# Patient Record
Sex: MALE | URBAN - METROPOLITAN AREA
[De-identification: names, ages, dates, MRNs, and addresses within clinical notes are randomized per-mention and may not be internally consistent; named-entity substitution may affect disease eponyms.]

---

## 2018-06-05 ENCOUNTER — NEW REFERRAL (OUTPATIENT)
Dept: URBAN - METROPOLITAN AREA CLINIC 27 | Facility: CLINIC | Age: 73
End: 2018-06-05

## 2018-06-05 DIAGNOSIS — H25.9: ICD-10-CM

## 2018-06-05 DIAGNOSIS — H43.393: ICD-10-CM

## 2018-06-05 DIAGNOSIS — E11.3213: ICD-10-CM

## 2018-06-05 PROCEDURE — 92134 CPTRZ OPH DX IMG PST SGM RTA: CPT

## 2018-06-05 PROCEDURE — 92250 FUNDUS PHOTOGRAPHY W/I&R: CPT

## 2018-06-05 PROCEDURE — 92004 COMPRE OPH EXAM NEW PT 1/>: CPT

## 2018-06-05 PROCEDURE — 92225 OPHTHALMOSCOPY (INITIAL): CPT

## 2018-06-05 PROCEDURE — 92235 FLUORESCEIN ANGRPH MLTIFRAME: CPT

## 2018-06-05 ASSESSMENT — VISUAL ACUITY
OD_SC: CF 6FT
OD_PH: 20/200
OS_SC: CF 5FT

## 2018-06-05 ASSESSMENT — TONOMETRY
OD_IOP_MMHG: 18
OS_IOP_MMHG: 17

## 2018-06-12 ENCOUNTER — PROCEDURE ONLY (OUTPATIENT)
Dept: URBAN - METROPOLITAN AREA CLINIC 27 | Facility: CLINIC | Age: 73
End: 2018-06-12

## 2018-06-12 DIAGNOSIS — E11.3213: ICD-10-CM

## 2018-06-12 PROCEDURE — 67028 INJECTION EYE DRUG: CPT | Mod: GA,RT

## 2018-06-12 ASSESSMENT — TONOMETRY: OD_IOP_MMHG: 16

## 2018-06-12 ASSESSMENT — VISUAL ACUITY: OD_SC: 20/200+1

## 2018-06-19 ENCOUNTER — PROCEDURE ONLY (OUTPATIENT)
Dept: URBAN - METROPOLITAN AREA CLINIC 27 | Facility: CLINIC | Age: 73
End: 2018-06-19

## 2018-06-19 DIAGNOSIS — E11.3213: ICD-10-CM

## 2018-06-19 PROCEDURE — 67028 INJECTION EYE DRUG: CPT

## 2018-06-19 ASSESSMENT — VISUAL ACUITY
OS_SC: CF 5FT
OS_PH: 20/400

## 2018-06-19 ASSESSMENT — TONOMETRY: OS_IOP_MMHG: 20

## 2018-07-17 ENCOUNTER — FOLLOW UP (OUTPATIENT)
Dept: URBAN - METROPOLITAN AREA CLINIC 27 | Facility: CLINIC | Age: 73
End: 2018-07-17

## 2018-07-17 DIAGNOSIS — E11.3213: ICD-10-CM

## 2018-07-17 PROCEDURE — 92134 CPTRZ OPH DX IMG PST SGM RTA: CPT

## 2018-07-17 PROCEDURE — 92012 INTRM OPH EXAM EST PATIENT: CPT

## 2018-07-17 ASSESSMENT — VISUAL ACUITY
OD_SC: 20/400
OS_SC: CF 5FT
OD_PH: 20/100

## 2018-07-17 ASSESSMENT — TONOMETRY
OD_IOP_MMHG: 20
OS_IOP_MMHG: 18

## 2018-08-02 RX ORDER — GLIMEPIRIDE 4 MG/1
4 TABLET ORAL DAILY
COMMUNITY
End: 2021-07-03 | Stop reason: HOSPADM

## 2018-08-02 RX ORDER — PIOGLITAZONEHYDROCHLORIDE 30 MG/1
30 TABLET ORAL EVERY MORNING
COMMUNITY
End: 2021-07-03 | Stop reason: HOSPADM

## 2018-08-02 RX ORDER — FUROSEMIDE 20 MG/1
20 TABLET ORAL WEEKLY
COMMUNITY
End: 2021-07-03 | Stop reason: HOSPADM

## 2018-08-02 RX ORDER — ENALAPRIL MALEATE 10 MG/1
10 TABLET ORAL 2 TIMES DAILY
Status: ON HOLD | COMMUNITY
End: 2021-07-03 | Stop reason: SDUPTHER

## 2018-08-02 NOTE — PRE-PROCEDURE INSTRUCTIONS
Pre-Surgery Instructions:   Medication Instructions    enalapril (VASOTEC) 10 mg tablet Instructed patient per Anesthesia Guidelines   furosemide (LASIX) 20 mg tablet Instructed patient per Anesthesia Guidelines   glimepiride (AMARYL) 4 mg tablet Instructed patient per Anesthesia Guidelines   MAGNESIUM PO Instructed patient per Anesthesia Guidelines   metFORMIN (GLUCOPHAGE) 850 mg tablet Instructed patient per Anesthesia Guidelines   metFORMIN (GLUCOPHAGE) 850 mg tablet Instructed patient per Anesthesia Guidelines   NON FORMULARY Instructed patient per Anesthesia Guidelines   pioglitazone (ACTOS) 30 mg tablet Instructed patient per Anesthesia Guidelines   PRAVASTATIN SODIUM PO Instructed patient per Anesthesia Guidelines  Pre op instructions given  Pt to take enalapril and blood sugar the am of surgery   [facility] Surgical Experience    The following information was developed to assist you to prepare for your operation  What do I need to do before coming to the hospital?   Arrange for a responsible person to drive you to and from the hospital    Arrange care for your children at home  Children are not allowed in the recovery areas of the hospital   Plan to wear clothing that is easy to put on and take off  If you are having shoulder surgery, wear a shirt that buttons or zippers in the front  Bathing  o Shower the evening before and the morning of your surgery with an antibacterial soap  Please refer to the Pre Op Showering Instructions for Surgery Patients Sheet   o Remove nail polish and all body piercing jewelry  o Do not shave any body part for at least 24 hours before surgery-this includes face, arms, legs and upper body  Food  o Nothing to eat or drink after midnight the night before your surgery   This includes candy and chewing gum  o Exception: If your surgery is after 12:00pm (noon), you may have clear liquids such as 7-Up®, ginger ale, apple or cranberry juice, Grzegorz Loron, water, or clear broth until 8:00 am  o Do not drink milk or juice with pulp on the morning before surgery  o Do not drink alcohol 24 hours before surgery  Medicine  o Follow instructions you received from your surgeon about which medicines you may take on the day of surgery  o If instructed to take medicine on the morning of surgery, take pills with just a small sip of water  Call your prescribing doctor for specific infroamtion on what to do if you take insulin    What should I bring to the hospital?    Bring:  Raisa Mcelroy or a walker, if you have them, for foot or knee surgery   A list of the daily medicines, vitamins, minerals, herbals and nutritional supplements you take  Include the dosages of medicines and the time you take them each day   Glasses, dentures or hearing aids   Minimal clothing; you will be wearing hospital sleepwear   Photo ID; required to verify your identity   If you have a Living Will or Power of , bring a copy of the documents   If you have an ostomy, bring an extra pouch and any supplies you use    Do not bring   Medicines or inhalers   Money, valuables or jewelry    What other information should I know about the day of surgery?  Notify your surgeons if you develop a cold, sore throat, cough, fever, rash or any other illness   Report to the Ambulatory Surgical/Same Day Surgery Unit   You will be instructed to stop at Registration only if you have not been pre-registered   Inform your  fi they do not stay that they will be asked by the staff to leave a phone number where they can be reached   Be available to be reached before surgery  In the event the operating room schedule changes, you may be asked to come in earlier or later than expected    *It is important to tell your doctor and others involved in your health care if you are taking or have been taking any non-prescription drugs, vitamins, minerals, herbals or other nutritional supplements   Any of these may interact with some food or medicines and cause a reaction

## 2018-08-05 ENCOUNTER — ANESTHESIA EVENT (OUTPATIENT)
Dept: PERIOP | Facility: AMBULARY SURGERY CENTER | Age: 73
End: 2018-08-05
Payer: MEDICARE

## 2018-08-05 RX ORDER — ONDANSETRON 2 MG/ML
4 INJECTION INTRAMUSCULAR; INTRAVENOUS ONCE AS NEEDED
Status: CANCELLED | OUTPATIENT
Start: 2018-08-05

## 2018-08-05 NOTE — ANESTHESIA PREPROCEDURE EVALUATION
Review of Systems/Medical History  Patient summary reviewed  Chart reviewed      Cardiovascular  EKG reviewed, Hyperlipidemia, Hypertension ,   Comment: EKG: SR IRBBB    Cariology visit, murmur present, no further work up indicated  Watching murmur  ,  Pulmonary       GI/Hepatic            Endo/Other  Diabetes well controlled type 2 ,   Obesity  morbid obesity   GYN       Hematology   Musculoskeletal    Arthritis     Neurology   Psychology           Physical Exam    Airway    Mallampati score: II  TM Distance: >3 FB  Neck ROM: full     Dental       Cardiovascular  Rhythm: regular, Rate: normal, Murmur,     Pulmonary  Breath sounds clear to auscultation,     Other Findings  Some missing teeth no loose teeth      Anesthesia Plan  ASA Score- 3     Anesthesia Type- IV sedation with anesthesia with ASA Monitors  Additional Monitors:   Airway Plan:         Plan Factors-    Induction- intravenous  Postoperative Plan-     Informed Consent- Anesthetic plan and risks discussed with patient

## 2018-08-06 ENCOUNTER — HOSPITAL ENCOUNTER (OUTPATIENT)
Facility: AMBULARY SURGERY CENTER | Age: 73
Setting detail: OUTPATIENT SURGERY
Discharge: HOME | End: 2018-08-06
Attending: OPHTHALMOLOGY | Admitting: OPHTHALMOLOGY
Payer: MEDICARE

## 2018-08-06 ENCOUNTER — ANESTHESIA (OUTPATIENT)
Dept: PERIOP | Facility: AMBULARY SURGERY CENTER | Age: 73
End: 2018-08-06
Payer: MEDICARE

## 2018-08-06 VITALS
HEIGHT: 68 IN | WEIGHT: 240 LBS | SYSTOLIC BLOOD PRESSURE: 146 MMHG | HEART RATE: 88 BPM | DIASTOLIC BLOOD PRESSURE: 63 MMHG | OXYGEN SATURATION: 95 % | RESPIRATION RATE: 18 BRPM | TEMPERATURE: 97.8 F | BODY MASS INDEX: 36.37 KG/M2

## 2018-08-06 DIAGNOSIS — H25.041 POSTERIOR SUBCAPSULAR POLAR AGE-RELATED CATARACT OF RIGHT EYE: Primary | ICD-10-CM

## 2018-08-06 LAB — GLUCOSE SERPL-MCNC: 120 MG/DL (ref 65–140)

## 2018-08-06 PROCEDURE — 82948 REAGENT STRIP/BLOOD GLUCOSE: CPT

## 2018-08-06 PROCEDURE — 93005 ELECTROCARDIOGRAM TRACING: CPT

## 2018-08-06 PROCEDURE — V2632 POST CHMBR INTRAOCULAR LENS: HCPCS | Performed by: OPHTHALMOLOGY

## 2018-08-06 DEVICE — IOL SN60WF 20.0: Type: IMPLANTABLE DEVICE | Site: EYE | Status: FUNCTIONAL

## 2018-08-06 RX ORDER — LIDOCAINE HYDROCHLORIDE 20 MG/ML
1 JELLY TOPICAL
Status: ACTIVE | OUTPATIENT
Start: 2018-08-06 | End: 2018-08-06

## 2018-08-06 RX ORDER — GATIFLOXACIN 5 MG/ML
1 SOLUTION/ DROPS OPHTHALMIC 2 TIMES DAILY
Qty: 3 ML | Refills: 0
Start: 2018-08-06 | End: 2018-09-27

## 2018-08-06 RX ORDER — GATIFLOXACIN 5 MG/ML
SOLUTION/ DROPS OPHTHALMIC AS NEEDED
Status: DISCONTINUED | OUTPATIENT
Start: 2018-08-06 | End: 2018-08-06 | Stop reason: HOSPADM

## 2018-08-06 RX ORDER — LIDOCAINE HYDROCHLORIDE 10 MG/ML
INJECTION, SOLUTION EPIDURAL; INFILTRATION; INTRACAUDAL; PERINEURAL AS NEEDED
Status: DISCONTINUED | OUTPATIENT
Start: 2018-08-06 | End: 2018-08-06 | Stop reason: HOSPADM

## 2018-08-06 RX ORDER — TETRACAINE HYDROCHLORIDE 5 MG/ML
1 SOLUTION OPHTHALMIC ONCE
Status: COMPLETED | OUTPATIENT
Start: 2018-08-06 | End: 2018-08-06

## 2018-08-06 RX ORDER — MIDAZOLAM HYDROCHLORIDE 1 MG/ML
INJECTION INTRAMUSCULAR; INTRAVENOUS AS NEEDED
Status: DISCONTINUED | OUTPATIENT
Start: 2018-08-06 | End: 2018-08-06 | Stop reason: SURG

## 2018-08-06 RX ORDER — PHENYLEPHRINE HCL 2.5 %
1 DROPS OPHTHALMIC (EYE)
Status: ACTIVE | OUTPATIENT
Start: 2018-08-06 | End: 2018-08-06

## 2018-08-06 RX ORDER — TETRACAINE HYDROCHLORIDE 5 MG/ML
SOLUTION OPHTHALMIC AS NEEDED
Status: DISCONTINUED | OUTPATIENT
Start: 2018-08-06 | End: 2018-08-06 | Stop reason: HOSPADM

## 2018-08-06 RX ORDER — KETOROLAC TROMETHAMINE 5 MG/ML
1 SOLUTION OPHTHALMIC
Status: ACTIVE | OUTPATIENT
Start: 2018-08-06 | End: 2018-08-06

## 2018-08-06 RX ORDER — CYCLOPENTOLATE HYDROCHLORIDE 10 MG/ML
1 SOLUTION/ DROPS OPHTHALMIC
Status: ACTIVE | OUTPATIENT
Start: 2018-08-06 | End: 2018-08-06

## 2018-08-06 RX ORDER — BALANCED SALT SOLUTION 6.4; .75; .48; .3; 3.9; 1.7 MG/ML; MG/ML; MG/ML; MG/ML; MG/ML; MG/ML
SOLUTION OPHTHALMIC AS NEEDED
Status: DISCONTINUED | OUTPATIENT
Start: 2018-08-06 | End: 2018-08-06 | Stop reason: HOSPADM

## 2018-08-06 RX ORDER — LIDOCAINE HYDROCHLORIDE 10 MG/ML
0.5 INJECTION, SOLUTION EPIDURAL; INFILTRATION; INTRACAUDAL; PERINEURAL ONCE AS NEEDED
Status: DISCONTINUED | OUTPATIENT
Start: 2018-08-06 | End: 2018-08-06 | Stop reason: HOSPADM

## 2018-08-06 RX ADMIN — LIDOCAINE HYDROCHLORIDE 1 APPLICATION: 20 JELLY TOPICAL at 06:52

## 2018-08-06 RX ADMIN — LIDOCAINE HYDROCHLORIDE 1 APPLICATION: 20 JELLY TOPICAL at 07:07

## 2018-08-06 RX ADMIN — LIDOCAINE HYDROCHLORIDE 1 APPLICATION: 20 JELLY TOPICAL at 07:41

## 2018-08-06 RX ADMIN — KETOROLAC TROMETHAMINE 1 DROP: 5 SOLUTION OPHTHALMIC at 07:41

## 2018-08-06 RX ADMIN — PHENYLEPHRINE HYDROCHLORIDE 1 DROP: 25 SOLUTION/ DROPS OPHTHALMIC at 06:53

## 2018-08-06 RX ADMIN — CYCLOPENTOLATE HYDROCHLORIDE 1 DROP: 10 SOLUTION/ DROPS OPHTHALMIC at 07:07

## 2018-08-06 RX ADMIN — KETOROLAC TROMETHAMINE 1 DROP: 5 SOLUTION OPHTHALMIC at 06:52

## 2018-08-06 RX ADMIN — KETOROLAC TROMETHAMINE 1 DROP: 5 SOLUTION OPHTHALMIC at 07:07

## 2018-08-06 RX ADMIN — PHENYLEPHRINE HYDROCHLORIDE 1 DROP: 25 SOLUTION/ DROPS OPHTHALMIC at 07:41

## 2018-08-06 RX ADMIN — CYCLOPENTOLATE HYDROCHLORIDE 1 DROP: 10 SOLUTION/ DROPS OPHTHALMIC at 07:41

## 2018-08-06 RX ADMIN — TETRACAINE HYDROCHLORIDE 1 DROP: 5 SOLUTION OPHTHALMIC at 06:53

## 2018-08-06 RX ADMIN — PHENYLEPHRINE HYDROCHLORIDE 1 DROP: 25 SOLUTION/ DROPS OPHTHALMIC at 07:07

## 2018-08-06 RX ADMIN — MIDAZOLAM HYDROCHLORIDE 2 MG: 1 INJECTION, SOLUTION INTRAMUSCULAR; INTRAVENOUS at 07:45

## 2018-08-06 RX ADMIN — CYCLOPENTOLATE HYDROCHLORIDE 1 DROP: 10 SOLUTION/ DROPS OPHTHALMIC at 06:52

## 2018-08-06 NOTE — ANESTHESIA POSTPROCEDURE EVALUATION
Post-Op Assessment Note      CV Status:  Stable    Mental Status:  Alert and awake    Hydration Status:  Euvolemic    PONV Controlled:  Controlled    Airway Patency:  Patent    Post Op Vitals Reviewed: Yes          Staff: Anesthesiologist           /63 (08/06/18 0804)    Temp      Pulse 88 (08/06/18 0804)   Resp      SpO2 95 % (08/06/18 0804)

## 2018-08-06 NOTE — DISCHARGE INSTRUCTIONS
Dr Jaquelin Medeiros Cataract Instructions    Activity:     1  No Driving until instructed   2  Keep shield on until seen tomorrow except when administering drops   3  No heavy lifting   4  No water in eye     Diet:     1  Resume normal diet    Normal Symptoms:     1  Mild Headache   2  Scratchy or picky feeling around eye    Call the office if:     1  You have any questions or concerns   2  If eye pain is not relieved by extra strength tylenol    Office phone number:  [phone]      Next appointment:     1  See Dr Jaquelin Medeiros at his office tomorrow as scheduled   _________1030 am _________________________________________________   2  Bring blue eye kit with you and eyedrops to the office    A new set of comprehensive instructions will be given and reviewed with you during your office visit tomorrow

## 2018-08-06 NOTE — OP NOTE
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 [H25 041]  Decreased vision to 20/100  With problems driving  Pt requested cataract sx the right eye    Procedure and Technique:    Procedure Details     The patient was brought in the OR in stable condition and placed on the operative table  The right eye was prepped and draped in the usual sterile manner  Attention was directed to the right eye where a lid speculum was placed  A 2 4 mm clear corneal incision was made temperally  1/2 cc of 1% MPF Lidocaine was irrigated into the anterior chamber followed by viscoat  The side port incision was placed superiorly  The capsularrhexis was made and the nucleus was hydrodissected with BSS  The nucleus was then removed with the phaco handpiece followed by removal of the cortical material with the I/A handpiece  The capsular bag was then filled with Provisc  The IOL was folded and placed in to the capsular bag and centered well  The remaining Provisc was removed from the eye with the I/A  The wounds were hydrated with BSS and found to be water tight  The lid speculum was removed and 2 drops of Gatifloxicin were placed over the cornea  A protective eye shield was taped over the eye and the patient went to PACU in stable condition  I will see the patient in the office tomorrow and the expected post op period is a few weeks         Complications: None        Disposition: PACU Condition: Stable    SIGNATURE: Marilee Nolan MD  DATE: August 6, 2018  TIME: 8:04 AM

## 2018-08-07 LAB
ATRIAL RATE: 85 BPM
P AXIS: -16 DEGREES
PR INTERVAL: 142 MS
QRS AXIS: 70 DEGREES
QRSD INTERVAL: 110 MS
QT INTERVAL: 392 MS
QTC INTERVAL: 466 MS
T WAVE AXIS: 30 DEGREES
VENTRICULAR RATE: 85 BPM

## 2018-08-07 PROCEDURE — 93010 ELECTROCARDIOGRAM REPORT: CPT | Performed by: INTERNAL MEDICINE

## 2018-09-25 ENCOUNTER — FOLLOW UP (OUTPATIENT)
Dept: URBAN - METROPOLITAN AREA CLINIC 27 | Facility: CLINIC | Age: 73
End: 2018-09-25

## 2018-09-25 DIAGNOSIS — E11.3213: ICD-10-CM

## 2018-09-25 PROCEDURE — 92134 CPTRZ OPH DX IMG PST SGM RTA: CPT

## 2018-09-25 PROCEDURE — 92012 INTRM OPH EXAM EST PATIENT: CPT

## 2018-09-25 ASSESSMENT — VISUAL ACUITY
OD_SC: 20/40+3
OS_SC: CF 4FT

## 2018-09-25 ASSESSMENT — TONOMETRY
OS_IOP_MMHG: 19
OD_IOP_MMHG: 18

## 2018-09-27 NOTE — PRE-PROCEDURE INSTRUCTIONS
Pre-Surgery Instructions:   Medication Instructions    enalapril (VASOTEC) 10 mg tablet Instructed patient per Anesthesia Guidelines   furosemide (LASIX) 20 mg tablet Instructed patient per Anesthesia Guidelines   glimepiride (AMARYL) 4 mg tablet Instructed patient per Anesthesia Guidelines   MAGNESIUM PO Instructed patient per Anesthesia Guidelines   metFORMIN (GLUCOPHAGE) 850 mg tablet Instructed patient per Anesthesia Guidelines   metFORMIN (GLUCOPHAGE) 850 mg tablet Instructed patient per Anesthesia Guidelines   NON FORMULARY Instructed patient per Anesthesia Guidelines   pioglitazone (ACTOS) 30 mg tablet Instructed patient per Anesthesia Guidelines   PRAVASTATIN SODIUM PO Instructed patient per Anesthesia Guidelines  Pre op instructions given  Pt to take blood sugar, enalapril the am of surgery   sister Lurdes Mendez [phone] Surgical Experience    The following information was developed to assist you to prepare for your operation  What do I need to do before coming to the hospital?   Arrange for a responsible person to drive you to and from the hospital    Arrange care for your children at home  Children are not allowed in the recovery areas of the hospital   Plan to wear clothing that is easy to put on and take off  If you are having shoulder surgery, wear a shirt that buttons or zippers in the front  Bathing  o Shower the evening before and the morning of your surgery with an antibacterial soap  Please refer to the Pre Op Showering Instructions for Surgery Patients Sheet   o Remove nail polish and all body piercing jewelry  o Do not shave any body part for at least 24 hours before surgery-this includes face, arms, legs and upper body  Food  o Nothing to eat or drink after midnight the night before your surgery   This includes candy and chewing gum  o Exception: If your surgery is after 12:00pm (noon), you may have clear liquids such as 7-Up®, ginger ale, apple or cranberry juice, Jell-O®, water, or clear broth until 8:00 am  o Do not drink milk or juice with pulp on the morning before surgery  o Do not drink alcohol 24 hours before surgery  Medicine  o Follow instructions you received from your surgeon about which medicines you may take on the day of surgery  o If instructed to take medicine on the morning of surgery, take pills with just a small sip of water  Call your prescribing doctor for specific infroamtion on what to do if you take insulin    What should I bring to the hospital?    Bring:  Antionette Duster or a walker, if you have them, for foot or knee surgery   A list of the daily medicines, vitamins, minerals, herbals and nutritional supplements you take  Include the dosages of medicines and the time you take them each day   Glasses, dentures or hearing aids   Minimal clothing; you will be wearing hospital sleepwear   Photo ID; required to verify your identity   If you have a Living Will or Power of , bring a copy of the documents   If you have an ostomy, bring an extra pouch and any supplies you use    Do not bring   Medicines or inhalers   Money, valuables or jewelry    What other information should I know about the day of surgery?  Notify your surgeons if you develop a cold, sore throat, cough, fever, rash or any other illness   Report to the Ambulatory Surgical/Same Day Surgery Unit   You will be instructed to stop at Registration only if you have not been pre-registered   Inform your  fi they do not stay that they will be asked by the staff to leave a phone number where they can be reached   Be available to be reached before surgery  In the event the operating room schedule changes, you may be asked to come in earlier or later than expected    *It is important to tell your doctor and others involved in your health care if you are taking or have been taking any non-prescription drugs, vitamins, minerals, herbals or other nutritional supplements  Any of these may interact with some food or medicines and cause a reaction

## 2018-09-30 ENCOUNTER — ANESTHESIA EVENT (OUTPATIENT)
Dept: PERIOP | Facility: AMBULARY SURGERY CENTER | Age: 73
End: 2018-09-30
Payer: MEDICARE

## 2018-09-30 NOTE — ANESTHESIA PREPROCEDURE EVALUATION
Edema lower legs   Diabetes mellitus (HCC)    Hearing aid worn bilateral   Arthritis    Hyperlipidemia    Hypertension controlled   BPH associated with nocturia            Review of Systems/Medical History  Patient summary reviewed  Chart reviewed      Cardiovascular  Hyperlipidemia, Hypertension ,    Pulmonary       GI/Hepatic       Prostatic disorder, benign prostatic hyperplasia       Endo/Other  Diabetes ,   Obesity  super morbid obesity   GYN       Hematology   Musculoskeletal    Comment: Tremor of both hands Arthritis     Neurology   Psychology           Physical Exam    Airway    Mallampati score: II  TM Distance: >3 FB  Neck ROM: full     Dental       Cardiovascular  Rhythm: regular, Rate: normal,     Pulmonary  Breath sounds clear to auscultation,     Other Findings        Anesthesia Plan  ASA Score- 2     Anesthesia Type- IV sedation with anesthesia with ASA Monitors  Additional Monitors:   Airway Plan:         Plan Factors-    Induction- intravenous  Postoperative Plan-     Informed Consent- Anesthetic plan and risks discussed with patient

## 2018-10-01 ENCOUNTER — ANESTHESIA (OUTPATIENT)
Dept: PERIOP | Facility: AMBULARY SURGERY CENTER | Age: 73
End: 2018-10-01
Payer: MEDICARE

## 2018-10-01 ENCOUNTER — HOSPITAL ENCOUNTER (OUTPATIENT)
Facility: AMBULARY SURGERY CENTER | Age: 73
Setting detail: OUTPATIENT SURGERY
Discharge: HOME | End: 2018-10-01
Attending: OPHTHALMOLOGY | Admitting: OPHTHALMOLOGY
Payer: MEDICARE

## 2018-10-01 VITALS
HEART RATE: 88 BPM | HEIGHT: 68 IN | OXYGEN SATURATION: 94 % | RESPIRATION RATE: 16 BRPM | TEMPERATURE: 98.3 F | SYSTOLIC BLOOD PRESSURE: 143 MMHG | DIASTOLIC BLOOD PRESSURE: 67 MMHG | BODY MASS INDEX: 36.37 KG/M2 | WEIGHT: 240 LBS

## 2018-10-01 DIAGNOSIS — H25.042 POSTERIOR SUBCAPSULAR POLAR AGE-RELATED CATARACT OF LEFT EYE: Primary | ICD-10-CM

## 2018-10-01 LAB — GLUCOSE SERPL-MCNC: 117 MG/DL (ref 65–140)

## 2018-10-01 PROCEDURE — V2632 POST CHMBR INTRAOCULAR LENS: HCPCS | Performed by: OPHTHALMOLOGY

## 2018-10-01 PROCEDURE — 82948 REAGENT STRIP/BLOOD GLUCOSE: CPT

## 2018-10-01 DEVICE — IOL SN60WF 20.0: Type: IMPLANTABLE DEVICE | Site: EYE | Status: FUNCTIONAL

## 2018-10-01 RX ORDER — GATIFLOXACIN 5 MG/ML
1 SOLUTION/ DROPS OPHTHALMIC 2 TIMES DAILY
Qty: 3 ML | Refills: 0
Start: 2018-10-01 | End: 2021-07-03 | Stop reason: HOSPADM

## 2018-10-01 RX ORDER — KETOROLAC TROMETHAMINE 5 MG/ML
1 SOLUTION OPHTHALMIC
Status: COMPLETED | OUTPATIENT
Start: 2018-10-01 | End: 2018-10-01

## 2018-10-01 RX ORDER — FENTANYL CITRATE/PF 50 MCG/ML
25 SYRINGE (ML) INJECTION
Status: DISCONTINUED | OUTPATIENT
Start: 2018-10-01 | End: 2018-10-01 | Stop reason: HOSPADM

## 2018-10-01 RX ORDER — PHENYLEPHRINE HCL 2.5 %
1 DROPS OPHTHALMIC (EYE)
Status: COMPLETED | OUTPATIENT
Start: 2018-10-01 | End: 2018-10-01

## 2018-10-01 RX ORDER — CYCLOPENTOLATE HYDROCHLORIDE 10 MG/ML
1 SOLUTION/ DROPS OPHTHALMIC
Status: COMPLETED | OUTPATIENT
Start: 2018-10-01 | End: 2018-10-01

## 2018-10-01 RX ORDER — MIDAZOLAM HYDROCHLORIDE 1 MG/ML
INJECTION INTRAMUSCULAR; INTRAVENOUS AS NEEDED
Status: DISCONTINUED | OUTPATIENT
Start: 2018-10-01 | End: 2018-10-01 | Stop reason: SURG

## 2018-10-01 RX ORDER — TETRACAINE HYDROCHLORIDE 5 MG/ML
1 SOLUTION OPHTHALMIC ONCE
Status: COMPLETED | OUTPATIENT
Start: 2018-10-01 | End: 2018-10-01

## 2018-10-01 RX ORDER — LIDOCAINE HYDROCHLORIDE 10 MG/ML
INJECTION, SOLUTION EPIDURAL; INFILTRATION; INTRACAUDAL; PERINEURAL AS NEEDED
Status: DISCONTINUED | OUTPATIENT
Start: 2018-10-01 | End: 2018-10-01 | Stop reason: HOSPADM

## 2018-10-01 RX ORDER — GATIFLOXACIN 5 MG/ML
SOLUTION/ DROPS OPHTHALMIC AS NEEDED
Status: DISCONTINUED | OUTPATIENT
Start: 2018-10-01 | End: 2018-10-01 | Stop reason: HOSPADM

## 2018-10-01 RX ORDER — LIDOCAINE HYDROCHLORIDE 20 MG/ML
1 JELLY TOPICAL
Status: COMPLETED | OUTPATIENT
Start: 2018-10-01 | End: 2018-10-01

## 2018-10-01 RX ORDER — BALANCED SALT SOLUTION 6.4; .75; .48; .3; 3.9; 1.7 MG/ML; MG/ML; MG/ML; MG/ML; MG/ML; MG/ML
SOLUTION OPHTHALMIC AS NEEDED
Status: DISCONTINUED | OUTPATIENT
Start: 2018-10-01 | End: 2018-10-01 | Stop reason: HOSPADM

## 2018-10-01 RX ORDER — ONDANSETRON 2 MG/ML
4 INJECTION INTRAMUSCULAR; INTRAVENOUS ONCE AS NEEDED
Status: DISCONTINUED | OUTPATIENT
Start: 2018-10-01 | End: 2018-10-01 | Stop reason: HOSPADM

## 2018-10-01 RX ORDER — TETRACAINE HYDROCHLORIDE 5 MG/ML
SOLUTION OPHTHALMIC AS NEEDED
Status: DISCONTINUED | OUTPATIENT
Start: 2018-10-01 | End: 2018-10-01 | Stop reason: HOSPADM

## 2018-10-01 RX ADMIN — KETOROLAC TROMETHAMINE 1 DROP: 5 SOLUTION OPHTHALMIC at 10:13

## 2018-10-01 RX ADMIN — PHENYLEPHRINE HYDROCHLORIDE 1 DROP: 25 SOLUTION/ DROPS OPHTHALMIC at 10:13

## 2018-10-01 RX ADMIN — TETRACAINE HYDROCHLORIDE 1 DROP: 5 SOLUTION OPHTHALMIC at 08:24

## 2018-10-01 RX ADMIN — LIDOCAINE HYDROCHLORIDE 1 APPLICATION: 20 JELLY TOPICAL at 08:39

## 2018-10-01 RX ADMIN — MIDAZOLAM HYDROCHLORIDE 0.5 MG: 1 INJECTION, SOLUTION INTRAMUSCULAR; INTRAVENOUS at 11:09

## 2018-10-01 RX ADMIN — LIDOCAINE HYDROCHLORIDE 1 APPLICATION: 20 JELLY TOPICAL at 08:24

## 2018-10-01 RX ADMIN — LIDOCAINE HYDROCHLORIDE 1 APPLICATION: 20 JELLY TOPICAL at 10:13

## 2018-10-01 RX ADMIN — LIDOCAINE HYDROCHLORIDE 1 APPLICATION: 20 JELLY TOPICAL at 10:29

## 2018-10-01 RX ADMIN — CYCLOPENTOLATE HYDROCHLORIDE 1 DROP: 10 SOLUTION/ DROPS OPHTHALMIC at 10:12

## 2018-10-01 RX ADMIN — PHENYLEPHRINE HYDROCHLORIDE 1 DROP: 25 SOLUTION/ DROPS OPHTHALMIC at 08:24

## 2018-10-01 RX ADMIN — KETOROLAC TROMETHAMINE 1 DROP: 5 SOLUTION OPHTHALMIC at 08:39

## 2018-10-01 RX ADMIN — PHENYLEPHRINE HYDROCHLORIDE 1 DROP: 25 SOLUTION/ DROPS OPHTHALMIC at 08:39

## 2018-10-01 RX ADMIN — PHENYLEPHRINE HYDROCHLORIDE 1 DROP: 25 SOLUTION/ DROPS OPHTHALMIC at 10:29

## 2018-10-01 RX ADMIN — CYCLOPENTOLATE HYDROCHLORIDE 1 DROP: 10 SOLUTION/ DROPS OPHTHALMIC at 08:39

## 2018-10-01 RX ADMIN — KETOROLAC TROMETHAMINE 1 DROP: 5 SOLUTION OPHTHALMIC at 10:29

## 2018-10-01 RX ADMIN — KETOROLAC TROMETHAMINE 1 DROP: 5 SOLUTION OPHTHALMIC at 08:24

## 2018-10-01 RX ADMIN — MIDAZOLAM HYDROCHLORIDE 0.5 MG: 1 INJECTION, SOLUTION INTRAMUSCULAR; INTRAVENOUS at 11:06

## 2018-10-01 RX ADMIN — CYCLOPENTOLATE HYDROCHLORIDE 1 DROP: 10 SOLUTION/ DROPS OPHTHALMIC at 10:29

## 2018-10-01 RX ADMIN — CYCLOPENTOLATE HYDROCHLORIDE 1 DROP: 10 SOLUTION/ DROPS OPHTHALMIC at 08:24

## 2018-10-01 NOTE — DISCHARGE INSTRUCTIONS
Dr Jaison Rodriguez Cataract Instructions    Activity:     1  No Driving until instructed   2  Keep shield on until seen tomorrow except when administering drops   3  No heavy lifting   4  No water in eye     Diet:     1  Resume normal diet    Normal Symptoms:     1  Mild Headache   2  Scratchy or picky feeling around eye    Call the office if:     1  You have any questions or concerns   2  If eye pain is not relieved by extra strength tylenol    Office phone number:  [phone]      Next appointment:     1  See Dr Jaison Rodriguez at his office tomorrow as scheduled         11:15 AM   2  Bring blue eye kit with you and eyedrops to the office    A new set of comprehensive instructions will be given and reviewed with you during your office visit tomorrow

## 2018-10-01 NOTE — OP NOTE
OPERATIVE REPORT    PATIENT NAME: Brianne Monteiro    :  1945  MRN: [id number]  Pt Location: [place] [address] OR ROOM 01    Surgery Date: 10/1/2018    Surgeon(s) and Role:     * Jhony Mann MD - Primary    Posterior subcapsular polar age-related cataract of left eye [H25 042]    Post-Op Diagnosis Codes:     * Posterior subcapsular polar age-related cataract of left eye [H25 042]    Procedure(s):  EXTRACTION EXTRACAPSULAR CATARACT PHACO INTRAOCULAR LENS (IOL)    Anesthesia Type:   IV Sedation with Anesthesia    Operative Indications:  Posterior subcapsular polar age-related cataract of left eye [H25 042]  Decreased vision to 20/200  With problems driving  Pt requested cataract sx the left eye    Procedure and Technique:    Procedure Details     The patient was brought in the OR in stable condition and placed on the operative table  The left eye was prepped and draped in the usual sterile manner  Attention was directed to the left eye where a lid speculum was placed  A 2 4 mm clear corneal incision was made temperally  1/2 cc of 1% MPF Lidocaine was irrigated into the anterior chamber followed by viscoat  The side port incision was placed superiorly  The capsularrhexis was made and the nucleus was hydrodissected with BSS  The nucleus was then removed with the phaco handpiece followed by removal of the cortical material with the I/A handpiece  The capsular bag was then filled with Provisc  The IOL was folded and placed in to the capsular bag and centered well  The remaining Provisc was removed from the eye with the I/A  The wounds were hydrated with BSS and found to be water tight  The lid speculum was removed and 2 drops of Gatifloxicin were placed over the cornea  A protective eye shield was taped over the eye and the patient went to PACU in stable condition  I will see the patient in the office tomorrow and the expected post op period is a few weeks         Complications: None        Disposition: PACU Condition: Stable    SIGNATURE: Diana Stoner MD  DATE: October 1, 2018  TIME: 11:28 AM

## 2018-10-01 NOTE — PERIOPERATIVE NURSING NOTE
Surgery delayed until 1100 due to having toast at 0700  Drops stopped after 2nd dose and resumed at 1013

## 2018-11-20 ENCOUNTER — FOLLOW UP (OUTPATIENT)
Dept: URBAN - METROPOLITAN AREA CLINIC 27 | Facility: CLINIC | Age: 73
End: 2018-11-20

## 2018-11-20 DIAGNOSIS — Z96.1: ICD-10-CM

## 2018-11-20 DIAGNOSIS — H43.393: ICD-10-CM

## 2018-11-20 DIAGNOSIS — E11.3213: ICD-10-CM

## 2018-11-20 PROCEDURE — 92250 FUNDUS PHOTOGRAPHY W/I&R: CPT

## 2018-11-20 PROCEDURE — 92014 COMPRE OPH EXAM EST PT 1/>: CPT

## 2018-11-20 PROCEDURE — 92134 CPTRZ OPH DX IMG PST SGM RTA: CPT

## 2018-11-20 PROCEDURE — 92235 FLUORESCEIN ANGRPH MLTIFRAME: CPT

## 2018-11-20 ASSESSMENT — TONOMETRY
OD_IOP_MMHG: 16
OS_IOP_MMHG: 17

## 2018-11-20 ASSESSMENT — VISUAL ACUITY
OS_SC: 20/200
OD_SC: 20/30-2
OS_PH: 20/80-1

## 2018-12-04 ENCOUNTER — PROCEDURE ONLY (OUTPATIENT)
Dept: URBAN - METROPOLITAN AREA CLINIC 27 | Facility: CLINIC | Age: 73
End: 2018-12-04

## 2018-12-04 DIAGNOSIS — E11.3213: ICD-10-CM

## 2018-12-04 PROCEDURE — 67028 INJECTION EYE DRUG: CPT

## 2018-12-04 ASSESSMENT — VISUAL ACUITY: OD_SC: 20/30-2

## 2018-12-04 ASSESSMENT — TONOMETRY: OD_IOP_MMHG: 15

## 2018-12-18 ENCOUNTER — PROCEDURE ONLY (OUTPATIENT)
Dept: URBAN - METROPOLITAN AREA CLINIC 27 | Facility: CLINIC | Age: 73
End: 2018-12-18

## 2018-12-18 DIAGNOSIS — E11.3213: ICD-10-CM

## 2018-12-18 PROCEDURE — 67028 INJECTION EYE DRUG: CPT

## 2018-12-18 ASSESSMENT — TONOMETRY: OS_IOP_MMHG: 17

## 2018-12-18 ASSESSMENT — VISUAL ACUITY: OS_SC: 20/200+1

## 2019-01-22 ENCOUNTER — FOLLOW UP (OUTPATIENT)
Dept: URBAN - METROPOLITAN AREA CLINIC 27 | Facility: CLINIC | Age: 74
End: 2019-01-22

## 2019-01-22 DIAGNOSIS — E11.3213: ICD-10-CM

## 2019-01-22 PROCEDURE — 92134 CPTRZ OPH DX IMG PST SGM RTA: CPT

## 2019-01-22 PROCEDURE — 92012 INTRM OPH EXAM EST PATIENT: CPT

## 2019-01-22 ASSESSMENT — TONOMETRY
OD_IOP_MMHG: 14
OS_IOP_MMHG: 13

## 2019-01-22 ASSESSMENT — VISUAL ACUITY
OD_SC: 20/40+2
OS_SC: 20/125-2

## 2019-03-05 ENCOUNTER — FOLLOW UP (OUTPATIENT)
Dept: URBAN - METROPOLITAN AREA CLINIC 27 | Facility: CLINIC | Age: 74
End: 2019-03-05

## 2019-03-05 DIAGNOSIS — E11.3213: ICD-10-CM

## 2019-03-05 PROCEDURE — 92134 CPTRZ OPH DX IMG PST SGM RTA: CPT

## 2019-03-05 PROCEDURE — 92012 INTRM OPH EXAM EST PATIENT: CPT

## 2019-03-05 ASSESSMENT — VISUAL ACUITY
OD_SC: 20/20-1
OS_PH: 20/80
OS_SC: 20/125

## 2019-03-05 ASSESSMENT — TONOMETRY
OS_IOP_MMHG: 20
OD_IOP_MMHG: 22

## 2019-05-07 ENCOUNTER — FOLLOW UP (OUTPATIENT)
Dept: URBAN - METROPOLITAN AREA CLINIC 27 | Facility: CLINIC | Age: 74
End: 2019-05-07

## 2019-05-07 DIAGNOSIS — Z96.1: ICD-10-CM

## 2019-05-07 DIAGNOSIS — H43.393: ICD-10-CM

## 2019-05-07 DIAGNOSIS — E11.3213: ICD-10-CM

## 2019-05-07 PROCEDURE — 92134 CPTRZ OPH DX IMG PST SGM RTA: CPT

## 2019-05-07 PROCEDURE — 92014 COMPRE OPH EXAM EST PT 1/>: CPT

## 2019-05-07 ASSESSMENT — TONOMETRY
OD_IOP_MMHG: 18
OS_IOP_MMHG: 16

## 2019-05-07 ASSESSMENT — VISUAL ACUITY
OS_PH: 20/80+
OD_PH: 20/25-
OS_SC: 20/100-2
OD_SC: 20/30+2

## 2019-05-21 ENCOUNTER — PROCEDURE ONLY (OUTPATIENT)
Dept: URBAN - METROPOLITAN AREA CLINIC 27 | Facility: CLINIC | Age: 74
End: 2019-05-21

## 2019-05-21 DIAGNOSIS — E11.3213: ICD-10-CM

## 2019-05-21 PROCEDURE — 67028 INJECTION EYE DRUG: CPT | Mod: GA,RT

## 2019-05-21 ASSESSMENT — VISUAL ACUITY: OD_SC: 20/25

## 2019-05-21 ASSESSMENT — TONOMETRY: OD_IOP_MMHG: 19

## 2019-06-04 ENCOUNTER — PROCEDURE ONLY (OUTPATIENT)
Dept: URBAN - METROPOLITAN AREA CLINIC 27 | Facility: CLINIC | Age: 74
End: 2019-06-04

## 2019-06-04 DIAGNOSIS — E11.3213: ICD-10-CM

## 2019-06-04 PROCEDURE — 67028 INJECTION EYE DRUG: CPT

## 2019-06-04 ASSESSMENT — VISUAL ACUITY: OS_SC: 20/125

## 2019-06-04 ASSESSMENT — TONOMETRY: OS_IOP_MMHG: 17

## 2019-07-02 ENCOUNTER — FOLLOW UP (OUTPATIENT)
Dept: URBAN - METROPOLITAN AREA CLINIC 27 | Facility: CLINIC | Age: 74
End: 2019-07-02

## 2019-07-02 DIAGNOSIS — E11.3213: ICD-10-CM

## 2019-07-02 PROCEDURE — 92012 INTRM OPH EXAM EST PATIENT: CPT

## 2019-07-02 PROCEDURE — 92134 CPTRZ OPH DX IMG PST SGM RTA: CPT

## 2019-07-02 ASSESSMENT — VISUAL ACUITY
OS_SC: 20/125
OD_SC: 20/25+2

## 2019-07-02 ASSESSMENT — TONOMETRY
OD_IOP_MMHG: 21
OS_IOP_MMHG: 21

## 2019-09-03 ENCOUNTER — FOLLOW UP (OUTPATIENT)
Dept: URBAN - METROPOLITAN AREA CLINIC 27 | Facility: CLINIC | Age: 74
End: 2019-09-03

## 2019-09-03 DIAGNOSIS — H43.393: ICD-10-CM

## 2019-09-03 DIAGNOSIS — E11.3213: ICD-10-CM

## 2019-09-03 DIAGNOSIS — Z96.1: ICD-10-CM

## 2019-09-03 PROCEDURE — 92134 CPTRZ OPH DX IMG PST SGM RTA: CPT

## 2019-09-03 PROCEDURE — 92012 INTRM OPH EXAM EST PATIENT: CPT

## 2019-09-03 ASSESSMENT — VISUAL ACUITY
OD_SC: 20/30
OS_SC: 20/100

## 2019-09-03 ASSESSMENT — TONOMETRY
OS_IOP_MMHG: 18
OD_IOP_MMHG: 16

## 2019-10-08 ENCOUNTER — FOLLOW UP (OUTPATIENT)
Dept: URBAN - METROPOLITAN AREA CLINIC 27 | Facility: CLINIC | Age: 74
End: 2019-10-08

## 2019-10-08 DIAGNOSIS — E11.3213: ICD-10-CM

## 2019-10-08 DIAGNOSIS — H43.393: ICD-10-CM

## 2019-10-08 DIAGNOSIS — Z96.1: ICD-10-CM

## 2019-10-08 PROCEDURE — 92134 CPTRZ OPH DX IMG PST SGM RTA: CPT

## 2019-10-08 PROCEDURE — 92014 COMPRE OPH EXAM EST PT 1/>: CPT

## 2019-10-08 PROCEDURE — 92250 FUNDUS PHOTOGRAPHY W/I&R: CPT

## 2019-10-08 PROCEDURE — 92235 FLUORESCEIN ANGRPH MLTIFRAME: CPT

## 2019-10-08 ASSESSMENT — TONOMETRY
OS_IOP_MMHG: 17
OD_IOP_MMHG: 17

## 2019-10-08 ASSESSMENT — VISUAL ACUITY
OD_SC: 20/30-2
OS_SC: 20/100+1

## 2019-11-05 ENCOUNTER — PROCEDURE ONLY (OUTPATIENT)
Dept: URBAN - METROPOLITAN AREA CLINIC 27 | Facility: CLINIC | Age: 74
End: 2019-11-05

## 2019-11-05 DIAGNOSIS — E11.3213: ICD-10-CM

## 2019-11-05 PROCEDURE — 67028 INJECTION EYE DRUG: CPT

## 2019-11-05 ASSESSMENT — VISUAL ACUITY: OD_CC: 20/40

## 2019-11-05 ASSESSMENT — TONOMETRY: OD_IOP_MMHG: 18

## 2019-11-19 ENCOUNTER — PROCEDURE ONLY (OUTPATIENT)
Dept: URBAN - METROPOLITAN AREA CLINIC 27 | Facility: CLINIC | Age: 74
End: 2019-11-19

## 2019-11-19 DIAGNOSIS — E11.3213: ICD-10-CM

## 2019-11-19 PROCEDURE — 67028 INJECTION EYE DRUG: CPT

## 2019-11-19 ASSESSMENT — TONOMETRY: OS_IOP_MMHG: 18

## 2019-11-19 ASSESSMENT — VISUAL ACUITY: OS_SC: 20/100+2

## 2019-12-31 ENCOUNTER — FOLLOW UP (OUTPATIENT)
Dept: URBAN - METROPOLITAN AREA CLINIC 27 | Facility: CLINIC | Age: 74
End: 2019-12-31

## 2019-12-31 DIAGNOSIS — E11.3213: ICD-10-CM

## 2019-12-31 PROCEDURE — 92134 CPTRZ OPH DX IMG PST SGM RTA: CPT

## 2019-12-31 PROCEDURE — 92012 INTRM OPH EXAM EST PATIENT: CPT

## 2019-12-31 ASSESSMENT — VISUAL ACUITY
OS_SC: 20/80-2
OD_SC: 20/25-2

## 2019-12-31 ASSESSMENT — TONOMETRY
OS_IOP_MMHG: 19
OD_IOP_MMHG: 19

## 2020-02-25 ENCOUNTER — FOLLOW UP (OUTPATIENT)
Dept: URBAN - METROPOLITAN AREA CLINIC 27 | Facility: CLINIC | Age: 75
End: 2020-02-25

## 2020-02-25 DIAGNOSIS — E11.3213: ICD-10-CM

## 2020-02-25 DIAGNOSIS — Z96.1: ICD-10-CM

## 2020-02-25 DIAGNOSIS — H43.393: ICD-10-CM

## 2020-02-25 PROCEDURE — 92012 INTRM OPH EXAM EST PATIENT: CPT

## 2020-02-25 PROCEDURE — 92134 CPTRZ OPH DX IMG PST SGM RTA: CPT

## 2020-02-25 ASSESSMENT — TONOMETRY
OS_IOP_MMHG: 21
OD_IOP_MMHG: 18

## 2020-02-25 ASSESSMENT — VISUAL ACUITY
OD_CC: 20/30+2
OD_PH: 20/25-2
OS_PH: 20/60-1
OS_CC: 20/80-2

## 2021-04-10 ENCOUNTER — IMMUNIZATIONS (OUTPATIENT)
Dept: FAMILY MEDICINE CLINIC | Facility: HOSPITAL | Age: 76
End: 2021-04-10

## 2021-04-10 DIAGNOSIS — Z23 ENCOUNTER FOR IMMUNIZATION: Primary | ICD-10-CM

## 2021-04-10 PROCEDURE — 0001A SARS-COV-2 / COVID-19 MRNA VACCINE (PFIZER-BIONTECH) 30 MCG: CPT

## 2021-04-10 PROCEDURE — 91300 SARS-COV-2 / COVID-19 MRNA VACCINE (PFIZER-BIONTECH) 30 MCG: CPT

## 2021-04-13 ENCOUNTER — FOLLOW UP (OUTPATIENT)
Dept: URBAN - METROPOLITAN AREA CLINIC 27 | Facility: CLINIC | Age: 76
End: 2021-04-13

## 2021-04-13 DIAGNOSIS — E11.3213: ICD-10-CM

## 2021-04-13 DIAGNOSIS — H43.393: ICD-10-CM

## 2021-04-13 DIAGNOSIS — Z96.1: ICD-10-CM

## 2021-04-13 PROCEDURE — 92235 FLUORESCEIN ANGRPH MLTIFRAME: CPT

## 2021-04-13 PROCEDURE — 92134 CPTRZ OPH DX IMG PST SGM RTA: CPT

## 2021-04-13 PROCEDURE — 92250 FUNDUS PHOTOGRAPHY W/I&R: CPT

## 2021-04-13 PROCEDURE — 92014 COMPRE OPH EXAM EST PT 1/>: CPT

## 2021-04-13 ASSESSMENT — TONOMETRY
OD_IOP_MMHG: 18
OS_IOP_MMHG: 17

## 2021-04-13 ASSESSMENT — VISUAL ACUITY
OS_SC: 20/100
OD_SC: 20/30+2

## 2021-04-27 ENCOUNTER — PROCEDURE ONLY (OUTPATIENT)
Dept: URBAN - METROPOLITAN AREA CLINIC 27 | Facility: CLINIC | Age: 76
End: 2021-04-27

## 2021-04-27 DIAGNOSIS — E11.3213: ICD-10-CM

## 2021-04-27 PROCEDURE — 67028 INJECTION EYE DRUG: CPT

## 2021-04-27 ASSESSMENT — VISUAL ACUITY: OD_CC: 20/25

## 2021-04-27 ASSESSMENT — TONOMETRY: OD_IOP_MMHG: 20

## 2021-05-01 ENCOUNTER — IMMUNIZATIONS (OUTPATIENT)
Dept: FAMILY MEDICINE CLINIC | Facility: HOSPITAL | Age: 76
End: 2021-05-01

## 2021-05-01 DIAGNOSIS — Z23 ENCOUNTER FOR IMMUNIZATION: Primary | ICD-10-CM

## 2021-05-01 PROCEDURE — 0002A SARS-COV-2 / COVID-19 MRNA VACCINE (PFIZER-BIONTECH) 30 MCG: CPT

## 2021-05-01 PROCEDURE — 91300 SARS-COV-2 / COVID-19 MRNA VACCINE (PFIZER-BIONTECH) 30 MCG: CPT

## 2021-05-04 ENCOUNTER — PROCEDURE ONLY (OUTPATIENT)
Dept: URBAN - METROPOLITAN AREA CLINIC 27 | Facility: CLINIC | Age: 76
End: 2021-05-04

## 2021-05-04 DIAGNOSIS — E11.3213: ICD-10-CM

## 2021-05-04 PROCEDURE — 67028 INJECTION EYE DRUG: CPT

## 2021-05-04 ASSESSMENT — VISUAL ACUITY: OS_CC: 20/200

## 2021-05-04 ASSESSMENT — TONOMETRY: OS_IOP_MMHG: 17

## 2021-06-01 ENCOUNTER — FOLLOW UP (OUTPATIENT)
Dept: URBAN - METROPOLITAN AREA CLINIC 27 | Facility: CLINIC | Age: 76
End: 2021-06-01

## 2021-06-01 DIAGNOSIS — E11.3213: ICD-10-CM

## 2021-06-01 PROCEDURE — 92134 CPTRZ OPH DX IMG PST SGM RTA: CPT

## 2021-06-01 PROCEDURE — 92012 INTRM OPH EXAM EST PATIENT: CPT

## 2021-06-01 ASSESSMENT — VISUAL ACUITY
OD_SC: 20/25+2
OS_SC: 20/200+1

## 2021-06-01 ASSESSMENT — TONOMETRY
OS_IOP_MMHG: 21
OD_IOP_MMHG: 15

## 2021-06-29 ENCOUNTER — APPOINTMENT (EMERGENCY)
Dept: RADIOLOGY | Facility: HOSPITAL | Age: 76
DRG: 637 | End: 2021-06-29
Payer: MEDICARE

## 2021-06-29 ENCOUNTER — HOSPITAL ENCOUNTER (INPATIENT)
Facility: HOSPITAL | Age: 76
LOS: 3 days | Discharge: HOME HEALTH | DRG: 637 | End: 2021-07-03
Attending: EMERGENCY MEDICINE | Admitting: INTERNAL MEDICINE
Payer: MEDICARE

## 2021-06-29 DIAGNOSIS — E11.649 TYPE 2 DIABETES MELLITUS WITH HYPOGLYCEMIA WITHOUT COMA, WITHOUT LONG-TERM CURRENT USE OF INSULIN (HCC): ICD-10-CM

## 2021-06-29 DIAGNOSIS — N30.00 ACUTE CYSTITIS WITHOUT HEMATURIA: ICD-10-CM

## 2021-06-29 DIAGNOSIS — I10 ESSENTIAL HYPERTENSION: ICD-10-CM

## 2021-06-29 DIAGNOSIS — R97.20 ELEVATED PSA: ICD-10-CM

## 2021-06-29 DIAGNOSIS — R33.9 URINARY RETENTION: ICD-10-CM

## 2021-06-29 DIAGNOSIS — E16.2 HYPOGLYCEMIA: Primary | ICD-10-CM

## 2021-06-29 LAB
BASE EX.OXY STD BLDV CALC-SCNC: 53.6 % (ref 60–80)
BASE EXCESS BLDV CALC-SCNC: 5.9 MMOL/L
BASOPHILS # BLD AUTO: 0.01 THOUSANDS/ΜL (ref 0–0.1)
BASOPHILS NFR BLD AUTO: 0 % (ref 0–1)
EOSINOPHIL # BLD AUTO: 0.01 THOUSAND/ΜL (ref 0–0.61)
EOSINOPHIL NFR BLD AUTO: 0 % (ref 0–6)
ERYTHROCYTE [DISTWIDTH] IN BLOOD BY AUTOMATED COUNT: 14.5 % (ref 11.6–15.1)
GLUCOSE SERPL-MCNC: 46 MG/DL (ref 65–140)
GLUCOSE SERPL-MCNC: 76 MG/DL (ref 65–140)
HCO3 BLDV-SCNC: 32.1 MMOL/L (ref 24–30)
HCT VFR BLD AUTO: 31.3 % (ref 36.5–49.3)
HGB BLD-MCNC: 9.6 G/DL (ref 12–17)
IMM GRANULOCYTES # BLD AUTO: 0.02 THOUSAND/UL (ref 0–0.2)
IMM GRANULOCYTES NFR BLD AUTO: 0 % (ref 0–2)
LYMPHOCYTES # BLD AUTO: 1.14 THOUSANDS/ΜL (ref 0.6–4.47)
LYMPHOCYTES NFR BLD AUTO: 15 % (ref 14–44)
MCH RBC QN AUTO: 27.4 PG (ref 26.8–34.3)
MCHC RBC AUTO-ENTMCNC: 30.7 G/DL (ref 31.4–37.4)
MCV RBC AUTO: 89 FL (ref 82–98)
MONOCYTES # BLD AUTO: 0.59 THOUSAND/ΜL (ref 0.17–1.22)
MONOCYTES NFR BLD AUTO: 8 % (ref 4–12)
NEUTROPHILS # BLD AUTO: 5.75 THOUSANDS/ΜL (ref 1.85–7.62)
NEUTS SEG NFR BLD AUTO: 77 % (ref 43–75)
NRBC BLD AUTO-RTO: 0 /100 WBCS
O2 CT BLDV-SCNC: 8.1 ML/DL
PCO2 BLDV: 55.1 MM HG (ref 42–50)
PH BLDV: 7.38 [PH] (ref 7.3–7.4)
PLATELET # BLD AUTO: 308 THOUSANDS/UL (ref 149–390)
PMV BLD AUTO: 9.8 FL (ref 8.9–12.7)
PO2 BLDV: 30.9 MM HG (ref 35–45)
RBC # BLD AUTO: 3.51 MILLION/UL (ref 3.88–5.62)
WBC # BLD AUTO: 7.52 THOUSAND/UL (ref 4.31–10.16)

## 2021-06-29 PROCEDURE — 82948 REAGENT STRIP/BLOOD GLUCOSE: CPT

## 2021-06-29 PROCEDURE — 96374 THER/PROPH/DIAG INJ IV PUSH: CPT

## 2021-06-29 PROCEDURE — 99285 EMERGENCY DEPT VISIT HI MDM: CPT

## 2021-06-29 PROCEDURE — 85025 COMPLETE CBC W/AUTO DIFF WBC: CPT | Performed by: EMERGENCY MEDICINE

## 2021-06-29 PROCEDURE — 82805 BLOOD GASES W/O2 SATURATION: CPT | Performed by: EMERGENCY MEDICINE

## 2021-06-29 PROCEDURE — 80053 COMPREHEN METABOLIC PANEL: CPT | Performed by: EMERGENCY MEDICINE

## 2021-06-29 PROCEDURE — 84443 ASSAY THYROID STIM HORMONE: CPT | Performed by: EMERGENCY MEDICINE

## 2021-06-29 PROCEDURE — 36415 COLL VENOUS BLD VENIPUNCTURE: CPT | Performed by: EMERGENCY MEDICINE

## 2021-06-29 PROCEDURE — 83690 ASSAY OF LIPASE: CPT | Performed by: EMERGENCY MEDICINE

## 2021-06-29 PROCEDURE — 99291 CRITICAL CARE FIRST HOUR: CPT | Performed by: EMERGENCY MEDICINE

## 2021-06-29 PROCEDURE — 84484 ASSAY OF TROPONIN QUANT: CPT | Performed by: EMERGENCY MEDICINE

## 2021-06-29 PROCEDURE — 71045 X-RAY EXAM CHEST 1 VIEW: CPT

## 2021-06-29 RX ORDER — DEXTROSE MONOHYDRATE 25 G/50ML
50 INJECTION, SOLUTION INTRAVENOUS ONCE
Status: COMPLETED | OUTPATIENT
Start: 2021-06-29 | End: 2021-06-29

## 2021-06-29 RX ORDER — DEXTROSE MONOHYDRATE 25 G/50ML
INJECTION, SOLUTION INTRAVENOUS
Status: COMPLETED
Start: 2021-06-29 | End: 2021-06-29

## 2021-06-29 RX ADMIN — DEXTROSE MONOHYDRATE 50 ML: 25 INJECTION, SOLUTION INTRAVENOUS at 22:36

## 2021-06-29 RX ADMIN — DEXTROSE MONOHYDRATE 50 ML: 500 INJECTION PARENTERAL at 22:36

## 2021-06-30 PROBLEM — I10 ESSENTIAL HYPERTENSION: Status: ACTIVE | Noted: 2021-06-30

## 2021-06-30 PROBLEM — D64.9 ANEMIA: Status: ACTIVE | Noted: 2021-06-30

## 2021-06-30 PROBLEM — N17.9 AKI (ACUTE KIDNEY INJURY) (HCC): Status: ACTIVE | Noted: 2021-06-30

## 2021-06-30 PROBLEM — E11.649 TYPE 2 DIABETES MELLITUS WITH HYPOGLYCEMIA, WITHOUT LONG-TERM CURRENT USE OF INSULIN (HCC): Status: ACTIVE | Noted: 2021-06-30

## 2021-06-30 PROBLEM — E78.5 DYSLIPIDEMIA: Status: ACTIVE | Noted: 2021-06-30

## 2021-06-30 LAB
ALBUMIN SERPL BCP-MCNC: 2.4 G/DL (ref 3.5–5)
ALP SERPL-CCNC: 76 U/L (ref 46–116)
ALT SERPL W P-5'-P-CCNC: 19 U/L (ref 12–78)
ANION GAP SERPL CALCULATED.3IONS-SCNC: 6 MMOL/L (ref 4–13)
ANION GAP SERPL CALCULATED.3IONS-SCNC: 8 MMOL/L (ref 4–13)
AST SERPL W P-5'-P-CCNC: 20 U/L (ref 5–45)
ATRIAL RATE: 85 BPM
BACTERIA UR QL AUTO: ABNORMAL /HPF
BASOPHILS # BLD AUTO: 0.01 THOUSANDS/ΜL (ref 0–0.1)
BASOPHILS NFR BLD AUTO: 0 % (ref 0–1)
BILIRUB SERPL-MCNC: 0.19 MG/DL (ref 0.2–1)
BILIRUB UR QL STRIP: NEGATIVE
BUN SERPL-MCNC: 14 MG/DL (ref 5–25)
BUN SERPL-MCNC: 15 MG/DL (ref 5–25)
CALCIUM ALBUM COR SERPL-MCNC: 9.4 MG/DL (ref 8.3–10.1)
CALCIUM SERPL-MCNC: 8.1 MG/DL (ref 8.3–10.1)
CALCIUM SERPL-MCNC: 8.4 MG/DL (ref 8.3–10.1)
CHLORIDE SERPL-SCNC: 100 MMOL/L (ref 100–108)
CHLORIDE SERPL-SCNC: 101 MMOL/L (ref 100–108)
CLARITY UR: ABNORMAL
CO2 SERPL-SCNC: 31 MMOL/L (ref 21–32)
CO2 SERPL-SCNC: 31 MMOL/L (ref 21–32)
COLOR UR: YELLOW
CORTIS SERPL-MCNC: 13.7 UG/DL
CREAT SERPL-MCNC: 1.45 MG/DL (ref 0.6–1.3)
CREAT SERPL-MCNC: 1.46 MG/DL (ref 0.6–1.3)
EOSINOPHIL # BLD AUTO: 0.01 THOUSAND/ΜL (ref 0–0.61)
EOSINOPHIL NFR BLD AUTO: 0 % (ref 0–6)
ERYTHROCYTE [DISTWIDTH] IN BLOOD BY AUTOMATED COUNT: 14.5 % (ref 11.6–15.1)
EST. AVERAGE GLUCOSE BLD GHB EST-MCNC: 126 MG/DL
FERRITIN SERPL-MCNC: 88 NG/ML (ref 8–388)
GFR SERPL CREATININE-BSD FRML MDRD: 46 ML/MIN/1.73SQ M
GFR SERPL CREATININE-BSD FRML MDRD: 46 ML/MIN/1.73SQ M
GLUCOSE P FAST SERPL-MCNC: 159 MG/DL (ref 65–99)
GLUCOSE SERPL-MCNC: 129 MG/DL (ref 65–140)
GLUCOSE SERPL-MCNC: 130 MG/DL (ref 65–140)
GLUCOSE SERPL-MCNC: 139 MG/DL (ref 65–140)
GLUCOSE SERPL-MCNC: 159 MG/DL (ref 65–140)
GLUCOSE SERPL-MCNC: 165 MG/DL (ref 65–140)
GLUCOSE SERPL-MCNC: 36 MG/DL (ref 65–140)
GLUCOSE SERPL-MCNC: 52 MG/DL (ref 65–140)
GLUCOSE SERPL-MCNC: 54 MG/DL (ref 65–140)
GLUCOSE SERPL-MCNC: 56 MG/DL (ref 65–140)
GLUCOSE SERPL-MCNC: 65 MG/DL (ref 65–140)
GLUCOSE SERPL-MCNC: 99 MG/DL (ref 65–140)
GLUCOSE UR STRIP-MCNC: NEGATIVE MG/DL
HBA1C MFR BLD: 6 %
HCT VFR BLD AUTO: 30 % (ref 36.5–49.3)
HGB BLD-MCNC: 9.1 G/DL (ref 12–17)
HGB UR QL STRIP.AUTO: ABNORMAL
IMM GRANULOCYTES # BLD AUTO: 0.03 THOUSAND/UL (ref 0–0.2)
IMM GRANULOCYTES NFR BLD AUTO: 0 % (ref 0–2)
IRON SATN MFR SERPL: 18 %
IRON SERPL-MCNC: 40 UG/DL (ref 65–175)
KETONES UR STRIP-MCNC: NEGATIVE MG/DL
LEUKOCYTE ESTERASE UR QL STRIP: ABNORMAL
LIPASE SERPL-CCNC: 143 U/L (ref 73–393)
LYMPHOCYTES # BLD AUTO: 1.63 THOUSANDS/ΜL (ref 0.6–4.47)
LYMPHOCYTES NFR BLD AUTO: 22 % (ref 14–44)
MCH RBC QN AUTO: 27.3 PG (ref 26.8–34.3)
MCHC RBC AUTO-ENTMCNC: 30.3 G/DL (ref 31.4–37.4)
MCV RBC AUTO: 90 FL (ref 82–98)
MONOCYTES # BLD AUTO: 0.49 THOUSAND/ΜL (ref 0.17–1.22)
MONOCYTES NFR BLD AUTO: 7 % (ref 4–12)
NEUTROPHILS # BLD AUTO: 5.1 THOUSANDS/ΜL (ref 1.85–7.62)
NEUTS SEG NFR BLD AUTO: 71 % (ref 43–75)
NITRITE UR QL STRIP: NEGATIVE
NON-SQ EPI CELLS URNS QL MICRO: ABNORMAL /HPF
NRBC BLD AUTO-RTO: 0 /100 WBCS
PH UR STRIP.AUTO: 6 [PH]
PLATELET # BLD AUTO: 306 THOUSANDS/UL (ref 149–390)
PMV BLD AUTO: 10 FL (ref 8.9–12.7)
POTASSIUM SERPL-SCNC: 4.1 MMOL/L (ref 3.5–5.3)
POTASSIUM SERPL-SCNC: 4.1 MMOL/L (ref 3.5–5.3)
PR INTERVAL: 144 MS
PROT SERPL-MCNC: 6.6 G/DL (ref 6.4–8.2)
PROT UR STRIP-MCNC: ABNORMAL MG/DL
QRS AXIS: 69 DEGREES
QRSD INTERVAL: 114 MS
QT INTERVAL: 414 MS
QTC INTERVAL: 492 MS
RBC # BLD AUTO: 3.33 MILLION/UL (ref 3.88–5.62)
RBC #/AREA URNS AUTO: ABNORMAL /HPF
SODIUM SERPL-SCNC: 137 MMOL/L (ref 136–145)
SODIUM SERPL-SCNC: 140 MMOL/L (ref 136–145)
SP GR UR STRIP.AUTO: 1.01 (ref 1–1.03)
T WAVE AXIS: 38 DEGREES
TIBC SERPL-MCNC: 228 UG/DL (ref 250–450)
TROPONIN I SERPL-MCNC: <0.02 NG/ML
TSH SERPL DL<=0.05 MIU/L-ACNC: 1.76 UIU/ML (ref 0.36–3.74)
UROBILINOGEN UR QL STRIP.AUTO: 0.2 E.U./DL
VENTRICULAR RATE: 85 BPM
WBC # BLD AUTO: 7.27 THOUSAND/UL (ref 4.31–10.16)
WBC #/AREA URNS AUTO: ABNORMAL /HPF

## 2021-06-30 PROCEDURE — 85025 COMPLETE CBC W/AUTO DIFF WBC: CPT | Performed by: PHYSICIAN ASSISTANT

## 2021-06-30 PROCEDURE — 82948 REAGENT STRIP/BLOOD GLUCOSE: CPT

## 2021-06-30 PROCEDURE — 80377 DRUG/SUBSTANCE NOS 7/MORE: CPT | Performed by: INTERNAL MEDICINE

## 2021-06-30 PROCEDURE — 99223 1ST HOSP IP/OBS HIGH 75: CPT | Performed by: INTERNAL MEDICINE

## 2021-06-30 PROCEDURE — 82533 TOTAL CORTISOL: CPT | Performed by: EMERGENCY MEDICINE

## 2021-06-30 PROCEDURE — 97163 PT EVAL HIGH COMPLEX 45 MIN: CPT

## 2021-06-30 PROCEDURE — 82728 ASSAY OF FERRITIN: CPT | Performed by: PHYSICIAN ASSISTANT

## 2021-06-30 PROCEDURE — 36415 COLL VENOUS BLD VENIPUNCTURE: CPT | Performed by: EMERGENCY MEDICINE

## 2021-06-30 PROCEDURE — 83550 IRON BINDING TEST: CPT | Performed by: PHYSICIAN ASSISTANT

## 2021-06-30 PROCEDURE — 81001 URINALYSIS AUTO W/SCOPE: CPT | Performed by: PHYSICIAN ASSISTANT

## 2021-06-30 PROCEDURE — 97110 THERAPEUTIC EXERCISES: CPT

## 2021-06-30 PROCEDURE — 80048 BASIC METABOLIC PNL TOTAL CA: CPT | Performed by: PHYSICIAN ASSISTANT

## 2021-06-30 PROCEDURE — 99222 1ST HOSP IP/OBS MODERATE 55: CPT | Performed by: INTERNAL MEDICINE

## 2021-06-30 PROCEDURE — 83036 HEMOGLOBIN GLYCOSYLATED A1C: CPT | Performed by: PHYSICIAN ASSISTANT

## 2021-06-30 PROCEDURE — 93005 ELECTROCARDIOGRAM TRACING: CPT

## 2021-06-30 PROCEDURE — 83540 ASSAY OF IRON: CPT | Performed by: PHYSICIAN ASSISTANT

## 2021-06-30 PROCEDURE — 93010 ELECTROCARDIOGRAM REPORT: CPT | Performed by: INTERNAL MEDICINE

## 2021-06-30 RX ORDER — LISINOPRIL 10 MG/1
10 TABLET ORAL DAILY
Status: DISCONTINUED | OUTPATIENT
Start: 2021-06-30 | End: 2021-07-01

## 2021-06-30 RX ORDER — ONDANSETRON 2 MG/ML
4 INJECTION INTRAMUSCULAR; INTRAVENOUS EVERY 6 HOURS PRN
Status: DISCONTINUED | OUTPATIENT
Start: 2021-06-30 | End: 2021-07-03 | Stop reason: HOSPADM

## 2021-06-30 RX ORDER — POLYETHYLENE GLYCOL 3350 17 G/17G
17 POWDER, FOR SOLUTION ORAL DAILY PRN
Status: DISCONTINUED | OUTPATIENT
Start: 2021-06-30 | End: 2021-07-03 | Stop reason: HOSPADM

## 2021-06-30 RX ORDER — DEXTROSE MONOHYDRATE 25 G/50ML
25 INJECTION, SOLUTION INTRAVENOUS ONCE
Status: COMPLETED | OUTPATIENT
Start: 2021-06-30 | End: 2021-06-30

## 2021-06-30 RX ORDER — CALCIUM CARBONATE 200(500)MG
1000 TABLET,CHEWABLE ORAL DAILY PRN
Status: DISCONTINUED | OUTPATIENT
Start: 2021-06-30 | End: 2021-07-03 | Stop reason: HOSPADM

## 2021-06-30 RX ORDER — DEXTROSE MONOHYDRATE 100 MG/ML
50 INJECTION, SOLUTION INTRAVENOUS CONTINUOUS
Status: DISCONTINUED | OUTPATIENT
Start: 2021-06-30 | End: 2021-07-01

## 2021-06-30 RX ORDER — DEXTROSE MONOHYDRATE 25 G/50ML
25 INJECTION, SOLUTION INTRAVENOUS EVERY 4 HOURS PRN
Status: DISCONTINUED | OUTPATIENT
Start: 2021-06-30 | End: 2021-07-03 | Stop reason: HOSPADM

## 2021-06-30 RX ORDER — ACETAMINOPHEN 325 MG/1
650 TABLET ORAL EVERY 6 HOURS PRN
Status: DISCONTINUED | OUTPATIENT
Start: 2021-06-30 | End: 2021-07-03 | Stop reason: HOSPADM

## 2021-06-30 RX ORDER — HEPARIN SODIUM 5000 [USP'U]/ML
5000 INJECTION, SOLUTION INTRAVENOUS; SUBCUTANEOUS EVERY 8 HOURS SCHEDULED
Status: DISCONTINUED | OUTPATIENT
Start: 2021-06-30 | End: 2021-07-03 | Stop reason: HOSPADM

## 2021-06-30 RX ORDER — PRAVASTATIN SODIUM 40 MG
40 TABLET ORAL EVERY MORNING
Status: DISCONTINUED | OUTPATIENT
Start: 2021-06-30 | End: 2021-07-03 | Stop reason: HOSPADM

## 2021-06-30 RX ORDER — DEXTROSE MONOHYDRATE 25 G/50ML
50 INJECTION, SOLUTION INTRAVENOUS ONCE
Status: COMPLETED | OUTPATIENT
Start: 2021-06-30 | End: 2021-06-30

## 2021-06-30 RX ORDER — DEXTROSE AND SODIUM CHLORIDE 5; .45 G/100ML; G/100ML
75 INJECTION, SOLUTION INTRAVENOUS CONTINUOUS
Status: DISCONTINUED | OUTPATIENT
Start: 2021-06-30 | End: 2021-06-30

## 2021-06-30 RX ADMIN — LISINOPRIL 10 MG: 10 TABLET ORAL at 08:46

## 2021-06-30 RX ADMIN — HEPARIN SODIUM 5000 UNITS: 5000 INJECTION INTRAVENOUS; SUBCUTANEOUS at 22:28

## 2021-06-30 RX ADMIN — DEXTROSE MONOHYDRATE 25 ML: 500 INJECTION PARENTERAL at 00:52

## 2021-06-30 RX ADMIN — DEXTROSE MONOHYDRATE 25 ML: 500 INJECTION PARENTERAL at 10:13

## 2021-06-30 RX ADMIN — HEPARIN SODIUM 5000 UNITS: 5000 INJECTION INTRAVENOUS; SUBCUTANEOUS at 14:30

## 2021-06-30 RX ADMIN — DEXTROSE 50 ML/HR: 10 SOLUTION INTRAVENOUS at 10:18

## 2021-06-30 RX ADMIN — PRAVASTATIN SODIUM 40 MG: 40 TABLET ORAL at 08:46

## 2021-06-30 RX ADMIN — DEXTROSE AND SODIUM CHLORIDE 75 ML/HR: 5; .45 INJECTION, SOLUTION INTRAVENOUS at 01:17

## 2021-06-30 RX ADMIN — DEXTROSE MONOHYDRATE 50 ML: 500 INJECTION PARENTERAL at 04:33

## 2021-06-30 RX ADMIN — DEXTROSE MONOHYDRATE 25 ML: 500 INJECTION PARENTERAL at 12:36

## 2021-06-30 RX ADMIN — HEPARIN SODIUM 5000 UNITS: 5000 INJECTION INTRAVENOUS; SUBCUTANEOUS at 06:43

## 2021-06-30 NOTE — PROGRESS NOTES
Patient at [address] [place] [address] had a finger stick of 36  Patient alert and verbally responsive, no c/o nausea or vomiting, no diaphoresis, no tremor, no visual disturbance  Orange juice 4 0z given with carmencita ramirez  Hospitalist Tennille Rodas made aware with new order to give D50 IV solution 50ml  Blood work done  Recheck blood sugar was 99  Patient remained asymptomatic  Will continue to monitor

## 2021-06-30 NOTE — ED NOTES
PATIENT BECAME MORE ALERT AND ORIENTED AFTER ADMINISTRATION OF DEXTROSE  PATIENT STATES "I'VE BEEN EATING BUT I THINK MY METFORMIN IS TOO MUCH SINCE I'VE LOST 20LBS"  SISTER AT BEDSIDE STATES "HE IS MORE ALERT THAN EARLIER"        Myra Cramer, RN  06/29/21 2022

## 2021-06-30 NOTE — ASSESSMENT & PLAN NOTE
No results found for: HGBA1C    Recent Labs     06/29/21  2219 06/29/21  2336 06/30/21  0037   POCGLU 46* 76 56*       Blood Sugar Average: Last 72 hrs:  (P) 09 [id number]     Patient unsure of last HgA1C  Currently on metformin 1700mg QAM, pioglitazone 30 mg QAM, and glimepiride 4 mg daily   States he had multiple episodes of hypoglycemia - he passed out at home today and states he had a similar episode a few weeks ago  Seen by his PCP a few weeks ago and states they were due to discuss discontinuing/cutting down some of his medications  HgA1C pending  Glucose 46 at home this afternoon after he became unresponsive sitting at the kitchen table, EMS gave D50 and patient declined transport at the time - he ate a full meal and had ice cream and his family convinced him to come to the ED for evaluation, glucose was 46 on arrival  Received D50 in the ED and was given a boxed lunch and it came up to 76, but again dropped to 56  Patient started on D5 continuous fluids   Continue with frequent glucose checks, holding home PO meds  Add SSI coverage as needed   Endocrinology consult appreciated

## 2021-06-30 NOTE — ASSESSMENT & PLAN NOTE
No prior labs available for comparison, Hg 9 6  No S/sx bleeding  Likely chronic in nature given DM2 and possible CKD  Repeat CBC in AM

## 2021-06-30 NOTE — PLAN OF CARE
Problem: Potential for Falls  Goal: Patient will remain free of falls  Description: INTERVENTIONS:  - Educate patient/family on patient safety including physical limitations  - Instruct patient to call for assistance with activity   - Consult OT/PT to assist with strengthening/mobility   - Keep Call bell within reach  - Keep bed low and locked with side rails adjusted as appropriate  - Keep care items and personal belongings within reach  - Initiate and maintain comfort rounds  - Make Fall Risk Sign visible to staff  - Offer Toileting every 2  Hours, in advance of need  - Initiate/Maintain  bed alarm  - Obtain necessary fall risk management equipment:  - Apply yellow socks and bracelet for high fall risk patients  - Consider moving patient to room near nurses station  Outcome: Progressing

## 2021-06-30 NOTE — ASSESSMENT & PLAN NOTE
Patient reported dizziness after starting Flomax which improved after discontinuing lisinopril  Holding antihypertensive at present   Continue Flomax   Follow-up with PCP as as outpatient for monitoring for blood pressure

## 2021-06-30 NOTE — ASSESSMENT & PLAN NOTE
No prior labs available for comparison, Hg 9 6  No S/sx bleeding, iron sat 18%, check B12, folate  Likely chronic in nature given DM2 and possible CKD  Remains stable  Monitor

## 2021-06-30 NOTE — ASSESSMENT & PLAN NOTE
Cr 1 46  Patient denies any Hx of CKD, no prior labs available for comparison  Previously on Lasix for LE edema but has not been taking this for the past year  Repeat BMP in AM  Does have LE edema, patient states this is about baseline for him  Currently on IVF, monitor response and consider restarting Lasix if Cr trends up or he shows signs of worsening fluid overload

## 2021-06-30 NOTE — CONSULTS
Consultation - Eliu Elliott 66 y o  male MRN: [id number]    Unit/Bed#: [address] [place] [address] Encounter: [id number]      Assessment/Plan     1  Type 2 diabetes mellitus not on long-term insulin therapy  2  Recurrent Hypoglycemia  3  Altered mental status-resolved  4  CKD/acute kidney injury  Tightly controlled glycemic management based on A1c of 6% for age  [de-identified] recent blood sugars within normal limits  -continue dextrose containing IV fluids for now, once blood sugars start trending up more, would recommend decreasing and eventually discontinuing  -sulfonylurea screen ordered  - continue sliding scale insulin, will change to algorithm 1    Will continue to follow, if blood sugars trend up without dextrose containing IV fluids, will start basal bolus insulin depending on needs  - based on requirements, will decide on medications on discharge  vWould avoid sulfonylureas is elderly patient    CC: Diabetes Consult    History of Present Illness     HPI: Eliu Elliott is a 66y o  year old male with type 2 diabetes,  Hypertension, hyperlipidemia presented with hypoglycemia altered mental status  Diagnosed with diabetes mellitus approximately 40 years ago    He is on oral agents at home  Medication list includes glimepiride 4 mg orally daily , pioglitazone 30 mg, metformin 1000 mg orally daily  Patient does not remember names of medications other than the metformin she says he was taking only once a day  Per pharmacy refill list-glimepiride was last filled in January 2021, 3 month supply    Unclear if patient was still taking this  Denies possibility of over medication with any of his prescriptions  Has a starr Nance, reports that his blood sugars were usually on the low side, mostly in the 90s, very occasionally >200mg/dl     Unintentional weight loss of 20 lb, however this may be explained by his low appetite  on recall for example ate only 10 grapes, few strawberries for lunch  Denies history of CKD, CAD, CVA    Patient was given D50, then started on a D10 drip  Recent blood sugars 139, 130 mg/dL    At this time, states that he feels well, mental status is improved  Has no complaints    All other systems were reviewed and are negative  Inpatient consult to Endocrinology  Consult performed by: Radha Workman MD  Consult ordered by: Stoney Cancino PA-C          Review of Systems    Historical Information   Past Medical History:   Diagnosis Date    Arthritis     BPH associated with nocturia     Diabetes mellitus ([address] )     Edema     lower legs    Hearing aid worn     bilateral    Hyperlipidemia     Hypertension     controlled    Tremor of both hands      Past Surgical History:   Procedure Laterality Date    CATARACT EXTRACTION      DC XCAPSL CTRC RMVL INSJ IO LENS PROSTH W/O ECP Right 8/6/2018    Procedure: EXTRACTION EXTRACAPSULAR CATARACT PHACO INTRAOCULAR LENS (IOL); Surgeon: Shayy Conti MD;  Location: [facility] MAIN OR;  Service: Ophthalmology    DC XCAPSL CTRC RMVL INSJ IO LENS PROSTH W/O ECP Left 10/1/2018    Procedure: EXTRACTION EXTRACAPSULAR CATARACT PHACO INTRAOCULAR LENS (IOL);   Surgeon: Shayy Conti MD;  Location: [facility] MAIN OR;  Service: Ophthalmology     Social History   Social History     Substance and Sexual Activity   Alcohol Use No     Social History     Substance and Sexual Activity   Drug Use No     Social History     Tobacco Use   Smoking Status Never Smoker   Smokeless Tobacco Never Used     Family History:   Family History   Problem Relation Age of Onset    Cancer Mother     Cancer Father         lung-smoker    Early death Brother     Cancer Brother         "bone cancer"       Meds/Allergies   Current Facility-Administered Medications   Medication Dose Route Frequency Provider Last Rate Last Admin    acetaminophen (TYLENOL) tablet 650 mg  650 mg Oral Q6H PRN Stoney Cancino PA-C        calcium carbonate (TUMS) chewable tablet 1,000 mg  1,000 mg Oral Daily PRN Stoney Cancino DENISHA        dextrose 50 % IV solution 25 mL  25 mL Intravenous Q4H PRN Norberto Fitzgerald MD   25 mL at 06/30/21 1236    dextrose infusion 10 %  50 mL/hr Intravenous Continuous Norberto Fitzgerald MD 50 mL/hr at 06/30/21 1018 50 mL/hr at 06/30/21 1018    heparin (porcine) subcutaneous injection 5,000 Units  5,000 Units Subcutaneous [facility] Connie Rodriguez PA-C   5,000 Units at 06/30/21 1430    insulin lispro (HumaLOG) 100 units/mL subcutaneous injection 1-5 Units  1-5 Units Subcutaneous TID AC Connie Rodriguez PA-C        insulin lispro (HumaLOG) 100 units/mL subcutaneous injection 1-5 Units  1-5 Units Subcutaneous HS Connie Rodriguez PA-C        lisinopril (ZESTRIL) tablet 10 mg  10 mg Oral Daily Connie Rodriguez PA-C   10 mg at 06/30/21 0846    ondansetron (ZOFRAN) injection 4 mg  4 mg Intravenous Q6H PRN Connie Rodriguez PA-C        polyethylene glycol (MIRALAX) packet 17 g  17 g Oral Daily PRN Connie Rodriguez PA-C        pravastatin (PRAVACHOL) tablet 40 mg  40 mg Oral QAM Connie Rodriguez PA-C   40 mg at 06/30/21 0846     No Known Allergies    Objective   Vitals: Blood pressure 134/71, pulse 82, temperature 98 7 °F (37 1 °C), resp  rate 20, height 5' 8" (1 727 m), weight 109 kg (240 lb 11 9 oz), SpO2 95 %  Intake/Output Summary (Last 24 hours) at 6/30/2021 1517  Last data filed at 6/30/2021 1401  Gross per 24 hour   Intake 330 ml   Output 1425 ml   Net -1095 ml     Invasive Devices     Peripheral Intravenous Line            Peripheral IV 06/29/21 Right;Ventral (anterior); Dorsal (posterior) Forearm <1 day                Physical Exam    The history was obtained from the review of the chart, patient and family      Lab Results:   Results from last 7 days   Lab Units 06/30/21  0442   HEMOGLOBIN A1C % 6 0*     Lab Results   Component Value Date    WBC 7 27 06/30/2021    HGB 9 1 (L) 06/30/2021    HCT 30 0 (L) 06/30/2021    MCV 90 06/30/2021     06/30/2021     Lab Results   Component Value Date/Time    BUN 14 06/30/2021 04:42 AM    K 4 1 06/30/2021 04:42 AM     06/30/2021 04:42 AM    CO2 31 06/30/2021 04:42 AM    CREATININE 1 45 (H) 06/30/2021 04:42 AM    AST 20 06/29/2021 11:49 PM    ALT 19 06/29/2021 11:49 PM    ALB 2 4 (L) 06/29/2021 11:49 PM     No results for input(s): CHOL, HDL, LDL, TRIG, VLDL in the last 72 hours  No results found for: See Doherty  POC Glucose (mg/dl)   Date Value   06/30/2021 54 (L)   06/30/2021 56 (L)   06/30/2021 52 (L)   06/30/2021 65   06/30/2021 99   06/30/2021 36 (LL)   06/30/2021 56 (L)   06/29/2021 76   06/29/2021 46 (L)   10/01/2018 117       Imaging Studies: I have personally reviewed pertinent reports  Portions of the record may have been created with voice recognition software  Occasional wrong word or "sound a like" substitutions may have occurred due to the inherent limitations of voice recognition software  Read the chart carefully and recognize, using context, where substitutions have occurred

## 2021-06-30 NOTE — ASSESSMENT & PLAN NOTE
Cr 1 46  Patient denies any Hx of CKD  Prior creatinine from PCP 1 2, likely urinary retention contributing  Previously on Lasix for LE edema but has not been taking this for the past year  Remains stable, likely around baseline  Monitor

## 2021-06-30 NOTE — PROGRESS NOTES
[facility] Progress Note  Patient: Elsa Flynn 66 y o  male   MRN: [id number]  PCP: No primary care provider on file    Unit/Bed#: [address] [place] [address] Encounter: [id number]  Date Of Visit: 06/30/21    Problem List:    Principal Problem:    Type 2 diabetes mellitus with hypoglycemia, without long-term current use of insulin ([facility])  Active Problems:    MARGIE (acute kidney injury) ([facility] )    Anemia    Essential hypertension    Dyslipidemia      Assessment & Plan:    * Type 2 diabetes mellitus with hypoglycemia, without long-term current use of insulin [facility])  Assessment & Plan  Lab Results   Component Value Date    HGBA1C 6 0 (H) 06/30/2021       Recent Labs     06/30/21  0501 06/30/21  0713 06/30/21  0914 06/30/21  0939   POCGLU 99 65 52* 56*       Blood Sugar Average: Last 72 hrs:  (P) 60 75     Patient unsure of last HgA1C  Currently on metformin 1700mg QAM, pioglitazone 30 mg QAM, and glimepiride 4 mg daily   States he had multiple episodes of hypoglycemia - he passed out at home today and states he had a similar episode a few weeks ago  Seen by his PCP a few weeks ago and states they were due to discuss discontinuing/cutting down some of his medications  Glucose 46 at home after he became unresponsive sitting at the kitchen table, EMS gave D50 and patient declined transport at the time - he ate a full meal and had ice cream and his family convinced him to come to the ED for evaluation, glucose was 46 on arrival  Received D50 in the ED and was given a boxed lunch and it came up to 76, but again dropped to 64  Patient is currently asymptomatic blood remains with hypoglycemia despite dextrose 5%  Tolerating diet  · Will change to D10  · Continue with frequent glucose checks, holding home PO meds  · Add SSI coverage as needed   · Will verify home meds  Endocrinology consult appreciated     Essential hypertension  Assessment & Plan  Home dose of enalapril substituted with lisinopril 10 mg    Anemia  Assessment & Plan  No prior labs available for comparison, Hg 9 6  No S/sx bleeding  Likely chronic in nature given DM2 and possible CKD  Repeat CBC in AM  Will attempt to obtain prior labs  Anemia workup    MARGIE (acute kidney injury) ([facility] [address] )  Assessment & Plan  Cr 1 46  Patient denies any Hx of CKD, no prior labs available for comparison  Previously on Lasix for LE edema but has not been taking this for the past year  Remains stable  Monitor  Will attempt to obtain prior labs    Dyslipidemia  Assessment & Plan  Continue pravastatin        VTE Pharmacologic Prophylaxis:   Pharmacologic: Heparin  Mechanical VTE Prophylaxis in Place: Yes    Patient Centered Rounds: I have performed bedside rounds with nursing staff today  Discussions with Specialists or Other Care Team Provider:  Yes    Education and Discussions with Family / Patient:  Discussed with patient's sister over the phone    Time Spent for Care: 45 minutes  More than 50% of total time spent on counseling and coordination of care as described above  Current Length of Stay: 0 day(s)    Current Patient Status: Inpatient   Certification Statement: The patient, admitted on an observation basis, will now require > 2 midnight hospital stay due to Persistent hypoglycemia requiring IV dextrose    Discharge Plan:  Home when hypoglycemia improved    Code Status: Level 1 - Full Code      Subjective:   Reports that he feels well  Reported ongoing generalize fatigue recently  Denies fever, chills, chest pain, nausea vomiting or abdominal pain  Reports loose bowel movement at times related to p o  Intake    Patient also reported that he lost weight over last few weeks which was unintentional but his appetite has been fair    Lives alone, remembers taking metformin not sure about other diabetic medications including glimepiride and pioglitazone        Objective:     Vitals:   Temp (24hrs), Av 3 °F (36 8 °C), Min:98 °F (36 7 °C), Max:98 7 °F (37 1 °C)    Temp:  [98 °F (36 7 °C)-98 7 °F (37 1 °C)] 98 7 °F (37 1 °C)  HR:  [75-98] 82  Resp:  [18-22] 20  BP: (131-145)/(63-75) 134/71  SpO2:  [93 %-98 %] 95 %  Body mass index is 36 6 kg/m²  Input and Output Summary (last 24 hours): Intake/Output Summary (Last 24 hours) at 6/30/2021 1047  Last data filed at 6/30/2021 1018  Gross per 24 hour   Intake 330 ml   Output 725 ml   Net -395 ml       Physical Exam:     Physical Exam  Constitutional:       General: He is not in acute distress  Appearance: He is obese  HENT:      Head: Normocephalic and atraumatic  Cardiovascular:      Rate and Rhythm: Normal rate  Heart sounds: Murmur heard  Pulmonary:      Effort: Pulmonary effort is normal  No respiratory distress  Breath sounds: No wheezing, rhonchi or rales  Chest:      Chest wall: No tenderness  Abdominal:      General: Bowel sounds are normal  There is no distension  Palpations: Abdomen is soft  Tenderness: There is no abdominal tenderness  There is no guarding or rebound  Comments: Protuberant   Musculoskeletal:      Cervical back: Neck supple  Comments: Trace lower extremity edema   Skin:     General: Skin is warm and dry  Findings: No rash  Neurological:      General: No focal deficit present  Mental Status: He is alert  Mental status is at baseline  Cranial Nerves: No cranial nerve deficit           Additional Data:     Labs:    Results from last 7 days   Lab Units 06/30/21  0442   WBC Thousand/uL 7 27   HEMOGLOBIN g/dL 9 1*   HEMATOCRIT % 30 0*   PLATELETS Thousands/uL 306   NEUTROS PCT % 71   LYMPHS PCT % 22   MONOS PCT % 7   EOS PCT % 0     Results from last 7 days   Lab Units 06/30/21  0442 06/29/21  2349   POTASSIUM mmol/L 4 1 4 1   CHLORIDE mmol/L 100 101   CO2 mmol/L 31 31   BUN mg/dL 14 15   CREATININE mg/dL 1 45* 1 46*   CALCIUM mg/dL 8 4 8 1*   ALK PHOS U/L  --  76   ALT U/L  --  19   AST U/L  --  20           * I Have Reviewed All Lab Data Listed Above  * Additional Pertinent Lab Tests Reviewed: All Labs Within Last 24 Hours Reviewed      Imaging:  Imaging Reports Reviewed Today Include:  X-ray  Recent Cultures (last 7 days):           Last 24 Hours Medication List:   Current Facility-Administered Medications   Medication Dose Route Frequency Provider Last Rate    acetaminophen  650 mg Oral Q6H PRN Donna Memphis, PA-C      calcium carbonate  1,000 mg Oral Daily PRN Donna Memphis, PA-C      dextrose  25 mL Intravenous Q4H PRN Britt Avelar MD      dextrose  50 mL/hr Intravenous Continuous Britt Avelar MD 50 mL/hr (06/30/21 1018)    heparin (porcine)  5,000 Units Subcutaneous [facility] Donna Kesha, PA-C      insulin lispro  1-5 Units Subcutaneous TID AC Donna Kesha, PA-C      insulin lispro  1-5 Units Subcutaneous HS Donna Memphis, PA-C      lisinopril  10 mg Oral Daily Donna Memphis, PA-C      ondansetron  4 mg Intravenous Q6H PRN Donna Memphis, PA-C      polyethylene glycol  17 g Oral Daily PRN Donna Kesha, PA-C      pravastatin  40 mg Oral QAM Donnamiko Rebolledo, PA-C            Today, Patient Was Seen By: Britt Avelar MD    ** Please Note: "This note has been constructed using a voice recognition system  Therefore there may be syntax, spelling, and/or grammatical errors   Please call if you have any questions  "**

## 2021-06-30 NOTE — ASSESSMENT & PLAN NOTE
Lab Results   Component Value Date    HGBA1C 6 0 (H) 06/30/2021       Recent Labs     07/02/21 2001 07/03/21  0720 07/03/21  1100 07/03/21  1639   POCGLU 260* 182* 163* 188*       Blood Sugar Average: Last 72 hrs:  (P) 60 75     Patient unsure of last HgA1C  on metformin 1700mg QAM, pioglitazone 30 mg QAM, and glimepiride 4 mg daily as per home med list   He does have freestyle [place] sensor  States he had multiple episodes of hypoglycemia - he passed out at home today and states he had a similar episode a few weeks ago  Seen by his PCP a few weeks ago and stated they were due to discuss discontinuing/cutting down some of his medications  Glucose 46 at home after he became unresponsive sitting at the kitchen table, EMS gave D50 and patient declined transport at the time - he ate a full meal and had ice cream and his family convinced him to come to the ED for evaluation, glucose was 46 on arrival  Remained persistently hypo glycemic despite p o  Intake and D50 in eventually needed to be on continuous dextrose drip with subsequent improvement  Blood sugar trend noted, no further hypoglycemia off dextrose  [place] diet  Seen by endocrinology, input appreciated  Patient with very tightly control hemoglobin A1c given his age comorbidities and living situation  · Started on Januvia 100 mg daily as per endocrinology recommendation  · Follow-up sulfonylurea screen  · Resume metformin 500 mg b i d  after discharge   · Advised to monitor blood sugar at home  · Follow-up with endocrinology and PCP as outpatient

## 2021-06-30 NOTE — H&P
Paul 43  H&P- Mona Daigle 1945, 66 y o  male MRN: [id number]  Unit/Bed#: ED 10 Encounter: [id number]  Primary Care Provider: No primary care provider on file     Date and time admitted to hospital: 6/29/2021 10:20 PM    * Type 2 diabetes mellitus with hypoglycemia, without long-term current use of insulin [facility])  Assessment & Plan  No results found for: HGBA1C    Recent Labs     06/29/21  2219 06/29/21  2336 06/30/21  0037   POCGLU 46* 76 56*       Blood Sugar Average: Last 72 hrs:  (P) 34 [id number]     Patient unsure of last HgA1C  Currently on metformin 1700mg QAM, pioglitazone 30 mg QAM, and glimepiride 4 mg daily   States he had multiple episodes of hypoglycemia - he passed out at home today and states he had a similar episode a few weeks ago  Seen by his PCP a few weeks ago and states they were due to discuss discontinuing/cutting down some of his medications  HgA1C pending  Glucose 46 at home this afternoon after he became unresponsive sitting at the kitchen table, EMS gave D50 and patient declined transport at the time - he ate a full meal and had ice cream and his family convinced him to come to the ED for evaluation, glucose was 46 on arrival  Received D50 in the ED and was given a boxed lunch and it came up to 76, but again dropped to 64  Patient started on D5 continuous fluids   Continue with frequent glucose checks, holding home PO meds  Add SSI coverage as needed   Endocrinology consult appreciated     MARGIE (acute kidney injury) ([facility] [address] )  Assessment & Plan  Cr 1 46  Patient denies any Hx of CKD, no prior labs available for comparison  Previously on Lasix for LE edema but has not been taking this for the past year  Repeat BMP in AM  Does have LE edema, patient states this is about baseline for him  Currently on IVF, monitor response and consider restarting Lasix if Cr trends up or he shows signs of worsening fluid overload    Anemia  Assessment & Plan  No prior labs available for comparison, Hg 9 6  No S/sx bleeding  Likely chronic in nature given DM2 and possible CKD  Repeat CBC in AM      Dyslipidemia  Assessment & Plan  Continue pravastatin    Essential hypertension  Assessment & Plan  Home dose of enalapril substituted with lisinopril 10 mg    VTE Prophylaxis: Heparin  / sequential compression device   Code Status: level 1  POLST: POLST is not applicable to this patient  Discussion with family: No    Anticipated Length of Stay:  Patient will be admitted on an Observation basis with an anticipated length of stay of  < 2 midnights  Justification for Hospital Stay: hypoglycemia in DM2    Total Time for Visit, including Counseling / Coordination of Care: 30 minutes  Greater than 50% of this total time spent on direct patient counseling and coordination of care  Chief Complaint:   hypoglycemia    History of Present Illness:    Rudi Sorto is a 66 y o  male who presents with PMH of HTN, HLD, DM2, LE edema, and BPH  He presented to the ED today for hypoglycemia  This afternoon he was sitting at the kitchen table and slumped over in his chair and was unresponsive  He did not fall to the ground or hit his head on anything  Family report that medics checked a glucose and it was 46 at the time  He received an amp of D50 and afterwards declined transport to the hospital  He states he had a full meal a few hours later and this evening at around 7pm he had a bowl of ice cream  His family encouraged him to come to the ER for further evaluation and when he arrived his glucose was 46  He was again given an amp of D50 and reportedly was somewhat groggy but perked up after this  He was given a turkey sandwich and repeat glucose was 76 and back down to 56 about an hour later  He states that he has had multiple hypoglycemic episodes in the past few weeks and was unresponsive one other time  He states he was seen by his PCP a few weeks ago decreased some of his medications  Previously he was taking metformin 1700mg QAM and 850 mg QPM, glimepiride 4 mg BID and Actos 30 mg QAM but cut down to metformin 1700mg once daily, glimepiride 4mg QD, and is still taking Actos 30 mg QAM  He states his appetite has been the same and he has been eating about the same over the past few weeks, he denies any decrease in oral intake  He follows with multiple specialists, though patient is a relatively poor historian and is not sure which specialists he sees  He states someone put him on Lasix in the past but he hasn't been taking this in the past year or so because he wasn't following up with that doctor since the beginning of the pandemic  He is on Lasix for LE edmea  He denies any Hx of CKD  Review of Systems:    Review of Systems   Constitutional: Negative for chills, fatigue and fever  HENT: Negative for congestion, rhinorrhea and sore throat  Eyes: Negative for visual disturbance  Respiratory: Negative for cough, shortness of breath and wheezing  Cardiovascular: Negative for chest pain, palpitations and leg swelling  Gastrointestinal: Negative for abdominal distention, abdominal pain, diarrhea, nausea and vomiting  Genitourinary: Negative for dysuria, frequency, hematuria and urgency  Musculoskeletal: Negative for gait problem and myalgias  Skin: Negative for rash and wound  Neurological: Positive for syncope  Negative for dizziness, weakness, light-headedness, numbness and headaches          Past Medical and Surgical History:     Past Medical History:   Diagnosis Date    Arthritis     BPH associated with nocturia     Diabetes mellitus ([address] )     Edema     lower legs    Hearing aid worn     bilateral    Hyperlipidemia     Hypertension     controlled    Tremor of both hands        Past Surgical History:   Procedure Laterality Date    CATARACT EXTRACTION      CO XCAPSL CTRC RMVL INSJ IO LENS PROSTH W/O ECP Right 8/6/2018    Procedure: EXTRACTION EXTRACAPSULAR CATARACT PHACO INTRAOCULAR LENS (IOL); Surgeon: Mani Luong MD;  Location: [facility] MAIN OR;  Service: Ophthalmology    TN XCAPSL CTRC RMVL INSJ IO LENS PROSTH W/O ECP Left 10/1/2018    Procedure: EXTRACTION EXTRACAPSULAR CATARACT PHACO INTRAOCULAR LENS (IOL); Surgeon: Mani Luong MD;  Location: [facility] MAIN OR;  Service: Ophthalmology       Meds/Allergies:    Prior to Admission medications    Medication Sig Start Date End Date Taking?  Authorizing Provider   enalapril (VASOTEC) 10 mg tablet Take 10 mg by mouth 2 (two) times a day   Yes Historical Provider, MD   glimepiride (AMARYL) 4 mg tablet Take 4 mg by mouth daily    Yes Historical Provider, MD   metFORMIN (GLUCOPHAGE) 850 mg tablet Take 1,700 mg by mouth daily with breakfast   Yes Historical Provider, MD   pioglitazone (ACTOS) 30 mg tablet Take 30 mg by mouth every morning   Yes Historical Provider, MD   PRAVASTATIN SODIUM PO Take 40 mg by mouth every morning   Yes Historical Provider, MD   Bromfenac Sodium (BROMSITE) 0 075 % SOLN Administer 1 drop into the left eye 2 (two) times a day  Patient not taking: Reported on 6/30/2021 10/1/18   Mani Luong MD   furosemide (LASIX) 20 mg tablet Take 20 mg by mouth once a week On Monday  Patient not taking: Reported on 6/30/2021    Historical Provider, MD   gatifloxacin (ZYMAXID) 0 5 % Administer 1 drop into the left eye 2 (two) times a day  Patient not taking: Reported on 6/30/2021 10/1/18   Mani Luong MD   MAGNESIUM PO Take by mouth every morning  Patient not taking: Reported on 6/30/2021    Historical Provider, MD   metFORMIN (GLUCOPHAGE) 850 mg tablet Take 850 mg by mouth every evening  Patient not taking: Reported on 6/30/2021    Historical Provider, MD   NON FORMULARY every morning [place]  Patient not taking: Reported on 6/30/2021    Historical Provider, MD       Allergies: No Known Allergies    Social History:    Social History     Substance and Sexual Activity   Alcohol Use No     Social History     Tobacco Use   Smoking Status Never Smoker   Smokeless Tobacco Never Used     Social History     Substance and Sexual Activity   Drug Use No       Family History:    Family History   Problem Relation Age of Onset    Cancer Mother     Cancer Father         lung-smoker    Early death Brother     Cancer Brother         "bone cancer"       Physical Exam:     Vitals:   Blood Pressure: 136/65 (06/30/21 0116)  Pulse: 84 (06/30/21 0116)  Temperature: 98 1 °F (36 7 °C) (06/29/21 2221)  Temp Source: Oral (06/29/21 2221)  Respirations: 20 (06/30/21 0116)  Weight - Scale: 109 kg (240 lb 11 9 oz) (06/29/21 2221)  SpO2: 94 % (06/30/21 0116)    Physical Exam  Vitals reviewed  Constitutional:       General: He is not in acute distress  Appearance: He is well-developed  He is not ill-appearing  HENT:      Head: Normocephalic and atraumatic  Cardiovascular:      Rate and Rhythm: Normal rate and regular rhythm  Heart sounds: Murmur heard  No gallop  Pulmonary:      Effort: Pulmonary effort is normal  No respiratory distress  Breath sounds: Normal breath sounds  No wheezing, rhonchi or rales  Abdominal:      General: Bowel sounds are normal  There is no distension  Palpations: Abdomen is soft  Tenderness: There is no abdominal tenderness  There is no guarding  Musculoskeletal:         General: No tenderness  Skin:     General: Skin is warm and dry  Findings: No erythema or rash  Neurological:      Mental Status: He is alert and oriented to person, place, and time  Psychiatric:         Mood and Affect: Mood normal          Behavior: Behavior normal          Additional Data:     Lab Results: I have personally reviewed pertinent reports        Results from last 7 days   Lab Units 06/29/21  2349   WBC Thousand/uL 7 52   HEMOGLOBIN g/dL 9 6*   HEMATOCRIT % 31 3*   PLATELETS Thousands/uL 308   NEUTROS PCT % 77*   LYMPHS PCT % 15   MONOS PCT % 8   EOS PCT % 0     Results from last 7 days   Lab Units 06/29/21  2349   SODIUM mmol/L 140   POTASSIUM mmol/L 4 1   CHLORIDE mmol/L 101   CO2 mmol/L 31   BUN mg/dL 15   CREATININE mg/dL 1 46*   ANION GAP mmol/L 8   CALCIUM mg/dL 8 1*   ALBUMIN g/dL 2 4*   TOTAL BILIRUBIN mg/dL 0 19*   ALK PHOS U/L 76   ALT U/L 19   AST U/L 20   GLUCOSE RANDOM mg/dL 56*         Results from last 7 days   Lab Units 06/30/21  0037 06/29/21  2336 06/29/21  2219   POC GLUCOSE mg/dl 56* 76 46*               Imaging: I have personally reviewed pertinent reports  XR chest 1 view portable    (Results Pending)       EKG, Pathology, and Other Studies Reviewed on Admission:   · EKG: none on admission    Allscripts / Epic Records Reviewed: Yes     ** Please Note: This note has been constructed using a voice recognition system   **

## 2021-06-30 NOTE — ED PROVIDER NOTES
History  Chief Complaint   Patient presents with    Hypoglycemia - Symptomatic     Pt has a bloodsugar of 55 at ED  Seen today by Medics at home for the same  Refused trasport to ED at that time  HPI     65 yo M presents hx of DM on metformin, htn hld, to ed for eval of symptomatic hypoglycemia  Seen today by EMS earlier for symptomatic hypoglycemia, in 46s, refused transport to ed  Family forced him to come after his glucose monitor noted that he was continuously low  He has no complaints, but on arrival was somnolent  Glucose 46  Patient got amp of d50 and is awake and alert, has no complaints  Prior to Admission Medications   Prescriptions Last Dose Informant Patient Reported? Taking?    Bromfenac Sodium (BROMSITE) 0 075 % SOLN Not Taking at Unknown time  No No   Sig: Administer 1 drop into the left eye 2 (two) times a day   Patient not taking: Reported on 6/30/2021   MAGNESIUM PO Not Taking at Unknown time  Yes No   Sig: Take by mouth every morning   Patient not taking: Reported on 6/30/2021   NON FORMULARY Not Taking at Unknown time  Yes No   Sig: every morning [place]   Patient not taking: Reported on 6/30/2021   PRAVASTATIN SODIUM PO 6/30/2021 at Unknown time  Yes Yes   Sig: Take 40 mg by mouth every morning   enalapril (VASOTEC) 10 mg tablet 6/30/2021 at Unknown time  Yes Yes   Sig: Take 10 mg by mouth 2 (two) times a day   furosemide (LASIX) 20 mg tablet Not Taking at Unknown time  Yes No   Sig: Take 20 mg by mouth once a week On Monday   Patient not taking: Reported on 6/30/2021   gatifloxacin (ZYMAXID) 0 5 % Not Taking at Unknown time  No No   Sig: Administer 1 drop into the left eye 2 (two) times a day   Patient not taking: Reported on 6/30/2021   glimepiride (AMARYL) 4 mg tablet 6/30/2021 at Unknown time  Yes Yes   Sig: Take 4 mg by mouth daily    metFORMIN (GLUCOPHAGE) 850 mg tablet 6/30/2021 at Unknown time  Yes Yes   Sig: Take 1,700 mg by mouth daily with breakfast   metFORMIN (GLUCOPHAGE) 850 mg tablet Not Taking at Unknown time  Yes No   Sig: Take 850 mg by mouth every evening   Patient not taking: Reported on 6/30/2021   pioglitazone (ACTOS) 30 mg tablet 6/30/2021 at Unknown time  Yes Yes   Sig: Take 30 mg by mouth every morning      Facility-Administered Medications: None       Past Medical History:   Diagnosis Date    Arthritis     BPH associated with nocturia     Diabetes mellitus ([address] )     Edema     lower legs    Hearing aid worn     bilateral    Hyperlipidemia     Hypertension     controlled    Tremor of both hands        Past Surgical History:   Procedure Laterality Date    CATARACT EXTRACTION      OR XCAPSL CTRC RMVL INSJ IO LENS PROSTH W/O ECP Right 8/6/2018    Procedure: EXTRACTION EXTRACAPSULAR CATARACT PHACO INTRAOCULAR LENS (IOL); Surgeon: Billy Zuleta MD;  Location: [facility] MAIN OR;  Service: Ophthalmology    OR XCAPSL CTRC RMVL INSJ IO LENS PROSTH W/O ECP Left 10/1/2018    Procedure: EXTRACTION EXTRACAPSULAR CATARACT PHACO INTRAOCULAR LENS (IOL); Surgeon: Billy Zuleta MD;  Location: [facility] MAIN OR;  Service: Ophthalmology       Family History   Problem Relation Age of Onset    Cancer Mother     Cancer Father         lung-smoker    Early death Brother     Cancer Brother         "bone cancer"     I have reviewed and agree with the history as documented  E-Cigarette/Vaping    E-Cigarette Use Never User      E-Cigarette/Vaping Substances    Nicotine No     THC No     CBD No     Flavoring No     Other No     Unknown No      Social History     Tobacco Use    Smoking status: Never Smoker    Smokeless tobacco: Never Used   Vaping Use    Vaping Use: Never used   Substance Use Topics    Alcohol use: No    Drug use: No       Review of Systems   Constitutional: Negative for chills, fatigue and fever  HENT: Negative for nosebleeds and sore throat  Eyes: Negative for redness and visual disturbance     Respiratory: Negative for shortness of breath and wheezing  Cardiovascular: Negative for chest pain and palpitations  Gastrointestinal: Negative for abdominal pain and diarrhea  Endocrine: Negative for polyuria  Genitourinary: Negative for difficulty urinating and testicular pain  Musculoskeletal: Negative for back pain and neck stiffness  Skin: Negative for rash and wound  Neurological: Negative for seizures, speech difficulty and headaches  Psychiatric/Behavioral: Negative for dysphoric mood and hallucinations  All other systems reviewed and are negative  Physical Exam  Physical Exam  Vitals and nursing note reviewed  Constitutional:       Appearance: He is well-developed  He is obese  HENT:      Head: Normocephalic and atraumatic  Right Ear: External ear normal       Left Ear: External ear normal    Eyes:      Conjunctiva/sclera: Conjunctivae normal    Cardiovascular:      Rate and Rhythm: Normal rate and regular rhythm  Heart sounds: Normal heart sounds  Pulmonary:      Effort: Pulmonary effort is normal       Breath sounds: Normal breath sounds  No wheezing  Chest:      Chest wall: No tenderness  Abdominal:      General: Bowel sounds are normal       Palpations: Abdomen is soft  Tenderness: There is no abdominal tenderness  There is no guarding  Musculoskeletal:         General: Normal range of motion  Cervical back: Normal range of motion  Skin:     General: Skin is warm and dry  Findings: No rash  Neurological:      Mental Status: He is alert and oriented to person, place, and time  Cranial Nerves: No cranial nerve deficit  Sensory: No sensory deficit  Motor: No abnormal muscle tone        Coordination: Coordination normal          Vital Signs  ED Triage Vitals   Temperature Pulse Respirations Blood Pressure SpO2   06/29/21 2221 06/29/21 2221 06/29/21 2221 06/29/21 2221 06/29/21 2221   98 1 °F (36 7 °C) 98 22 145/63 93 %      Temp Source Heart Rate Source Patient Position - Orthostatic VS BP Location FiO2 (%)   06/29/21 2221 06/29/21 2221 06/29/21 2221 06/29/21 2221 --   Oral Monitor Lying Right arm       Pain Score       06/30/21 0308       No Pain           Vitals:    06/30/21 2232 07/01/21 0709 07/01/21 1532 07/01/21 1945   BP: 131/69 129/69 130/71 134/58   Pulse:  83  74   Patient Position - Orthostatic VS:             Visual Acuity  Visual Acuity      Most Recent Value   L Pupil Size (mm)  3   R Pupil Size (mm)  3   L Pupil Shape  Round   R Pupil Shape  Round          ED Medications  Medications   pravastatin (PRAVACHOL) tablet 40 mg (40 mg Oral Given 7/1/21 0809)   acetaminophen (TYLENOL) tablet 650 mg (650 mg Oral Given 7/1/21 1604)   calcium carbonate (TUMS) chewable tablet 1,000 mg (has no administration in time range)   polyethylene glycol (MIRALAX) packet 17 g (has no administration in time range)   ondansetron (ZOFRAN) injection 4 mg (has no administration in time range)   heparin (porcine) subcutaneous injection 5,000 Units (5,000 Units Subcutaneous Given 7/1/21 1306)   dextrose 50 % IV solution 25 mL (25 mL Intravenous Given 6/30/21 1236)   insulin lispro (HumaLOG) 100 units/mL subcutaneous injection 1-5 Units (2 Units Subcutaneous Given 7/1/21 1618)   insulin lispro (HumaLOG) 100 units/mL subcutaneous injection 1-5 Units (1 Units Subcutaneous Not Given 6/30/21 2225)   tamsulosin (FLOMAX) capsule 0 4 mg (0 4 mg Oral Given 7/1/21 1937)   lisinopril (ZESTRIL) tablet 10 mg (has no administration in time range)   dextrose 50 % IV solution 50 mL (50 mL Intravenous Given 6/29/21 2236)   dextrose 50 % IV solution 25 mL (25 mL Intravenous Given 6/30/21 0052)   dextrose 50 % IV solution 50 mL (50 mL Intravenous Given 6/30/21 0433)       Diagnostic Studies  Results Reviewed     Procedure Component Value Units Date/Time    Cortisol [[id number]]  (Normal) Collected: 06/30/21 0039    Lab Status: Final result Specimen: Blood from Arm, Right Updated: 06/30/21 0933     Cortisol, Random 13 7 ug/dL     UA (URINE) with reflex to Scope [[id number]]  (Abnormal) Collected: 06/30/21 0637    Lab Status: Final result Specimen: Urine, Clean Catch Updated: 06/30/21 0726     Color, UA Yellow     Clarity, UA Cloudy     Specific Gravity, UA 1 015     pH, UA 6 0     Leukocytes, UA Large     Nitrite, UA Negative     Protein, UA 30 (1+) mg/dl      Glucose, UA Negative mg/dl      Ketones, UA Negative mg/dl      Urobilinogen, UA 0 2 E U /dl      Bilirubin, UA Negative     Blood, UA Small    TSH, 3rd generation with Free T4 reflex [[id number]]  (Normal) Collected: 06/29/21 2349    Lab Status: Final result Specimen: Blood from Arm, Right Updated: 06/30/21 0047     TSH 3RD GENERATON 1 757 uIU/mL     Narrative:      Patients undergoing fluorescein dye angiography may retain small amounts of fluorescein in the body for 48-72 hours post procedure  Samples containing fluorescein can produce falsely depressed TSH values  If the patient had this procedure,a specimen should be resubmitted post fluorescein clearance        Fingerstick Glucose (POCT) [[id number]]  (Abnormal) Collected: 06/30/21 0037    Lab Status: Final result Updated: 06/30/21 0041     POC Glucose 56 mg/dl     Troponin I [[id number]]  (Normal) Collected: 06/29/21 2349    Lab Status: Final result Specimen: Blood from Arm, Right Updated: 06/30/21 0017     Troponin I <0 02 ng/mL     Comprehensive metabolic panel [[id number]]  (Abnormal) Collected: 06/29/21 2349    Lab Status: Final result Specimen: Blood from Arm, Right Updated: 06/30/21 0016     Sodium 140 mmol/L      Potassium 4 1 mmol/L      Chloride 101 mmol/L      CO2 31 mmol/L      ANION GAP 8 mmol/L      BUN 15 mg/dL      Creatinine 1 46 mg/dL      Glucose 56 mg/dL      Calcium 8 1 mg/dL      Corrected Calcium 9 4 mg/dL      AST 20 U/L      ALT 19 U/L      Alkaline Phosphatase 76 U/L      Total Protein 6 6 g/dL      Albumin 2 4 g/dL      Total Bilirubin 0 19 mg/dL      eGFR 46 ml/min/1 73sq m     Narrative: National Kidney Disease Foundation guidelines for Chronic Kidney Disease (CKD):     Stage 1 with normal or high GFR (GFR > 90 mL/min/1 73 square meters)    Stage 2 Mild CKD (GFR = 60-89 mL/min/1 73 square meters)    Stage 3A Moderate CKD (GFR = 45-59 mL/min/1 73 square meters)    Stage 3B Moderate CKD (GFR = 30-44 mL/min/1 73 square meters)    Stage 4 Severe CKD (GFR = 15-29 mL/min/1 73 square meters)    Stage 5 End Stage CKD (GFR <15 mL/min/1 73 square meters)  Note: GFR calculation is accurate only with a steady state creatinine    Lipase [[id number]]  (Normal) Collected: 06/29/21 2349    Lab Status: Final result Specimen: Blood from Arm, Right Updated: 06/30/21 0016     Lipase 143 u/L     CBC and differential [[id number]]  (Abnormal) Collected: 06/29/21 2349    Lab Status: Final result Specimen: Blood from Arm, Right Updated: 06/29/21 2359     WBC 7 52 Thousand/uL      RBC 3 51 Million/uL      Hemoglobin 9 6 g/dL      Hematocrit 31 3 %      MCV 89 fL      MCH 27 4 pg      MCHC 30 7 g/dL      RDW 14 5 %      MPV 9 8 fL      Platelets 527 Thousands/uL      nRBC 0 /100 WBCs      Neutrophils Relative 77 %      Immat GRANS % 0 %      Lymphocytes Relative 15 %      Monocytes Relative 8 %      Eosinophils Relative 0 %      Basophils Relative 0 %      Neutrophils Absolute 5 75 Thousands/µL      Immature Grans Absolute 0 02 Thousand/uL      Lymphocytes Absolute 1 14 Thousands/µL      Monocytes Absolute 0 59 Thousand/µL      Eosinophils Absolute 0 01 Thousand/µL      Basophils Absolute 0 01 Thousands/µL     Blood gas, venous [[id number]]  (Abnormal) Collected: 06/29/21 2349    Lab Status: Final result Specimen: Blood from Arm, Right Updated: 06/29/21 2359     pH, Dangelo 7 383     pCO2, Dangelo 55 1 mm Hg      pO2, Dangelo 30 9 mm Hg      HCO3, Dangelo 32 1 mmol/L      Base Excess, Dangelo 5 9 mmol/L      O2 Content, Dangelo 8 1 ml/dL      O2 HGB, VENOUS 53 6 %     Fingerstick Glucose (POCT) [[id number]]  (Normal) Collected: 06/29/21 2256 Lab Status: Final result Updated: 06/29/21 2337     POC Glucose 76 mg/dl     Fingerstick Glucose (POCT) [[id number]]  (Abnormal) Collected: 06/29/21 2219    Lab Status: Final result Updated: 06/29/21 2224     POC Glucose 46 mg/dl                  XR chest 1 view portable   Final Result by Lisa Mcmullen MD (06/30 5498)      Mildly elevated right hemidiaphragm with adjacent right basilar scarring or subsegmental atelectasis  Otherwise no acute pulmonary disease                  Workstation performed: [id number]                    Procedures  CriticalCare Time  Performed by: Azalea Perez MD  Authorized by: Azalea Perez MD     Critical care provider statement:     Critical care time (minutes):  34    Critical care start time:  6/29/2021 10:40 PM    Critical care end time:  6/29/2021 11:14 PM    Critical care time was exclusive of:  Separately billable procedures and treating other patients and teaching time    Critical care was necessary to treat or prevent imminent or life-threatening deterioration of the following conditions:  Metabolic crisis    Critical care was time spent personally by me on the following activities:  Obtaining history from patient or surrogate, ordering and performing treatments and interventions, development of treatment plan with patient or surrogate, ordering and review of laboratory studies, ordering and review of radiographic studies, re-evaluation of patient's condition, evaluation of patient's response to treatment, review of old charts and examination of patient  Comments:      Multiple episodes of hypoglycemia requiring frequent re evaluations and d50, as well as glucose drip             ED Course  ED Course as of Jul 01 2011 Wed Jun 30, 2021   0021 Poct 76      0049 Started on D10 gtt  Med list from home shows patient also on glimepiride  MDM    Hypoglycemia, symptomatic, patient had d50 provided x2  Started on glucose drip   Suspect from anti hyperglycemics, on glimepiride as well  Needs obs for hypoglycemia monitoring and med adjustment  Disposition  Final diagnoses:   Hypoglycemia     Time reflects when diagnosis was documented in both MDM as applicable and the Disposition within this note     Time User Action Codes Description Comment    6/30/2021 12:29 AM Kaden Ochoa Add [E16 2] Hypoglycemia     6/30/2021  6:51 AM Jaimie Núñez Add [E11 649] Type 2 diabetes mellitus with hypoglycemia without coma, without long-term current use of insulin [facility])       ED Disposition     ED Disposition Condition Date/Time Comment    Admit Stable Wed Jun 30, 2021 12:29 AM Case was discussed with CHRISTO and the patient's admission status was agreed to be Admission Status: observation status to the service of Dr Tracey Rojo          Follow-up Information    None         Current Discharge Medication List      CONTINUE these medications which have NOT CHANGED    Details   enalapril (VASOTEC) 10 mg tablet Take 10 mg by mouth 2 (two) times a day      glimepiride (AMARYL) 4 mg tablet Take 4 mg by mouth daily       !! 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 metFORMIN (GLUCOPHAGE) 850 mg tablet Take 850 mg by mouth every evening      NON FORMULARY every morning [place]uma       !! - Potential duplicate medications found  Please discuss with provider  No discharge procedures on file      PDMP Review     None          ED Provider  Electronically Signed by           Gwen Oh MD  07/01/21 2013

## 2021-06-30 NOTE — PLAN OF CARE
Problem: Potential for Falls  Goal: Patient will remain free of falls  Description: INTERVENTIONS:  - Educate patient/family on patient safety including physical limitations  - Instruct patient to call for assistance with activity   - Consult OT/PT to assist with strengthening/mobility   - Keep Call bell within reach  - Keep bed low and locked with side rails adjusted as appropriate  - Keep care items and personal belongings within reach  - Initiate and maintain comfort rounds  - Make Fall Risk Sign visible to staff  - Offer Toileting every 2 Hours, in advance of need  - Initiate/Maintain bed alarm  - Obtain necessary fall risk management equipment:   - Apply yellow socks and bracelet for high fall risk patients  - Consider moving patient to room near nurses station  Outcome: Progressing     Problem: NEUROSENSORY - ADULT  Goal: Achieves stable or improved neurological status  Description: INTERVENTIONS  - Monitor and report changes in neurological status  - Monitor vital signs such as temperature, blood pressure, glucose, and any other labs ordered   - Initiate measures to prevent increased intracranial pressure  - Monitor for seizure activity and implement precautions if appropriate      Outcome: Progressing  Goal: Remains free of injury related to seizures activity  Description: INTERVENTIONS  - Maintain airway, patient safety  and administer oxygen as ordered  - Monitor patient for seizure activity, document and report duration and description of seizure to physician/advanced practitioner  - If seizure occurs,  ensure patient safety during seizure  - Reorient patient post seizure  - Seizure pads on all 4 side rails  - Instruct patient/family to notify RN of any seizure activity including if an aura is experienced  - Instruct patient/family to call for assistance with activity based on nursing assessment  - Administer anti-seizure medications if ordered    Outcome: Progressing  Goal: Achieves maximal functionality and self care  Description: INTERVENTIONS  - Monitor swallowing and airway patency with patient fatigue and changes in neurological status  - Encourage and assist patient to increase activity and self care     - Encourage visually impaired, hearing impaired and aphasic patients to use assistive/communication devices  Outcome: Progressing     Problem: CARDIOVASCULAR - ADULT  Goal: Absence of cardiac dysrhythmias or at baseline rhythm  Description: INTERVENTIONS:  - Continuous cardiac monitoring, vital signs, obtain 12 lead EKG if ordered  - Administer antiarrhythmic and heart rate control medications as ordered  - Monitor electrolytes and administer replacement therapy as ordered  Outcome: Progressing     Problem: RESPIRATORY - ADULT  Goal: Achieves optimal ventilation and oxygenation  Description: INTERVENTIONS:  - Assess for changes in respiratory status  - Assess for changes in mentation and behavior  - Position to facilitate oxygenation and minimize respiratory effort  - Oxygen administered by appropriate delivery if ordered  - Initiate smoking cessation education as indicated  - Encourage broncho-pulmonary hygiene including cough, deep breathe, Incentive Spirometry  - Assess the need for suctioning and aspirate as needed  - Assess and instruct to report SOB or any respiratory difficulty  - Respiratory Therapy support as indicated  Outcome: Progressing     Problem: GASTROINTESTINAL - ADULT  Goal: Minimal or absence of nausea and/or vomiting  Description: INTERVENTIONS:  - Administer IV fluids if ordered to ensure adequate hydration  - Maintain NPO status until nausea and vomiting are resolved  - Nasogastric tube if ordered  - Administer ordered antiemetic medications as needed  - Provide nonpharmacologic comfort measures as appropriate  - Advance diet as tolerated, if ordered  - Consider nutrition services referral to assist patient with adequate nutrition and appropriate food choices  Outcome: Progressing  Goal: Maintains or returns to baseline bowel function  Description: INTERVENTIONS:  - Assess bowel function  - Encourage oral fluids to ensure adequate hydration  - Administer IV fluids if ordered to ensure adequate hydration  - Administer ordered medications as needed  - Encourage mobilization and activity  - Consider nutritional services referral to assist patient with adequate nutrition and appropriate food choices  Outcome: Progressing  Goal: Maintains adequate nutritional intake  Description: INTERVENTIONS:  - Monitor percentage of each meal consumed  - Identify factors contributing to decreased intake, treat as appropriate  - Assist with meals as needed  - Monitor I&O, weight, and lab values if indicated  - Obtain nutrition services referral as needed  Outcome: Progressing  Goal: Oral mucous membranes remain intact  Description: INTERVENTIONS  - Assess oral mucosa and hygiene practices  - Implement preventative oral hygiene regimen  - Implement oral medicated treatments as ordered  - Initiate Nutrition services referral as needed  Outcome: Progressing     Problem: GENITOURINARY - ADULT  Goal: Maintains or returns to baseline urinary function  Description: INTERVENTIONS:  - Assess urinary function  - Encourage oral fluids to ensure adequate hydration if ordered  - Administer IV fluids as ordered to ensure adequate hydration  - Administer ordered medications as needed  - Offer frequent toileting  - Follow urinary retention protocol if ordered  Outcome: Progressing  Goal: Absence of urinary retention  Description: INTERVENTIONS:  - Assess patients ability to void and empty bladder  - Monitor I/O  - Bladder scan as needed  - Discuss with physician/AP medications to alleviate retention as needed  - Discuss catheterization for long term situations as appropriate  Outcome: Progressing     Problem: METABOLIC, FLUID AND ELECTROLYTES - ADULT  Goal: Electrolytes maintained within normal limits  Description: INTERVENTIONS:  - Monitor labs and assess patient for signs and symptoms of electrolyte imbalances  - Administer electrolyte replacement as ordered  - Monitor response to electrolyte replacements, including repeat lab results as appropriate  - Instruct patient on fluid and nutrition as appropriate  Outcome: Progressing  Goal: Fluid balance maintained  Description: INTERVENTIONS:  - Monitor labs   - Monitor I/O and WT  - Instruct patient on fluid and nutrition as appropriate  - Assess for signs & symptoms of volume excess or deficit  Outcome: Progressing  Goal: Glucose maintained within target range  Description: INTERVENTIONS:  - Monitor Blood Glucose as ordered  - Assess for signs and symptoms of hyperglycemia and hypoglycemia  - Administer ordered medications to maintain glucose within target range  - Assess nutritional intake and initiate nutrition service referral as needed  Outcome: Progressing     Problem: SKIN/TISSUE INTEGRITY - ADULT  Goal: Skin Integrity remains intact(Skin Breakdown Prevention)  Description: Assess:  -Perform Izaiah assessment every shift  -Clean and moisturize skin every shift  -Inspect skin when repositioning, toileting, and assisting with ADLS  -Assess under medical devices such as oxygen tubing every shift  -Assess extremities for adequate circulation and sensation     Bed Management:  -Have minimal linens on bed & keep smooth, unwrinkled  -Change linens as needed when moist or perspiring  -Avoid sitting or lying in one position for more than 2 hours while in bed  -Keep HOB at 30 degrees     Toileting:  -Offer bedside commode  -Assess for incontinence every shift  -Use incontinent care products after each incontinent episode such as foam cleanser    Activity:  -Mobilize patient 3 times a day  -Encourage activity and walks on unit  -Encourage or provide ROM exercises   -Turn and reposition patient every 2 Hours  -Use appropriate equipment to lift or move patient in bed  -Instruct/ Assist with weight shifting every 2 hours when out of bed in chair  -Consider limitation of chair time 4 hour intervals    Skin Care:  -Avoid use of baby powder, tape, friction and shearing, hot water or constrictive clothing  -Relieve pressure over bony prominences using cushion  -Do not massage red bony areas    Next Steps:  -Teach patient strategies to minimize risks such as turning and repositioning     Outcome: Progressing     Problem: HEMATOLOGIC - ADULT  Goal: Maintains hematologic stability  Description: INTERVENTIONS  - Assess for signs and symptoms of bleeding or hemorrhage  - Monitor labs  - Administer supportive blood products/factors as ordered and appropriate  Outcome: Progressing     Problem: MUSCULOSKELETAL - ADULT  Goal: Maintain or return mobility to safest level of function  Description: INTERVENTIONS:  - Assess patient's ability to carry out ADLs; assess patient's baseline for ADL function and identify physical deficits which impact ability to perform ADLs (bathing, care of mouth/teeth, toileting, grooming, dressing, etc )  - Assess/evaluate cause of self-care deficits   - Assess range of motion  - Assess patient's mobility  - Assess patient's need for assistive devices and provide as appropriate  - Encourage maximum independence but intervene and supervise when necessary  - Involve family in performance of ADLs  - Assess for home care needs following discharge   - Consider OT consult to assist with ADL evaluation and planning for discharge  - Provide patient education as appropriate  Outcome: Progressing  Goal: Maintain proper alignment of affected body part  Description: INTERVENTIONS:  - Support, maintain and protect limb and body alignment  - Provide patient/ family with appropriate education  Outcome: Progressing

## 2021-06-30 NOTE — PHYSICAL THERAPY NOTE
PT EVALUATION     06/30/21 1540   Note Type   Note type Evaluation   Pain Assessment   Pain Assessment Tool Pain Assessment not indicated - pt denies pain   Home Living   Type of [address] One level   Home Equipment Walker;Cane   Additional Comments Patient states using a cane for longer distances at times but otherwise holding on to furniture as per patient   Prior Function   Level of [place] Independent with ADLs and functional mobility   Lives With Alone   Receives Help From Family  (Sisters food shopping during [place] pandemic)   ADL Assistance Independent   IADLs Independent   Comments Patient driving prior to admission although deconditioned being in the home the past year with pandemic   Restrictions/Precautions   Other Precautions Fall Risk   General   Additional Pertinent History Chart reviewed, patient admitted with 2 diabetes mellitus with hypoglycemia  Symptoms now resolving patient presents as generally deconditioned unsteady gait and tolerated use of roller walker   Family/Caregiver Present Yes  (Sister present for partial treatment)   Cognition   Overall Cognitive Status WFL   Arousal/Participation Cooperative   Following Commands Follows multistep commands with increased time or repetition   RLE Assessment   RLE Assessment   (ROM WFL, strength 3+/5)   LLE Assessment   LLE Assessment   (ROM WFL, strength 3+/5)   Coordination   Movements are Fluid and Coordinated 0   Transfers   Sit to Stand 5  Supervision   Stand to Sit 5  Supervision   Ambulation/Elevation   Gait Assistance 4  Minimal assist   Additional items Assist x 1;Verbal cues; Tactile cues   Assistive Device   (None)   Distance 10 ft with change in direction unsteady gait at times narrow base of support   Balance   Static Sitting Fair +   Dynamic Sitting Fair   Static Standing Fair   Dynamic Standing Fair -   Ambulatory Fair -   Activity Tolerance   Activity Tolerance Patient limited by fatigue   Nurse Made Aware yes Assessment   Prognosis Good   Problem List Decreased strength;Decreased range of motion;Decreased endurance; Impaired balance;Decreased mobility; Decreased coordination   Assessment Patient seen for Physical Therapy evaluation  Patient admitted with Type 2 diabetes mellitus with hypoglycemia, without long-term current use of insulin ([facility] [address] )  Comorbidities affecting patient's physical performance include:HTN, HLD, DM2, LE edema, and BPH  Personal factors affecting patient at time of initial evaluation include: ambulating with assistive device, inability to ambulate household distances, inability to navigate community distances, inability to navigate level surfaces without external assistance, inability to perform dynamic tasks in community, limited home support, positive fall history, inability to perform physical activity, inability to perform ADLS and inability to perform IADLS   Prior to admission, patient was independent with functional mobility without assistive device, independent with ADLS, requiring assist for IADLS, ambulating household distance and home with family assist   Please find objective findings from Physical Therapy assessment regarding body systems outlined above with impairments and limitations including weakness, decreased ROM, impaired balance, decreased endurance, impaired coordination, gait deviations, decreased activity tolerance, decreased functional mobility tolerance and fall risk  The Barthel Index was used as a functional outcome tool presenting with a score of 50 today indicating marked limitations of functional mobility and ADLS  Patient's clinical presentation is currently unstable/unpredictable as seen in patient's presentation of vital sign response, changing level of pain, increased fall risk, new onset of impairment of functional mobility, decreased endurance and new onset of weakness   Pt would benefit from continued Physical Therapy treatment to address deficits as defined above and maximize level of functional mobility  As demonstrated by objective findings, the assigned level of complexity for this evaluation is high  The patient's AM-PAC Basic Mobility Inpatient Short Form Raw Score is 19, Standardized Score is 42 48  A standardized score less than 42 9 suggests the patient may benefit from discharge to post-acute rehabilitation services although patient has family support and demonstrated improved gait with walker and will be able to return home with home PT services  Please also refer to the recommendation of the Physical Therapist for safe discharge planning  Goals   Patient Goals Get stronger and go home   STG Expiration Date 07/07/21   Short Term Goal #1 Transfers and gait with roller walker independently   Short Term Goal #2 Gait endurance to functional household distances, strength bilateral lower extremities 3+ /4-all to meet patient goal of improved strength and returning home   LTG Expiration Date 07/14/21   Long Term Goal #1 Transfers and gait without assistive device independently   Long Term Goal #2 Gait endurance to functional community distances   Plan   Treatment/Interventions ADL retraining;Functional transfer training;LE strengthening/ROM; Elevations; Therapeutic exercise; Endurance training;Patient/family training;Equipment eval/education; Bed mobility;Gait training; Compensatory technique education   PT Frequency 5x/wk   Recommendation   PT Discharge Recommendation Home with home health rehabilitation   Additional Comments Patient has roller walker at home for use as needed   AM-PAC Basic Mobility Inpatient   Turning in Bed Without Bedrails 4   Lying on Back to Sitting on Edge of Flat Bed 3   Moving Bed to Chair 3   Standing Up From Chair 3   Walk in Room 3   Climb 3-5 Stairs 3   Basic Mobility Inpatient Raw Score 19   Basic Mobility Standardized Score 42 48   Barthel Index   Feeding 10   Bathing 0   Grooming Score 0   Dressing Score 5   Bladder Score 5   Bowels Score 10   Toilet Use Score 5   Transfers (Bed/Chair) Score 10   Mobility (Level Surface) Score 0   Stairs Score 5   Barthel Index Score 48   Licensure   [place] License Number  Felixsoren Chakraborty [id number]     PT TREATMENT  Time In:1525  Time Out: 1540  Total Time: 15min      S:  Patient states wanting to return home  O:  -gait training with roller with supervision, education completed in safety awareness and benefits of use of walker once return home verses holding onto furniture as patient  -standing balance exercise completed with sidestepping and backward walking  A:  Patient will benefit from continued physical therapy with progression as tolerated as well as increasing functional mobility with nursing staff and return home with home PT  P:  [address], PT

## 2021-07-01 PROBLEM — R97.20 ELEVATED PSA: Status: ACTIVE | Noted: 2021-07-01

## 2021-07-01 LAB
ANION GAP SERPL CALCULATED.3IONS-SCNC: 9 MMOL/L (ref 4–13)
BUN SERPL-MCNC: 13 MG/DL (ref 5–25)
CALCIUM SERPL-MCNC: 8.7 MG/DL (ref 8.3–10.1)
CHLORIDE SERPL-SCNC: 101 MMOL/L (ref 100–108)
CO2 SERPL-SCNC: 29 MMOL/L (ref 21–32)
CREAT SERPL-MCNC: 1.43 MG/DL (ref 0.6–1.3)
ERYTHROCYTE [DISTWIDTH] IN BLOOD BY AUTOMATED COUNT: 14.5 % (ref 11.6–15.1)
GFR SERPL CREATININE-BSD FRML MDRD: 47 ML/MIN/1.73SQ M
GLUCOSE SERPL-MCNC: 148 MG/DL (ref 65–140)
GLUCOSE SERPL-MCNC: 150 MG/DL (ref 65–140)
GLUCOSE SERPL-MCNC: 152 MG/DL (ref 65–140)
GLUCOSE SERPL-MCNC: 152 MG/DL (ref 65–140)
GLUCOSE SERPL-MCNC: 177 MG/DL (ref 65–140)
GLUCOSE SERPL-MCNC: 250 MG/DL (ref 65–140)
GLUCOSE SERPL-MCNC: 273 MG/DL (ref 65–140)
GLUCOSE SERPL-MCNC: 273 MG/DL (ref 65–140)
GLUCOSE SERPL-MCNC: 274 MG/DL (ref 65–140)
GLUCOSE SERPL-MCNC: 292 MG/DL (ref 65–140)
GLUCOSE SERPL-MCNC: 320 MG/DL (ref 65–140)
HCT VFR BLD AUTO: 31.3 % (ref 36.5–49.3)
HGB BLD-MCNC: 9.6 G/DL (ref 12–17)
MCH RBC QN AUTO: 27.4 PG (ref 26.8–34.3)
MCHC RBC AUTO-ENTMCNC: 30.7 G/DL (ref 31.4–37.4)
MCV RBC AUTO: 89 FL (ref 82–98)
PLATELET # BLD AUTO: 312 THOUSANDS/UL (ref 149–390)
PMV BLD AUTO: 9.7 FL (ref 8.9–12.7)
POTASSIUM SERPL-SCNC: 4.7 MMOL/L (ref 3.5–5.3)
RBC # BLD AUTO: 3.5 MILLION/UL (ref 3.88–5.62)
SODIUM SERPL-SCNC: 139 MMOL/L (ref 136–145)
WBC # BLD AUTO: 7.45 THOUSAND/UL (ref 4.31–10.16)

## 2021-07-01 PROCEDURE — 87086 URINE CULTURE/COLONY COUNT: CPT | Performed by: INTERNAL MEDICINE

## 2021-07-01 PROCEDURE — 82948 REAGENT STRIP/BLOOD GLUCOSE: CPT

## 2021-07-01 PROCEDURE — 87077 CULTURE AEROBIC IDENTIFY: CPT | Performed by: INTERNAL MEDICINE

## 2021-07-01 PROCEDURE — 80048 BASIC METABOLIC PNL TOTAL CA: CPT | Performed by: INTERNAL MEDICINE

## 2021-07-01 PROCEDURE — 99232 SBSQ HOSP IP/OBS MODERATE 35: CPT | Performed by: INTERNAL MEDICINE

## 2021-07-01 PROCEDURE — 85027 COMPLETE CBC AUTOMATED: CPT | Performed by: INTERNAL MEDICINE

## 2021-07-01 PROCEDURE — 87186 SC STD MICRODIL/AGAR DIL: CPT | Performed by: INTERNAL MEDICINE

## 2021-07-01 RX ORDER — LISINOPRIL 10 MG/1
10 TABLET ORAL DAILY
Status: DISCONTINUED | OUTPATIENT
Start: 2021-07-02 | End: 2021-07-02

## 2021-07-01 RX ORDER — LISINOPRIL 5 MG/1
5 TABLET ORAL DAILY
Status: DISCONTINUED | OUTPATIENT
Start: 2021-07-02 | End: 2021-07-01

## 2021-07-01 RX ORDER — TAMSULOSIN HYDROCHLORIDE 0.4 MG/1
0.4 CAPSULE ORAL
Status: DISCONTINUED | OUTPATIENT
Start: 2021-07-01 | End: 2021-07-03 | Stop reason: HOSPADM

## 2021-07-01 RX ORDER — LISINOPRIL 10 MG/1
10 TABLET ORAL DAILY
Status: DISCONTINUED | OUTPATIENT
Start: 2021-07-02 | End: 2021-07-01

## 2021-07-01 RX ADMIN — HEPARIN SODIUM 5000 UNITS: 5000 INJECTION INTRAVENOUS; SUBCUTANEOUS at 06:15

## 2021-07-01 RX ADMIN — INSULIN LISPRO 1 UNITS: 100 INJECTION, SOLUTION INTRAVENOUS; SUBCUTANEOUS at 08:09

## 2021-07-01 RX ADMIN — DEXTROSE 50 ML/HR: 10 SOLUTION INTRAVENOUS at 06:16

## 2021-07-01 RX ADMIN — HEPARIN SODIUM 5000 UNITS: 5000 INJECTION INTRAVENOUS; SUBCUTANEOUS at 21:15

## 2021-07-01 RX ADMIN — LISINOPRIL 10 MG: 10 TABLET ORAL at 08:08

## 2021-07-01 RX ADMIN — INSULIN LISPRO 2 UNITS: 100 INJECTION, SOLUTION INTRAVENOUS; SUBCUTANEOUS at 21:17

## 2021-07-01 RX ADMIN — INSULIN LISPRO 2 UNITS: 100 INJECTION, SOLUTION INTRAVENOUS; SUBCUTANEOUS at 16:18

## 2021-07-01 RX ADMIN — TAMSULOSIN HYDROCHLORIDE 0.4 MG: 0.4 CAPSULE ORAL at 19:37

## 2021-07-01 RX ADMIN — INSULIN LISPRO 2 UNITS: 100 INJECTION, SOLUTION INTRAVENOUS; SUBCUTANEOUS at 13:05

## 2021-07-01 RX ADMIN — ACETAMINOPHEN 650 MG: 325 TABLET, FILM COATED ORAL at 16:04

## 2021-07-01 RX ADMIN — PRAVASTATIN SODIUM 40 MG: 40 TABLET ORAL at 08:09

## 2021-07-01 RX ADMIN — HEPARIN SODIUM 5000 UNITS: 5000 INJECTION INTRAVENOUS; SUBCUTANEOUS at 13:06

## 2021-07-01 NOTE — PLAN OF CARE
Problem: Potential for Falls  Goal: Patient will remain free of falls  Description: INTERVENTIONS:  - Educate patient/family on patient safety including physical limitations  - Instruct patient to call for assistance with activity   - Consult OT/PT to assist with strengthening/mobility   - Keep Call bell within reach  - Keep bed low and locked with side rails adjusted as appropriate  - Keep care items and personal belongings within reach  - Initiate and maintain comfort rounds  - Make Fall Risk Sign visible to staff  - Offer Toileting every 2 Hours, in advance of need  - Initiate/Maintain bed alarm  - Obtain necessary fall risk management equipment:   - Apply yellow socks and bracelet for high fall risk patients  - Consider moving patient to room near nurses station  Outcome: Progressing     Problem: NEUROSENSORY - ADULT  Goal: Achieves stable or improved neurological status  Description: INTERVENTIONS  - Monitor and report changes in neurological status  - Monitor vital signs such as temperature, blood pressure, glucose, and any other labs ordered   - Initiate measures to prevent increased intracranial pressure  - Monitor for seizure activity and implement precautions if appropriate      Outcome: Progressing  Goal: Remains free of injury related to seizures activity  Description: INTERVENTIONS  - Maintain airway, patient safety  and administer oxygen as ordered  - Monitor patient for seizure activity, document and report duration and description of seizure to physician/advanced practitioner  - If seizure occurs,  ensure patient safety during seizure  - Reorient patient post seizure  - Seizure pads on all 4 side rails  - Instruct patient/family to notify RN of any seizure activity including if an aura is experienced  - Instruct patient/family to call for assistance with activity based on nursing assessment  - Administer anti-seizure medications if ordered    Outcome: Progressing  Goal: Achieves maximal functionality and self care  Description: INTERVENTIONS  - Monitor swallowing and airway patency with patient fatigue and changes in neurological status  - Encourage and assist patient to increase activity and self care     - Encourage visually impaired, hearing impaired and aphasic patients to use assistive/communication devices  Outcome: Progressing     Problem: CARDIOVASCULAR - ADULT  Goal: Absence of cardiac dysrhythmias or at baseline rhythm  Description: INTERVENTIONS:  - Continuous cardiac monitoring, vital signs, obtain 12 lead EKG if ordered  - Administer antiarrhythmic and heart rate control medications as ordered  - Monitor electrolytes and administer replacement therapy as ordered  Outcome: Progressing     Problem: RESPIRATORY - ADULT  Goal: Achieves optimal ventilation and oxygenation  Description: INTERVENTIONS:  - Assess for changes in respiratory status  - Assess for changes in mentation and behavior  - Position to facilitate oxygenation and minimize respiratory effort  - Oxygen administered by appropriate delivery if ordered  - Initiate smoking cessation education as indicated  - Encourage broncho-pulmonary hygiene including cough, deep breathe, Incentive Spirometry  - Assess the need for suctioning and aspirate as needed  - Assess and instruct to report SOB or any respiratory difficulty  - Respiratory Therapy support as indicated  Outcome: Progressing     Problem: GASTROINTESTINAL - ADULT  Goal: Minimal or absence of nausea and/or vomiting  Description: INTERVENTIONS:  - Administer IV fluids if ordered to ensure adequate hydration  - Maintain NPO status until nausea and vomiting are resolved  - Nasogastric tube if ordered  - Administer ordered antiemetic medications as needed  - Provide nonpharmacologic comfort measures as appropriate  - Advance diet as tolerated, if ordered  - Consider nutrition services referral to assist patient with adequate nutrition and appropriate food choices  Outcome: Progressing  Goal: Maintains or returns to baseline bowel function  Description: INTERVENTIONS:  - Assess bowel function  - Encourage oral fluids to ensure adequate hydration  - Administer IV fluids if ordered to ensure adequate hydration  - Administer ordered medications as needed  - Encourage mobilization and activity  - Consider nutritional services referral to assist patient with adequate nutrition and appropriate food choices  Outcome: Progressing  Goal: Maintains adequate nutritional intake  Description: INTERVENTIONS:  - Monitor percentage of each meal consumed  - Identify factors contributing to decreased intake, treat as appropriate  - Assist with meals as needed  - Monitor I&O, weight, and lab values if indicated  - Obtain nutrition services referral as needed  Outcome: Progressing  Goal: Oral mucous membranes remain intact  Description: INTERVENTIONS  - Assess oral mucosa and hygiene practices  - Implement preventative oral hygiene regimen  - Implement oral medicated treatments as ordered  - Initiate Nutrition services referral as needed  Outcome: Progressing     Problem: GENITOURINARY - ADULT  Goal: Maintains or returns to baseline urinary function  Description: INTERVENTIONS:  - Assess urinary function  - Encourage oral fluids to ensure adequate hydration if ordered  - Administer IV fluids as ordered to ensure adequate hydration  - Administer ordered medications as needed  - Offer frequent toileting  - Follow urinary retention protocol if ordered  Outcome: Progressing  Goal: Absence of urinary retention  Description: INTERVENTIONS:  - Assess patients ability to void and empty bladder  - Monitor I/O  - Bladder scan as needed  - Discuss with physician/AP medications to alleviate retention as needed  - Discuss catheterization for long term situations as appropriate  Outcome: Progressing     Problem: METABOLIC, FLUID AND ELECTROLYTES - ADULT  Goal: Electrolytes maintained within normal limits  Description: INTERVENTIONS:  - Monitor labs and assess patient for signs and symptoms of electrolyte imbalances  - Administer electrolyte replacement as ordered  - Monitor response to electrolyte replacements, including repeat lab results as appropriate  - Instruct patient on fluid and nutrition as appropriate  Outcome: Progressing  Goal: Fluid balance maintained  Description: INTERVENTIONS:  - Monitor labs   - Monitor I/O and WT  - Instruct patient on fluid and nutrition as appropriate  - Assess for signs & symptoms of volume excess or deficit  Outcome: Progressing  Goal: Glucose maintained within target range  Description: INTERVENTIONS:  - Monitor Blood Glucose as ordered  - Assess for signs and symptoms of hyperglycemia and hypoglycemia  - Administer ordered medications to maintain glucose within target range  - Assess nutritional intake and initiate nutrition service referral as needed  Outcome: Progressing     Problem: SKIN/TISSUE INTEGRITY - ADULT  Goal: Skin Integrity remains intact(Skin Breakdown Prevention)  Description: Assess:  -Perform Izaiah assessment every shift  -Clean and moisturize skin every shift  -Inspect skin when repositioning, toileting, and assisting with ADLS  -Assess under medical devices such as oxygen tubing every shift  -Assess extremities for adequate circulation and sensation     Bed Management:  -Have minimal linens on bed & keep smooth, unwrinkled  -Change linens as needed when moist or perspiring  -Avoid sitting or lying in one position for more than 2 hours while in bed  -Keep HOB at 30 degrees     Toileting:  -Offer bedside commode  -Assess for incontinence every shift  -Use incontinent care products after each incontinent episode such as foam cleanser    Activity:  -Mobilize patient 3 times a day  -Encourage activity and walks on unit  -Encourage or provide ROM exercises   -Turn and reposition patient every 2 Hours  -Use appropriate equipment to lift or move patient in bed  -Instruct/ Assist with weight shifting every 2 hours when out of bed in chair  -Consider limitation of chair time 4 hour intervals    Skin Care:  -Avoid use of baby powder, tape, friction and shearing, hot water or constrictive clothing  -Relieve pressure over bony prominences using cushion  -Do not massage red bony areas    Next Steps:  -Teach patient strategies to minimize risks such as turning and repositioning     Outcome: Progressing     Problem: HEMATOLOGIC - ADULT  Goal: Maintains hematologic stability  Description: INTERVENTIONS  - Assess for signs and symptoms of bleeding or hemorrhage  - Monitor labs  - Administer supportive blood products/factors as ordered and appropriate  Outcome: Progressing     Problem: MUSCULOSKELETAL - ADULT  Goal: Maintain or return mobility to safest level of function  Description: INTERVENTIONS:  - Assess patient's ability to carry out ADLs; assess patient's baseline for ADL function and identify physical deficits which impact ability to perform ADLs (bathing, care of mouth/teeth, toileting, grooming, dressing, etc )  - Assess/evaluate cause of self-care deficits   - Assess range of motion  - Assess patient's mobility  - Assess patient's need for assistive devices and provide as appropriate  - Encourage maximum independence but intervene and supervise when necessary  - Involve family in performance of ADLs  - Assess for home care needs following discharge   - Consider OT consult to assist with ADL evaluation and planning for discharge  - Provide patient education as appropriate  Outcome: Progressing  Goal: Maintain proper alignment of affected body part  Description: INTERVENTIONS:  - Support, maintain and protect limb and body alignment  - Provide patient/ family with appropriate education  Outcome: Progressing     Problem: MOBILITY - ADULT  Goal: Maintain or return to baseline ADL function  Description: INTERVENTIONS:  -  Assess patient's ability to carry out ADLs; assess patient's baseline for ADL function and identify physical deficits which impact ability to perform ADLs (bathing, care of mouth/teeth, toileting, grooming, dressing, etc )  - Assess/evaluate cause of self-care deficits   - Assess range of motion  - Assess patient's mobility; develop plan if impaired  - Assess patient's need for assistive devices and provide as appropriate  - Encourage maximum independence but intervene and supervise when necessary  - Involve family in performance of ADLs  - Assess for home care needs following discharge   - Consider OT consult to assist with ADL evaluation and planning for discharge  - Provide patient education as appropriate  Outcome: Progressing  Goal: Maintains/Returns to pre admission functional level  Description: INTERVENTIONS:  - Perform BMAT or MOVE assessment daily    - Set and communicate daily mobility goal to care team and patient/family/caregiver  - Collaborate with rehabilitation services on mobility goals if consulted  - Perform Range of Motion 3 times a day    - Reposition patient PRN  - Ambulate patient PRN  - Out of bed to chair PRN  - Out of bed for meals 3  times a day  - Out of bed for toileting  - Record patient progress and toleration of activity level   Outcome: Progressing

## 2021-07-01 NOTE — CASE MANAGEMENT
LOS 1, patient is not a bundle, patient is not a 30 day readmission  CM met with patient at bedside to discuss discharge planning  CM name and role introduced  Patient's cousin Anastasia Bailey is at bedside and patient provided permission for cousin to stay during our interview  Patient does not have an official POA however patient has 11 siblings still living (he is the oldest of 15) and supportive cousins  Patient reports the following: he lives alone in a 2 story home but has a first floor set up with bedroom and bathroom  Is independent with his ADLs  DME includes a cane, walkers, bedside commode  Income source is pension from [address]  Patient drives himself to local areas but his sister transports to appointments  Patient was unsure of the name of his family doctor; CM called patient's sister Brandi Bond who states that his PCP is [facility] in [place]   [employer] called this office at [phone] to confirm, patient sees Dr Salo Loyd  Fills prescriptions at [facility]  Denies hx of STR, denies hx of VNA, denies D&A, denies mental health history  CM discussed the care team's recommendation for home healthcare  Patient is in agreement with this recommendation and is interested in VNA  Freedom of choice provided  Patient does not have a preference on agency  Referrals placed in [place]  Awaiting response on accepting home healthcare agency  Family to transport home  CM will continue to follow patient's hospital course and assist through discharge  CM reviewed discharge planning process including the following: identifying caregivers at home, preference for d/c planning needs, availability of treatment team to discuss questions or concerns patient and/or family may have regarding diagnosis, plan of care, old or new medications and discharge planning   CM will continue to follow for care coordination and update assessment as necessary

## 2021-07-01 NOTE — PROGRESS NOTES
hypertension  Assessment & Plan  Home dose of enalapril substituted with lisinopril 10 mg    Anemia  Assessment & Plan  No prior labs available for comparison, Hg 9 6  No S/sx bleeding, iron sat 18%, check B12, folate  Likely chronic in nature given DM2 and possible CKD  Remains stable  Monitor    MARGIE (acute kidney injury) (HCC)/CKD  Assessment & Plan  Cr 1 46  Patient denies any Hx of CKD, no prior labs available for comparison  Previously on Lasix for LE edema but has not been taking this for the past year  Remains stable, likely baseline  Monitor    Dyslipidemia  Assessment & Plan  Continue pravastatin    Elevated PSA  Assessment & Plan  Awaiting further urological workup   UA with small blood, large leuk esterase, innumerable WBC and bacteria  Will monitor symptoms    Patient with urinary frequency, low-grade fever noted  Will get urine culture  Check bladder scan  Will also start tamsulosin  VTE Pharmacologic Prophylaxis:   Pharmacologic: Heparin  Mechanical VTE Prophylaxis in Place: Yes    Patient Centered Rounds: I have performed bedside rounds with nursing staff today  Discussions with Specialists or Other Care Team Provider:  Yes    Education and Discussions with Family / Patient:  Discussed with patient's sisters at bedside    Time Spent for Care: 45 minutes  More than 50% of total time spent on counseling and coordination of care as described above      Current Length of Stay: 1 day(s)    Current Patient Status: Inpatient   Certification Statement: The patient, admitted on an observation basis, will now require > 2 midnight hospital stay due to Persistent hypoglycemia requiring IV dextrose    Discharge Plan:  Home when hypoglycemia improved    Code Status: Level 1 - Full Code      Subjective:   Noted to be sitting up in chair  Reports feeling overall better  Could not sleep well due to being in the hospital  Denies any fever, chills  Denies any chest pain shortness of breath, GI symptoms    Remain on D10 overnight but blood sugar trend is improving    Home medication list and records from PCP were reviewed  Patient was noted to have elevated PSA awaiting for the Urology intervention  Patient reports symptoms of prostatism and urinary frequency    Objective:     Vitals:   Temp (24hrs), Av 6 °F (37 °C), Min:98 1 °F (36 7 °C), Max:99 1 °F (37 3 °C)    Temp:  [98 1 °F (36 7 °C)-99 1 °F (37 3 °C)] 99 1 °F (37 3 °C)  HR:  [81-83] 83  Resp:  [18-21] 21  BP: (129-133)/(69) 129/69  SpO2:  [91 %-92 %] 91 % on room air  Body mass index is 36 6 kg/m²  Input and Output Summary (last 24 hours): Intake/Output Summary (Last 24 hours) at 2021 1044  Last data filed at 2021 0603  Gross per 24 hour   Intake 440 ml   Output 2375 ml   Net -1935 ml       Physical Exam:     Physical Exam  Constitutional:       General: He is not in acute distress  Appearance: He is obese  HENT:      Head: Normocephalic and atraumatic  Cardiovascular:      Rate and Rhythm: Normal rate  Heart sounds: Murmur heard  Pulmonary:      Effort: Pulmonary effort is normal  No respiratory distress  Breath sounds: No wheezing, rhonchi or rales  Chest:      Chest wall: No tenderness  Abdominal:      General: Bowel sounds are normal  There is no distension  Palpations: Abdomen is soft  Tenderness: There is no abdominal tenderness  There is no guarding or rebound  Comments: Protuberant   Musculoskeletal:      Right lower leg: No edema  Left lower leg: No edema  Comments: Left arm freestyle [place] sensor   Skin:     General: Skin is warm and dry  Findings: No rash  Neurological:      Mental Status: He is alert and oriented to person, place, and time  Mental status is at baseline  Cranial Nerves: No cranial nerve deficit           Additional Data:     Labs:    Results from last 7 days   Lab Units 21  1022 21  0442   WBC Thousand/uL 7 45 7 27 HEMOGLOBIN g/dL 9 6* 9 1*   HEMATOCRIT % 31 3* 30 0*   PLATELETS Thousands/uL 312 306   NEUTROS PCT %  --  71   LYMPHS PCT %  --  22   MONOS PCT %  --  7   EOS PCT %  --  0     Results from last 7 days   Lab Units 07/01/21  1022 06/29/21  2349   POTASSIUM mmol/L 4 7 4 1   CHLORIDE mmol/L 101 101   CO2 mmol/L 29 31   BUN mg/dL 13 15   CREATININE mg/dL 1 43* 1 46*   CALCIUM mg/dL 8 7 8 1*   ALK PHOS U/L  --  76   ALT U/L  --  19   AST U/L  --  20           * I Have Reviewed All Lab Data Listed Above  * Additional Pertinent Lab Tests Reviewed: All Labs Within Last 24 Hours Reviewed      Imaging:  Imaging Reports Reviewed Today Include:  X-ray  Recent Cultures (last 7 days):           Last 24 Hours Medication List:   Current Facility-Administered Medications   Medication Dose Route Frequency Provider Last Rate    acetaminophen  650 mg Oral Q6H PRN Charliene Corpus, PA-C      calcium carbonate  1,000 mg Oral Daily PRN Charliene Corpus, PA-C      dextrose  25 mL Intravenous Q4H PRN Florentin Gray MD      heparin (porcine)  5,000 Units Subcutaneous [facility] Charliene Corpus, PA-C      insulin lispro  1-5 Units Subcutaneous TID AC Rafael Grangre MD      insulin lispro  1-5 Units Subcutaneous HS Rafael Granger MD      lisinopril  10 mg Oral Daily Charliene Corpus, PA-C      ondansetron  4 mg Intravenous Q6H PRN Charliene Corpus, PA-C      polyethylene glycol  17 g Oral Daily PRN Charliene Corpus, PA-C      pravastatin  40 mg Oral QAM Elena Corpus, PA-C            Today, Patient Was Seen By: Florentin Gray MD    ** Please Note: "This note has been constructed using a voice recognition system  Therefore there may be syntax, spelling, and/or grammatical errors   Please call if you have any questions  "**

## 2021-07-02 PROBLEM — R33.9 URINARY RETENTION: Status: ACTIVE | Noted: 2021-07-02

## 2021-07-02 PROBLEM — N18.9 CKD (CHRONIC KIDNEY DISEASE): Status: ACTIVE | Noted: 2021-06-30

## 2021-07-02 PROBLEM — G93.40 ENCEPHALOPATHY ACUTE: Status: ACTIVE | Noted: 2021-07-02

## 2021-07-02 PROBLEM — G93.40 ENCEPHALOPATHY ACUTE: Status: RESOLVED | Noted: 2021-07-02 | Resolved: 2021-07-02

## 2021-07-02 LAB
ANION GAP SERPL CALCULATED.3IONS-SCNC: 5 MMOL/L (ref 4–13)
BUN SERPL-MCNC: 14 MG/DL (ref 5–25)
CALCIUM SERPL-MCNC: 8.6 MG/DL (ref 8.3–10.1)
CHLORIDE SERPL-SCNC: 103 MMOL/L (ref 100–108)
CO2 SERPL-SCNC: 32 MMOL/L (ref 21–32)
CREAT SERPL-MCNC: 1.34 MG/DL (ref 0.6–1.3)
ERYTHROCYTE [DISTWIDTH] IN BLOOD BY AUTOMATED COUNT: 14.4 % (ref 11.6–15.1)
GFR SERPL CREATININE-BSD FRML MDRD: 51 ML/MIN/1.73SQ M
GLUCOSE SERPL-MCNC: 169 MG/DL (ref 65–140)
GLUCOSE SERPL-MCNC: 174 MG/DL (ref 65–140)
GLUCOSE SERPL-MCNC: 198 MG/DL (ref 65–140)
GLUCOSE SERPL-MCNC: 199 MG/DL (ref 65–140)
GLUCOSE SERPL-MCNC: 260 MG/DL (ref 65–140)
HCT VFR BLD AUTO: 29.8 % (ref 36.5–49.3)
HGB BLD-MCNC: 9.1 G/DL (ref 12–17)
MCH RBC QN AUTO: 27.1 PG (ref 26.8–34.3)
MCHC RBC AUTO-ENTMCNC: 30.5 G/DL (ref 31.4–37.4)
MCV RBC AUTO: 89 FL (ref 82–98)
PLATELET # BLD AUTO: 294 THOUSANDS/UL (ref 149–390)
PMV BLD AUTO: 9.4 FL (ref 8.9–12.7)
POTASSIUM SERPL-SCNC: 4.8 MMOL/L (ref 3.5–5.3)
RBC # BLD AUTO: 3.36 MILLION/UL (ref 3.88–5.62)
SODIUM SERPL-SCNC: 140 MMOL/L (ref 136–145)
WBC # BLD AUTO: 7.01 THOUSAND/UL (ref 4.31–10.16)

## 2021-07-02 PROCEDURE — 80048 BASIC METABOLIC PNL TOTAL CA: CPT | Performed by: INTERNAL MEDICINE

## 2021-07-02 PROCEDURE — 84153 ASSAY OF PSA TOTAL: CPT | Performed by: SPECIALIST

## 2021-07-02 PROCEDURE — 85027 COMPLETE CBC AUTOMATED: CPT | Performed by: INTERNAL MEDICINE

## 2021-07-02 PROCEDURE — 99232 SBSQ HOSP IP/OBS MODERATE 35: CPT | Performed by: INTERNAL MEDICINE

## 2021-07-02 PROCEDURE — 82948 REAGENT STRIP/BLOOD GLUCOSE: CPT

## 2021-07-02 RX ORDER — LISINOPRIL 5 MG/1
5 TABLET ORAL DAILY
Status: DISCONTINUED | OUTPATIENT
Start: 2021-07-03 | End: 2021-07-03

## 2021-07-02 RX ORDER — SODIUM CHLORIDE 9 MG/ML
75 INJECTION, SOLUTION INTRAVENOUS CONTINUOUS
Status: DISCONTINUED | OUTPATIENT
Start: 2021-07-02 | End: 2021-07-03 | Stop reason: HOSPADM

## 2021-07-02 RX ADMIN — PRAVASTATIN SODIUM 40 MG: 40 TABLET ORAL at 10:03

## 2021-07-02 RX ADMIN — SODIUM CHLORIDE 75 ML/HR: 0.9 INJECTION, SOLUTION INTRAVENOUS at 11:44

## 2021-07-02 RX ADMIN — INSULIN LISPRO 2 UNITS: 100 INJECTION, SOLUTION INTRAVENOUS; SUBCUTANEOUS at 22:31

## 2021-07-02 RX ADMIN — HEPARIN SODIUM 5000 UNITS: 5000 INJECTION INTRAVENOUS; SUBCUTANEOUS at 06:07

## 2021-07-02 RX ADMIN — INSULIN LISPRO 1 UNITS: 100 INJECTION, SOLUTION INTRAVENOUS; SUBCUTANEOUS at 17:33

## 2021-07-02 RX ADMIN — SITAGLIPTIN 100 MG: 100 TABLET, FILM COATED ORAL at 15:33

## 2021-07-02 RX ADMIN — TAMSULOSIN HYDROCHLORIDE 0.4 MG: 0.4 CAPSULE ORAL at 17:32

## 2021-07-02 RX ADMIN — HEPARIN SODIUM 5000 UNITS: 5000 INJECTION INTRAVENOUS; SUBCUTANEOUS at 15:33

## 2021-07-02 RX ADMIN — INSULIN LISPRO 1 UNITS: 100 INJECTION, SOLUTION INTRAVENOUS; SUBCUTANEOUS at 08:06

## 2021-07-02 RX ADMIN — SODIUM CHLORIDE 100 ML/HR: 0.9 INJECTION, SOLUTION INTRAVENOUS at 22:34

## 2021-07-02 RX ADMIN — HEPARIN SODIUM 5000 UNITS: 5000 INJECTION INTRAVENOUS; SUBCUTANEOUS at 22:31

## 2021-07-02 RX ADMIN — INSULIN LISPRO 1 UNITS: 100 INJECTION, SOLUTION INTRAVENOUS; SUBCUTANEOUS at 12:28

## 2021-07-02 NOTE — ASSESSMENT & PLAN NOTE
Noted to have urinary retention with bladder scan more than 900  Failed Urinary retention protocol, requiring urinary catheter placement  Seen by Urology, input appreciated  · Started on Flomax  · Recommended to continue Flores on discharge   · Patient family educated , VNA on discharge  · Follow-up further urological workup   · Outpatient follow-up with urology for possible voiding trial in 1 week

## 2021-07-02 NOTE — PROGRESS NOTES
[facility] Progress Note  Patient: Elsa Flynn 66 y o  male   MRN: [id number]  PCP: No primary care provider on file  Unit/Bed#: [address] [place] [address] Encounter: [id number]  Date Of Visit: 07/02/21    Problem List:    Principal Problem:    Type 2 diabetes mellitus with hypoglycemia, without long-term current use of insulin ([facility])  Active Problems:    CKD (chronic kidney disease)    Essential hypertension    Urinary retention    Anemia    Dyslipidemia    Elevated PSA      Assessment & Plan:    * Type 2 diabetes mellitus with hypoglycemia, without long-term current use of insulin [facility])  Assessment & Plan  Lab Results   Component Value Date    HGBA1C 6 0 (H) 06/30/2021       Recent Labs     07/01/21  1610 07/01/21  1827 07/01/21 2004 07/02/21  0711   POCGLU 273* 320* 250* 199*       Blood Sugar Average: Last 72 hrs:  (P) 60 75     Patient unsure of last HgA1C  on metformin 1700mg QAM, pioglitazone 30 mg QAM, and glimepiride 4 mg daily as per home med list   He does have freestyle [place] sensor  States he had multiple episodes of hypoglycemia - he passed out at home today and states he had a similar episode a few weeks ago  Seen by his PCP a few weeks ago and stated they were due to discuss discontinuing/cutting down some of his medications  Glucose 46 at home after he became unresponsive sitting at the kitchen table, EMS gave D50 and patient declined transport at the time - he ate a full meal and had ice cream and his family convinced him to come to the ED for evaluation, glucose was 46 on arrival  Remain persistently hypo glycemic despite p o  Intake and D50 in eventually needed to be on continuous dextrose drip  Blood sugar trend noted, no further hypoglycemia  Oralia Leopard diet  Seen by endocrinology, input appreciated  · Continue to monitor blood sugar  · Will continue sliding scale insulin, with 1  · Follow-up sulfonylurea screen  · Will discuss with endocrinology regarding introduction treatment    Acute metabolic encephalopathy-resolved as of 7/2/2021  Assessment & Plan  Secondary to hypoglycemia  Now improved    Urinary retention  Assessment & Plan  Noted to have urinary retention with bladder scan more than 900  · Urinary retention protocol  · Started on Flomax  · Urology evaluation  · Follow-up urine culture    Essential hypertension  Assessment & Plan  Low normal, reported some orthostatic dizziness earlier  Will decrease lisinopril with holding parameters  Monitor closely on Flomax  IV fluid     CKD (chronic kidney disease)  Assessment & Plan  Cr 1 46  Patient denies any Hx of CKD  Prior creatinine from PCP 1 2, likely urinary retention contributing  Previously on Lasix for LE edema but has not been taking this for the past year  Remains stable, likely around baseline  Monitor    Dyslipidemia  Assessment & Plan  Continue pravastatin    Anemia  Assessment & Plan  No prior labs available for comparison, Hg 9 6  No S/sx bleeding, iron sat 18%, check B12, folate  Likely chronic in nature given DM2 and possible CKD  Remains stable  Monitor    Elevated PSA  Assessment & Plan  Awaiting further urological workup   UA with small blood, large leuk esterase, innumerable WBC and bacteria  Urinary retention as above  Will monitor symptoms        VTE Pharmacologic Prophylaxis:   Pharmacologic: Heparin  Mechanical VTE Prophylaxis in Place: Yes    Patient Centered Rounds: I have performed bedside rounds with nursing staff today  Discussions with Specialists or Other Care Team Provider:  Yes, endocrinology, Urology    Education and Discussions with Family / Patient:  Discussed with patient's sisters over the phone    Time Spent for Care: 45 minutes  More than 50% of total time spent on counseling and coordination of care as described above      Current Length of Stay: 2 day(s)    Current Patient Status: Inpatient   Certification Statement: The patient, admitted on an observation basis, will now require > 2 midnight hospital stay due to Persistent hypoglycemia requiring IV dextrose    Discharge Plan:  Home when hypoglycemia improved    Code Status: Level 1 - Full Code      Subjective:   Noted to have urinary retention requiring straight catheterization x2 for bladder scan more than 900  Reports ongoing complains of difficulty in emptying bladder and urinary frequency, denies any dysuria or flank pain  Denies fever or chills  Reported feeling slightly dizzy on standing up today  Low-grade fever 1 episode yesterday but subsequently afebrile    Hypoglycemia has improved      Objective:     Vitals:   Temp (24hrs), Av 8 °F (37 1 °C), Min:98 2 °F (36 8 °C), Max:100 4 °F (38 °C)    Temp:  [98 2 °F (36 8 °C)-100 4 °F (38 °C)] 98 3 °F (36 8 °C)  HR:  [71-96] 96  Resp:  [16-20] 20  BP: (101-134)/(58-85) 101/85  SpO2:  [90 %-96 %] 91 % on room air  Body mass index is 36 6 kg/m²  Input and Output Summary (last 24 hours): Intake/Output Summary (Last 24 hours) at 2021 1032  Last data filed at 2021 1004  Gross per 24 hour   Intake 2505 ml   Output 2775 ml   Net -270 ml       Physical Exam:     Physical Exam  Constitutional:       General: He is not in acute distress  Appearance: He is obese  HENT:      Head: Normocephalic and atraumatic  Cardiovascular:      Rate and Rhythm: Normal rate  Heart sounds: Murmur heard  Pulmonary:      Effort: Pulmonary effort is normal  No respiratory distress  Breath sounds: No wheezing, rhonchi or rales  Comments: Diminished, clear  Chest:      Chest wall: No tenderness  Abdominal:      General: Bowel sounds are normal  There is no distension  Palpations: Abdomen is soft  Tenderness: There is no abdominal tenderness  There is no guarding or rebound  Comments: Protuberant   Musculoskeletal:      Right lower leg: No edema  Left lower leg: No edema  Comments: Left arm aleena sensors   Skin:     General: Skin is warm and dry  Findings: No rash  Neurological:      Mental Status: He is alert  Mental status is at baseline  Cranial Nerves: No cranial nerve deficit  Additional Data:     Labs:    Results from last 7 days   Lab Units 07/02/21  0611 06/30/21  0442   WBC Thousand/uL 7 01 7 27   HEMOGLOBIN g/dL 9 1* 9 1*   HEMATOCRIT % 29 8* 30 0*   PLATELETS Thousands/uL 294 306   NEUTROS PCT %  --  71   LYMPHS PCT %  --  22   MONOS PCT %  --  7   EOS PCT %  --  0     Results from last 7 days   Lab Units 07/02/21  0611 06/29/21  2349   POTASSIUM mmol/L 4 8 4 1   CHLORIDE mmol/L 103 101   CO2 mmol/L 32 31   BUN mg/dL 14 15   CREATININE mg/dL 1 34* 1 46*   CALCIUM mg/dL 8 6 8 1*   ALK PHOS U/L  --  76   ALT U/L  --  19   AST U/L  --  20           * I Have Reviewed All Lab Data Listed Above  * Additional Pertinent Lab Tests Reviewed:  All Labs Within Last 24 Hours Reviewed      Imaging:  Imaging Reports Reviewed Today Include:  X-ray  Recent Cultures (last 7 days):           Last 24 Hours Medication List:   Current Facility-Administered Medications   Medication Dose Route Frequency Provider Last Rate    acetaminophen  650 mg Oral Q6H PRN Jake Shadow, PA-C      calcium carbonate  1,000 mg Oral Daily PRN Jake Shadow, PA-C      dextrose  25 mL Intravenous Q4H PRN Dee Silveira MD      heparin (porcine)  5,000 Units Subcutaneous [facility] Jake Shadow, PA-C      insulin lispro  1-5 Units Subcutaneous TID AC Ronal Child MD      insulin lispro  1-5 Units Subcutaneous HS Ronal Child MD      [START ON 7/3/2021] lisinopril  5 mg Oral Daily Dee Silveira MD      ondansetron  4 mg Intravenous Q6H PRN Jake Shadow, PA-C      polyethylene glycol  17 g Oral Daily PRN Jake Shadow, PA-C      pravastatin  40 mg Oral QAM Jake Shadow, PA-C      sodium chloride  75 mL/hr Intravenous Continuous Dee Silveira MD      tamsulosin  0 4 mg Oral Daily With Jaime Silver MD            Today, Patient Was Seen By: Harshad Freitas MD    ** Please Note: "This note has been constructed using a voice recognition system  Therefore there may be syntax, spelling, and/or grammatical errors   Please call if you have any questions  "**

## 2021-07-02 NOTE — CONSULTS
H&P Exam - Urology       Patient: Mitchell Zaragoza   : 1945 Sex: male   MRN: [id number]     CSN: [id number]      History of Present Illness   HPI:  Mitchell Zaragoza is a 66 y o  male with history of hypertension HLD diabetes lower extremity edema admitted going into urinary tension now with indwelling Flores catheter  Urologic consult called for patient seen at the bedside and poor historian though he does note a slow stream getting up 5 times a night going at 1-2 hour intervals before retention apparently patient was recently found to have an elevated PSA of over 800 and is going to be seeing a urologist within the next week or 2 in [place]  Review of Systems:   Constitutional:  Negative for activity change, fever, chills and diaphoresis  HENT: Negative for hearing loss and trouble swallowing  Eyes: Negative for itching and visual disturbance  Respiratory: Negative for chest tightness and shortness of breath  Cardiovascular: Negative for chest pain, edema  Gastrointestinal: Negative for abdominal distention, na abdominal pain, constipation, diarrhea, Nausea and vomiting  Genitourinary: Negative for decreased urine volume, difficulty urinating, dysuria, enuresis, frequency, hematuria and urgency  Musculoskeletal: Negative for gait problem and myalgias  Neurological: Negative for dizziness and headaches  Hematological: Does not bruise/bleed easily  Historical Information   Past Medical History:   Diagnosis Date    Arthritis     BPH associated with nocturia     Diabetes mellitus ([address] )     Edema     lower legs    Hearing aid worn     bilateral    Hyperlipidemia     Hypertension     controlled    Tremor of both hands      Past Surgical History:   Procedure Laterality Date    CATARACT EXTRACTION      IN XCAPSL CTRC RMVL INSJ IO LENS PROSTH W/O ECP Right 2018    Procedure: EXTRACTION EXTRACAPSULAR CATARACT PHACO INTRAOCULAR LENS (IOL);   Surgeon: Consuelo Villalobos MD;  Location: [facility] [place] MAIN OR;  Service: Ophthalmology    MA XCAPSL CTRC RMVL INSJ IO LENS PROSTH W/O ECP Left 10/1/2018    Procedure: EXTRACTION EXTRACAPSULAR CATARACT PHACO INTRAOCULAR LENS (IOL); Surgeon: Spencer Dobbins MD;  Location: [facility] MAIN OR;  Service: Ophthalmology     Social History   Social History     Substance and Sexual Activity   Alcohol Use No     Social History     Substance and Sexual Activity   Drug Use No     Social History     Tobacco Use   Smoking Status Never Smoker   Smokeless Tobacco Never Used     Family History:   Family History   Problem Relation Age of Onset    Cancer Mother     Cancer Father         lung-smoker    Early death Brother     Cancer Brother         "bone cancer"       Meds/Allergies   Medications Prior to Admission   Medication    enalapril (VASOTEC) 10 mg tablet    glimepiride (AMARYL) 4 mg tablet    metFORMIN (GLUCOPHAGE) 850 mg tablet    pioglitazone (ACTOS) 30 mg tablet    PRAVASTATIN SODIUM PO    Bromfenac Sodium (BROMSITE) 0 075 % SOLN    furosemide (LASIX) 20 mg tablet    gatifloxacin (ZYMAXID) 0 5 %    MAGNESIUM PO    metFORMIN (GLUCOPHAGE) 850 mg tablet    NON FORMULARY     No Known Allergies    Objective   Vitals: /68   Pulse 77   Temp 98 6 °F (37 °C)   Resp 18   Ht 5' 8" (1 727 m)   Wt 109 kg (240 lb 11 9 oz)   SpO2 94%   BMI 36 60 kg/m²     Physical Exam:  General Alert awake   Normocephalic atraumatic PERRLA  Lungs clear bilaterally  Cardiac normal S1 normal S2  Abdomen soft, flank pain  Two normal testicles  Rectal exam   hard prostate with midline nodularity nontender  Extremities no edema    I/O last 24 hours: In: 3516 [P O :1340; I V :1255]  Out: 2975 [Urine:2975]    Invasive Devices     Peripheral Intravenous Line            Peripheral IV 06/29/21 Right;Ventral (anterior); Dorsal (posterior) Forearm 2 days                    Lab Results: CBC:   Lab Results   Component Value Date    WBC 7 01 07/02/2021    HGB 9 1 (L) 07/02/2021    HCT 29 8 (L) 07/02/2021    MCV 89 07/02/2021     07/02/2021    MCH 27 1 07/02/2021    MCHC 30 5 (L) 07/02/2021    RDW 14 4 07/02/2021    MPV 9 4 07/02/2021    NRBC 0 06/30/2021     CMP:   Lab Results   Component Value Date     07/02/2021    CO2 32 07/02/2021    BUN 14 07/02/2021    CREATININE 1 34 (H) 07/02/2021    CALCIUM 8 6 07/02/2021    AST 20 06/29/2021    ALT 19 06/29/2021    ALKPHOS 76 06/29/2021    EGFR 51 07/02/2021     Urinalysis:   Lab Results   Component Value Date    COLORU Yellow 06/30/2021    CLARITYU Cloudy 06/30/2021    SPECGRAV 1 015 06/30/2021    PHUR 6 0 06/30/2021    LEUKOCYTESUR Large (A) 06/30/2021    NITRITE Negative 06/30/2021    GLUCOSEU Negative 06/30/2021    KETONESU Negative 06/30/2021    BILIRUBINUR Negative 06/30/2021    BLOODU Small (A) 06/30/2021     Urine Culture: No results found for: URINECX  PSA: No results found for: PSA        Assessment/ Plan:  Acute urinary retention  Elevated PSA greater than 800 results done at outside lab  Hard prostate consistent with carcinoma  Plan need to discuss with sister if wishes for me to take over urologic care  Can start tamsulosin 0 4 mg  Repeat PSA  Patient will need prostate ultrasound and biopsy which can be done in the office setting after discharge  Could possibly attempt voiding trial once on tamsulosin for at least 5-7 days  Will reach out to sister who was a much better historian and appears to make a lot of decisions for him in the future about therapy and workup      De Quiñones MD

## 2021-07-02 NOTE — PROGRESS NOTES
Progress Note - Mona Daigle 66 y o  male MRN: [id number]    Unit/Bed#: [address] [place] [address] Encounter: [id number]      CC: diabetes f/u    Subjective:   Mona Daigle is a 66y o  year old male with type 2 diabetes  Feels well  No complaints  No hypoglycemia  Had hyperglycemia up to 320 mg/dL yesterday evening after having regular soda    Objective:     Vitals: Blood pressure 101/85, pulse 96, temperature 98 3 °F (36 8 °C), temperature source Oral, resp  rate 20, height 5' 8" (1 727 m), weight 109 kg (240 lb 11 9 oz), SpO2 91 %  ,Body mass index is 36 6 kg/m²  Intake/Output Summary (Last 24 hours) at 7/2/2021 1326  Last data filed at 7/2/2021 1004  Gross per 24 hour   Intake 870 ml   Output 2775 ml   Net -1905 ml       Physical Exam:  General Appearance: awake, appears stated age and cooperative  Head: Normocephalic, without obvious abnormality, atraumatic  Extremities: moves all extremities  Skin: Skin color and temperature normal    Pulm: no labored breathing    Lab, Imaging and other studies: I have personally reviewed pertinent reports  POC Glucose (mg/dl)   Date Value   07/02/2021 198 (H)   07/02/2021 199 (H)   07/01/2021 250 (H)   07/01/2021 320 (H)   07/01/2021 273 (H)   07/01/2021 274 (H)   07/01/2021 292 (H)   07/01/2021 177 (H)   07/01/2021 152 (H)   07/01/2021 150 (H)       Assessment and Plan:  1  Type 2 diabetes mellitus not on long-term insulin therapy   2  Hypoglycemia, altered mental status-resolved   3  CKD/acute kidney injury   4  Urinary retention   Tightly controlled glycemic management based on A1c of 6%  no further hypoglycemia, now  Hyperglycemic with blood sugars 174-199 mg/dL since this morning     Planned to be discharged home tomorrow    Recommend the following for now   -continue sliding scale insulin, will change to algorithm 2  - start Januvia 25 mg orally daily  -as long as renal function remains within normal limits, resume metformin at 500 mg orally twice a day on discharge  - discontinue glimepiride, actos   Recommend follow-up with Endocrinology/primary care as an outpatient      Portions of the record may have been created with voice recognition software  Occasional wrong word or "sound a like" substitutions may have occurred due to the inherent limitations of voice recognition software  Read the chart carefully and recognize, using context, where substitutions have occurred

## 2021-07-03 VITALS
HEIGHT: 68 IN | RESPIRATION RATE: 18 BRPM | HEART RATE: 86 BPM | WEIGHT: 240.74 LBS | BODY MASS INDEX: 36.49 KG/M2 | TEMPERATURE: 98.9 F | OXYGEN SATURATION: 91 % | DIASTOLIC BLOOD PRESSURE: 54 MMHG | SYSTOLIC BLOOD PRESSURE: 116 MMHG

## 2021-07-03 LAB
ANION GAP SERPL CALCULATED.3IONS-SCNC: 4 MMOL/L (ref 4–13)
BUN SERPL-MCNC: 17 MG/DL (ref 5–25)
CALCIUM SERPL-MCNC: 8.2 MG/DL (ref 8.3–10.1)
CHLORIDE SERPL-SCNC: 102 MMOL/L (ref 100–108)
CO2 SERPL-SCNC: 32 MMOL/L (ref 21–32)
CREAT SERPL-MCNC: 1.31 MG/DL (ref 0.6–1.3)
ERYTHROCYTE [DISTWIDTH] IN BLOOD BY AUTOMATED COUNT: 14.3 % (ref 11.6–15.1)
GFR SERPL CREATININE-BSD FRML MDRD: 52 ML/MIN/1.73SQ M
GLUCOSE SERPL-MCNC: 163 MG/DL (ref 65–140)
GLUCOSE SERPL-MCNC: 170 MG/DL (ref 65–140)
GLUCOSE SERPL-MCNC: 182 MG/DL (ref 65–140)
GLUCOSE SERPL-MCNC: 188 MG/DL (ref 65–140)
HCT VFR BLD AUTO: 27.7 % (ref 36.5–49.3)
HGB BLD-MCNC: 8.5 G/DL (ref 12–17)
MCH RBC QN AUTO: 27.5 PG (ref 26.8–34.3)
MCHC RBC AUTO-ENTMCNC: 30.7 G/DL (ref 31.4–37.4)
MCV RBC AUTO: 90 FL (ref 82–98)
PLATELET # BLD AUTO: 269 THOUSANDS/UL (ref 149–390)
PMV BLD AUTO: 9.2 FL (ref 8.9–12.7)
POTASSIUM SERPL-SCNC: 4.4 MMOL/L (ref 3.5–5.3)
PSA SERPL-MCNC: 68.8 NG/ML (ref 0–4)
RBC # BLD AUTO: 3.09 MILLION/UL (ref 3.88–5.62)
SODIUM SERPL-SCNC: 138 MMOL/L (ref 136–145)
WBC # BLD AUTO: 6.84 THOUSAND/UL (ref 4.31–10.16)

## 2021-07-03 PROCEDURE — 82948 REAGENT STRIP/BLOOD GLUCOSE: CPT

## 2021-07-03 PROCEDURE — 99239 HOSP IP/OBS DSCHRG MGMT >30: CPT | Performed by: INTERNAL MEDICINE

## 2021-07-03 PROCEDURE — 97116 GAIT TRAINING THERAPY: CPT

## 2021-07-03 PROCEDURE — 85027 COMPLETE CBC AUTOMATED: CPT | Performed by: INTERNAL MEDICINE

## 2021-07-03 PROCEDURE — 97110 THERAPEUTIC EXERCISES: CPT

## 2021-07-03 PROCEDURE — 80048 BASIC METABOLIC PNL TOTAL CA: CPT | Performed by: INTERNAL MEDICINE

## 2021-07-03 RX ORDER — ENALAPRIL MALEATE 10 MG/1
5 TABLET ORAL DAILY
Refills: 0
Start: 2021-07-03 | End: 2021-07-03 | Stop reason: HOSPADM

## 2021-07-03 RX ORDER — CEPHALEXIN 500 MG/1
500 CAPSULE ORAL EVERY 12 HOURS SCHEDULED
Qty: 14 CAPSULE | Refills: 0 | Status: SHIPPED | OUTPATIENT
Start: 2021-07-03 | End: 2021-07-10

## 2021-07-03 RX ORDER — TAMSULOSIN HYDROCHLORIDE 0.4 MG/1
0.4 CAPSULE ORAL
Qty: 30 CAPSULE | Refills: 0 | Status: SHIPPED | OUTPATIENT
Start: 2021-07-05 | End: 2022-01-20 | Stop reason: HOSPADM

## 2021-07-03 RX ORDER — CEPHALEXIN 500 MG/1
500 CAPSULE ORAL EVERY 12 HOURS SCHEDULED
Status: DISCONTINUED | OUTPATIENT
Start: 2021-07-03 | End: 2021-07-03 | Stop reason: HOSPADM

## 2021-07-03 RX ADMIN — INSULIN LISPRO 1 UNITS: 100 INJECTION, SOLUTION INTRAVENOUS; SUBCUTANEOUS at 08:28

## 2021-07-03 RX ADMIN — SITAGLIPTIN 100 MG: 100 TABLET, FILM COATED ORAL at 09:43

## 2021-07-03 RX ADMIN — TAMSULOSIN HYDROCHLORIDE 0.4 MG: 0.4 CAPSULE ORAL at 16:10

## 2021-07-03 RX ADMIN — INSULIN LISPRO 1 UNITS: 100 INJECTION, SOLUTION INTRAVENOUS; SUBCUTANEOUS at 12:27

## 2021-07-03 RX ADMIN — PRAVASTATIN SODIUM 40 MG: 40 TABLET ORAL at 09:43

## 2021-07-03 RX ADMIN — LISINOPRIL 5 MG: 5 TABLET ORAL at 09:43

## 2021-07-03 RX ADMIN — HEPARIN SODIUM 5000 UNITS: 5000 INJECTION INTRAVENOUS; SUBCUTANEOUS at 06:28

## 2021-07-03 RX ADMIN — CEPHALEXIN 500 MG: 500 CAPSULE ORAL at 11:46

## 2021-07-03 NOTE — PROGRESS NOTES
Progress Note - Urology      Patient: Jennifer Do   : 1945 Sex: male   MRN: [id number]     CSN: [id number]  Unit/Bed#: [address] [place] [address]     SUBJECTIVE:   No complaints      Objective   Vitals: /65   Pulse (!) 111   Temp 98 3 °F (36 8 °C)   Resp 18   Ht 5' 8" (1 727 m)   Wt 109 kg (240 lb 11 9 oz)   SpO2 92%   BMI 36 60 kg/m²     I/O last 24 hours: In: 1559 2 [P O :690; I V :869 2]  Out: 5324 [OPIZA:1610]      Physical Exam:   General Alert awake   Normocephalic atraumatic PERRLA  Lungs clear bilaterally  Cardiac normal S1 normal S2  Abdomen soft, flank pain  Extremities no edema      Lab Results: CBC:   Lab Results   Component Value Date    WBC 6 84 2021    HGB 8 5 (L) 2021    HCT 27 7 (L) 2021    MCV 90 2021     2021    MCH 27 5 2021    MCHC 30 7 (L) 2021    RDW 14 3 2021    MPV 9 2 2021    NRBC 0 2021     CMP:   Lab Results   Component Value Date     2021    CO2 32 2021    BUN 17 2021    CREATININE 1 31 (H) 2021    CALCIUM 8 2 (L) 2021    AST 20 2021    ALT 19 2021    ALKPHOS 76 2021    EGFR 52 2021     Urinalysis:   Lab Results   Component Value Date    COLORU Yellow 2021    CLARITYU Cloudy 2021    SPECGRAV 1 015 2021    PHUR 6 0 2021    LEUKOCYTESUR Large (A) 2021    NITRITE Negative 2021    GLUCOSEU Negative 2021    KETONESU Negative 2021    BILIRUBINUR Negative 2021    BLOODU Small (A) 2021     Urine Culture:   Lab Results   Component Value Date    URINECX >100,000 cfu/ml Gram Negative Peyman (A) 2021     PSA: No results found for: PSA      Assessment/ Plan:  History of elevated PSA  Urologic workup pending?   Need to speak to sister  Awaiting PSA ordered yesterday  Will either follow-up with urologist in [place] or present to my office for workup possible ultrasound biopsy  Continue tamsulosin  Voiding trial in a few days          Rosina Marsh MD

## 2021-07-03 NOTE — PHYSICAL THERAPY NOTE
PT TREATMENT        07/03/21 1350   PT Last Visit   PT Visit Date 07/03/21   Note Type   Note Type Treatment   Pain Assessment   Pain Assessment Tool Pain Assessment not indicated - pt denies pain   Restrictions/Precautions   Other Precautions Fall Risk   General   Chart Reviewed Yes   Subjective   Subjective "going home today"   Transfers   Sit to Stand 5  Supervision   Additional items Verbal cues   Stand to Sit 5  Supervision   Additional items Verbal cues   Ambulation/Elevation   Gait pattern Foward flexed; Short stride   Gait Assistance   (S to occasional min assist)   Additional items Assist x 1;Verbal cues; Tactile cues   Assistive Device Rolling walker   Distance 50 feet x 2 with change in direction with extended rest break inbetween walks due to fatigue  Balance   Static Sitting Fair +   Static Standing Fair   Dynamic Standing   (F-/F)   Ambulatory   (F-/F)   Activity Tolerance   Activity Tolerance Patient limited by fatigue   Exercises   Hip Flexion 10 reps;Bilateral;Sitting   Knee AROM Long Arc Quad 10 reps;Bilateral;Sitting   Ankle Pumps 10 reps;Bilateral;Sitting   Assessment   Assessment Pt with good tolerance to BLE exercises, transfers and ambulation with a RW  Pt does need verbal/tactile cuing during gait for posture, RW control and direction  Pt fatigues easily with limited distance ambulation  Pt is for tentative discharge this p m  and will benefit from continued transfers and gait training with a RW, balance, endurance and functional mobility from home PT  The patient's AM-PAC Basic Mobility Inpatient Short Form Raw Score is 19, Standardized Score is 42 48  A standardized score less than 42 9 suggests the patient may benefit from discharge to post-acute rehabilitation services  Please also refer to the recommendation of the Physical Therapist for safe discharge planning    Despite ampac score, pt is safe to be discharged home with family assist, home PT and continued ambulation with a RW    Plan   Treatment/Interventions ADL retraining;Functional transfer training;LE strengthening/ROM; Therapeutic exercise; Endurance training;Patient/family training;Equipment eval/education; Bed mobility;Gait training; Compensatory technique education   PT Frequency 5x/wk   Recommendation   PT Discharge Recommendation Home with home health rehabilitation   Equipment Recommended   (pt has a RW at home)   Suma 8 in Bed Without Bedrails 4   Lying on Back to Sitting on Edge of Flat Bed 3   Moving Bed to Chair 3   Standing Up From Chair 3   Walk in Room 3   Climb 3-5 Stairs 3   Basic Mobility Inpatient Raw Score 19   Basic Mobility Standardized Score 79 14   Licensure   [place] License Number  [facility] [id number]       Portions of the documentation may have been created using voice recognition software  Occasional wrong word or sound alike substitutions may have occurred due to the inherent limitation of the voice recognition software  Read the chart carefully and recognize, using context, where substitutions have occurred

## 2021-07-03 NOTE — DISCHARGE SUMMARY
Discharge Summary - [facility]    Patient Information: Felipe Mercer 66 y o  male MRN: [id number]  Unit/Bed#: [address] [place] [address] Encounter: [id number]    Discharging Physician / Practitioner: Elaine Hernandez MD  PCP: No primary care provider on file  Admission Date: 6/29/2021  Discharge Date: 07/03/21    Reason for Admission: Hypoglycemia - Symptomatic (Pt has a bloodsugar of 46 at ED  Seen today by Medics at home for the same  Refused trasport to ED at that time )      Discharge Diagnoses:     Principal Problem:    Type 2 diabetes mellitus with hypoglycemia, without long-term current use of insulin (HCC)  Active Problems:    CKD (chronic kidney disease)    Essential hypertension    Urinary retention    Acute cystitis with hematuria    Anemia    Dyslipidemia    Elevated PSA  Resolved Problems:    Acute metabolic encephalopathy        * Type 2 diabetes mellitus with hypoglycemia, without long-term current use of insulin [facility])  Assessment & Plan  Lab Results   Component Value Date    HGBA1C 6 0 (H) 06/30/2021       Recent Labs     07/02/21 2001 07/03/21  0720 07/03/21  1100 07/03/21  1639   POCGLU 260* 182* 163* 188*       Blood Sugar Average: Last 72 hrs:  (P) 60 75     Patient unsure of last HgA1C  on metformin 1700mg QAM, pioglitazone 30 mg QAM, and glimepiride 4 mg daily as per home med list   He does have freestyle [place] sensor  States he had multiple episodes of hypoglycemia - he passed out at home today and states he had a similar episode a few weeks ago  Seen by his PCP a few weeks ago and stated they were due to discuss discontinuing/cutting down some of his medications  Glucose 46 at home after he became unresponsive sitting at the kitchen table, EMS gave D50 and patient declined transport at the time - he ate a full meal and had ice cream and his family convinced him to come to the ED for evaluation, glucose was 46 on arrival  Remained persistently hypo glycemic despite p o   Intake and D50 in eventually needed to be on continuous dextrose drip with subsequent improvement  Blood sugar trend noted, no further hypoglycemia off dextrose  Shobha Rasher diet  Seen by endocrinology, input appreciated  Patient with very tightly control hemoglobin A1c given his age comorbidities and living situation  · Started on Januvia 100 mg daily as per endocrinology recommendation  · Follow-up sulfonylurea screen  · Resume metformin 500 mg b i d  after discharge   · Advised to monitor blood sugar at home  · Follow-up with endocrinology and PCP as outpatient    Acute metabolic encephalopathy-resolved as of 7/2/2021  Assessment & Plan  Secondary to hypoglycemia  Now improved    Acute cystitis with hematuria  Assessment & Plan  Urine culture with growth of E coli prior to catheter placement   Will treat with cephalexin   Follow-up culture sensitivity      Urinary retention  Assessment & Plan  Noted to have urinary retention with bladder scan more than 900  Failed Urinary retention protocol, requiring urinary catheter placement  Seen by Urology, input appreciated  · Started on Flomax  · Recommended to continue Flores on discharge   · Patient family educated , VNA on discharge  · Follow-up further urological workup   · Outpatient follow-up with urology for possible voiding trial in 1 week    Essential hypertension  Assessment & Plan  Patient reported dizziness after starting Flomax which improved after discontinuing lisinopril  Holding antihypertensive at present   Continue Flomax   Follow-up with PCP as as outpatient for monitoring for blood pressure    CKD (chronic kidney disease)  Assessment & Plan  Cr 1 46  Patient denies any Hx of CKD  Prior creatinine from PCP 1 2, likely urinary retention contributing  Previously on Lasix for LE edema but has not been taking this for the past year  Remains stable, likely around baseline  Monitor    Dyslipidemia  Assessment & Plan  Continue pravastatin    Anemia  Assessment & Plan  No prior labs available for comparison, Hg 9 6  No S/sx bleeding, iron sat 18%, check B12, folate  Likely chronic in nature given DM2 and possible CKD  Remains stable  Monitor    Elevated PSA  Assessment & Plan  Awaiting further urological workup   Follow-up with urology after discharge      Consultations During Hospital Stay:  [address] TO CASE MANAGEMENT  IP CONSULT TO ENDOCRINOLOGY  IP CONSULT TO UROLOGY    Procedures Performed:     · Flores catheter placement    Significant Findings:     · Refer to hospital course and above listed diagnosis related plan for details    Imaging while in hospital:    XR chest 1 view portable    Result Date: 6/30/2021  Narrative: CHEST INDICATION:   hypoglycemia  COMPARISON:  None EXAM PERFORMED/VIEWS:  XR CHEST PORTABLE FINDINGS: Cardiomediastinal silhouette appears unremarkable  Right hemidiaphragm mildly elevated  There is adjacent right basilar scarring or subsegmental atelectasis  No pulmonary edema, pneumothorax or significant effusion  Osseous structures appear within normal limits for patient age  Impression: Mildly elevated right hemidiaphragm with adjacent right basilar scarring or subsegmental atelectasis  Otherwise no acute pulmonary disease Workstation performed: [id number]       Incidental Findings:   · None    Test Results Pending at Discharge (will require follow up):   · As per After Visit Summary     Outpatient Tests Requested:  · CBC, BMP    Complications:  Refer to hospital course and above listed diagnosis related plan, if any    Hospital Course: As per HPI  Ingrid Gallegos is a 66 y o  male patient with past medical history of hypertension, dyslipidemia, diabetes mellitus type 2, BPH, elevated PSA level who originally presented to the hospital on 6/29/2021 due to hypoglycemia  On the day of admission in afternoon, patient was sitting at the kitchen table and slumped over in his chair and was unresponsive   He did not fall to the ground or hit his head on anything  Family report that medics checked a glucose and it was 46 at the time  He received an amp of D50 and afterwards declined transport to the hospital  He stated he had a full meal a few hours later and in evening at around 7pm he had a bowl of ice cream  His family encouraged him to come to the ER for further evaluation and when he arrived his glucose was 46  He was again given an amp of D50 and reportedly was somewhat groggy but perked up after this  He was given a turkey sandwich and repeat glucose was 76 and back down to 56 about an hour later  He stated that he has had multiple hypoglycemic episodes in the past few weeks and was unresponsive one other time  He stated he was seen by his PCP a few weeks ago decreased some of his medications  Previously he was taking metformin 1700mg QAM and 850 mg QPM, glimepiride 4 mg BID and Actos 30 mg QAM but cut down to metformin 1700mg once daily, glimepiride 4mg QD, and is still taking Actos 30 mg QAM  He stated his appetite has been the same and he has been eating about the same over the past few weeks, he denied any decrease in oral intake  He follows with multiple specialists, though patient is a relatively poor historian and is not sure which specialists he sees  He stated someone put him on Lasix in the past but he hasn't been taking this in the past year or so because he wasn't following up with that doctor since the beginning of the pandemic  He is on Lasix for LE edmea  He denies any Hx of CKD  Please see above list of diagnoses and related plan for additional information         Condition at Discharge: stable     Discharge Day Visit / Exam:     Subjective:    Comfortably sitting in chair   Denies any chest pain, shortness of breath, dizziness   Ambulated with physical therapy without any dizziness   Denies any discomfort with urinary catheter, fever chills or flank pain   Looking forward to go home    Reports good p o  intake    Vitals: Blood Pressure: 116/54 (07/03/21 1636)  Pulse: 86 (07/03/21 1636)  Temperature: 98 9 °F (37 2 °C) (07/03/21 1636)  Temp Source: Oral (07/02/21 0912)  Respirations: 18 (07/03/21 1636)  Height: 5' 8" (172 7 cm) (06/30/21 0219)  Weight - Scale: 109 kg (240 lb 11 9 oz) (06/30/21 0219)  SpO2: 91 % (07/03/21 1636)  Exam:   Physical Exam  Constitutional:       General: He is not in acute distress  Appearance: He is obese  HENT:      Head: Normocephalic and atraumatic  Cardiovascular:      Rate and Rhythm: Normal rate  Heart sounds: Murmur heard  Pulmonary:      Effort: Pulmonary effort is normal  No respiratory distress  Breath sounds: No wheezing, rhonchi or rales  Comments: Diminished, clear  Chest:      Chest wall: No tenderness  Abdominal:      General: Bowel sounds are normal  There is no distension  Palpations: Abdomen is soft  Tenderness: There is no abdominal tenderness  There is no guarding or rebound  Genitourinary:     Comments: Flores catheter with clear yellow urine with sediments  Musculoskeletal:      Right lower leg: No edema  Left lower leg: No edema  Skin:     General: Skin is warm and dry  Findings: No rash  Neurological:      General: No focal deficit present  Mental Status: He is alert  Mental status is at baseline  Cranial Nerves: No cranial nerve deficit  Discharge instructions/Information to patient and family:(Discharge Medications and Follow up):   See after visit summary for information provided to patient and family  Provisions for Follow-Up Care:  See after visit summary for information related to follow-up care and any pertinent home health orders  Disposition: Home with VNA    Planned Readmission:  No     Discharge Statement:  I spent 40 minutes discharging the patient  This time was spent on the day of discharge   I had direct contact with the patient on the day of discharge  Greater than 50% of the total time was spent examining patient, answering all patient questions, arranging and discussing plan of care with patient as well as directly providing post-discharge instructions  Additional time then spent on discharge activities  Coordinated with endocrinology yesterday regarding discharge and follow-up plan  Coordinated with Dr Melody Garcia at the time of discharge   Discussed with patient's sister at bedside  Discharge Medications:  See after visit summary for reconciled discharge medications provided to patient and family  ** Please Note: "This note has been constructed using a voice recognition system  Therefore there may be syntax, spelling, and/or grammatical errors   Please call if you have any questions  "**

## 2021-07-03 NOTE — DISCHARGE INSTRUCTIONS
Monitor blood glucose level twice a day         How to Change a Catheter Drainage Bag   WHAT YOU NEED TO KNOW:   The catheter is attached to a drainage bag that holds the urine  A large drainage bag is usually used during the night  A leg bag can be worn under your clothes during the day  The leg bag is worn around your calf or thigh  It allows you to be in public without anyone knowing you have a catheter  A leg bag will have to be emptied frequently because it is not as big as the large bag  DISCHARGE INSTRUCTIONS:   Call your doctor if:   · Your catheter comes out  · You have a fever  · You have material that looks like sand in the tubing or drainage bag  · You see blood in the tubing or drainage bag  · You see a layer of crystals inside the tubing  · You have questions or concerns about your condition or care  Attach or remove a drainage bag: You use the same steps to attach or remove a large drainage bag and a leg bag  · Gather supplies:      ? Large drainage bag    ? Clean leg bag    ? A clean towel    ? Alcohol pads    ·          · Wash your hands with soap and water  for at least 20 seconds  · Empty the drainage bag  Do not touch the tip against the container or the toilet as you empty the urine  · Place the clean towel under the connection between the catheter tube and the drainage bag tubing  The towel will catch urine that may leak out of the tubing  · Pinch and hold the catheter tubing  Disconnect the tubing from the bag by using a twisting motion  Be careful not to pull on the catheter  Place the disconnected bag on the towel  · Clean the tip of the tubing on the bag to be connected  Use an alcohol pad  Start at the tip and clean towards the bag  You may have to hold the bag tubing in the same hand as the pinched catheter tubing while you are cleaning the tip  Do not  allow the tip to touch the catheter tubing      · Connect the bag tubing to the catheter tubing  Place the tip into the catheter tubing with a twisting motion until it is securely connected  · Use straps to fasten the bag to your calf  if you are attaching a leg bag  You may need to tape the catheter tubing to your thigh  Be careful not to pull the catheter tubing tight  Damage can be done to your urethra and bladder  · Wash your hands with soap and water  for at least 20 seconds  What you need to know about drainage bags:   · Wash your hands with soap and water before and after emptying the drainage bag  · Empty your drainage bag when it is half full throughout the day  Also empty the large drainage bag when you wake in the morning or from a nap  · Make sure to clamp or twist the drainage bag closed after emptying  · Put a cap on the end of the connection tubing to prevent germs in the tubing  Prevent problems with your catheter and drainage bag:   · Drink liquids as directed  Ask your healthcare provider how much liquid to drink each day and which liquids are best for you  Liquids will help flush your kidneys and bladder to help prevent infection  · Check for kinks in the tubing and straighten them out  · Check the tape or strap used to secure the catheter tube to your skin  Make sure it is not blocking the tube  · Make sure you are not sitting or lying on the tubing  · Make sure the urine bag is hanging below the level of your waist     Follow up with your doctor as directed:  Write down your questions so you remember to ask them during your visits  © Copyright [address] Information is for End User's use only and may not be sold, redistributed or otherwise used for commercial purposes  All illustrations and images included in CareNotes® are the copyrighted property of [employer]  or [facility] Brenda Dorsey   The above information is an  only  It is not intended as medical advice for individual conditions or treatments   Talk to your doctor, nurse or pharmacist before following any medical regimen to see if it is safe and effective for you  Flores Catheter Placement and Care   WHAT YOU NEED TO KNOW:   A Flores catheter is a sterile tube that is inserted into your bladder to drain urine  It is also called an indwelling urinary catheter  DISCHARGE INSTRUCTIONS:   Seek care immediately if:   · Your catheter comes out  · You suddenly have material that looks like sand in the tubing or drainage bag  · No urine is draining into the bag and you have checked the system  · You have pain in your hip, back, pelvis, or lower abdomen  · You are confused or cannot think clearly  Call your doctor or urologist if:   · You have a fever  · You have bladder spasms for more than 1 day after the catheter is placed  · You see blood in the tubing or drainage bag  · You have a rash or itching where the catheter tube is secured to your skin  · Urine leaks from or around the catheter, tubing, or drainage bag  · The closed drainage system has accidently come open or apart  · You see a layer of crystals inside the tubing  · You have questions or concerns about your condition or care  Care for your catheter and drainage bag: You can reduce your risk for infection and injury by caring for your catheter and drainage bag properly  · Wash your hands often  Wash before and after you touch your catheter, tubing, or drainage bag  Use soap and water  Wear clean disposable gloves when you care for your catheter or disconnect the drainage bag  Wash your hands before you prepare or eat food  · Clean your genital area 2 times every day  Clean your catheter area and anal opening after every bowel movement  ? For men:  Use a soapy cloth to clean the tip of your penis  Start where the catheter enters  Wipe backward making sure to pull back the foreskin  Then use a cloth with clear water in the same direction to clean away the soap  ? For women:  Use a soapy cloth to clean the area that the catheter enters your body  Make sure to separate your labia and wipe toward the anus  Then use a cloth with clear water and wipe in the same direction  · Secure the catheter tube  so you do not pull or move the catheter  This helps prevent pain and bladder spasms  Healthcare providers will show you how to use medical tape or a strap to secure the catheter tube to your body  · Keep a closed drainage system  Your catheter should always be attached to the drainage bag to form a closed system  Do not disconnect any part of the closed system unless you need to change the bag  · Keep the drainage bag below the level of your waist   This helps stop urine from moving back up the tubing and into your bladder  Do not loop or kink the tubing  This can cause urine to back up and collect in your bladder  Do not let the drainage bag touch or lie on the floor  · Empty the drainage bag when needed  The weight of a full drainage bag can be painful  Empty the drainage bag every 3 to 6 hours or when it is ? full  · Clean and change the drainage bag as directed  Ask your healthcare provider how often you should change the drainage bag and what cleaning solution to use  Wear disposable gloves when you change the bag  Do not allow the end of the catheter or tubing to touch anything  Clean the ends with an alcohol pad before you reconnect them  What to do if problems develop:   · No urine is draining into the bag:      ? Check for kinks in the tubing and straighten them out  ? Check the tape or strap used to secure the catheter tube to your skin  Make sure it is not blocking the tube  ? Make sure you are not sitting or lying on the tubing      ? Make sure the urine bag is hanging below the level of your waist     · Urine leaks from or around the catheter, tubing, or drainage bag:  Check if the closed drainage system has accidently come open or apart  Clean the catheter and tubing ends with a new alcohol pad and reconnect them  Follow up with your doctor or urologist as directed:  Write down your questions so you remember to ask them during your visits  © Copyright [address] Information is for End User's use only and may not be sold, redistributed or otherwise used for commercial purposes  All illustrations and images included in CareNotes® are the copyrighted property of [employer] M [employer] Inc  or [facility] Brenda Dorsey   The above information is an  only  It is not intended as medical advice for individual conditions or treatments  Talk to your doctor, nurse or pharmacist before following any medical regimen to see if it is safe and effective for you

## 2021-07-04 PROBLEM — N30.01 ACUTE CYSTITIS WITH HEMATURIA: Status: ACTIVE | Noted: 2021-07-04

## 2021-07-04 LAB — BACTERIA UR CULT: ABNORMAL

## 2021-07-04 NOTE — ASSESSMENT & PLAN NOTE
Urine culture with growth of E coli prior to catheter placement   Will treat with cephalexin   Follow-up culture sensitivity

## 2021-07-12 LAB
ACETOHEXAMIDE SERPL-MCNC: NEGATIVE UG/ML (ref 20–60)
CHLORPROPAMIDE SERPL-MCNC: NEGATIVE UG/ML (ref 75–250)
GLIMEPIRIDE SERPL-MCNC: NEGATIVE NG/ML (ref 80–250)
GLIPIZIDE SERPL-MCNC: NEGATIVE NG/ML (ref 200–1000)
GLYBURIDE SERPL-MCNC: NEGATIVE NG/ML
NATEGLINIDE SERPL-MCNC: NEGATIVE NG/ML
REPAGLINIDE SERPL-MCNC: NEGATIVE NG/ML
TOLAZAMIDE SERPL-MCNC: NEGATIVE UG/ML
TOLBUTAMIDE SERPL-MCNC: NEGATIVE UG/ML (ref 40–100)

## 2021-07-13 ENCOUNTER — OFFICE VISIT (OUTPATIENT)
Dept: ENDOCRINOLOGY | Facility: CLINIC | Age: 76
End: 2021-07-13
Payer: MEDICARE

## 2021-07-13 VITALS
WEIGHT: 223.3 LBS | TEMPERATURE: 98.2 F | HEIGHT: 68 IN | DIASTOLIC BLOOD PRESSURE: 70 MMHG | BODY MASS INDEX: 33.84 KG/M2 | SYSTOLIC BLOOD PRESSURE: 128 MMHG | HEART RATE: 80 BPM

## 2021-07-13 DIAGNOSIS — E78.5 DYSLIPIDEMIA: ICD-10-CM

## 2021-07-13 DIAGNOSIS — R33.9 URINARY RETENTION: ICD-10-CM

## 2021-07-13 DIAGNOSIS — R97.20 ELEVATED PSA: ICD-10-CM

## 2021-07-13 DIAGNOSIS — N18.31 STAGE 3A CHRONIC KIDNEY DISEASE (HCC): ICD-10-CM

## 2021-07-13 DIAGNOSIS — E11.65 TYPE 2 DIABETES MELLITUS WITH HYPERGLYCEMIA, WITHOUT LONG-TERM CURRENT USE OF INSULIN (HCC): Primary | ICD-10-CM

## 2021-07-13 DIAGNOSIS — E66.9 CLASS 1 OBESITY WITH BODY MASS INDEX (BMI) OF 33.0 TO 33.9 IN ADULT, UNSPECIFIED OBESITY TYPE, UNSPECIFIED WHETHER SERIOUS COMORBIDITY PRESENT: ICD-10-CM

## 2021-07-13 PROBLEM — E66.811 CLASS 1 OBESITY WITH BODY MASS INDEX (BMI) OF 33.0 TO 33.9 IN ADULT: Status: ACTIVE | Noted: 2021-07-13

## 2021-07-13 PROCEDURE — 1124F ACP DISCUSS-NO DSCNMKR DOCD: CPT | Performed by: INTERNAL MEDICINE

## 2021-07-13 PROCEDURE — 99214 OFFICE O/P EST MOD 30 MIN: CPT | Performed by: INTERNAL MEDICINE

## 2021-07-13 NOTE — PATIENT INSTRUCTIONS
Continue current dose of metformin and Januvia  Please check blood sugars before your meals and at bedtime    If you are checking off after meals please ensure that it is a minimum of 2 hours later  It is okay to check blood sugars 1 to 2 times a day   Please send log for review in 2-3 weeks     Repeat BMP (Lab work) in 1 month    Follow up in 3 months with repeat labs

## 2021-07-13 NOTE — PROGRESS NOTES
Michelle Hunt 66 y o  male MRN: [id number]    Encounter: [id number]      Assessment/Plan     1  Type 2 diabetes mellitus not on insulin therapy with hyperglycemia   recent hospital admission for hypoglycemia   Blood sugar log brought to the clinic may not be reflective of patient's true glycemic control based on history of average sugars 202 mg/dL that was noted on patient's Glucometer by the family as well as on history blood sugars are checked soon after eating  Recommend the following at this time  - Continue current regimen  -advised to check blood sugars before meals and at bedtime  If checking after meals, advised to check a minimum of 2 hours after   - Patient to send blood sugar log for review in 2-3 weeks   -consistent carb diet, regular exercise recommended  - repeat A1c, BMP, fasting lipid panel in 3 months    2  Hyperlipidemia  - continue statin therapy  -  Repeat fasting lipid panel in 3 months     3  CKD- keep well hydrated  Check BMP in 1 month     4  Urinary retention, elevated PSA -following up with urology     5  Obesity    CC: Diabetes    History of Present Illness     HPI:  Michelle Hunt is a 66 y o  male presents for a Hospital follow-up visit regarding diabetes management  Also has  History of hypertension, hyperlipidemia, CKD  Accompanied by sister     patient was initially seen at the hospital on 06/30/2021 and he had presented with hypoglycemia, altered mental status      Diagnosed with diabetes mellitus over 40 years ago  Prior to the hospital stay, was on glimepiride, pioglitazone, metformin  No known history of CAD, CVA     Current regimen:   Metformin 500 mg orally twice a day  Januvia 100 mg orally daily     Appetite is ok, eating 3 meals a day   Weight stable   Urinary retention - following up with urology; will be having prostate biopsy     Would like refills of medications and also freestyle aleena   Has a lot of shaking and so unable to check  Blood sugars on his own  Sister in law has been helping but does not live with him  And so often blood sugars are done after meals    Currently checking fingerstick BG twice a day   Has been checking BG after meals - within 1 hr    323, 304, 237, 243, 232, 309, 239, 188, 204   Per history Avg on meter 202 mg/dl - did not get    Has cut out sodas but is drinking juice   Protein drinks   some walking with cane at home  Denies chest pain /shortness of breath  No changes in vision       Statin:  Pravastatin   ACE-I/ARB:  --  Symptoms of hypoglycemia :  Not had any lows or symptoms     All other systems were reviewed and are negative  Review of Systems      Historical Information   Past Medical History:   Diagnosis Date    Arthritis     BPH associated with nocturia     Diabetes mellitus ([address] )     Edema     lower legs    Hearing aid worn     bilateral    Hyperlipidemia     Hypertension     controlled    Tremor of both hands      Past Surgical History:   Procedure Laterality Date    CATARACT EXTRACTION      UT XCAPSL CTRC RMVL INSJ IO LENS PROSTH W/O ECP Right 8/6/2018    Procedure: EXTRACTION EXTRACAPSULAR CATARACT PHACO INTRAOCULAR LENS (IOL); Surgeon: Maria A Richards MD;  Location: [facility] MAIN OR;  Service: Ophthalmology    UT XCAPSL CTRC RMVL INSJ IO LENS PROSTH W/O ECP Left 10/1/2018    Procedure: EXTRACTION EXTRACAPSULAR CATARACT PHACO INTRAOCULAR LENS (IOL);   Surgeon: Maria A Richards MD;  Location: [facility] MAIN OR;  Service: Ophthalmology     Social History   Social History     Substance and Sexual Activity   Alcohol Use No     Social History     Substance and Sexual Activity   Drug Use No     Social History     Tobacco Use   Smoking Status Never Smoker   Smokeless Tobacco Never Used     Family History:   Family History   Problem Relation Age of Onset    Cancer Mother     Cancer Father         lung-smoker    Early death Brother     Cancer Brother         "bone cancer"       Meds/Allergies   Current Outpatient Medications   Medication Sig Dispense Refill    metFORMIN (GLUCOPHAGE) 500 mg tablet Take 1 tablet (500 mg total) by mouth 2 (two) times a day with meals 60 tablet 0    PRAVASTATIN SODIUM PO Take 40 mg by mouth every morning      sitaGLIPtin (JANUVIA) 100 mg tablet Take 1 tablet (100 mg total) by mouth daily 30 tablet 0    tamsulosin (FLOMAX) 0 4 mg Take 1 capsule (0 4 mg total) by mouth daily with dinner 30 capsule 0     No current facility-administered medications for this visit  No Known Allergies    Objective   Vitals: Blood pressure 128/70, pulse 80, temperature 98 2 °F (36 8 °C), height 5' 8" (1 727 m), weight 101 kg (223 lb 4 8 oz)  Physical Exam  Constitutional:       General: He is not in acute distress  Appearance: He is well-developed  He is not diaphoretic  HENT:      Head: Normocephalic and atraumatic  Eyes:      Conjunctiva/sclera: Conjunctivae normal       Pupils: Pupils are equal, round, and reactive to light  Cardiovascular:      Rate and Rhythm: Normal rate and regular rhythm  Heart sounds: Normal heart sounds  No murmur heard  Pulmonary:      Effort: Pulmonary effort is normal  No respiratory distress  Breath sounds: Normal breath sounds  No wheezing  Abdominal:      General: There is no distension  Palpations: Abdomen is soft  Tenderness: There is no abdominal tenderness  There is no guarding  Musculoskeletal:      Cervical back: Normal range of motion and neck supple  Right lower leg: Edema present  Left lower leg: Edema present  Skin:     General: Skin is warm and dry  Findings: No erythema or rash  Neurological:      Mental Status: He is alert and oriented to person, place, and time  Psychiatric:         Behavior: Behavior normal          Thought Content: Thought content normal          The history was obtained from the review of the chart, patient and family      Lab Results:   Lab Results   Component Value Date/Time Hemoglobin A1C 6 0 (H) 06/30/2021 04:42 AM    WBC 6 84 07/03/2021 08:06 AM    WBC 7 01 07/02/2021 06:11 AM    WBC 7 45 07/01/2021 10:22 AM    Hemoglobin 8 5 (L) 07/03/2021 08:06 AM    Hemoglobin 9 1 (L) 07/02/2021 06:11 AM    Hemoglobin 9 6 (L) 07/01/2021 10:22 AM    Hematocrit 27 7 (L) 07/03/2021 08:06 AM    Hematocrit 29 8 (L) 07/02/2021 06:11 AM    Hematocrit 31 3 (L) 07/01/2021 10:22 AM    MCV 90 07/03/2021 08:06 AM    MCV 89 07/02/2021 06:11 AM    MCV 89 07/01/2021 10:22 AM    Platelets 913 72/32/2536 08:06 AM    Platelets 413 99/10/2835 06:11 AM    Platelets 538 60/79/6367 10:22 AM    BUN 17 07/03/2021 08:06 AM    BUN 14 07/02/2021 06:11 AM    BUN 13 07/01/2021 10:22 AM    Potassium 4 4 07/03/2021 08:06 AM    Potassium 4 8 07/02/2021 06:11 AM    Potassium 4 7 07/01/2021 10:22 AM    Chloride 102 07/03/2021 08:06 AM    Chloride 103 07/02/2021 06:11 AM    Chloride 101 07/01/2021 10:22 AM    CO2 32 07/03/2021 08:06 AM    CO2 32 07/02/2021 06:11 AM    CO2 29 07/01/2021 10:22 AM    Creatinine 1 31 (H) 07/03/2021 08:06 AM    Creatinine 1 34 (H) 07/02/2021 06:11 AM    Creatinine 1 43 (H) 07/01/2021 10:22 AM    AST 20 06/29/2021 11:49 PM    ALT 19 06/29/2021 11:49 PM    Albumin 2 4 (L) 06/29/2021 11:49 PM         Imaging Studies: I have personally reviewed pertinent reports  Portions of the record may have been created with voice recognition software  Occasional wrong word or "sound a like" substitutions may have occurred due to the inherent limitations of voice recognition software  Read the chart carefully and recognize, using context, where substitutions have occurred

## 2021-07-15 DIAGNOSIS — E11.65 TYPE 2 DIABETES MELLITUS WITH HYPERGLYCEMIA, WITHOUT LONG-TERM CURRENT USE OF INSULIN (HCC): Primary | ICD-10-CM

## 2021-07-15 RX ORDER — FLASH GLUCOSE SENSOR
KIT MISCELLANEOUS
Qty: 2 EACH | Refills: 12 | Status: SHIPPED | OUTPATIENT
Start: 2021-07-15 | End: 2022-07-11

## 2021-07-16 ENCOUNTER — OFFICE VISIT (OUTPATIENT)
Dept: FAMILY MEDICINE CLINIC | Facility: CLINIC | Age: 76
End: 2021-07-16
Payer: MEDICARE

## 2021-07-16 VITALS
RESPIRATION RATE: 20 BRPM | TEMPERATURE: 96.4 F | WEIGHT: 223 LBS | HEART RATE: 72 BPM | DIASTOLIC BLOOD PRESSURE: 62 MMHG | BODY MASS INDEX: 33.03 KG/M2 | SYSTOLIC BLOOD PRESSURE: 124 MMHG | HEIGHT: 69 IN

## 2021-07-16 DIAGNOSIS — E78.5 DYSLIPIDEMIA: ICD-10-CM

## 2021-07-16 DIAGNOSIS — R97.20 ELEVATED PSA: ICD-10-CM

## 2021-07-16 DIAGNOSIS — E66.9 OBESITY (BMI 30-39.9): ICD-10-CM

## 2021-07-16 DIAGNOSIS — E11.649 TYPE 2 DIABETES MELLITUS WITH HYPOGLYCEMIA WITHOUT COMA, WITHOUT LONG-TERM CURRENT USE OF INSULIN (HCC): ICD-10-CM

## 2021-07-16 DIAGNOSIS — E46 PROTEIN-CALORIE MALNUTRITION, UNSPECIFIED SEVERITY (HCC): ICD-10-CM

## 2021-07-16 DIAGNOSIS — R09.02 HYPOXIA: ICD-10-CM

## 2021-07-16 DIAGNOSIS — R53.1 GENERALIZED WEAKNESS: ICD-10-CM

## 2021-07-16 DIAGNOSIS — E11.22 CKD STAGE 3 DUE TO TYPE 2 DIABETES MELLITUS (HCC): ICD-10-CM

## 2021-07-16 DIAGNOSIS — N18.30 CKD STAGE 3 DUE TO TYPE 2 DIABETES MELLITUS (HCC): ICD-10-CM

## 2021-07-16 DIAGNOSIS — R25.1 TREMOR: ICD-10-CM

## 2021-07-16 DIAGNOSIS — D64.9 ANEMIA, UNSPECIFIED TYPE: Primary | ICD-10-CM

## 2021-07-16 PROBLEM — I10 ESSENTIAL HYPERTENSION: Status: RESOLVED | Noted: 2021-06-30 | Resolved: 2021-07-16

## 2021-07-16 PROCEDURE — 99204 OFFICE O/P NEW MOD 45 MIN: CPT | Performed by: FAMILY MEDICINE

## 2021-07-16 RX ORDER — PRAVASTATIN SODIUM 40 MG
40 TABLET ORAL EVERY MORNING
Qty: 90 TABLET | Refills: 1 | Status: SHIPPED | OUTPATIENT
Start: 2021-07-16 | End: 2022-01-31

## 2021-07-16 NOTE — PROGRESS NOTES
Assessment/Plan:    No problem-specific Assessment & Plan notes found for this encounter  DM2, hx of hypogylcemia  Now off sulfonylurea, risks advised to pt  Off of TZD also  Creatinine improved so on metformin  Seeing endocrinology, smbg being monitored    HLD on pravastatin  Continue use for risk reduction  [id number]    Elevated psa to 68 8  Urologist is suspecting prostate CA and doing testing for mets  Await result  Continue medications  Anemia may be related  Try to get old records from [address] for comparison    Anemia  Check labs  Consider hematology referral  Iron was ok in hospital    Uti, finish abx    Tremors and gait issues, they are requesting neurology eval, referral printed    Pulse on in low 90s with normal cxr, pulmonary eval suggested    VNA pending       Diagnoses and all orders for this visit:    Anemia, unspecified type  -     Vitamin B12; Future  -     Folate; Future  -     C-reactive protein; Future  -     Sedimentation rate, automated; Future  -     Uric acid; Future  -     Lactate dehydrogenase, isoenzymes; Future    Type 2 diabetes mellitus with hypoglycemia without coma, without long-term current use of insulin (HCC)    Dyslipidemia  -     pravastatin (PRAVACHOL) 40 mg tablet; Take 1 tablet (40 mg total) by mouth every morning    Tremor  -     Ambulatory referral to Neurology; Future    Hypoxia  -     Ambulatory referral to Pulmonology; Future    Generalized weakness  -     Ambulatory Referral to Home Health; Future    Protein-calorie malnutrition, unspecified severity ([facility] [address] )   -     Vitamin B12; Future  -     Folate; Future    Elevated PSA    Obesity (BMI 30-39  9)    CKD stage 3 due to type 2 diabetes mellitus ([address] )              Return in about 3 months (around 10/16/2021) for Recheck  Subjective:      Patient ID: Jaylen Pittman is a 66 y o  male  Chief Complaint   Patient presents with    Follow-up     hospital follow up for hypoglycemia   ac/lpn       HPI  Brother of Sapphire Fred  He lives along  [place] this am  Trouble walking since  Was in [address] [place] [address] to see endocrinologist  Just metformin and januvia    On pravachol   Sometimes low fat diet    On flomax  Since hospitalization  Seeing Dr Dontae Moralez  bx next week of prostate  Catheter since hospital    No hx of htn    Lasix in past  Hx of edema  Not taking  Pulse ox in 88-90 today    Anemia  Duration? No bleeding noted    psa 68        The following portions of the patient's history were reviewed and updated as appropriate: allergies, current medications, past family history, past medical history, past social history, past surgical history and problem list     Review of Systems   Constitutional: Negative for fever  HENT: Positive for hearing loss  Negative for trouble swallowing  Respiratory: Positive for shortness of breath  Cardiovascular: Positive for leg swelling  Negative for chest pain  Gastrointestinal: Negative for abdominal distention, abdominal pain, anal bleeding and blood in stool  Genitourinary: Positive for difficulty urinating  Musculoskeletal: Positive for gait problem  Skin: Negative for color change  Neurological: Positive for tremors and weakness  Current Outpatient Medications   Medication Sig Dispense Refill    Continuous Blood Gluc Sensor ([employer] [place] 14 Day Sensor) MISC Change sensor every 14 days 2 each 12    metFORMIN (GLUCOPHAGE) 500 mg tablet Take 1 tablet (500 mg total) by mouth 2 (two) times a day with meals 180 tablet 3    sitaGLIPtin (JANUVIA) 100 mg tablet Take 1 tablet (100 mg total) by mouth daily 90 tablet 3    tamsulosin (FLOMAX) 0 4 mg Take 1 capsule (0 4 mg total) by mouth daily with dinner 30 capsule 0    pravastatin (PRAVACHOL) 40 mg tablet Take 1 tablet (40 mg total) by mouth every morning 90 tablet 1     No current facility-administered medications for this visit         Objective:    /62   Pulse 72   Temp (!) 96 4 °F (35 8 °C)   Resp 20   Ht 5' 9" (1 753 m)   Wt 101 kg (223 lb)   BMI 32 93 kg/m²        Physical Exam  Vitals and nursing note reviewed  Constitutional:       General: He is not in acute distress  Appearance: He is well-developed  He is obese  He is not ill-appearing  HENT:      Head: Normocephalic  Right Ear: Tympanic membrane and ear canal normal       Left Ear: Tympanic membrane and ear canal normal    Eyes:      General: No scleral icterus  Conjunctiva/sclera: Conjunctivae normal    Cardiovascular:      Rate and Rhythm: Normal rate and regular rhythm  Heart sounds: No murmur heard  Pulmonary:      Effort: Pulmonary effort is normal  No respiratory distress  Breath sounds: No wheezing  Abdominal:      General: There is no distension  Palpations: Abdomen is soft  Tenderness: There is no abdominal tenderness  Musculoskeletal:         General: No deformity  Cervical back: Neck supple  Right lower leg: No edema  Left lower leg: Edema present  Skin:     General: Skin is warm and dry  Coloration: Skin is not jaundiced or pale  Neurological:      Mental Status: He is alert  Motor: Weakness present  Gait: Gait abnormal    Psychiatric:         Mood and Affect: Mood normal          Behavior: Behavior normal          Thought Content: Thought content normal          BMI Counseling: Body mass index is 32 93 kg/m²  The BMI is above normal  Nutrition recommendations include decreasing portion sizes and moderation in carbohydrate intake  Exercise recommendations include exercising 3-5 times per week  No pharmacotherapy was ordered                Perry Slater DO

## 2021-07-17 ENCOUNTER — HOSPITAL ENCOUNTER (INPATIENT)
Facility: HOSPITAL | Age: 76
LOS: 3 days | Discharge: HOME HEALTH | DRG: 698 | End: 2021-07-21
Attending: EMERGENCY MEDICINE | Admitting: FAMILY MEDICINE
Payer: MEDICARE

## 2021-07-17 DIAGNOSIS — R06.02 SOB (SHORTNESS OF BREATH): Primary | ICD-10-CM

## 2021-07-17 DIAGNOSIS — N39.0 URINARY TRACT INFECTION WITHOUT HEMATURIA, SITE UNSPECIFIED: ICD-10-CM

## 2021-07-17 DIAGNOSIS — R97.20 ELEVATED PSA: ICD-10-CM

## 2021-07-17 DIAGNOSIS — A41.9 SEPSIS (HCC): ICD-10-CM

## 2021-07-17 DIAGNOSIS — N39.0 UTI (URINARY TRACT INFECTION): ICD-10-CM

## 2021-07-17 DIAGNOSIS — D64.9 ANEMIA, UNSPECIFIED TYPE: ICD-10-CM

## 2021-07-17 DIAGNOSIS — K59.00 CONSTIPATION: ICD-10-CM

## 2021-07-17 DIAGNOSIS — I26.99 BILATERAL PULMONARY EMBOLISM (HCC): ICD-10-CM

## 2021-07-17 DIAGNOSIS — R09.02 HYPOXIA: ICD-10-CM

## 2021-07-17 DIAGNOSIS — K21.9 GERD (GASTROESOPHAGEAL REFLUX DISEASE): ICD-10-CM

## 2021-07-17 DIAGNOSIS — R77.8 ELEVATED TROPONIN: ICD-10-CM

## 2021-07-17 PROCEDURE — 99285 EMERGENCY DEPT VISIT HI MDM: CPT

## 2021-07-17 NOTE — LETTER
To [address],    Patient Tania Marcelino is being dc'd today 7/21/21  Attached is his AVS/SOC  Thanks!   Braden Form

## 2021-07-18 ENCOUNTER — APPOINTMENT (EMERGENCY)
Dept: RADIOLOGY | Facility: HOSPITAL | Age: 76
DRG: 698 | End: 2021-07-18
Attending: EMERGENCY MEDICINE
Payer: MEDICARE

## 2021-07-18 ENCOUNTER — APPOINTMENT (EMERGENCY)
Dept: RADIOLOGY | Facility: HOSPITAL | Age: 76
DRG: 698 | End: 2021-07-18
Payer: MEDICARE

## 2021-07-18 ENCOUNTER — APPOINTMENT (INPATIENT)
Dept: RADIOLOGY | Facility: HOSPITAL | Age: 76
DRG: 698 | End: 2021-07-18
Payer: MEDICARE

## 2021-07-18 PROBLEM — R79.89 ELEVATED TROPONIN: Status: ACTIVE | Noted: 2021-07-18

## 2021-07-18 PROBLEM — N39.0 URINARY TRACT INFECTION WITHOUT HEMATURIA: Status: ACTIVE | Noted: 2021-07-18

## 2021-07-18 PROBLEM — J96.91 RESPIRATORY FAILURE WITH HYPOXIA (HCC): Status: ACTIVE | Noted: 2021-07-18

## 2021-07-18 PROBLEM — R55 SYNCOPAL EPISODES: Status: ACTIVE | Noted: 2021-07-18

## 2021-07-18 PROBLEM — I26.99 BILATERAL PULMONARY EMBOLISM (HCC): Status: ACTIVE | Noted: 2021-07-18

## 2021-07-18 PROBLEM — R77.8 ELEVATED TROPONIN: Status: ACTIVE | Noted: 2021-07-18

## 2021-07-18 PROBLEM — E11.9 TYPE 2 DIABETES MELLITUS, WITHOUT LONG-TERM CURRENT USE OF INSULIN (HCC): Status: ACTIVE | Noted: 2021-06-30

## 2021-07-18 PROBLEM — A41.9 SEPSIS (HCC): Status: ACTIVE | Noted: 2021-07-18

## 2021-07-18 LAB
ALBUMIN SERPL BCP-MCNC: 2.8 G/DL (ref 3.5–5)
ALP SERPL-CCNC: 75 U/L (ref 46–116)
ALT SERPL W P-5'-P-CCNC: 28 U/L (ref 12–78)
ANION GAP SERPL CALCULATED.3IONS-SCNC: 11 MMOL/L (ref 4–13)
ANION GAP SERPL CALCULATED.3IONS-SCNC: 8 MMOL/L (ref 4–13)
APTT PPP: 115 SECONDS (ref 23–37)
APTT PPP: 184 SECONDS (ref 23–37)
APTT PPP: 26 SECONDS (ref 23–37)
AST SERPL W P-5'-P-CCNC: 21 U/L (ref 5–45)
BACTERIA UR QL AUTO: ABNORMAL /HPF
BASE EXCESS BLDA CALC-SCNC: 3.5 MMOL/L
BASOPHILS # BLD AUTO: 0.02 THOUSANDS/ΜL (ref 0–0.1)
BASOPHILS # BLD AUTO: 0.02 THOUSANDS/ΜL (ref 0–0.1)
BASOPHILS NFR BLD AUTO: 0 % (ref 0–1)
BASOPHILS NFR BLD AUTO: 0 % (ref 0–1)
BILIRUB SERPL-MCNC: 0.25 MG/DL (ref 0.2–1)
BILIRUB UR QL STRIP: NEGATIVE
BODY TEMPERATURE: 98.2 DEGREES FEHRENHEIT
BUN SERPL-MCNC: 21 MG/DL (ref 5–25)
BUN SERPL-MCNC: 24 MG/DL (ref 5–25)
CALCIUM ALBUM COR SERPL-MCNC: 9.2 MG/DL (ref 8.3–10.1)
CALCIUM SERPL-MCNC: 8.2 MG/DL (ref 8.3–10.1)
CALCIUM SERPL-MCNC: 8.2 MG/DL (ref 8.3–10.1)
CHLORIDE SERPL-SCNC: 100 MMOL/L (ref 100–108)
CHLORIDE SERPL-SCNC: 98 MMOL/L (ref 100–108)
CLARITY UR: CLEAR
CO2 SERPL-SCNC: 29 MMOL/L (ref 21–32)
CO2 SERPL-SCNC: 29 MMOL/L (ref 21–32)
COLOR UR: YELLOW
CREAT SERPL-MCNC: 1.16 MG/DL (ref 0.6–1.3)
CREAT SERPL-MCNC: 1.35 MG/DL (ref 0.6–1.3)
EOSINOPHIL # BLD AUTO: 0.11 THOUSAND/ΜL (ref 0–0.61)
EOSINOPHIL # BLD AUTO: 0.15 THOUSAND/ΜL (ref 0–0.61)
EOSINOPHIL NFR BLD AUTO: 1 % (ref 0–6)
EOSINOPHIL NFR BLD AUTO: 2 % (ref 0–6)
ERYTHROCYTE [DISTWIDTH] IN BLOOD BY AUTOMATED COUNT: 15.3 % (ref 11.6–15.1)
ERYTHROCYTE [DISTWIDTH] IN BLOOD BY AUTOMATED COUNT: 15.5 % (ref 11.6–15.1)
ERYTHROCYTE [DISTWIDTH] IN BLOOD BY AUTOMATED COUNT: 15.5 % (ref 11.6–15.1)
GFR SERPL CREATININE-BSD FRML MDRD: 50 ML/MIN/1.73SQ M
GFR SERPL CREATININE-BSD FRML MDRD: 60 ML/MIN/1.73SQ M
GLUCOSE SERPL-MCNC: 161 MG/DL (ref 65–140)
GLUCOSE SERPL-MCNC: 171 MG/DL (ref 65–140)
GLUCOSE SERPL-MCNC: 187 MG/DL (ref 65–140)
GLUCOSE SERPL-MCNC: 217 MG/DL (ref 65–140)
GLUCOSE SERPL-MCNC: 224 MG/DL (ref 65–140)
GLUCOSE SERPL-MCNC: 279 MG/DL (ref 65–140)
GLUCOSE UR STRIP-MCNC: ABNORMAL MG/DL
HCO3 BLDA-SCNC: 28.3 MMOL/L (ref 22–28)
HCT VFR BLD AUTO: 28.8 % (ref 36.5–49.3)
HCT VFR BLD AUTO: 31.1 % (ref 36.5–49.3)
HCT VFR BLD AUTO: 31.7 % (ref 36.5–49.3)
HGB BLD-MCNC: 8.8 G/DL (ref 12–17)
HGB BLD-MCNC: 9.3 G/DL (ref 12–17)
HGB BLD-MCNC: 9.8 G/DL (ref 12–17)
HGB UR QL STRIP.AUTO: ABNORMAL
IMM GRANULOCYTES # BLD AUTO: 0.03 THOUSAND/UL (ref 0–0.2)
IMM GRANULOCYTES # BLD AUTO: 0.04 THOUSAND/UL (ref 0–0.2)
IMM GRANULOCYTES NFR BLD AUTO: 0 % (ref 0–2)
IMM GRANULOCYTES NFR BLD AUTO: 1 % (ref 0–2)
INR PPP: 1.07 (ref 0.84–1.19)
KETONES UR STRIP-MCNC: NEGATIVE MG/DL
LACTATE SERPL-SCNC: 1.9 MMOL/L (ref 0.5–2)
LACTATE SERPL-SCNC: 2.1 MMOL/L (ref 0.5–2)
LEUKOCYTE ESTERASE UR QL STRIP: ABNORMAL
LYMPHOCYTES # BLD AUTO: 1.96 THOUSANDS/ΜL (ref 0.6–4.47)
LYMPHOCYTES # BLD AUTO: 2.07 THOUSANDS/ΜL (ref 0.6–4.47)
LYMPHOCYTES NFR BLD AUTO: 22 % (ref 14–44)
LYMPHOCYTES NFR BLD AUTO: 27 % (ref 14–44)
MCH RBC QN AUTO: 27.1 PG (ref 26.8–34.3)
MCH RBC QN AUTO: 27.2 PG (ref 26.8–34.3)
MCH RBC QN AUTO: 27.3 PG (ref 26.8–34.3)
MCHC RBC AUTO-ENTMCNC: 29.9 G/DL (ref 31.4–37.4)
MCHC RBC AUTO-ENTMCNC: 30.6 G/DL (ref 31.4–37.4)
MCHC RBC AUTO-ENTMCNC: 30.9 G/DL (ref 31.4–37.4)
MCV RBC AUTO: 88 FL (ref 82–98)
MCV RBC AUTO: 89 FL (ref 82–98)
MCV RBC AUTO: 91 FL (ref 82–98)
MONOCYTES # BLD AUTO: 0.57 THOUSAND/ΜL (ref 0.17–1.22)
MONOCYTES # BLD AUTO: 0.63 THOUSAND/ΜL (ref 0.17–1.22)
MONOCYTES NFR BLD AUTO: 7 % (ref 4–12)
MONOCYTES NFR BLD AUTO: 8 % (ref 4–12)
NASAL CANNULA: 3
NEUTROPHILS # BLD AUTO: 4.75 THOUSANDS/ΜL (ref 1.85–7.62)
NEUTROPHILS # BLD AUTO: 6.15 THOUSANDS/ΜL (ref 1.85–7.62)
NEUTS SEG NFR BLD AUTO: 62 % (ref 43–75)
NEUTS SEG NFR BLD AUTO: 70 % (ref 43–75)
NITRITE UR QL STRIP: POSITIVE
NON-SQ EPI CELLS URNS QL MICRO: ABNORMAL /HPF
NRBC BLD AUTO-RTO: 0 /100 WBCS
NRBC BLD AUTO-RTO: 0 /100 WBCS
NT-PROBNP SERPL-MCNC: 2051 PG/ML
O2 CT BLDA-SCNC: 13.6 ML/DL (ref 16–23)
OXYHGB MFR BLDA: 90.1 % (ref 94–97)
PCO2 BLDA: 44 MM HG (ref 36–44)
PCO2 TEMP ADJ BLDA: 43.6 MM HG (ref 36–44)
PH BLD: 7.43 [PH] (ref 7.35–7.45)
PH BLDA: 7.43 [PH] (ref 7.35–7.45)
PH UR STRIP.AUTO: 5.5 [PH]
PLATELET # BLD AUTO: 208 THOUSANDS/UL (ref 149–390)
PLATELET # BLD AUTO: 209 THOUSANDS/UL (ref 149–390)
PLATELET # BLD AUTO: 241 THOUSANDS/UL (ref 149–390)
PMV BLD AUTO: 10.5 FL (ref 8.9–12.7)
PMV BLD AUTO: 10.6 FL (ref 8.9–12.7)
PMV BLD AUTO: 10.7 FL (ref 8.9–12.7)
PO2 BLD: 60.8 MM HG (ref 75–129)
PO2 BLDA: 61.6 MM HG (ref 75–129)
POTASSIUM SERPL-SCNC: 4.5 MMOL/L (ref 3.5–5.3)
POTASSIUM SERPL-SCNC: 4.5 MMOL/L (ref 3.5–5.3)
PROCALCITONIN SERPL-MCNC: <0.05 NG/ML
PROT SERPL-MCNC: 7.1 G/DL (ref 6.4–8.2)
PROT UR STRIP-MCNC: ABNORMAL MG/DL
PROTHROMBIN TIME: 13.8 SECONDS (ref 11.6–14.5)
RBC # BLD AUTO: 3.24 MILLION/UL (ref 3.88–5.62)
RBC # BLD AUTO: 3.43 MILLION/UL (ref 3.88–5.62)
RBC # BLD AUTO: 3.59 MILLION/UL (ref 3.88–5.62)
RBC #/AREA URNS AUTO: ABNORMAL /HPF
SARS-COV-2 RNA RESP QL NAA+PROBE: NEGATIVE
SODIUM SERPL-SCNC: 137 MMOL/L (ref 136–145)
SODIUM SERPL-SCNC: 138 MMOL/L (ref 136–145)
SP GR UR STRIP.AUTO: 1.02 (ref 1–1.03)
SPECIMEN SOURCE: ABNORMAL
TROPONIN I SERPL-MCNC: 1.06 NG/ML
TROPONIN I SERPL-MCNC: 1.07 NG/ML
TROPONIN I SERPL-MCNC: 1.12 NG/ML
TROPONIN I SERPL-MCNC: 1.12 NG/ML
TROPONIN I SERPL-MCNC: 1.13 NG/ML
TSH SERPL DL<=0.05 MIU/L-ACNC: 2.94 UIU/ML (ref 0.36–3.74)
UROBILINOGEN UR QL STRIP.AUTO: 0.2 E.U./DL
WBC # BLD AUTO: 6.92 THOUSAND/UL (ref 4.31–10.16)
WBC # BLD AUTO: 7.6 THOUSAND/UL (ref 4.31–10.16)
WBC # BLD AUTO: 8.9 THOUSAND/UL (ref 4.31–10.16)
WBC #/AREA URNS AUTO: ABNORMAL /HPF

## 2021-07-18 PROCEDURE — 83605 ASSAY OF LACTIC ACID: CPT | Performed by: EMERGENCY MEDICINE

## 2021-07-18 PROCEDURE — 84484 ASSAY OF TROPONIN QUANT: CPT | Performed by: INTERNAL MEDICINE

## 2021-07-18 PROCEDURE — 82805 BLOOD GASES W/O2 SATURATION: CPT | Performed by: EMERGENCY MEDICINE

## 2021-07-18 PROCEDURE — 85730 THROMBOPLASTIN TIME PARTIAL: CPT | Performed by: INTERNAL MEDICINE

## 2021-07-18 PROCEDURE — 82948 REAGENT STRIP/BLOOD GLUCOSE: CPT

## 2021-07-18 PROCEDURE — 71045 X-RAY EXAM CHEST 1 VIEW: CPT

## 2021-07-18 PROCEDURE — 99291 CRITICAL CARE FIRST HOUR: CPT | Performed by: EMERGENCY MEDICINE

## 2021-07-18 PROCEDURE — 36600 WITHDRAWAL OF ARTERIAL BLOOD: CPT

## 2021-07-18 PROCEDURE — U0003 INFECTIOUS AGENT DETECTION BY NUCLEIC ACID (DNA OR RNA); SEVERE ACUTE RESPIRATORY SYNDROME CORONAVIRUS 2 (SARS-COV-2) (CORONAVIRUS DISEASE [COVID-19]), AMPLIFIED PROBE TECHNIQUE, MAKING USE OF HIGH THROUGHPUT TECHNOLOGIES AS DESCRIBED BY CMS-2020-01-R: HCPCS | Performed by: EMERGENCY MEDICINE

## 2021-07-18 PROCEDURE — 85025 COMPLETE CBC W/AUTO DIFF WBC: CPT | Performed by: PHYSICIAN ASSISTANT

## 2021-07-18 PROCEDURE — 80048 BASIC METABOLIC PNL TOTAL CA: CPT | Performed by: PHYSICIAN ASSISTANT

## 2021-07-18 PROCEDURE — 84443 ASSAY THYROID STIM HORMONE: CPT | Performed by: EMERGENCY MEDICINE

## 2021-07-18 PROCEDURE — U0005 INFEC AGEN DETEC AMPLI PROBE: HCPCS | Performed by: EMERGENCY MEDICINE

## 2021-07-18 PROCEDURE — 36415 COLL VENOUS BLD VENIPUNCTURE: CPT | Performed by: EMERGENCY MEDICINE

## 2021-07-18 PROCEDURE — 71275 CT ANGIOGRAPHY CHEST: CPT

## 2021-07-18 PROCEDURE — 99222 1ST HOSP IP/OBS MODERATE 55: CPT | Performed by: INTERNAL MEDICINE

## 2021-07-18 PROCEDURE — 84484 ASSAY OF TROPONIN QUANT: CPT

## 2021-07-18 PROCEDURE — 87040 BLOOD CULTURE FOR BACTERIA: CPT | Performed by: EMERGENCY MEDICINE

## 2021-07-18 PROCEDURE — 85027 COMPLETE CBC AUTOMATED: CPT | Performed by: PHYSICIAN ASSISTANT

## 2021-07-18 PROCEDURE — 84484 ASSAY OF TROPONIN QUANT: CPT | Performed by: EMERGENCY MEDICINE

## 2021-07-18 PROCEDURE — 84484 ASSAY OF TROPONIN QUANT: CPT | Performed by: PHYSICIAN ASSISTANT

## 2021-07-18 PROCEDURE — 85730 THROMBOPLASTIN TIME PARTIAL: CPT | Performed by: PHYSICIAN ASSISTANT

## 2021-07-18 PROCEDURE — 99223 1ST HOSP IP/OBS HIGH 75: CPT | Performed by: INTERNAL MEDICINE

## 2021-07-18 PROCEDURE — 83880 ASSAY OF NATRIURETIC PEPTIDE: CPT | Performed by: EMERGENCY MEDICINE

## 2021-07-18 PROCEDURE — 84145 PROCALCITONIN (PCT): CPT | Performed by: PHYSICIAN ASSISTANT

## 2021-07-18 PROCEDURE — 85610 PROTHROMBIN TIME: CPT | Performed by: PHYSICIAN ASSISTANT

## 2021-07-18 PROCEDURE — 80053 COMPREHEN METABOLIC PANEL: CPT | Performed by: EMERGENCY MEDICINE

## 2021-07-18 PROCEDURE — 96365 THER/PROPH/DIAG IV INF INIT: CPT

## 2021-07-18 PROCEDURE — 99292 CRITICAL CARE ADDL 30 MIN: CPT | Performed by: EMERGENCY MEDICINE

## 2021-07-18 PROCEDURE — 93970 EXTREMITY STUDY: CPT

## 2021-07-18 PROCEDURE — 85025 COMPLETE CBC W/AUTO DIFF WBC: CPT | Performed by: EMERGENCY MEDICINE

## 2021-07-18 PROCEDURE — 81001 URINALYSIS AUTO W/SCOPE: CPT | Performed by: EMERGENCY MEDICINE

## 2021-07-18 PROCEDURE — 93005 ELECTROCARDIOGRAM TRACING: CPT

## 2021-07-18 PROCEDURE — G1004 CDSM NDSC: HCPCS

## 2021-07-18 RX ORDER — CALCIUM CARBONATE 200(500)MG
1000 TABLET,CHEWABLE ORAL DAILY PRN
Status: DISCONTINUED | OUTPATIENT
Start: 2021-07-18 | End: 2021-07-21 | Stop reason: HOSPADM

## 2021-07-18 RX ORDER — CEFTRIAXONE 1 G/50ML
1000 INJECTION, SOLUTION INTRAVENOUS ONCE
Status: COMPLETED | OUTPATIENT
Start: 2021-07-18 | End: 2021-07-18

## 2021-07-18 RX ORDER — ASPIRIN 81 MG/1
324 TABLET, CHEWABLE ORAL ONCE
Status: COMPLETED | OUTPATIENT
Start: 2021-07-18 | End: 2021-07-18

## 2021-07-18 RX ORDER — HEPARIN SODIUM 1000 [USP'U]/ML
8400 INJECTION, SOLUTION INTRAVENOUS; SUBCUTANEOUS ONCE
Status: COMPLETED | OUTPATIENT
Start: 2021-07-18 | End: 2021-07-18

## 2021-07-18 RX ORDER — POLYETHYLENE GLYCOL 3350 17 G/17G
17 POWDER, FOR SOLUTION ORAL DAILY PRN
Status: DISCONTINUED | OUTPATIENT
Start: 2021-07-18 | End: 2021-07-21 | Stop reason: HOSPADM

## 2021-07-18 RX ORDER — TAMSULOSIN HYDROCHLORIDE 0.4 MG/1
0.4 CAPSULE ORAL
Status: DISCONTINUED | OUTPATIENT
Start: 2021-07-18 | End: 2021-07-21 | Stop reason: HOSPADM

## 2021-07-18 RX ORDER — PRAVASTATIN SODIUM 40 MG
40 TABLET ORAL EVERY MORNING
Status: DISCONTINUED | OUTPATIENT
Start: 2021-07-18 | End: 2021-07-18

## 2021-07-18 RX ORDER — CEFTRIAXONE 1 G/50ML
1000 INJECTION, SOLUTION INTRAVENOUS EVERY 24 HOURS
Status: DISCONTINUED | OUTPATIENT
Start: 2021-07-19 | End: 2021-07-21 | Stop reason: HOSPADM

## 2021-07-18 RX ORDER — HEPARIN SODIUM 1000 [USP'U]/ML
4200 INJECTION, SOLUTION INTRAVENOUS; SUBCUTANEOUS
Status: DISCONTINUED | OUTPATIENT
Start: 2021-07-18 | End: 2021-07-20

## 2021-07-18 RX ORDER — ACETAMINOPHEN 325 MG/1
650 TABLET ORAL EVERY 6 HOURS PRN
Status: DISCONTINUED | OUTPATIENT
Start: 2021-07-18 | End: 2021-07-21 | Stop reason: HOSPADM

## 2021-07-18 RX ORDER — HEPARIN SODIUM 1000 [USP'U]/ML
8400 INJECTION, SOLUTION INTRAVENOUS; SUBCUTANEOUS
Status: DISCONTINUED | OUTPATIENT
Start: 2021-07-18 | End: 2021-07-20

## 2021-07-18 RX ORDER — PRAVASTATIN SODIUM 40 MG
40 TABLET ORAL
Status: DISCONTINUED | OUTPATIENT
Start: 2021-07-18 | End: 2021-07-21 | Stop reason: HOSPADM

## 2021-07-18 RX ORDER — HEPARIN SODIUM 10000 [USP'U]/100ML
3-30 INJECTION, SOLUTION INTRAVENOUS
Status: DISCONTINUED | OUTPATIENT
Start: 2021-07-18 | End: 2021-07-20

## 2021-07-18 RX ORDER — ONDANSETRON 2 MG/ML
4 INJECTION INTRAMUSCULAR; INTRAVENOUS EVERY 6 HOURS PRN
Status: DISCONTINUED | OUTPATIENT
Start: 2021-07-18 | End: 2021-07-21 | Stop reason: HOSPADM

## 2021-07-18 RX ADMIN — PRAVASTATIN SODIUM 40 MG: 40 TABLET ORAL at 21:07

## 2021-07-18 RX ADMIN — INSULIN LISPRO 4 UNITS: 100 INJECTION, SOLUTION INTRAVENOUS; SUBCUTANEOUS at 12:38

## 2021-07-18 RX ADMIN — HEPARIN SODIUM 12.8 UNITS/KG/HR: 10000 INJECTION, SOLUTION INTRAVENOUS at 23:55

## 2021-07-18 RX ADMIN — ASPIRIN 81 MG CHEWABLE TABLET 324 MG: 81 TABLET CHEWABLE at 01:24

## 2021-07-18 RX ADMIN — INSULIN LISPRO 1 UNITS: 100 INJECTION, SOLUTION INTRAVENOUS; SUBCUTANEOUS at 09:18

## 2021-07-18 RX ADMIN — HEPARIN SODIUM 8400 UNITS: 1000 INJECTION INTRAVENOUS; SUBCUTANEOUS at 05:44

## 2021-07-18 RX ADMIN — TAMSULOSIN HYDROCHLORIDE 0.4 MG: 0.4 CAPSULE ORAL at 17:17

## 2021-07-18 RX ADMIN — HEPARIN SODIUM 18 UNITS/KG/HR: 10000 INJECTION, SOLUTION INTRAVENOUS at 05:47

## 2021-07-18 RX ADMIN — SODIUM CHLORIDE 500 ML: 0.9 INJECTION, SOLUTION INTRAVENOUS at 04:08

## 2021-07-18 RX ADMIN — CEFTRIAXONE 1000 MG: 1 INJECTION, SOLUTION INTRAVENOUS at 04:08

## 2021-07-18 RX ADMIN — INSULIN LISPRO 1 UNITS: 100 INJECTION, SOLUTION INTRAVENOUS; SUBCUTANEOUS at 21:07

## 2021-07-18 RX ADMIN — IOHEXOL 85 ML: 350 INJECTION, SOLUTION INTRAVENOUS at 02:48

## 2021-07-18 RX ADMIN — INSULIN LISPRO 1 UNITS: 100 INJECTION, SOLUTION INTRAVENOUS; SUBCUTANEOUS at 17:17

## 2021-07-18 NOTE — CONSULTS
Consultation - Cardiology   [facility]     Ingrid Gallegos 66 y o  male MRN: [id number]  : 1945  Unit/Bed#: [address] [place] [address] Encounter: [id number]      Assessment & Plan     1  Syncope  Patient is having recurrent episodes of syncopal episode  He describes the mostly exertional   Most likely etiology appears to be secondary to patient's hypoglycemia or possibility of pulmonary hypertension  Monitor overnight shows no evidence of any significant tachy-linda arrhythmia will check echo Doppler and will need ischemia workup  Will continue monitor    2  Bilateral pulmonary embolism  Patient is diagnosed to have bilateral pulmonary embolism with RV strain  Hemodynamically stable at this time continue heparin drip  Continue oxygen therapy  Continue monitoring  3  Acute DVT  Already started on IV heparin drip    4  Elevated troponin  Most likely secondary to non MI related secondary to RV strain  But does need ischemia workup as he has multiple risk factor    5  Respiratory failure with hypoxia  Secondary to above patient already on oxygen therapy as saturation has improved    6  Diabetes mellitus  Was recently admitted with hypoglycemia  Blood sugars are now better monitor blood sugar  Management as per medical team    7  Elevated PSA with possible prostate cancer  Patient was recently diagnosed to have elevated PSA workup is in progress    8  Recent admission for hypoglycemia and syncopal episode  Old chart reviewed    9  Dyslipidemia  Continue statin    10  Urinary tract infection with indwelling Flores catheter  Patient is on antibiotics    Plan  Echo Doppler  Agree with IV heparin  Continue monitoring  Follow-up electrolytes  Follow-up hemoglobin  Follow-up blood sugar   At some point patient will need ischemia workup    Discussed with medical team  Thank you for your consultation      Physician Requesting Consult: Anmol Luna MD    Reason for Consult / Principal Problem:  Elevated troponin, shortness of breath, syncope    Inpatient consult to Cardiology  Consult performed by: Iliana Vincent MD  Consult ordered by: Narayan Guaman PA-C          HPI: Yasmany Henderson is a 66y o  year old male who presents with shortness of breath and recurrent syncope  Patient who has medical history significant for hypertension, hyperlipidemia, CKD stage 3, type 2 diabetes mellitus, possible prostate cancer at PSA is elevated, was recently admitted with hypoglycemia and syncopal episode with urinary retention and UTI and had a Flores catheter placed  He was feeling dizzy and lightheadedness with Flomax and his lisinopril was held  He has not seen his medical MD for more than a year and he was told that he has a cardiac murmur  As per him as he went home he was having shortness of breath and he would pass out when he walks  He could not breathe and he is oxygen saturation was low  He was tachypneic and he had a syncopal episode where he start having pain in his knees and chest wall area hence he sent to the emergency room where CTA shows that he has a bilateral size segmental and subsegmental PE and later on found to have DVT on the right lower extremity as well as evidence of RV strain on CTA  His troponin was slightly elevated EKG shows right bundle branch block with heart rate around 90 beats per minute but hemodynamically he is stable  He is feeling better since he is on oxygen and IV heparin  He denies any previous history of cardiac issues  He said he had a cardiac murmur and workup was negative  He never  he never smoked  His sister help take care of him  Denies any previous history of MI  But he does have episodes of syncope and he is diabetic for many years  Review of Systems   Constitutional: Positive for activity change and fatigue  Negative for chills, diaphoresis, fever and unexpected weight change  HENT: Negative for congestion  Eyes: Negative for discharge and redness  Respiratory: Positive for shortness of breath  Negative for cough, chest tightness and wheezing  Cardiovascular: Negative  Negative for chest pain, palpitations and leg swelling  Gastrointestinal: Negative for abdominal pain, diarrhea and nausea  Endocrine: Negative  Genitourinary: Positive for difficulty urinating and urgency  Negative for decreased urine volume  Musculoskeletal: Positive for arthralgias and gait problem  Negative for back pain  Skin: Negative for rash and wound  Allergic/Immunologic: Negative  Neurological: Positive for syncope  Negative for dizziness, seizures, weakness, light-headedness and headaches  Hematological: Negative  Psychiatric/Behavioral: Negative for agitation and confusion  The patient is nervous/anxious  Historical Information   Past Medical History:   Diagnosis Date    Arthritis     BPH associated with nocturia     Diabetes mellitus ([address] )     Edema     lower legs    Hearing aid worn     bilateral    Hyperlipidemia     Hypertension     controlled    Tremor of both hands      Past Surgical History:   Procedure Laterality Date    CATARACT EXTRACTION      MI XCAPSL CTRC RMVL INSJ IO LENS PROSTH W/O ECP Right 8/6/2018    Procedure: EXTRACTION EXTRACAPSULAR CATARACT PHACO INTRAOCULAR LENS (IOL); Surgeon: Maria A Richards MD;  Location: [facility] MAIN OR;  Service: Ophthalmology    MI XCAPSL CTRC RMVL INSJ IO LENS PROSTH W/O ECP Left 10/1/2018    Procedure: EXTRACTION EXTRACAPSULAR CATARACT PHACO INTRAOCULAR LENS (IOL);   Surgeon: Maria A Richards MD;  Location: [facility] OR;  Service: Ophthalmology     Social History     Substance and Sexual Activity   Alcohol Use No     Social History     Substance and Sexual Activity   Drug Use No     Social History     Tobacco Use   Smoking Status Never Smoker   Smokeless Tobacco Never Used     Family History:   Family History   Problem Relation Age of Onset    Cancer Mother     Cancer Father         lung-smoker    Early death Brother     Cancer Brother         "bone cancer"       Meds/Allergies    PTA meds:    Medications Prior to Admission   Medication    Continuous Blood Gluc Sensor (FreeStyle [place] 14 Day Sensor) MISC    metFORMIN (GLUCOPHAGE) 500 mg tablet    pravastatin (PRAVACHOL) 40 mg tablet    sitaGLIPtin (JANUVIA) 100 mg tablet    tamsulosin (FLOMAX) 0 4 mg      No Known Allergies    Current Facility-Administered Medications:     acetaminophen (TYLENOL) tablet 650 mg, 650 mg, Oral, Q6H PRN, ASHLEE Menendez-C    calcium carbonate (TUMS) chewable tablet 1,000 mg, 1,000 mg, Oral, Daily PRN, ASHLEE Menendez-C    [START ON 7/19/2021] cefTRIAXone (ROCEPHIN) IVPB (premix in dextrose) 1,000 mg 50 mL, 1,000 mg, Intravenous, Q24H, ASHLEE Menendez-C    heparin (porcine) 25,000 units in 0 45% NaCl 250 mL infusion (premix), 3-30 Units/kg/hr (Order-Specific), Intravenous, Titrated, ASHLEE Menendez-C, Held at 07/18/21 1145    heparin (porcine) injection 4,200 Units, 4,200 Units, Intravenous, Q1H PRN, Andrew Scotty PA-C    heparin (porcine) injection 8,400 Units, 8,400 Units, Intravenous, Q1H PRN, Andrew Scotty PA-C    insulin lispro (HumaLOG) 100 units/mL subcutaneous injection 1-6 Units, 1-6 Units, Subcutaneous, TID AC, 4 Units at 07/18/21 1238 **AND** Fingerstick Glucose (POCT), , , TID AC, Andrewtien Fajardo PA-C    insulin lispro (HumaLOG) 100 units/mL subcutaneous injection 1-6 Units, 1-6 Units, Subcutaneous, HS, Andrew Scotty PA-C    ondansetron [facility]) injection 4 mg, 4 mg, Intravenous, Q6H PRN, Andrew Scotty PA-C    polyethylene glycol (MIRALAX) packet 17 g, 17 g, Oral, Daily PRN, Andrew Scotty PA-C    pravastatin (PRAVACHOL) tablet 40 mg, 40 mg, Oral, HS, Josie Revankar, DO    tamsulosin (FLOMAX) capsule 0 4 mg, 0 4 mg, Oral, Daily With Geofm Puls Josue Duque PA-C    VTE Pharmacologic Prophylaxis:   Heparin drip    Objective:   Vitals: Blood pressure 132/71, pulse 87, temperature 98 2 °F (36 8 °C), temperature source Oral, resp  rate 22, weight 103 kg (227 lb 14 4 oz), SpO2 90 %  Body mass index is 33 65 kg/m²  Wt Readings from Last 3 Encounters:   07/18/21 103 kg (227 lb 14 4 oz)   07/16/21 101 kg (223 lb)   07/13/21 101 kg (223 lb 4 8 oz)     BP Readings from Last 3 Encounters:   07/18/21 132/71   07/16/21 124/62   07/13/21 128/70     Orthostatic Blood Pressures      Most Recent Value   Blood Pressure  132/71 filed at 07/18/2021 1225   Patient Position - Orthostatic VS  Lying filed at 07/18/2021 7907          Intake/Output Summary (Last 24 hours) at 7/18/2021 1249  Last data filed at 7/18/2021 0526  Gross per 24 hour   Intake 550 ml   Output --   Net 550 ml       Invasive Devices     Peripheral Intravenous Line            Peripheral IV 07/18/21 Left Antecubital <1 day    Peripheral IV 07/18/21 Right Antecubital <1 day          Drain            Urethral Catheter Latex 16 Fr  15 days                  Physical Exam:   Physical Exam    Neurologic:  Alert & oriented x 3, no new focal deficits, Not in any acute distress,  Constitutional:  Well developed, well nourished, non-toxic appearance   Eyes:  Pupil equal and reacting to light, conjunctiva normal   HENT:  Atraumatic, oropharynx moist, Neck- normal range of motion, no tenderness, supple   Respiratory:  Bilateral air entry, mostly clear to auscultation  Cardiovascular: S1-S2 regular with a 2/6 ejection systolic murmur and S4 is present  GI:  Soft, nondistended, normal bowel sounds, nontender, no hepatosplenomegaly appreciated  Musculoskeletal:  No edema, no tenderness, no deformities     Skin:  Well hydrated, no rash   Lymphatic:  No lymphadenopathy noted   Extremities:  Mild edema and distal pulses are present    Labs:   Troponins:   Results from last 7 days   Lab Units 07/18/21  1102 07/18/21  0750 07/18/21  0306   TROPONIN I ng/mL 1 13* 1 12* 1 07*       CBC with diff:   Results from last 7 days   Lab Units 07/18/21  0750 07/18/21  0531 07/18/21  0014   WBC Thousand/uL 7 60 6 92 8 90   HEMOGLOBIN g/dL 9 3* 8 8* 9 8*   HEMATOCRIT % 31 1* 28 8* 31 7*   MCV fL 91 89 88   PLATELETS Thousands/uL 209 208 241   MCH pg 27 1 27 2 27 3   MCHC g/dL 29 9* 30 6* 30 9*   RDW % 15 5* 15 5* 15 3*   MPV fL 10 7 10 6 10 5   NRBC AUTO /100 WBCs 0  --  0       CMP:   Results from last 7 days   Lab Units 07/18/21  0750 07/18/21  0014   SODIUM mmol/L 137 138   POTASSIUM mmol/L 4 5 4 5   CHLORIDE mmol/L 100 98*   CO2 mmol/L 29 29   ANION GAP mmol/L 8 11   BUN mg/dL 21 24   CREATININE mg/dL 1 16 1 35*   CALCIUM mg/dL 8 2* 8 2*   AST U/L  --  21   ALT U/L  --  28   ALK PHOS U/L  --  75   TOTAL PROTEIN g/dL  --  7 1   ALBUMIN g/dL  --  2 8*   TOTAL BILIRUBIN mg/dL  --  0 25   EGFR ml/min/1 73sq m 60 50   GLUCOSE RANDOM mg/dL 171* 217*     Coags:   Results from last 7 days   Lab Units 07/18/21  1115 07/18/21  0531   PTT seconds 184* 26   INR   --  1 07     TSH:    Results from last 7 days   Lab Units 07/18/21  0014   TSH 3RD GENERATON uIU/mL 2 943     NT-proBNP:   Recent Labs     07/18/21  0014   NTBNP 2,051*        Imaging & Testing   Cardiac testing:   Echo Doppler will be requested      Imaging: I have personally reviewed pertinent reports  XR chest 1 view portable    Result Date: 6/30/2021  Narrative: CHEST INDICATION:   hypoglycemia  COMPARISON:  None EXAM PERFORMED/VIEWS:  XR CHEST PORTABLE FINDINGS: Cardiomediastinal silhouette appears unremarkable  Right hemidiaphragm mildly elevated  There is adjacent right basilar scarring or subsegmental atelectasis  No pulmonary edema, pneumothorax or significant effusion  Osseous structures appear within normal limits for patient age  Impression: Mildly elevated right hemidiaphragm with adjacent right basilar scarring or subsegmental atelectasis    Otherwise no acute pulmonary disease Workstation performed: [id number]     CTA ED chest PE Study    Result Date: 7/18/2021  Narrative: CTA - CHEST WITH IV CONTRAST - PULMONARY ANGIOGRAM INDICATION:   PE suspected, high pretest prob sob  COMPARISON: Chest x-ray dated the same  TECHNIQUE: CTA examination of the chest was performed using angiographic technique according to a protocol specifically tailored to evaluate for pulmonary embolism  Axial, sagittal, and coronal 2D reformatted images were created from the source data and  submitted for interpretation  In addition, coronal 3D MIP postprocessing was performed on the acquisition scanner  Radiation dose length product (DLP) for this visit:  723 mGy-cm   This examination, like all CT scans performed in the [facility], was performed utilizing techniques to minimize radiation dose exposure, including the use of iterative reconstruction and automated exposure control  IV Contrast:  85 mL of iohexol (OMNIPAQUE)  FINDINGS: PULMONARY ARTERIAL TREE:  Some images are suboptimal secondary to respiratory motion which decreases sensitivity for evaluation of peripheral pulmonary emboli  Filling defects are seen in the segmental and subsegmental branches in the left upper and left lower lobes (axial image 119, series 2 and 104, series 601, for example)  Additional segmental/subsegmental pulmonary emboli may be present in the right lower lobe (axial image 113, series 2) as well as the right upper lobe (axial image 73-80, series 2)  LUNGS:  Linear scarring/atelectasis dependently and in the bilateral lower lung field  Lungs otherwise appear grossly clear  PLEURA:  Unremarkable  HEART/GREAT VESSELS: Prominence of the right ventricle suggesting right heart strain  Moderate to severe coronary atherosclerosis  Mild aortic atherosclerosis; no aortic aneurysm  Diameter of the ascending aorta is top normal  MEDIASTINUM AND TRINI:  Inspissated secretions in the distal trachea    Large airways appear patent  CHEST WALL AND LOWER NECK:   No acute fracture seen  Bilateral gynecomastia  Otherwise grossly unremarkable  VISUALIZED STRUCTURES IN THE UPPER ABDOMEN:  Nodular contour of the liver, may suggest a component of hepatic cirrhosis  OSSEOUS STRUCTURES: Generalized osteopenia  No acute fracture or destructive osseous lesion  Impression: Multiple segmental and subsegmental filling defects suspicious for pulmonary emboli bilaterally (axial image 119, series 2 and 104, series 601, axial image 113, series 2, and axial image 73-80, series 2 for example)  Prominence of the right ventricle suggesting right heart strain  Nodular contour of the liver, may suggest a component of hepatic cirrhosis  Coronary atherosclerosis, and other findings as above  I personally discussed this study with Stephy Luo on 7/18/2021 at 4:40 AM  Workstation performed: [id number]     VAS lower limb venous duplex study, complete bilateral    Result Date: 7/18/2021  Narrative:  [facility] REPORT CLINICAL: Indications: Patient presents with recent discovery of pulmonary embolism and physician wants to determine potential source  Patient reports SOB Risk Factors The patient has no history of DVT  FINDINGS:  Segment        Right                   Left                          Impression              Impression       CFV            Normal (Patent)         Normal (Patent)  Gastrocnemius  Occlusive Subsegmental                      CONCLUSION: RIGHT LOWER LIMB: There is evidence of acute occlusive chronic deep vein thrombosis noted in one of the gastrocnemius veins in the proximal calf  No evidence of superficial thrombophlebitis noted  Doppler evaluation shows a normal response to augmentation maneuvers  Popliteal, posterior tibial and anterior tibial arterial Doppler waveforms are triphasic/biphasic  LEFT LOWER LIMB: No evidence of acute or chronic deep vein thrombosis   No evidence of superficial thrombophlebitis noted  Doppler evaluation shows a normal response to augmentation maneuvers  Popliteal, posterior tibial and anterior tibial arterial Doppler waveforms are triphasic/biphasic  Technical findings were given to Dr Lynsey Young  EKG/ Monitor: Personally reviewed  Twelve lead EKG shows normal sinus rhythm right bundle branch block heart rate around 80 beats per minute as compared to previous EKG done few days ago QRS duration has slightly increased  Code Status: Level 1 - Full Code  Advance Directive and Living Will:      POLST:        Kamran Barrera MD MD, [facility] [place]      "This note has been constructed using a voice recognition system  Therefore there may be syntax, spelling, and/or grammatical errors   Please call if you have any questions  "

## 2021-07-18 NOTE — ASSESSMENT & PLAN NOTE
Reported having syncopal episode on Wednesday this time associated with shortness of breath and dizziness - he lives alone at home and is unsure how long he was out for  Reports Hx of heart murmur, no recent echo   - Diagnosed with bilateral PE's on admission, see plan above  - Monitor on telemetry-no evidence of arrhythmia so far  - EKG sinus rhythm with PACs, QtC 472  - will check orthostasis   - 2D echocardiogram showed left and right ventricular systolic function within normal limits, and grade 1 diastolic dysfunction  No major valvular abnormalities noted  Some Of the episodes of syncope were secondary to hypoglycemia  Appreciate cardiology recommendations

## 2021-07-18 NOTE — ED NOTES
Called and talked to Vascular and made them aware of the  VAS study of the lower limb, acknowledge and they will be in the room shortly       Jenni Cardenas RN  07/18/21 2279

## 2021-07-18 NOTE — ASSESSMENT & PLAN NOTE
Vs SIRS secondary to thromboembolism  POA as evidence by tachycardia, tachypnea, elevated lactic, abnormal UA suggestive of UTI  - patient received ceftriaxone   - urine culture, blood culture results pending  - Procalcitonin -negative

## 2021-07-18 NOTE — ASSESSMENT & PLAN NOTE
Patient has been having recurrent syncopal episodes over the past few weeks  He was recently admitted for hypoglycemia and glucose was persistently low and symptoms were attributed to hypoglycemia as patient had improvement with better glucose control  He had another syncopal episode on Wednesday - he lives alone at home and is unsure how long he was out for  He denies any symptoms prior to passing out     - Reports Hx of heart murmur, no recent echo   - diagnosed with bilateral PE's on admission, see plan above  - Monitor on telemetry  - EKG sinus rhythm with PACs, QtC 472  - Cardiology consult

## 2021-07-18 NOTE — ASSESSMENT & PLAN NOTE
On admission patient was hypoxic, however patient currently is saturating 92% on room air  Underwent home oxygen protocol and did not qualify for oxygen even on exertion

## 2021-07-18 NOTE — ASSESSMENT & PLAN NOTE
POA as evidence by tachycardia, tachypnea, elevated lactic, abnormal UA suggestive of UTI  - respiratory failure with hypoxia 2/2 bilateral PEs,and elevated troponin likely 2/2 PE as well  - patient received ceftriaxone   - urine culture, blood culture results pending  - Procalcitonin pending

## 2021-07-18 NOTE — ASSESSMENT & PLAN NOTE
Patient has indwelling Flores catheter due to urinary retention, following outpatient with Urology  Abnormal UA  Urine culture pending  Continue ceftriaxone  Monitor I/Os

## 2021-07-18 NOTE — ASSESSMENT & PLAN NOTE
Suspected UTI due to indwelling urethral catheter  Patient has indwelling Flores catheter due to urinary retention, following outpatient with Urology  Abnormal UA  Reflex Urine culture never sent  Will finish a course of oral antibiotic with Keflex  Last urine culture positive for E coli which is pansensitive    Continue ceftriaxone while in hospital  Monitor I/Os

## 2021-07-18 NOTE — ASSESSMENT & PLAN NOTE
Initial troponin 1 06 on presentation to the ED  EKG showing sinus rhythm with PACs with right bundle-branch block, similar to prior EKG no T-wave flattening in II and inversion III  Repeat troponin 1 07  Monitor on telemetry  Continue to trend troponin  Cardiology consult appreciated  Continue heparin as above

## 2021-07-18 NOTE — ASSESSMENT & PLAN NOTE
Creatinine 1 35 on admission, appears similar to previous labs , likely baseline  Continue to monitor with daily labs

## 2021-07-18 NOTE — ASSESSMENT & PLAN NOTE
In the setting of bilateral PEs, currently on 3 L nasal cannula O2 maintaining SpO2 between 90-95%, on arrival was hypoxic at 88% on room air   - see plan for bilateral Pes  - titrate O2 as needed  - continuous pulse ox

## 2021-07-18 NOTE — ASSESSMENT & PLAN NOTE
Lab Results   Component Value Date    HGBA1C 6 0 (H) 06/30/2021       Recent Labs     07/19/21  0711 07/19/21  1137 07/19/21  1550 07/19/21 2123   POCGLU 150* 182* 216* 191*       Blood Sugar Average: Last 72 hrs:  (P) 198 75     Patient now follows Endocrinology outpatient, recently had issues with multiple episodes of hypoglycemia  No further hypoglycemia  Medications have since been cut down is currently on Januvia and metformin  Hold metformin    Continue Januvia  Sliding scale insulin coverage with meals and at bedtime

## 2021-07-18 NOTE — ASSESSMENT & PLAN NOTE
Recently discharged with Flores catheter, following up with Urology outpatient  Continue to monitor urine output

## 2021-07-18 NOTE — H&P
[address] 1945, 66 y o  male MRN: [id number]  Unit/Bed#: ED 05 Encounter: [id number]  Primary Care Provider: Araseli Grullon DO   Date and time admitted to hospital: 7/17/2021 11:48 PM    * Bilateral pulmonary embolism [facility])  Assessment & Plan  Patient presented with progressively worsening SOB and arrived to the ED hypoxic to 88% on room air  He was placed on 3 L nasal cannula O2 and SpO2 improved to 90 to 95%  CTA PE study showed multiple segmental and subsegmental filling defects suspicious for pulmonary emboli bilaterally with prominence of the right ventricle suggesting right heart strain  There is a family history of Factor V Leiden, both brothers (+)  - started on heparin drip  - echo, lower extremity DVT study  - monitor on telemetry  - continue to trend troponin  - cardiology consult appreciated  - continuous pulse oximetry    Sepsis ([facility] [place] [address] )  Assessment & Plan  POA as evidence by tachycardia, tachypnea, elevated lactic, abnormal UA suggestive of UTI  - respiratory failure with hypoxia 2/2 bilateral PEs,and elevated troponin likely 2/2 PE as well  - patient received ceftriaxone   - urine culture, blood culture results pending  - Procalcitonin pending      Syncopal episodes  Assessment & Plan  Patient has been having recurrent syncopal episodes over the past few weeks  He was recently admitted for hypoglycemia and glucose was persistently low and symptoms were attributed to hypoglycemia as patient had improvement with better glucose control  He had another syncopal episode on Wednesday - he lives alone at home and is unsure how long he was out for  He denies any symptoms prior to passing out     - Reports Hx of heart murmur, no recent echo   - diagnosed with bilateral PE's on admission, see plan above  - Monitor on telemetry  - EKG sinus rhythm with PACs, QtC 472  - Cardiology consult    Elevated troponin  Assessment & Plan  Initial troponin 1 06 on presentation to the ED  EKG showing sinus rhythm with PACs with right bundle-branch block, similar to prior EKG no T-wave flattening in II and inversion III  Repeat troponin 1 07  Monitor on telemetry  Continue to trend troponin  Cardiology consult appreciated  Continue heparin as above    Respiratory failure with hypoxia (HCC)  Assessment & Plan  In the setting of bilateral PEs, currently on 3 L nasal cannula O2 maintaining SpO2 between 90-95%, on arrival was hypoxic at 88% on room air   - see plan for bilateral Pes  - titrate O2 as needed  - continuous pulse ox    Anemia  Assessment & Plan  Chronic in nature, at baseline with hemoglobin 9 8 on admission  Continue to monitor    Urinary tract infection without hematuria  Assessment & Plan  Patient has indwelling Flores catheter due to urinary retention, following outpatient with Urology  Abnormal UA  Urine culture pending  Continue ceftriaxone  Monitor I/Os    Type 2 diabetes mellitus, without long-term current use of insulin ([facility])  Assessment & Plan  Lab Results   Component Value Date    HGBA1C 6 0 (H) 06/30/2021       Recent Labs     07/18/21  0026   POCGLU 224*       Blood Sugar Average: Last 72 hrs:  (P) 224     Patient now follows Endocrinology outpatient, recently had issues with multiple episodes of hypoglycemia  Medications have since been cut down is currently on Januvia and metformin  Will hold home PO medications during admission  Sliding scale insulin coverage with meals and at bedtime    CKD stage 3 due to type 2 diabetes mellitus ([facility] [address] )  Assessment & Plan  Lab Results   Component Value Date    HGBA1C 6 0 (H) 06/30/2021       Recent Labs     07/18/21  0026   POCGLU 224*       Blood Sugar Average: Last 72 hrs:  (P) 224     Creatinine 1 35 on admission, appears similar to previous labs , likely baseline  Continue to monitor with daily labs    Dyslipidemia  Assessment & Plan  Continue pravastatin 40 mg daily    Urinary retention  Assessment & Plan  Recently discharged with Flores catheter, following up with Urology outpatient  Continue to monitor urine output    VTE Prophylaxis: Heparin Drip  / sequential compression device   Code Status:  Level 1  POLST: POLST is not applicable to this patient  Discussion with family: No    Anticipated Length of Stay:  Patient will be admitted on an Inpatient basis with an anticipated length of stay of  > 2 midnights  Justification for Hospital Stay:  Bilateral PE, sepsis    Total Time for Visit, including Counseling / Coordination of Care: 30 minutes  Greater than 50% of this total time spent on direct patient counseling and coordination of care  Chief Complaint:   SOB    History of Present Illness:    Nilo Bolaños is a 66 y o  male who presents with PMH of HTN, HLD, CKD stage 3, DM2 with recent issues of hypoglycemia on previous admission, urinary retention with Flores catheter, BPH, elevated PSA without going workup with concern for prostate cancer, anemia  He presented to the ED today with SOB, generalized weakness  His sister who checks in on him frequently noted that he has had more SOB on exertion over the past few days and was noted to by hypoxic at his recent PCP visit  He had a syncopal episode on Wednesday at [facility] and didn't call family until around 7AM for help getting up  He doesn't remember having any symptoms prior to the syncopal event and by the time a glucose was checked later in the morning it was better  He reports some rib pain after the event because he fell on his left side  He presented to ED and was found to be hypoxic to 88% on RA and tachycardic and tachypneic  He was also found to have an elevated lactic acid and abnormal UA suggestive of UTI  CTA PE study showed bilateral segmental and subsegmental PE's bilaterally with evidence of right heart strain  Review of Systems:    Review of Systems   Constitutional: Positive for fatigue  Negative for chills and fever     HENT: Negative for congestion, rhinorrhea and sore throat  Eyes: Negative for visual disturbance  Respiratory: Positive for shortness of breath  Negative for cough and wheezing  Cardiovascular: Negative for chest pain, palpitations and leg swelling  Gastrointestinal: Negative for abdominal distention, abdominal pain, diarrhea, nausea and vomiting  Genitourinary: Negative for decreased urine volume, dysuria, frequency, hematuria and urgency  Musculoskeletal: Negative for gait problem and myalgias  Skin: Negative for rash and wound  Neurological: Positive for weakness  Negative for dizziness, light-headedness, numbness and headaches  Past Medical and Surgical History:     Past Medical History:   Diagnosis Date    Arthritis     BPH associated with nocturia     Diabetes mellitus ([address] )     Edema     lower legs    Hearing aid worn     bilateral    Hyperlipidemia     Hypertension     controlled    Tremor of both hands        Past Surgical History:   Procedure Laterality Date    CATARACT EXTRACTION      NC XCAPSL CTRC RMVL INSJ IO LENS PROSTH W/O ECP Right 8/6/2018    Procedure: EXTRACTION EXTRACAPSULAR CATARACT PHACO INTRAOCULAR LENS (IOL); Surgeon: Spencer Dobbins MD;  Location: [facility] MAIN OR;  Service: Ophthalmology    NC XCAPSL CTRC RMVL INSJ IO LENS PROSTH W/O ECP Left 10/1/2018    Procedure: EXTRACTION EXTRACAPSULAR CATARACT PHACO INTRAOCULAR LENS (IOL); Surgeon: Spencer Dobbins MD;  Location: [facility] MAIN OR;  Service: Ophthalmology       Meds/Allergies:    Prior to Admission medications    Medication Sig Start Date End Date Taking?  Authorizing Provider   Continuous Blood Gluc Sensor ([employer] [place] 14 Day Sensor) MISC Change sensor every 14 days 7/15/21   Casandra oSsa MD   metFORMIN (GLUCOPHAGE) 500 mg tablet Take 1 tablet (500 mg total) by mouth 2 (two) times a day with meals 7/13/21 8/12/21  Casandra Sosa MD   pravastatin (PRAVACHOL) 40 mg tablet Take 1 tablet (40 mg total) by mouth every morning 7/16/21   Bill Walden DO   sitaGLIPtin (JANUVIA) 100 mg tablet Take 1 tablet (100 mg total) by mouth daily 7/13/21   Roberto Troncoso MD   tamsulosin [facility]) 0 4 mg Take 1 capsule (0 4 mg total) by mouth daily with dinner 7/5/21   Champ Caro MD       Allergies: No Known Allergies    Social History:    Social History     Substance and Sexual Activity   Alcohol Use No     Social History     Tobacco Use   Smoking Status Never Smoker   Smokeless Tobacco Never Used     Social History     Substance and Sexual Activity   Drug Use No       Family History:    Family History   Problem Relation Age of Onset    Cancer Mother     Cancer Father         lung-smoker    Early death Brother     Cancer Brother         "bone cancer"       Physical Exam:     Vitals:   Blood Pressure: 143/80 (07/18/21 0545)  Pulse: 86 (07/18/21 0545)  Temperature: 98 2 °F (36 8 °C) (07/17/21 2350)  Temp Source: Oral (07/17/21 2350)  Respirations: 20 (07/18/21 0545)  Weight - Scale: 105 kg (231 lb 0 7 oz) (07/17/21 2350)  SpO2: 92 % (07/18/21 0545)    Physical Exam  Vitals reviewed  Constitutional:       General: He is not in acute distress  Appearance: He is well-developed  He is not ill-appearing  HENT:      Head: Normocephalic and atraumatic  Cardiovascular:      Rate and Rhythm: Normal rate and regular rhythm  Heart sounds: Murmur heard  No gallop  Pulmonary:      Effort: Pulmonary effort is normal  No respiratory distress  Breath sounds: Normal breath sounds  No wheezing, rhonchi or rales  Abdominal:      General: Bowel sounds are normal  There is no distension  Palpations: Abdomen is soft  Tenderness: There is no abdominal tenderness  There is no guarding  Musculoskeletal:         General: No tenderness  Right lower leg: Edema (Bilateral pitting lower extremity edema, negative Homans sign bilaterally) present  Left lower leg: Edema present     Skin:     General: Skin is warm and dry       Findings: No erythema or rash  Neurological:      Mental Status: He is alert and oriented to person, place, and time  Psychiatric:         Mood and Affect: Mood normal          Behavior: Behavior normal           Additional Data:     Lab Results: I have personally reviewed pertinent reports  Results from last 7 days   Lab Units 07/18/21  0531 07/18/21  0014   WBC Thousand/uL 6 92 8 90   HEMOGLOBIN g/dL 8 8* 9 8*   HEMATOCRIT % 28 8* 31 7*   PLATELETS Thousands/uL 208 241   NEUTROS PCT %  --  70   LYMPHS PCT %  --  22   MONOS PCT %  --  7   EOS PCT %  --  1     Results from last 7 days   Lab Units 07/18/21  0014   SODIUM mmol/L 138   POTASSIUM mmol/L 4 5   CHLORIDE mmol/L 98*   CO2 mmol/L 29   BUN mg/dL 24   CREATININE mg/dL 1 35*   ANION GAP mmol/L 11   CALCIUM mg/dL 8 2*   ALBUMIN g/dL 2 8*   TOTAL BILIRUBIN mg/dL 0 25   ALK PHOS U/L 75   ALT U/L 28   AST U/L 21   GLUCOSE RANDOM mg/dL 217*     Results from last 7 days   Lab Units 07/18/21  0531   INR  1 07     Results from last 7 days   Lab Units 07/18/21  0026   POC GLUCOSE mg/dl 224*         Results from last 7 days   Lab Units 07/18/21  0306 07/18/21  0014   LACTIC ACID mmol/L 1 9 2 1*       Imaging: I have personally reviewed pertinent reports  CTA ED chest PE Study   Final Result by hCidi Mcgowan DO (07/18 0913)      Multiple segmental and subsegmental filling defects suspicious for pulmonary emboli bilaterally (axial image 119, series 2 and 104, series 601, axial image 113, series 2, and axial image 73-80, series 2 for example)  Prominence of the right ventricle suggesting right heart strain  Nodular contour of the liver, may suggest a component of hepatic cirrhosis  Coronary atherosclerosis, and other findings as above        I personally discussed this study with [facility] on 7/18/2021 at 4:40 AM                Workstation performed: [id number]         XR chest 1 view portable    (Results Pending)       EKG, Pathology, and Other Studies Reviewed on Admission:   EKG:  Sinus rhythm, PACs, frequent urge block, T-wave flattening in leads II and AVF, inversion in III  Allscripts / Epic Records Reviewed: Yes     ** Please Note: This note has been constructed using a voice recognition system   **

## 2021-07-18 NOTE — ED NOTES
Patient is alert/oriented times four,color pale,skin intact,but bruises to right side noted from past falls  Lungs with diminished breath sounds at the bases bilaterally,some fine crackles noted  O2 saturation on RA is 86-90%  Oxygen placed on patient via nasal cannula,at 3L/min,and O2 sat=95-96%  Bilateral lower leg and feet edema noted,+2-3,pulses faint upon palpation  Flores catheter intact,patent,draining cloudy yellow urine,urine bag changed for clean one to obtain specimen  IV HL to RAC  IV HL to LAC,both 20g  Side rails up,call bell in reach       Sowmya Hartman RN  07/18/21 4194

## 2021-07-18 NOTE — ED PROVIDER NOTES
History  Chief Complaint   Patient presents with    Shortness of Breath     Pt brought to ED by EMS for low O2 saturation and SOB  Pt states he had a syncopal episode with a fall and injury to L rib cage on Wed  Pt discharged for Odessia Crape for same two weeks ago  No fever chills n/v/d      57-year-old male with past history of diabetes, lower extremity edema, hypertension, hyperlipidemia, BPH with chronic Flores in place, presents to the ED for evaluation of worsening shortness of breath over the past few days  Patient lives alone  Patient states he is having significant dyspnea on exertion over the past few days that worsened today  Patient requested to come to the ED for further evaluation  Patient arrived via ambulance  Patient was satting 88% on room air  Patient does not use any supplemental oxygen at home  Patient denies any history of cigarette smoking, COPD, asthma  Patient denies any cough, fevers, chills, chest pain  Patient has generalized weakness  Patient states he usually leads a sedentary lifestyle  Patient's sister takes care of him on a daily basis  History provided by:  Patient      Prior to Admission Medications   Prescriptions Last Dose Informant Patient Reported? Taking?    Continuous Blood Gluc Sensor ([employer] [place] 14 Day Sensor) [facility]   No No   Sig: Change sensor every 14 days   metFORMIN (GLUCOPHAGE) 500 mg tablet   No No   Sig: Take 1 tablet (500 mg total) by mouth 2 (two) times a day with meals   pravastatin (PRAVACHOL) 40 mg tablet   No No   Sig: Take 1 tablet (40 mg total) by mouth every morning   sitaGLIPtin (JANUVIA) 100 mg tablet   No No   Sig: Take 1 tablet (100 mg total) by mouth daily   tamsulosin (FLOMAX) 0 4 mg   No No   Sig: Take 1 capsule (0 4 mg total) by mouth daily with dinner      Facility-Administered Medications: None       Past Medical History:   Diagnosis Date    Arthritis     BPH associated with nocturia     Diabetes mellitus (HCC)     Edema lower legs    Hearing aid worn     bilateral    Hyperlipidemia     Hypertension     controlled    Tremor of both hands        Past Surgical History:   Procedure Laterality Date    CATARACT EXTRACTION      AL XCAPSL CTRC RMVL INSJ IO LENS PROSTH W/O ECP Right 8/6/2018    Procedure: EXTRACTION EXTRACAPSULAR CATARACT PHACO INTRAOCULAR LENS (IOL); Surgeon: Messi Georges MD;  Location: [facility] MAIN OR;  Service: Ophthalmology    AL XCAPSL CTRC RMVL INSJ IO LENS PROSTH W/O ECP Left 10/1/2018    Procedure: EXTRACTION EXTRACAPSULAR CATARACT PHACO INTRAOCULAR LENS (IOL); Surgeon: Messi Georges MD;  Location: [facility] MAIN OR;  Service: Ophthalmology       Family History   Problem Relation Age of Onset    Cancer Mother     Cancer Father         lung-smoker    Early death Brother     Cancer Brother         "bone cancer"     I have reviewed and agree with the history as documented  E-Cigarette/Vaping    E-Cigarette Use Never User      E-Cigarette/Vaping Substances    Nicotine No     THC No     CBD No     Flavoring No     Other No     Unknown No      Social History     Tobacco Use    Smoking status: Never Smoker    Smokeless tobacco: Never Used   Vaping Use    Vaping Use: Never used   Substance Use Topics    Alcohol use: No    Drug use: No       Review of Systems   Constitutional: Negative for activity change, fatigue and fever  HENT: Negative for congestion, ear discharge and sore throat  Eyes: Negative for pain and redness  Respiratory: Positive for shortness of breath  Negative for cough, chest tightness and wheezing  Cardiovascular: Negative for chest pain  Gastrointestinal: Negative for abdominal pain, diarrhea, nausea and vomiting  Endocrine: Negative for cold intolerance  Genitourinary: Negative for dysuria and urgency  Musculoskeletal: Negative for arthralgias and back pain  Neurological: Negative for dizziness, weakness and headaches     Psychiatric/Behavioral: Negative for agitation and behavioral problems  Physical Exam  Physical Exam  Vitals and nursing note reviewed  Constitutional:       Appearance: He is well-developed  HENT:      Head: Normocephalic and atraumatic  Nose: Nose normal    Eyes:      Conjunctiva/sclera: Conjunctivae normal    Cardiovascular:      Rate and Rhythm: Normal rate and regular rhythm  Heart sounds: Normal heart sounds  Pulmonary:      Effort: Pulmonary effort is normal       Breath sounds: Normal breath sounds  Comments: Rales noted to bibasilar lungs  Patient is satting 88% on room air  Oxygenation improved to 94% on 3 L nasal cannula oxygen  Abdominal:      General: Bowel sounds are normal  There is no distension  Palpations: Abdomen is soft  Tenderness: There is no abdominal tenderness  Musculoskeletal:         General: Normal range of motion  Cervical back: Normal range of motion and neck supple  Skin:     General: Skin is warm  Neurological:      General: No focal deficit present  Mental Status: He is alert and oriented to person, place, and time  Comments: Generalized weakness noted without any focal neuro deficits  Psychiatric:         Mood and Affect: Mood normal          Behavior: Behavior normal          Thought Content:  Thought content normal          Judgment: Judgment normal          Vital Signs  ED Triage Vitals   Temperature Pulse Respirations Blood Pressure SpO2   07/17/21 2350 07/17/21 2350 07/17/21 2350 07/17/21 2350 07/17/21 2350   98 2 °F (36 8 °C) 100 18 128/56 (!) 88 %      Temp Source Heart Rate Source Patient Position - Orthostatic VS BP Location FiO2 (%)   07/17/21 2350 07/17/21 2350 07/17/21 2350 07/17/21 2350 --   Oral Monitor Lying Right arm       Pain Score       07/18/21 0202       No Pain           Vitals:    07/18/21 0200 07/18/21 0412 07/18/21 0445 07/18/21 0515   BP: 131/71 136/72 142/76 134/68   Pulse: 90 72 86 84   Patient Position - Orthostatic VS:  Lying Visual Acuity      ED Medications  Medications   heparin (porcine) injection 8,400 Units (has no administration in time range)   heparin (porcine) 25,000 units in 0 45% NaCl 250 mL infusion (premix) (has no administration in time range)   heparin (porcine) injection 8,400 Units (has no administration in time range)   heparin (porcine) injection 4,200 Units (has no administration in time range)   aspirin chewable tablet 324 mg (324 mg Oral Given 7/18/21 0124)   iohexol (OMNIPAQUE) 350 MG/ML injection (SINGLE-DOSE) 100 mL (85 mL Intravenous Given 7/18/21 0248)   sodium chloride 0 9 % bolus 500 mL (0 mL Intravenous Stopped 7/18/21 0526)   cefTRIAXone (ROCEPHIN) IVPB (premix in dextrose) 1,000 mg 50 mL (0 mg Intravenous Stopped 7/18/21 0526)       Diagnostic Studies  Results Reviewed     Procedure Component Value Units Date/Time    APTT [[id number]] Collected: 07/18/21 0531    Lab Status: No result Specimen: Blood from Arm, Left     CBC [[id number]] Collected: 07/18/21 0531    Lab Status: No result Specimen: Blood from Arm, Left     Protime-INR [[id number]] Collected: 07/18/21 0531    Lab Status: No result Specimen: Blood from Arm, Left     Lactic acid 2 Hours [[id number]]  (Normal) Collected: 07/18/21 0306    Lab Status: Final result Specimen: Blood from Arm, Left Updated: 07/18/21 0334     LACTIC ACID 1 9 mmol/L     Narrative:      Result may be elevated if tourniquet was used during collection      Troponin I [[id number]]  (Abnormal) Collected: 07/18/21 0306    Lab Status: Final result Specimen: Blood from Arm, Left Updated: 07/18/21 0333     Troponin I 1 07 ng/mL     Urine Microscopic [[id number]]  (Abnormal) Collected: 07/18/21 0224    Lab Status: Final result Specimen: Urine, Indwelling Flores Catheter Updated: 07/18/21 0242     RBC, UA 0-1 /hpf      WBC, UA 4-10 /hpf      Epithelial Cells Occasional /hpf      Bacteria, UA Moderate /hpf     UA w Reflex to Microscopic w Reflex to Culture [[id number]]  (Abnormal) Collected: 07/18/21 0224    Lab Status: Final result Specimen: Urine, Indwelling Flores Catheter Updated: 07/18/21 0233     Color, UA Yellow     Clarity, UA Clear     Specific Ismay, UA 1 020     pH, UA 5 5     Leukocytes, UA Trace     Nitrite, UA Positive     Protein, UA Trace mg/dl      Glucose,  (1/10%) mg/dl      Ketones, UA Negative mg/dl      Urobilinogen, UA 0 2 E U /dl      Bilirubin, UA Negative     Blood, UA Trace-Intact    Novel Coronavirus (Covid-19),PCR [facility] - 2 Hour Stat [[id number]]  (Normal) Collected: 07/18/21 0027    Lab Status: Final result Specimen: Nares from Nose Updated: 07/18/21 0130     SARS-CoV-2 Negative    Narrative: The specimen collection materials, transport medium, and/or testing methodology utilized in the production of these test results have been proven to be reliable in a limited validation with an abbreviated program under the Emergency Utilization Authorization provided by the FDA  Testing reported as "Presumptive positive" will be confirmed with secondary testing to ensure result accuracy  Clinical caution and judgement should be used with the interpretation of these results with consideration of the clinical impression and other laboratory testing  Testing reported as "Positive" or "Negative" has been proven to be accurate according to standard laboratory validation requirements  All testing is performed with control materials showing appropriate reactivity at standard intervals  TSH, 3rd generation with Free T4 reflex [[id number]]  (Normal) Collected: 07/18/21 0014    Lab Status: Final result Specimen: Blood from Arm, Left Updated: 07/18/21 0048     TSH 3RD GENERATON 2 943 uIU/mL     Narrative:      Patients undergoing fluorescein dye angiography may retain small amounts of fluorescein in the body for 48-72 hours post procedure  Samples containing fluorescein can produce falsely depressed TSH values   If the patient had this procedure,a specimen should be resubmitted post fluorescein clearance  NT-BNP PRO [[id number]]  (Abnormal) Collected: 07/18/21 0014    Lab Status: Final result Specimen: Blood from Arm, Left Updated: 07/18/21 0048     NT-proBNP 2,051 pg/mL     Troponin I [[id number]]  (Abnormal) Collected: 07/18/21 0014    Lab Status: Final result Specimen: Blood from Arm, Left Updated: 07/18/21 0044     Troponin I 1 06 ng/mL     Lactic acid [[id number]]  (Abnormal) Collected: 07/18/21 0014    Lab Status: Final result Specimen: Blood from Arm, Left Updated: 07/18/21 0043     LACTIC ACID 2 1 mmol/L     Narrative:      Result may be elevated if tourniquet was used during collection      Comprehensive metabolic panel [[id number]]  (Abnormal) Collected: 07/18/21 0014    Lab Status: Final result Specimen: Blood from Arm, Left Updated: 07/18/21 0040     Sodium 138 mmol/L      Potassium 4 5 mmol/L      Chloride 98 mmol/L      CO2 29 mmol/L      ANION GAP 11 mmol/L      BUN 24 mg/dL      Creatinine 1 35 mg/dL      Glucose 217 mg/dL      Calcium 8 2 mg/dL      Corrected Calcium 9 2 mg/dL      AST 21 U/L      ALT 28 U/L      Alkaline Phosphatase 75 U/L      Total Protein 7 1 g/dL      Albumin 2 8 g/dL      Total Bilirubin 0 25 mg/dL      eGFR 50 ml/min/1 73sq m     Narrative:      Cedric guidelines for Chronic Kidney Disease (CKD):     Stage 1 with normal or high GFR (GFR > 90 mL/min/1 73 square meters)    Stage 2 Mild CKD (GFR = 60-89 mL/min/1 73 square meters)    Stage 3A Moderate CKD (GFR = 45-59 mL/min/1 73 square meters)    Stage 3B Moderate CKD (GFR = 30-44 mL/min/1 73 square meters)    Stage 4 Severe CKD (GFR = 15-29 mL/min/1 73 square meters)    Stage 5 End Stage CKD (GFR <15 mL/min/1 73 square meters)  Note: GFR calculation is accurate only with a steady state creatinine    Fingerstick Glucose (POCT) [[id number]]  (Abnormal) Collected: 07/18/21 0026    Lab Status: Final result Updated: 07/18/21 0029     POC Glucose 224 mg/dl Blood gas, arterial [[id number]]  (Abnormal) Collected: 07/18/21 0022    Lab Status: Final result Specimen: Blood, Arterial from Radial, Left Updated: 07/18/21 0027     pH, Arterial 7 426     PH ART TC 7 429     pCO2, Arterial 44 0 mm Hg      PCO2 (TC) Arterial 43 6 mm Hg      pO2, Arterial 61 6 mm Hg      PO2 (TC) Arterial 60 8 mm Hg      HCO3, Arterial 28 3 mmol/L      Base Excess, Arterial 3 5 mmol/L      O2 Content, Arterial 13 6 mL/dL      O2 HGB,Arterial  90 1 %      SOURCE Radial, Left     Temperature 98 2 Degrees Fehrenheit      Nasal Cannula 3    CBC and differential [[id number]]  (Abnormal) Collected: 07/18/21 0014    Lab Status: Final result Specimen: Blood from Arm, Left Updated: 07/18/21 0023     WBC 8 90 Thousand/uL      RBC 3 59 Million/uL      Hemoglobin 9 8 g/dL      Hematocrit 31 7 %      MCV 88 fL      MCH 27 3 pg      MCHC 30 9 g/dL      RDW 15 3 %      MPV 10 5 fL      Platelets 499 Thousands/uL      nRBC 0 /100 WBCs      Neutrophils Relative 70 %      Immat GRANS % 0 %      Lymphocytes Relative 22 %      Monocytes Relative 7 %      Eosinophils Relative 1 %      Basophils Relative 0 %      Neutrophils Absolute 6 15 Thousands/µL      Immature Grans Absolute 0 03 Thousand/uL      Lymphocytes Absolute 1 96 Thousands/µL      Monocytes Absolute 0 63 Thousand/µL      Eosinophils Absolute 0 11 Thousand/µL      Basophils Absolute 0 02 Thousands/µL     Blood culture #2 [[id number]] Collected: 07/18/21 0014    Lab Status: In process Specimen: Blood Updated: 07/18/21 0021    Blood culture #1 [[id number]] Collected: 07/18/21 0014    Lab Status:  In process Specimen: Blood from Arm, Left Updated: 07/18/21 0020                 CTA ED chest PE Study   Final Result by Rebekah Laws DO (07/18 0417)      Multiple segmental and subsegmental filling defects suspicious for pulmonary emboli bilaterally (axial image 119, series 2 and 104, series 601, axial image 113, series 2, and axial image 73-80, series 2 for example)  Prominence of the right ventricle suggesting right heart strain  Nodular contour of the liver, may suggest a component of hepatic cirrhosis  Coronary atherosclerosis, and other findings as above  I personally discussed this study with Freya Woodall on 7/18/2021 at 4:40 AM                Workstation performed: [id number]         XR chest 1 view portable    (Results Pending)              Procedures  ECG 12 Lead Documentation Only    Date/Time: 7/18/2021 12:57 AM  Performed by: Russel Dang DO  Authorized by: Russel Dang DO     Indications / Diagnosis:  Shortness of breath  ECG reviewed by me, the ED Provider: yes    Patient location:  ED  Previous ECG:     Previous ECG:  Compared to current    Similarity:  Changes noted    Comparison to cardiac monitor: Yes    Interpretation:     Interpretation: abnormal    Comments:      Sinus rhythm, rate 91, normal axis, right bundle-branch block pattern noted, no acute ST elevations noted, T-wave inversions noted to lead 3, AVF suggesting inferior ischemia that is new compared to previous EKG      CriticalCare Time  Performed by: Russel Dang DO  Authorized by: Russel Dang DO     Critical care provider statement:     Critical care time (minutes):  120    Critical care time was exclusive of:  Separately billable procedures and treating other patients    Critical care was necessary to treat or prevent imminent or life-threatening deterioration of the following conditions:  Sepsis and respiratory failure    Critical care was time spent personally by me on the following activities:  Blood draw for specimens, obtaining history from patient or surrogate, development of treatment plan with patient or surrogate, discussions with consultants, evaluation of patient's response to treatment, examination of patient, review of old charts, re-evaluation of patient's condition, ordering and review of laboratory studies, ordering and review of radiographic studies, interpretation of cardiac output measurements and ordering and performing treatments and interventions  Comments:      Bilateral pulmonary embolism             ED Course                         Initial Sepsis Screening     Row Name 07/18/21 0359                Is the patient's history suggestive of a new or worsening infection?  --        Suspected source of infection  urinary tract infection  -KF        Are two or more of the following signs & symptoms of infection both present and new to the patient?  --        Indicate SIRS criteria  Tachycardia > 90 bpm;Tachypnea > 20 resp per min  -KF        If the answer is yes to both questions, suspicion of sepsis is present  --        If severe sepsis is present AND tissue hypoperfusion perists in the hour after fluid resuscitation or lactate > 4, the patient meets criteria for SEPTIC SHOCK  --        Are any of the following organ dysfunction criteria present within 6 hours of suspected infection and SIRS criteria that are NOT considered to be chronic conditions? --        Organ dysfunction  --        Date of presentation of severe sepsis  --        Time of presentation of severe sepsis  --        Tissue hypoperfusion persists in the hour after crystalloid fluid administration, evidenced, by either:  --        Was hypotension present within one hour of the conclusion of crystalloid fluid administration? No  -KF        Date of presentation of septic shock  --        Time of presentation of septic shock  --          User Key  (r) = Recorded By, (t) = Taken By, (c) = Cosigned By    [address] Name Provider Type    [id number] , DO Physician          SBIRT 22yo+      Most Recent Value   SBIRT (24 yo +)   In order to provide better care to our patients, we are screening all of our patients for alcohol and drug use  Would it be okay to ask you these screening questions? Yes Filed at: 07/18/2021 0033   Initial Alcohol Screen: US AUDIT-C    1   How often do you have a drink containing alcohol?  0 Filed at: 07/18/2021 0033   2  How many drinks containing alcohol do you have on a typical day you are drinking? 0 Filed at: 07/18/2021 0033   3a  Male UNDER 65: How often do you have five or more drinks on one occasion? 0 Filed at: 07/18/2021 0033   3b  FEMALE Any Age, or MALE 65+: How often do you have 4 or more drinks on one occassion? 0 Filed at: 07/18/2021 0033   Audit-C Score  0 Filed at: 07/18/2021 5930   ALBA: How many times in the past year have you    Used an illegal drug or used a prescription medication for non-medical reasons? Never Filed at: 07/18/2021 5426          Wells' Criteria for PE      Most Recent Value   Wells' Criteria for PE   Clinical signs and symptoms of DVT  0 Filed at: 07/18/2021 0766   PE is primary diagnosis or equally likely  3 Filed at: 07/18/2021 0018   HR >100  0 Filed at: 07/18/2021 0018   Immobilization at least 3 days or Surgery in the previous 4 weeks  1 5 Filed at: 07/18/2021 0018   Previous, objectively diagnosed PE or DVT  0 Filed at: 07/18/2021 0018   Hemoptysis  0 Filed at: 07/18/2021 0018   Malignancy with treatment within 6 months or palliative  0 Filed at: 07/18/2021 0018   Wells' Criteria Total  4 5 Filed at: 07/18/2021 0018                MDM  Number of Diagnoses or Management Options  Bilateral pulmonary embolism ([facility] [address] ): new and requires workup  Elevated troponin: new and requires workup  Hypoxia: new and requires workup  Sepsis [facility]): new and requires workup  SOB (shortness of breath): new and requires workup  UTI (urinary tract infection): new and requires workup  Diagnosis management comments: Obtain blood work, EKG, CTA ED PE study  Give supplemental oxygen and continue to monitor patient for any worsening symptoms         Amount and/or Complexity of Data Reviewed  Clinical lab tests: ordered and reviewed  Tests in the radiology section of CPT®: ordered and reviewed  Tests in the medicine section of CPT®: ordered and reviewed  Review and summarize past medical records: yes  Independent visualization of images, tracings, or specimens: yes    Risk of Complications, Morbidity, and/or Mortality  General comments: Patient met sirs criteria with tachypnea and tachycardia in the ED  Patient subsequently had lactic acid of 2 1  Patient also had some elevated troponin levels without any acute EKG changes  Subsequent CT PE study showed bilateral segmental and subsegmental pulmonary embolism with right heart strain  Patient's hypoxia improved in the ED with supplemental nasal cannula oxygen  At this time patient is being admitted for further management  Inpatient team will start heparin  Patient agrees with admission plans       Patient Progress  Patient progress: stable      Disposition  Final diagnoses:   SOB (shortness of breath)   Hypoxia   Bilateral pulmonary embolism (HCC)   Elevated troponin   Sepsis ([address] )   UTI (urinary tract infection)     Time reflects when diagnosis was documented in both MDM as applicable and the Disposition within this note     Time User Action Codes Description Comment    7/18/2021  4:46 AM Nanda Pleitez Add [R06 02] SOB (shortness of breath)     7/18/2021  4:47 AM Nanda Garcia Add [R09 02] Hypoxia     7/18/2021  4:47 AM Nanda Pleitez Add [I26 99] Bilateral pulmonary embolism ([address] )     7/18/2021  4:47 AM Marrafaela Tian Add [R77 8] Elevated troponin     7/18/2021  4:48 AM Nanda Pleitez Add [A41 9] Sepsis ([address] )     7/18/2021  4:48 AM Nanda Garcia Add [N39 0] UTI (urinary tract infection)     7/18/2021  4:53 AM Jerilee Simpers Modify [I26 99] Bilateral pulmonary embolism ([address] )     7/18/2021  4:53 AM Jerilee Simpers Modify [R77 8] Elevated troponin     7/18/2021  4:53 AM Jerilee Simpers Modify [A41 9] Sepsis [facility])       ED Disposition     ED Disposition Condition Date/Time Comment    Admit Stable Sun Jul 18, 2021  4:44 AM Case was discussed with PA for hospitalist and the patient's admission status was agreed to be Admission Status: inpatient status to the service of Dr Jero Salvador  Follow-up Information    None         Patient's Medications   Discharge Prescriptions    No medications on file     No discharge procedures on file      PDMP Review     None          ED Provider  Electronically Signed by           Marilou Weir DO  07/18/21 9034

## 2021-07-18 NOTE — ASSESSMENT & PLAN NOTE
Lab Results   Component Value Date    HGBA1C 6 0 (H) 06/30/2021       Recent Labs     07/18/21  0026   POCGLU 224*       Blood Sugar Average: Last 72 hrs:  (P) 224     Patient now follows Endocrinology outpatient, recently had issues with multiple episodes of hypoglycemia  Medications have since been cut down is currently on Januvia and metformin  Will hold home PO medications during admission  Sliding scale insulin coverage with meals and at bedtime

## 2021-07-18 NOTE — ASSESSMENT & PLAN NOTE
Patient presented with progressively worsening SOB and arrived to the ED hypoxic to 88% on room air  He was placed on 3 L nasal cannula O2 and SpO2 improved to 90 to 95%  CTA PE study showed multiple segmental and subsegmental filling defects suspicious for pulmonary emboli bilaterally with prominence of the right ventricle suggesting right heart strain  There is a family history of Factor V Leiden, both brothers (+)    - started on heparin drip  - echo, lower extremity DVT study  - monitor on telemetry  - continue to trend troponin  - cardiology consult appreciated  - continuous pulse oximetry

## 2021-07-18 NOTE — ASSESSMENT & PLAN NOTE
Initial troponin 1 06 on presentation to the ED  EKG showing sinus rhythm with PACs with right bundle-branch block, similar to prior EKG no T-wave flattening in II and inversion III  Troponin trend noted, remains flat  Likely secondary to pulmonary embolism  · Monitor on telemetry  · Did not show any regional wall motion abnormalities    · Cardiology consult appreciated, may require ischemic workup

## 2021-07-18 NOTE — ASSESSMENT & PLAN NOTE
Lab Results   Component Value Date    HGBA1C 6 0 (H) 06/30/2021       Recent Labs     07/18/21  0026   POCGLU 224*       Blood Sugar Average: Last 72 hrs:  (P) 224     Creatinine 1 35 on admission, appears similar to previous labs , likely baseline  Continue to monitor with daily labs

## 2021-07-18 NOTE — PROGRESS NOTES
[facility] Internal Medicine Progress Note  Patient: Nilo Bolaños 66 y o  male   MRN: [id number]  PCP: Jose Arreaga DO  Unit/Bed#: [address] [place] [address] Encounter: [id number]  Date Of Visit: 07/18/21    Problem List:    Principal Problem:    Bilateral pulmonary embolism ([facility] )  Active Problems:    Type 2 diabetes mellitus, without long-term current use of insulin (HCC)    Respiratory failure with hypoxia ([facility] )    CKD stage 3 due to type 2 diabetes mellitus (HCC)    Elevated PSA    Urinary retention    Sepsis ([facility] )    Urinary tract infection without hematuria    Non MI troponin elevation    Syncopal episodes    Anemia    Dyslipidemia      Assessment & Plan:    Respiratory failure with hypoxia ([facility] )  Assessment & Plan  In the setting of bilateral PEs, currently on 3 L nasal cannula O2 maintaining SpO2 between 90-95%, on arrival was hypoxic at 88% on room air   - see plan for bilateral Pes  - titrate O2 as needed  - continuous pulse ox    Type 2 diabetes mellitus, without long-term current use of insulin [facility])  Assessment & Plan  Lab Results   Component Value Date    HGBA1C 6 0 (H) 06/30/2021       Recent Labs     07/18/21  0026 07/18/21  1056 07/18/21  1616   POCGLU 224* 279* 161*       Blood Sugar Average: Last 72 hrs:  (P) 607 [id number]     Patient now follows Endocrinology outpatient, recently had issues with multiple episodes of hypoglycemia  No further hypoglycemia  Medications have since been cut down is currently on Januvia and metformin  Hold metformin  Resume Januvia  Sliding scale insulin coverage with meals and at bedtime    * Bilateral pulmonary embolism [facility])  Assessment & Plan  Patient presented with progressively worsening SOB and arrived to the ED hypoxic to 88% on room air  He was placed on 3 L nasal cannula O2 and SpO2 improved to 90 to 95%    CTA PE study showed multiple segmental and subsegmental filling defects suspicious for pulmonary emboli bilaterally with prominence of the right ventricle suggesting right heart strain  There is a family history of ? Factor V Leiden, both brothers (+)  Venous Doppler bilateral lower extremity-  acute occlusive chronic deep vein thrombosis noted in one of the gastrocnemius veins in the proximal calf on right  Noted to have elevated proBNP, troponin  Remains hemodynamically stable  Possibly precipitated by likely malignancy, possible history of hypercoagulable state and limited activity recently  - started on heparin drip for now  - follow up 2D echo  - monitor vital signs  - hematology/oncology evaluation  - monitor respiratory status    Syncopal episodes  Assessment & Plan  Reported having syncopal episode on Wednesday this time associated with shortness of breath and dizziness - he lives alone at home and is unsure how long he was out for     - Reports Hx of heart murmur, no recent echo   - diagnosed with bilateral PE's on admission, see plan above  - Monitor on telemetry-no evidence of arrhythmia so far    - EKG sinus rhythm with PACs, QtC 472  -will check orthostasis   - Cardiology consult appreciated    Non MI troponin elevation  Assessment & Plan  Initial troponin 1 06 on presentation to the ED  EKG showing sinus rhythm with PACs with right bundle-branch block, similar to prior EKG no T-wave flattening in II and inversion III  Troponin trend noted, remains flat  Likely secondary to pulmonary embolism  · Monitor on telemetry  · Follow-up up 2D echo   · Cardiology consult appreciated, may require ischemic workup    Urinary tract infection without hematuria  Assessment & Plan  Suspected  Patient has indwelling Flores catheter due to urinary retention, following outpatient with Urology  Abnormal UA  Urine culture pending  Continue ceftriaxone  Monitor I/Os    Sepsis ([facility] )  Assessment & Plan  POA as evidence by tachycardia, tachypnea, elevated lactic, abnormal UA suggestive of UTI  - patient received ceftriaxone   - urine culture, blood culture results pending  - Procalcitonin pending      Urinary retention  Assessment & Plan  Recently discharged with Flores catheter, following up with Urology outpatient  Continue to monitor urine output    Elevated PSA  Assessment & Plan  PSA level noted to be elevated 68 8 concerning for prostate cancer  Patient is following with Urology awaiting further workup including biopsy, CT scan, bone scan  CKD stage 3 due to type 2 diabetes mellitus (HCC)  Assessment & Plan  Creatinine 1 35 on admission, appears similar to previous labs , likely baseline  Continue to monitor with daily labs    Dyslipidemia  Assessment & Plan  Continue pravastatin 40 mg daily    Anemia  Assessment & Plan  Chronic in nature, at baseline with hemoglobin 9 8 on admission  Recent iron study noted, iron sat 18%  Check B12, folate  Continue to monitor on anticoagulation        VTE Pharmacologic Prophylaxis:   Pharmacologic: Heparin Drip  Mechanical VTE Prophylaxis in Place: Yes    Patient Centered Rounds: I have performed bedside rounds with nursing staff today  Discussions with Specialists or Other Care Team Provider:  Cardiology    Education and Discussions with Family / Patient:  Discussed with visiting family member over the phone    Time Spent for Care: 45 minutes  More than 50% of total time spent on counseling and coordination of care as described above      Current Length of Stay: 0 day(s)    Current Patient Status: Inpatient   Certification Statement: The patient will continue to require additional inpatient hospital stay due to Hypoxic respiratory failure secondary to bilateral PE     Discharge Plan:  Pending at present    Code Status: Level 1 - Full Code      Subjective:   HPI discussed with patient  Patient reported that he presented with gradually progressive shortness of breath and generalized weakness  Over last few days it was reported getting more shortness of breath with minimal activity   Last Wednesday day, he reported that he had a syncopal episode around 3:00 a m  Any fell on the left side  Subsequently he has been having some rib pain    Denied any fever, chills, precordial chest pain, cough/hemoptysis  Noted to have bilateral pulmonary embolism, elevated proBNP, troponin  Lactic acid was mildly elevated  UA was abnormal, patient reports no significant dysuria  Denies any history of bleeding  He thinks that they probably have history of blood clots in the family      Objective:     Vitals:   Temp (24hrs), Av 1 °F (36 7 °C), Min:98 °F (36 7 °C), Max:98 2 °F (36 8 °C)    Temp:  [98 °F (36 7 °C)-98 2 °F (36 8 °C)] 98 2 °F (36 8 °C)  HR:  [] 87  Resp:  [18-23] 22  BP: (121-145)/(56-80) 132/71  SpO2:  [88 %-96 %] 91 % on 3 L  Body mass index is 33 65 kg/m²  Input and Output Summary (last 24 hours): Intake/Output Summary (Last 24 hours) at 2021 1445  Last data filed at 2021 0526  Gross per 24 hour   Intake 550 ml   Output --   Net 550 ml       Physical Exam:     Physical Exam  Constitutional:       General: He is not in acute distress  Appearance: He is obese  HENT:      Head: Normocephalic and atraumatic  Cardiovascular:      Rate and Rhythm: Normal rate  Heart sounds: Murmur heard  Pulmonary:      Effort: Pulmonary effort is normal  No respiratory distress  Breath sounds: No wheezing, rhonchi or rales  Comments: Diminished bilaterally, clear  Chest:      Chest wall: Tenderness (Mild lower left lateral) present  Abdominal:      General: Bowel sounds are normal  There is no distension  Palpations: Abdomen is soft  Tenderness: There is no abdominal tenderness  There is no guarding or rebound  Genitourinary:     Comments: Flores catheter in place with clear yellow urine  Musculoskeletal:      Right lower leg: Edema present  Left lower leg: Edema present  Skin:     General: Skin is warm and dry  Coloration: Skin is pale  Findings: No rash     Neurological: General: No focal deficit present  Mental Status: He is alert and oriented to person, place, and time  Mental status is at baseline  Cranial Nerves: No cranial nerve deficit  Psychiatric:         Mood and Affect: Mood normal          Additional Data:     Labs:    Results from last 7 days   Lab Units 07/18/21  0750   WBC Thousand/uL 7 60   HEMOGLOBIN g/dL 9 3*   HEMATOCRIT % 31 1*   PLATELETS Thousands/uL 209   NEUTROS PCT % 62   LYMPHS PCT % 27   MONOS PCT % 8   EOS PCT % 2     Results from last 7 days   Lab Units 07/18/21  0750 07/18/21  0014   POTASSIUM mmol/L 4 5 4 5   CHLORIDE mmol/L 100 98*   CO2 mmol/L 29 29   BUN mg/dL 21 24   CREATININE mg/dL 1 16 1 35*   CALCIUM mg/dL 8 2* 8 2*   ALK PHOS U/L  --  75   ALT U/L  --  28   AST U/L  --  21     Results from last 7 days   Lab Units 07/18/21  0531   INR  1 07       * I Have Reviewed All Lab Data Listed Above  * Additional Pertinent Lab Tests Reviewed:  All Labs Within Last 24 Hours Reviewed      Imaging:  Imaging Reports Reviewed Today Include:  CT scan    Recent Cultures (last 7 days):           Last 24 Hours Medication List:   Current Facility-Administered Medications   Medication Dose Route Frequency Provider Last Rate    acetaminophen  650 mg Oral Q6H PRN Renelle Self, PA-C      calcium carbonate  1,000 mg Oral Daily PRN Renelle Self, PA-C      [START ON 7/19/2021] cefTRIAXone  1,000 mg Intravenous Q24H Renelle Self, PA-C      heparin (porcine)  3-30 Units/kg/hr (Order-Specific) Intravenous Titrated Renelle Self, PA-C 15 8 Units/kg/hr (07/18/21 1252)    heparin (porcine)  4,200 Units Intravenous Q1H PRN Renelle Self, PA-C      heparin (porcine)  8,400 Units Intravenous Q1H PRN Renelle Self, PA-C      insulin lispro  1-6 Units Subcutaneous TID [facility] Renelle Self, PA-C      insulin lispro  1-6 Units Subcutaneous HS Renelle Self, PA-C      ondansetron  4 mg Intravenous Q6H PRN Bhraat Aguilar PA-C      polyethylene glycol  17 g Oral Daily PRN Bharat Aguilar PA-C      pravastatin  40 mg Oral HS Josie Revbrycear, DO      tamsulosin  0 4 mg Oral Daily With Dinner Bharat Aguilar PA-C            Today, Patient Was Seen By: Kacie Ferro MD    ** Please Note: "This note has been constructed using a voice recognition system  Therefore there may be syntax, spelling, and/or grammatical errors   Please call if you have any questions  "**

## 2021-07-18 NOTE — SEPSIS NOTE
Sepsis Note   Mona Daigle 66 y o  male MRN: [id number]  Unit/Bed#: ED 05 Encounter: [id number]      qSOFA     Row Name 07/18/21 0200 07/18/21 0130 07/18/21 0100 07/18/21 0030 07/17/21 2350    Altered mental status GCS < 15  --  --  --  --  --    Respiratory Rate > / =22  1  0  0  1  0    Systolic BP < / =922  0  0  0  0  0    Q Sofa Score  1  0  0  1  0        Initial Sepsis Screening     Row Name 07/18/21 0359                Is the patient's history suggestive of a new or worsening infection?  --        Suspected source of infection  urinary tract infection  -KF        Are two or more of the following signs & symptoms of infection both present and new to the patient?  --        Indicate SIRS criteria  Tachycardia > 90 bpm;Tachypnea > 20 resp per min  -KF        If the answer is yes to both questions, suspicion of sepsis is present  --        If severe sepsis is present AND tissue hypoperfusion perists in the hour after fluid resuscitation or lactate > 4, the patient meets criteria for SEPTIC SHOCK  --        Are any of the following organ dysfunction criteria present within 6 hours of suspected infection and SIRS criteria that are NOT considered to be chronic conditions? --        Organ dysfunction  --        Date of presentation of severe sepsis  --        Time of presentation of severe sepsis  --        Tissue hypoperfusion persists in the hour after crystalloid fluid administration, evidenced, by either:  --        Was hypotension present within one hour of the conclusion of crystalloid fluid administration?   No  -KF        Date of presentation of septic shock  --        Time of presentation of septic shock  --          User Key  (r) = Recorded By, (t) = Taken By, (c) = Cosigned By    [address] Name Provider Type    [id number] Roland Lopez DO Physician

## 2021-07-19 ENCOUNTER — PATIENT OUTREACH (OUTPATIENT)
Dept: CASE MANAGEMENT | Facility: OTHER | Age: 76
End: 2021-07-19

## 2021-07-19 ENCOUNTER — APPOINTMENT (INPATIENT)
Dept: NON INVASIVE DIAGNOSTICS | Facility: HOSPITAL | Age: 76
DRG: 698 | End: 2021-07-19
Payer: MEDICARE

## 2021-07-19 ENCOUNTER — APPOINTMENT (INPATIENT)
Dept: RADIOLOGY | Facility: HOSPITAL | Age: 76
DRG: 698 | End: 2021-07-19
Payer: MEDICARE

## 2021-07-19 DIAGNOSIS — Z71.89 COMPLEX CARE COORDINATION: Primary | ICD-10-CM

## 2021-07-19 PROBLEM — A41.9 SEPSIS (HCC): Status: RESOLVED | Noted: 2021-07-18 | Resolved: 2021-07-19

## 2021-07-19 LAB
ANION GAP SERPL CALCULATED.3IONS-SCNC: 7 MMOL/L (ref 4–13)
APTT PPP: 56 SECONDS (ref 23–37)
APTT PPP: 92 SECONDS (ref 23–37)
APTT PPP: 95 SECONDS (ref 23–37)
ATRIAL RATE: 91 BPM
BUN SERPL-MCNC: 15 MG/DL (ref 5–25)
CALCIUM SERPL-MCNC: 8.2 MG/DL (ref 8.3–10.1)
CHLORIDE SERPL-SCNC: 102 MMOL/L (ref 100–108)
CO2 SERPL-SCNC: 31 MMOL/L (ref 21–32)
CREAT SERPL-MCNC: 1.1 MG/DL (ref 0.6–1.3)
ERYTHROCYTE [DISTWIDTH] IN BLOOD BY AUTOMATED COUNT: 15.6 % (ref 11.6–15.1)
ERYTHROCYTE [SEDIMENTATION RATE] IN BLOOD: 77 MM/HOUR (ref 0–19)
FOLATE SERPL-MCNC: 3.4 NG/ML (ref 3.1–17.5)
GFR SERPL CREATININE-BSD FRML MDRD: 64 ML/MIN/1.73SQ M
GLUCOSE SERPL-MCNC: 146 MG/DL (ref 65–140)
GLUCOSE SERPL-MCNC: 150 MG/DL (ref 65–140)
GLUCOSE SERPL-MCNC: 182 MG/DL (ref 65–140)
GLUCOSE SERPL-MCNC: 191 MG/DL (ref 65–140)
GLUCOSE SERPL-MCNC: 216 MG/DL (ref 65–140)
HCT VFR BLD AUTO: 30 % (ref 36.5–49.3)
HGB BLD-MCNC: 8.9 G/DL (ref 12–17)
MCH RBC QN AUTO: 26.8 PG (ref 26.8–34.3)
MCHC RBC AUTO-ENTMCNC: 29.7 G/DL (ref 31.4–37.4)
MCV RBC AUTO: 90 FL (ref 82–98)
P AXIS: -16 DEGREES
PLATELET # BLD AUTO: 219 THOUSANDS/UL (ref 149–390)
PMV BLD AUTO: 11.2 FL (ref 8.9–12.7)
POTASSIUM SERPL-SCNC: 4.3 MMOL/L (ref 3.5–5.3)
PR INTERVAL: 138 MS
PROCALCITONIN SERPL-MCNC: <0.05 NG/ML
QRS AXIS: 68 DEGREES
QRSD INTERVAL: 124 MS
QT INTERVAL: 384 MS
QTC INTERVAL: 472 MS
RBC # BLD AUTO: 3.32 MILLION/UL (ref 3.88–5.62)
SODIUM SERPL-SCNC: 140 MMOL/L (ref 136–145)
T WAVE AXIS: -23 DEGREES
VENTRICULAR RATE: 91 BPM
VIT B12 SERPL-MCNC: 141 PG/ML (ref 100–900)
WBC # BLD AUTO: 6.55 THOUSAND/UL (ref 4.31–10.16)

## 2021-07-19 PROCEDURE — 78306 BONE IMAGING WHOLE BODY: CPT

## 2021-07-19 PROCEDURE — 85730 THROMBOPLASTIN TIME PARTIAL: CPT | Performed by: INTERNAL MEDICINE

## 2021-07-19 PROCEDURE — 74177 CT ABD & PELVIS W/CONTRAST: CPT

## 2021-07-19 PROCEDURE — 99232 SBSQ HOSP IP/OBS MODERATE 35: CPT | Performed by: INTERNAL MEDICINE

## 2021-07-19 PROCEDURE — 85652 RBC SED RATE AUTOMATED: CPT | Performed by: PHYSICIAN ASSISTANT

## 2021-07-19 PROCEDURE — 80048 BASIC METABOLIC PNL TOTAL CA: CPT | Performed by: INTERNAL MEDICINE

## 2021-07-19 PROCEDURE — 84145 PROCALCITONIN (PCT): CPT | Performed by: PHYSICIAN ASSISTANT

## 2021-07-19 PROCEDURE — 81240 F2 GENE: CPT | Performed by: PHYSICIAN ASSISTANT

## 2021-07-19 PROCEDURE — G1004 CDSM NDSC: HCPCS

## 2021-07-19 PROCEDURE — 93306 TTE W/DOPPLER COMPLETE: CPT | Performed by: INTERNAL MEDICINE

## 2021-07-19 PROCEDURE — 81241 F5 GENE: CPT | Performed by: PHYSICIAN ASSISTANT

## 2021-07-19 PROCEDURE — 82948 REAGENT STRIP/BLOOD GLUCOSE: CPT

## 2021-07-19 PROCEDURE — 99231 SBSQ HOSP IP/OBS SF/LOW 25: CPT | Performed by: PHYSICIAN ASSISTANT

## 2021-07-19 PROCEDURE — 93970 EXTREMITY STUDY: CPT | Performed by: SURGERY

## 2021-07-19 PROCEDURE — 93010 ELECTROCARDIOGRAM REPORT: CPT | Performed by: INTERNAL MEDICINE

## 2021-07-19 PROCEDURE — C8929 TTE W OR WO FOL WCON,DOPPLER: HCPCS

## 2021-07-19 PROCEDURE — 82607 VITAMIN B-12: CPT | Performed by: INTERNAL MEDICINE

## 2021-07-19 PROCEDURE — 82746 ASSAY OF FOLIC ACID SERUM: CPT | Performed by: INTERNAL MEDICINE

## 2021-07-19 PROCEDURE — 85027 COMPLETE CBC AUTOMATED: CPT | Performed by: INTERNAL MEDICINE

## 2021-07-19 PROCEDURE — A9503 TC99M MEDRONATE: HCPCS

## 2021-07-19 RX ORDER — DOCUSATE SODIUM 100 MG/1
100 CAPSULE, LIQUID FILLED ORAL 2 TIMES DAILY
Status: DISCONTINUED | OUTPATIENT
Start: 2021-07-19 | End: 2021-07-21 | Stop reason: HOSPADM

## 2021-07-19 RX ORDER — BISACODYL 10 MG
10 SUPPOSITORY, RECTAL RECTAL ONCE AS NEEDED
Status: DISCONTINUED | OUTPATIENT
Start: 2021-07-19 | End: 2021-07-21 | Stop reason: HOSPADM

## 2021-07-19 RX ORDER — SENNOSIDES 8.6 MG
2 TABLET ORAL
Status: DISCONTINUED | OUTPATIENT
Start: 2021-07-19 | End: 2021-07-21 | Stop reason: HOSPADM

## 2021-07-19 RX ADMIN — PRAVASTATIN SODIUM 40 MG: 40 TABLET ORAL at 22:23

## 2021-07-19 RX ADMIN — POLYETHYLENE GLYCOL 3350 17 G: 17 POWDER, FOR SOLUTION ORAL at 14:30

## 2021-07-19 RX ADMIN — HEPARIN SODIUM 12.76 UNITS/KG/HR: 10000 INJECTION, SOLUTION INTRAVENOUS at 15:37

## 2021-07-19 RX ADMIN — TAMSULOSIN HYDROCHLORIDE 0.4 MG: 0.4 CAPSULE ORAL at 15:53

## 2021-07-19 RX ADMIN — PERFLUTREN 1.2 ML/MIN: 6.52 INJECTION, SUSPENSION INTRAVENOUS at 08:54

## 2021-07-19 RX ADMIN — HEPARIN SODIUM 4200 UNITS: 1000 INJECTION INTRAVENOUS; SUBCUTANEOUS at 10:43

## 2021-07-19 RX ADMIN — INSULIN LISPRO 1 UNITS: 100 INJECTION, SOLUTION INTRAVENOUS; SUBCUTANEOUS at 11:45

## 2021-07-19 RX ADMIN — IOHEXOL 100 ML: 350 INJECTION, SOLUTION INTRAVENOUS at 11:58

## 2021-07-19 RX ADMIN — DOCUSATE SODIUM 100 MG: 100 CAPSULE, LIQUID FILLED ORAL at 17:31

## 2021-07-19 RX ADMIN — SITAGLIPTIN 100 MG: 100 TABLET, FILM COATED ORAL at 09:08

## 2021-07-19 RX ADMIN — INSULIN LISPRO 2 UNITS: 100 INJECTION, SOLUTION INTRAVENOUS; SUBCUTANEOUS at 22:25

## 2021-07-19 RX ADMIN — IOHEXOL 50 ML: 240 INJECTION, SOLUTION INTRATHECAL; INTRAVASCULAR; INTRAVENOUS; ORAL at 10:41

## 2021-07-19 RX ADMIN — STANDARDIZED SENNA CONCENTRATE 17.2 MG: 8.6 TABLET ORAL at 22:23

## 2021-07-19 RX ADMIN — INSULIN LISPRO 2 UNITS: 100 INJECTION, SOLUTION INTRAVENOUS; SUBCUTANEOUS at 15:53

## 2021-07-19 RX ADMIN — CEFTRIAXONE 1000 MG: 1 INJECTION, SOLUTION INTRAVENOUS at 04:03

## 2021-07-19 NOTE — PROGRESS NOTES
[address] Internal Medicine Progress Note  Patient: Nomi Mesa 66 y o  male   MRN: [id number]  PCP: Halley Victoria DO  Unit/Bed#: [address] [place] [address] Encounter: [id number]  Date Of Visit: 07/19/21    Problem List:    Principal Problem:    Bilateral pulmonary embolism ([facility] )  Active Problems:    Res[facility] with hypoxia ([facility] )    Type 2 diabetes mellitus, without long-term current use of insulin (HCC)    Elevated PSA    Urinary tract infection without hematuria    Non MI troponin elevation    Syncope    CKD stage 3 due to type 2 diabetes mellitus ([facility] )    Anemia    Dyslipidemia    Urinary retention      Assessment & Plan:    Respiratory failure with hypoxia ([facility] )  Assessment & Plan  In the setting of bilateral PEs, currently on 3 L nasal cannula O2 maintaining SpO2 between 90-95%, on arrival was hypoxic at 88% on room air   - see plan for bilateral PEs  - titrate O2 as needed  - continuous pulse ox    * Bilateral pulmonary embolism [facility])  Assessment & Plan  Patient presented with progressively worsening SOB and arrived to the ED hypoxic to 88% on room air  He was placed on 3 L nasal cannula O2 and SpO2 improved to 90 to 95%  CTA PE study showed multiple segmental and subsegmental filling defects suspicious for pulmonary emboli bilaterally with prominence of the right ventricle suggesting right heart strain  There is a family history of ? Factor V Leiden, both brothers (+)  Venous Doppler bilateral lower extremity-  acute occlusive chronic deep vein thrombosis noted in one of the gastrocnemius veins in the proximal calf on right    Noted to have elevated proBNP, troponin  Remains hemodynamically stable  Possibly precipitated by likely malignancy, possible history of hypercoagulable state and limited activity recently  - Continue heparin drip for now, will transition to NOACs if remains stable and no further workup is planned  - follow up 2D echo  - monitor vital signs  - hematology/oncology evaluation  - monitor respiratory status    Syncope  Assessment & Plan  Reported having syncopal episode on Wednesday this time associated with shortness of breath and dizziness - he lives alone at home and is unsure how long he was out for  Reports Hx of heart murmur, no recent echo   - Diagnosed with bilateral PE's on admission, see plan above  - Monitor on telemetry-no evidence of arrhythmia so far  - EKG sinus rhythm with PACs, QtC 472  - will check orthostasis   - follow-up echo finding  - Cardiology consult appreciated    Non MI troponin elevation  Assessment & Plan  Initial troponin 1 06 on presentation to the ED  EKG showing sinus rhythm with PACs with right bundle-branch block, similar to prior EKG no T-wave flattening in II and inversion III  Troponin trend noted, remains flat  Likely secondary to pulmonary embolism  · Monitor on telemetry  · Follow-up up 2D echo   · Cardiology consult appreciated, may require ischemic workup    Urinary tract infection without hematuria  Assessment & Plan  Suspected UTI due to indwelling urethral catheter  Patient has indwelling Flores catheter due to urinary retention, following outpatient with Urology  Abnormal UA  Reflex Urine culture pending  Continue ceftriaxone  Monitor I/Os    Elevated PSA  Assessment & Plan  PSA level noted to be elevated 68 8 concerning for prostate cancer  Patient is following with Urology awaiting further workup including biopsy, CT scan, bone scan  Will follow-up Hematology/Oncology recommendation    Type 2 diabetes mellitus, without long-term current use of insulin [facility])  Assessment & Plan  Lab Results   Component Value Date    HGBA1C 6 0 (H) 06/30/2021       Recent Labs     07/19/21  0711 07/19/21  1137 07/19/21  1550 07/19/21  2123   POCGLU 150* 182* 216* 191*       Blood Sugar Average: Last 72 hrs:  (P) 198 75     Patient now follows Endocrinology outpatient, recently had issues with multiple episodes of hypoglycemia    No further hypoglycemia  Medications have since been cut down is currently on Januvia and metformin  Hold metformin  Continue Januvia  Sliding scale insulin coverage with meals and at bedtime    Sepsis (HCC)-resolved as of 7/19/2021  Assessment & Plan  Vs SIRS secondary to thromboembolism  POA as evidence by tachycardia, tachypnea, elevated lactic, abnormal UA suggestive of UTI  - patient received ceftriaxone   - urine culture, blood culture results pending  - Procalcitonin -negative    Urinary retention  Assessment & Plan  Recently discharged with Flores catheter, following up with Urology outpatient  Continue to monitor urine output    Dyslipidemia  Assessment & Plan  Continue pravastatin 40 mg daily    Anemia  Assessment & Plan  Chronic in nature, at baseline with hemoglobin 9 8 on admission  Remains relatively stable, no evidence of overt bleeding  Recent iron study noted, iron sat 18%  Follow-up B12, folate  Continue to monitor on anticoagulation    CKD stage 3 due to type 2 diabetes mellitus (HCC)  Assessment & Plan  Creatinine 1 35 on admission, appears similar to previous labs , likely baseline  Continue to monitor with daily labs        VTE Pharmacologic Prophylaxis:   Pharmacologic: Heparin Drip  Mechanical VTE Prophylaxis in Place: Yes    Patient Centered Rounds: I have performed bedside rounds with nursing staff today  Discussions with Specialists or Other Care Team Provider:  Cardiology, Hematology/Oncology    Education and Discussions with Family / Patient:  Discussed with sister at bedside  Time Spent for Care: 45 minutes  More than 50% of total time spent on counseling and coordination of care as described above      Current Length of Stay: 1 day(s)    Current Patient Status: Inpatient   Certification Statement: The patient will continue to require additional inpatient hospital stay due to Hypoxic respiratory failure secondary to bilateral PE     Discharge Plan:  Pending at present    Code Status: Level 1 - Full Code      Subjective:   Sitting up in chair  Reports that his left chest discomfort from the fall is improving  Denies shortness of breath, dizziness at rest  Denies any cough  Reports feeling constipated  Denies any abdominal pain  Denies any discomfort with the urinary catheter    Objective:     Vitals:   Temp (24hrs), Av °F (36 7 °C), Min:97 6 °F (36 4 °C), Max:98 5 °F (36 9 °C)    Temp:  [97 6 °F (36 4 °C)-98 5 °F (36 9 °C)] 98 °F (36 7 °C)  HR:  [] 100  Resp:  [17-20] 20  BP: (105-133)/(54-89) 133/89  SpO2:  [87 %-96 %] 96 % on 3 L  Body mass index is 33 65 kg/m²  Input and Output Summary (last 24 hours): Intake/Output Summary (Last 24 hours) at 2021 2210  Last data filed at 2021 1053  Gross per 24 hour   Intake --   Output 1350 ml   Net -1350 ml       Physical Exam:     Physical Exam  Constitutional:       General: He is not in acute distress  Appearance: He is obese  HENT:      Head: Normocephalic and atraumatic  Cardiovascular:      Rate and Rhythm: Normal rate  Heart sounds: Murmur heard  Pulmonary:      Effort: Pulmonary effort is normal  No respiratory distress  Breath sounds: No wheezing, rhonchi or rales  Chest:      Chest wall: No tenderness  Abdominal:      General: Bowel sounds are normal  There is no distension  Palpations: Abdomen is soft  Tenderness: There is no abdominal tenderness  There is no guarding or rebound  Musculoskeletal:      Comments: Bilateral lower extremity chronic leg edema   Skin:     General: Skin is warm and dry  Coloration: Skin is pale  Findings: No rash  Neurological:      Mental Status: He is alert  Mental status is at baseline  Cranial Nerves: No cranial nerve deficit           Additional Data:     Labs:    Results from last 7 days   Lab Units 21  0459 21  0750   WBC Thousand/uL 6 55 7 60   HEMOGLOBIN g/dL 8 9* 9 3*   HEMATOCRIT % 30 0* 31 1*   PLATELETS Thousands/uL 219 209   NEUTROS PCT %  --  62   LYMPHS PCT %  --  27   MONOS PCT %  --  8   EOS PCT %  --  2     Results from last 7 days   Lab Units 07/19/21  0459 07/18/21  0014   POTASSIUM mmol/L 4 3 4 5   CHLORIDE mmol/L 102 98*   CO2 mmol/L 31 29   BUN mg/dL 15 24   CREATININE mg/dL 1 10 1 35*   CALCIUM mg/dL 8 2* 8 2*   ALK PHOS U/L  --  75   ALT U/L  --  28   AST U/L  --  21     Results from last 7 days   Lab Units 07/18/21  0531   INR  1 07       * I Have Reviewed All Lab Data Listed Above  * Additional Pertinent Lab Tests Reviewed: All Labs Within Last 24 Hours Reviewed      Imaging:  Imaging Reports Reviewed Today Include:  CT scan    Recent Cultures (last 7 days):     Results from last 7 days   Lab Units 07/18/21  0014   BLOOD CULTURE  No Growth at 24 hrs  No Growth at 24 hrs         Last 24 Hours Medication List:   Current Facility-Administered Medications   Medication Dose Route Frequency Provider Last Rate    acetaminophen  650 mg Oral Q6H PRN Alton Hooks PA-C      bisacodyl  10 mg Rectal Once PRN Brtit Avelar MD      calcium carbonate  1,000 mg Oral Daily PRN Alton Hooks PA-C      cefTRIAXone  1,000 mg Intravenous Q24H Alton Hooks PA-C 1,000 mg (07/19/21 0403)    docusate sodium  100 mg Oral BID Britt Avelar MD      heparin (porcine)  3-30 Units/kg/hr (Order-Specific) Intravenous Titrated Alton Hooks PA-C 10 857 Units/kg/hr (07/19/21 1908)    heparin (porcine)  4,200 Units Intravenous Q1H PRN Alton Hooks PA-C      heparin (porcine)  8,400 Units Intravenous Q1H PRN Alton Hooks PA-C      insulin lispro  1-6 Units Subcutaneous TID [facility] Alton Hooks PA-C      insulin lispro  1-6 Units Subcutaneous HS Alton Hooks PA-C      ondansetron  4 mg Intravenous Q6H PRN Alton Hooks PA-C      polyethylene glycol  17 g Oral Daily PRN Alton Hooks PA-C      pravastatin  40 mg Oral HS Josie Plascencia DO      senna  2 tablet Oral HS Florentin Gray MD      sitaGLIPtin  100 mg Oral Daily Florentin Gray MD      tamsulosin  0 4 mg Oral Daily With Dinner Janet Rodriguez PA-C            Today, Patient Was Seen By: Florentin Gray MD    ** Please Note: "This note has been constructed using a voice recognition system  Therefore there may be syntax, spelling, and/or grammatical errors   Please call if you have any questions  "**

## 2021-07-19 NOTE — CONSULTS
Medical Oncology/Hematology Consult Note  Gareth Estes, 66 y  o , 1945  [address] [place] [id number] [phone]  07/19/21    Assessment and Plan:    1  B/L Pulmonary Emboli with family history of Factor V Leiden, with DVT of the gastrocnemius vein of the proximal calf  Patient is presently being maintained on heparin drip  Eventually, transition to oral medications such as NOAC would be acceptable  Either Xarelto 20 mg once a day verses Eliquis 5 mg twice a day would be appropriate  Given the patient's other issues such as possible underlying malignancy, I have recommended that Urology and full workup for perspective process date cancer be employed wallet admitted  Pulmonary emboli could potentially have been provoked by underlying malignancy  If a malignancy is in fact found, biopsy and treatment before transition to oral medication would be ideal     Work up recommended:  · CT AP + NM Bone Scan  · Factor V Leiden and prothrombin mutation testing  2  Normocytic Anemia, baseline hemoglobin of 9 grams/deciliter  CT imaging of the chest demonstrated a cirrhotic liver  Typically patients who have cirrhosis one of the macrocytic anemia  Patient having normocytic anemia, it is possible that he has concomitant iron deficiency  Patient's other studies have been requested such as folate and B12  I will order additional testing for normocytic anemia which include but are not limited to SPEP, reticulocyte count, free light chain ratio, sed rate and CRP  Iron studies from 6/30 suggest anemia of chronic disease or inflammation, which is consistant with elevated PSA and new onset of thrombosis  Observation in addition to the work up above advised  3   Elevated PSA, concern for metastatic prostate cancer  Patient has history of elevated PSA as discovered earlier this month      Lab Results   Component Value Date    PSA 68 8 (H) 07/02/2021     Patient has been referred to Urology as an outpatient who recommended workup for metastatic lesions  See 1  Above  Abdominal imaging has been requested as had a nuclear medicine bone scan  If disease is localized to the prostate, biopsy with Urology would be recommended as outpatient  However if prostate cancer is more disseminated, biopsy of metastatic site would be recommended  Given the patient's recent development of pulmonary emboli, it might be safer to complete this well the patient is on heparin drip and values can be tightly controlled  Control malignancy would be paramount if the patient had blood clots related to this provoking feature  Please do not hesitate to contact me if you have any questions or need additional information  Thank you for this consult   _______________________________________________________________    Reason for Consultation:     Chief Complaint   Patient presents with    Shortness of Breath     Pt brought to ED by EMS for low O2 saturation and SOB  Pt states he had a syncopal episode with a fall and injury to L rib cage on Wed  Pt discharged for [place] for same two weeks ago  No fever chills n/v/d  History of present illness: This is a 66-year-old male with past medical history of diabetes mellitus, hyperlipidemia, hypertension, BPH and edema who presented to the emergency room on 7/18/21 secondary to a syncopal episode and trouble breathing  7/18/2021:  CTA findings included Multiple segmental and subsegmental filling defects suspicious for pulmonary emboli bilaterally +  Prominence of the right ventricle suggesting right heart strain  +  Nodular contour of the liver, may suggest a component of hepatic cirrhosis  +  Coronary atherosclerosis, and other findings as above  7/18/2021:  Lower extremity VAS:  RIGHT LOWER LIMB:  There is evidence of acute occlusive chronic deep vein thrombosis noted in one  of the gastrocnemius veins in the proximal calf    No evidence of superficial thrombophlebitis noted  Doppler evaluation shows a normal response to augmentation maneuvers  Popliteal, posterior tibial and anterior tibial arterial Doppler waveforms are  triphasic/biphasic  LEFT LOWER LIMB:  No evidence of acute or chronic deep vein thrombosis  No evidence of superficial thrombophlebitis noted  Doppler evaluation shows a normal response to augmentation maneuvers  Popliteal, posterior tibial and anterior tibial arterial Doppler waveforms are  triphasic/biphasic  Earlier this month this patient went to his primary doctor and to the urologist   Patient's PSA was elevated at 68  Patient had been recommended to follow-up with additional imaging tests to overall out metastatic spread  Patient was also found to be somewhat anemic  Workup did not demonstrate iron deficiency but did demonstrate anemia of chronic inflammation  Interval history:  Patient was found resting in his hospital room  Patient is hard of hearing  Patient denies recent weight loss  Patient does note urologic changes consistent with BPH  Review of Systems   Constitutional: Positive for fatigue  Negative for activity change, appetite change and fever  HENT: Negative for nosebleeds  Respiratory: Positive for shortness of breath  Negative for cough and choking  Cardiovascular: Negative for chest pain, palpitations and leg swelling  Gastrointestinal: Negative for abdominal distention, abdominal pain, anal bleeding, blood in stool, constipation, diarrhea, nausea and vomiting  Endocrine: Negative for cold intolerance  Genitourinary: Positive for difficulty urinating  Negative for hematuria  Musculoskeletal: Negative for myalgias  Skin: Negative for color change, pallor and rash  Allergic/Immunologic: Negative for immunocompromised state  Neurological: Negative for headaches  Hematological: Negative for adenopathy  Does not bruise/bleed easily  All other systems reviewed and are negative      Past medical history:   Past Medical History:   Diagnosis Date    Arthritis     BPH associated with nocturia     Diabetes mellitus ([address] )     Edema     lower legs    Hearing aid worn     bilateral    Hyperlipidemia     Hypertension     controlled    Tremor of both hands        Past surgical history:   Past Surgical History:   Procedure Laterality Date    CATARACT EXTRACTION      AK XCAPSL CTRC RMVL INSJ IO LENS PROSTH W/O ECP Right 8/6/2018    Procedure: EXTRACTION EXTRACAPSULAR CATARACT PHACO INTRAOCULAR LENS (IOL); Surgeon: Arlene Fields MD;  Location: [facility] MAIN OR;  Service: Ophthalmology    AK XCAPSL CTRC RMVL INSJ IO LENS PROSTH W/O ECP Left 10/1/2018    Procedure: EXTRACTION EXTRACAPSULAR CATARACT PHACO INTRAOCULAR LENS (IOL);   Surgeon: Arlene Fields MD;  Location: [facility] MAIN OR;  Service: Ophthalmology       Allergies: No Known Allergies    Home medications:   Medications Prior to Admission   Medication    Continuous Blood Gluc Sensor (FreeStyle [place] 14 Day Sensor) MISC    metFORMIN (GLUCOPHAGE) 500 mg tablet    pravastatin (PRAVACHOL) 40 mg tablet    sitaGLIPtin (JANUVIA) 100 mg tablet    tamsulosin (FLOMAX) 0 4 mg       Hospital medications:   Current Facility-Administered Medications:     acetaminophen (TYLENOL) tablet 650 mg, 650 mg, Oral, Q6H PRN, Gordy Prado PA-C    calcium carbonate (TUMS) chewable tablet 1,000 mg, 1,000 mg, Oral, Daily PRN, Gordy Prado PA-C    cefTRIAXone (ROCEPHIN) IVPB (premix in dextrose) 1,000 mg 50 mL, 1,000 mg, Intravenous, Q24H, Gordy Prado PA-C, Last Rate: 100 mL/hr at 07/19/21 0403, 1,000 mg at 07/19/21 0403    heparin (porcine) 25,000 units in 0 45% NaCl 250 mL infusion (premix), 3-30 Units/kg/hr (Order-Specific), Intravenous, Titrated, Grody Prado PA-C, Last Rate: 11 3 mL/hr at 07/19/21 0339, 10 8 Units/kg/hr at 07/19/21 0339    heparin (porcine) injection 4,200 Units, 4,200 Units, Intravenous, Q1H PRN, Sergio Puckett Natalie Campbell PA-C    heparin (porcine) injection 8,400 Units, 8,400 Units, Intravenous, Q1H PRN, ASHLEE Denton-C    insulin lispro (HumaLOG) 100 units/mL subcutaneous injection 1-6 Units, 1-6 Units, Subcutaneous, TID AC, 1 Units at 07/18/21 1717 **AND** Fingerstick Glucose (POCT), , , TID AC, Shirley Don, PA-C    insulin lispro (HumaLOG) 100 units/mL subcutaneous injection 1-6 Units, 1-6 Units, Subcutaneous, HS, Shirley Vuk, PA-C, 1 Units at 07/18/21 2107    ondansetron [facility]) injection 4 mg, 4 mg, Intravenous, Q6H PRN, Shirley Don, PA-C    polyethylene glycol (MIRALAX) packet 17 g, 17 g, Oral, Daily PRN, Shirley Don PA-C    pravastatin (PRAVACHOL) tablet 40 mg, 40 mg, Oral, HS, Josie Revankar, DO, 40 mg at 07/18/21 2107    sitaGLIPtin (JANUVIA) tablet 100 mg, 100 mg, Oral, Daily, Layo Murray MD    tamsulosin (FLOMAX) capsule 0 4 mg, 0 4 mg, Oral, Daily With Dinner, Shirley Don, PA-C, 0 4 mg at 07/18/21 1717    Social history:   Social History     Tobacco Use    Smoking status: Never Smoker    Smokeless tobacco: Never Used   Vaping Use    Vaping Use: Never used   Substance Use Topics    Alcohol use: Never    Drug use: Never       Family history:   Family History   Problem Relation Age of Onset    Cancer Mother     Cancer Father         lung-smoker    Early death Brother     Cancer Brother         "bone cancer"       Vitals:  Vitals:    07/19/21 0813   BP: 107/70   Pulse: 81   Resp: 17   Temp: 98 1 °F (36 7 °C)   SpO2: (!) 87%       Physical Exam    Labs and Pertinent Reports Reviewed    Please note: This report has been generated by voice recognition software system  Therefore, there may be syntax, spelling and/or grammatical errors  Please call if you have any questions

## 2021-07-19 NOTE — ASSESSMENT & PLAN NOTE
PSA level noted to be elevated 68 8 concerning for prostate cancer  Patient is following with Urology awaiting further workup including biopsy, CT scan, bone scan    Will follow-up Hematology/Oncology recommendation

## 2021-07-19 NOTE — ASSESSMENT & PLAN NOTE
Patient presented with progressively worsening SOB and arrived to the ED hypoxic to 88% on room air  He was placed on 3 L nasal cannula O2 and SpO2 improved to 90 to 95%  CTA PE study showed multiple segmental and subsegmental filling defects suspicious for pulmonary emboli bilaterally with prominence of the right ventricle suggesting right heart strain  There is a family history of ? Factor V Leiden, both brothers (+)  Venous Doppler bilateral lower extremity-  acute occlusive chronic deep vein thrombosis noted in one of the gastrocnemius veins in the proximal calf on right  Noted to have elevated proBNP, troponin  Remains hemodynamically stable  Possibly precipitated by likely malignancy, possible history of hypercoagulable state and limited activity recently  - discussed with Hematology, and switch the patient to Eliquis 5 mg b i d   Discontinue IV heparin  - 2D echocardiogram showed left and right ventricular systolic function within normal limits, and grade 1 diastolic dysfunction  No major valvular abnormalities noted    - monitor vital signs  - hematology/oncology evaluation appreciated  - monitor respiratory status

## 2021-07-19 NOTE — PROGRESS NOTES
Progress Note - Cardiology   [facility]     Guilherme Goldberg 66 y o  male MRN: [id number]  : 1945  Unit/Bed#: [address] [place] [address] Encounter: [id number]    Assessment and Plan:   1  Bilateral pulmonary emboli:  Patient found to have multiple segmental and subsegmental filling deficits on CTA suspicious for bilateral PE  There was also prominence of the RV concerning for RV strain  -  continue IV heparin weight based protocol with close monitoring of PTT and for any bleeding    -  hemodynamically stable    -  2D echocardiogram pending    2  Acute DVT:  Venous duplex with right lower extremity acute occlusive DVT of the gastrocnemius vein and proximal calf  -  patient denies previous history of DVT    -  continue IV heparin as above    3  Recurrent syncope:  Patient notes multiple syncopal episodes over the last few months  Previous evaluation was thought that episodes were due to patient's hypoglycemia  -  obtain and monitor orthostatic vital signs    -  2D echocardiogram pending    4  Non MI troponin elevation secondary to PE/DVT and RV strain:  Peak troponin 1 13 and relatively flat    -  patient states he has previously been followed by   [facility] and has had testing  We will attempt to obtain these records    -  2D echocardiogram ordered for today    -  recommend ischemic workup prior to discharge  5  Diabetes:  Hemoglobin A1c 6  Appears patient does have episodes of hypoglycemia, review of chart demonstrates that at times patient will skip meals or eats very little  -  managed per primary team    -  may benefit from Meals on wheels or and other meal plan program     6  Chronic anemia:  Appears baseline hemoglobin is 9  Followed by primary team    7  Dyslipidemia:  On Pravachol    8  Elevated PSA:  Now has indwelling Flores catheter  Followed by Urology in outpatient setting      9   Chronic lower extremity edema:  Patient with +2 lower extremity edema  Per patient this is chronic     -  not on any oral diuretics as outpatient  -  monitor echocardiogram and edema, and adjust medication as patient's condition tolerates  Subjective / Objective:   Patient seen and examined  He is very pleasant and answers questions appropriately  Did seem to be a little bit surprised when I discussed his pulmonary emboli and DVT  He did not remember being told that he has this  Patient notes that he has been experiencing intermittent syncopal episodes at home  He lives by himself with sister and brother-in-law checking on him frequently  Vitals: Blood pressure 107/70, pulse 81, temperature 98 1 °F (36 7 °C), resp  rate 17, height 5' 9" (1 753 m), weight 103 kg (227 lb 14 4 oz), SpO2 (!) 87 %  Vitals:    07/17/21 2350 07/18/21 1228   Weight: 105 kg (231 lb 0 7 oz) 103 kg (227 lb 14 4 oz)     Body mass index is 33 65 kg/m²  BP Readings from Last 3 Encounters:   07/19/21 107/70   07/16/21 124/62   07/13/21 128/70     Orthostatic Blood Pressures      Most Recent Value   Blood Pressure  107/70 filed at 07/19/2021 0813   Patient Position - Orthostatic VS  Lying filed at 07/18/2021 0412        I/O       07/17 0701 - 07/18 0700 07/18 0701 - 07/19 0700 07/19 0701 - 07/20 0700    I V  (mL/kg)  20 (0 2)     IV Piggyback 550      Total Intake(mL/kg) 550 (5 2) 20 (0 2)     Urine (mL/kg/hr)  2250 (0 9)     Total Output  2250     Net +550 -2230                Invasive Devices     Peripheral Intravenous Line            Peripheral IV 07/18/21 Left Antecubital 1 day    Peripheral IV 07/18/21 Right Antecubital 1 day          Drain            Urethral Catheter 1 day                  Intake/Output Summary (Last 24 hours) at 7/19/2021 1006  Last data filed at 7/19/2021 0445  Gross per 24 hour   Intake 20 ml   Output 2250 ml   Net -2230 ml         Physical Exam:   Physical Exam  Vitals and nursing note reviewed     Constitutional:       General: He is not in acute distress  Appearance: Normal appearance  He is well-developed  He is obese  HENT:      Head:      Comments: Small abrasion noted at bridge of his nose towards right eye     Right Ear: External ear normal       Left Ear: External ear normal       Ears:      Comments: Hard of hearing, and does not have his hearing aids with him  Eyes:      General: No scleral icterus  Right eye: No discharge  Left eye: No discharge  Neck:      Thyroid: No thyromegaly  Cardiovascular:      Rate and Rhythm: Normal rate and regular rhythm  Pulses: Normal pulses  Heart sounds: Murmur heard  Pulmonary:      Effort: Pulmonary effort is normal  No accessory muscle usage or respiratory distress  Breath sounds: Examination of the right-lower field reveals decreased breath sounds  Examination of the left-lower field reveals decreased breath sounds  Decreased breath sounds present  No wheezing  Comments: Coarse scattered breath sounds  Abdominal:      General: Bowel sounds are normal  There is no distension  Palpations: Abdomen is soft  Musculoskeletal:      Cervical back: Normal range of motion and neck supple  Right lower le+ Pitting Edema present  Left lower le+ Pitting Edema present  Skin:     General: Skin is warm and dry  Capillary Refill: Capillary refill takes less than 2 seconds  Neurological:      Mental Status: He is alert     Psychiatric:         Mood and Affect: Mood normal                    Medications/ Allergies:     Current Facility-Administered Medications   Medication Dose Route Frequency Provider Last Rate    acetaminophen  650 mg Oral Q6H PRN Orianalenny Young, PA-C      calcium carbonate  1,000 mg Oral Daily PRN Orianamelinda Young, PA-C      cefTRIAXone  1,000 mg Intravenous Q24H Orianalenny Young, PA-C 1,000 mg (21 0403)    heparin (porcine)  3-30 Units/kg/hr (Order-Specific) Intravenous Titrated Oriana Young PA-C 10 8 Units/kg/hr (07/19/21 0339)    heparin (porcine)  4,200 Units Intravenous Q1H PRN Oriana Earnest, PA-C      heparin (porcine)  8,400 Units Intravenous Q1H PRN Oriana Earnest, PA-C      insulin lispro  1-6 Units Subcutaneous TID [facility] Oriana Earnest, PA-C      insulin lispro  1-6 Units Subcutaneous HS Oriana Earnest, PA-C      ondansetron  4 mg Intravenous Q6H PRN Oriana Earnest, PA-C      polyethylene glycol  17 g Oral Daily PRN Oriana Earnest, PA-C      pravastatin  40 mg Oral HS Josie Plascencia DO      sitaGLIPtin  100 mg Oral Daily Norberto Fitzgerald MD      tamsulosin  0 4 mg Oral Daily With Dinner Oriana Earnest, PA-C       acetaminophen, 650 mg, Q6H PRN  calcium carbonate, 1,000 mg, Daily PRN  heparin (porcine), 4,200 Units, Q1H PRN  heparin (porcine), 8,400 Units, Q1H PRN  ondansetron, 4 mg, Q6H PRN  polyethylene glycol, 17 g, Daily PRN      No Known Allergies    VTE Pharmacologic Prophylaxis:   Sequential compression device (Venodyne)     Labs:   Troponins:  Results from last 7 days   Lab Units 07/18/21  1432 07/18/21  1102 07/18/21  0750   TROPONIN I ng/mL 1 12* 1 13* 1 12*     CBC with diff:  Results from last 7 days   Lab Units 07/19/21  0459 07/18/21  0750 07/18/21  0531 07/18/21  0014   WBC Thousand/uL 6 55 7 60 6 92 8 90   HEMOGLOBIN g/dL 8 9* 9 3* 8 8* 9 8*   HEMATOCRIT % 30 0* 31 1* 28 8* 31 7*   MCV fL 90 91 89 88   PLATELETS Thousands/uL 219 209 208 241   MCH pg 26 8 27 1 27 2 27 3   MCHC g/dL 29 7* 29 9* 30 6* 30 9*   RDW % 15 6* 15 5* 15 5* 15 3*   MPV fL 11 2 10 7 10 6 10 5   NRBC AUTO /100 WBCs  --  0  --  0     CMP:  Results from last 7 days   Lab Units 07/19/21  0459 07/18/21  0750 07/18/21  0014   SODIUM mmol/L 140 137 138   POTASSIUM mmol/L 4 3 4 5 4 5   CHLORIDE mmol/L 102 100 98*   CO2 mmol/L 31 29 29   ANION GAP mmol/L 7 8 11   BUN mg/dL 15 21 24   CREATININE mg/dL 1 10 1 16 1 35*   CALCIUM mg/dL 8 2* 8 2* 8 2*   AST U/L  -- --  21   ALT U/L  --   --  28   ALK PHOS U/L  --   --  75   TOTAL PROTEIN g/dL  --   --  7 1   ALBUMIN g/dL  --   --  2 8*   TOTAL BILIRUBIN mg/dL  --   --  0 25   EGFR ml/min/1 73sq m 64 60 50     Coags:  Results from last 7 days   Lab Units 07/19/21  0256 07/18/21  1856 07/18/21  1115 07/18/21  0531   PTT seconds 92* 115* 184* 26   INR   --   --   --  1 07     TSH:  Results from last 7 days   Lab Units 07/18/21  0014   TSH 3RD GENERATON uIU/mL 2 943     NT-proBNP:   Recent Labs     07/18/21  0014   NTBNP 2,051*        Imaging & Testing   I have personally reviewed pertinent reports  XR chest 1 view portable    Result Date: 7/18/2021  Narrative: CHEST INDICATION:   sob  Pulmonary emboli on subsequent chest CT  COMPARISON: Chest radiograph from 6/29/2021 and chest CT from 7/18/2021  EXAM PERFORMED/VIEWS:  XR CHEST PORTABLE  FINDINGS: Cardiomediastinal silhouette normal  Low lung volumes with mild bibasilar atelectasis  No effusion or pneumothorax  Osseous structures normal for age  Impression: No acute cardiopulmonary disease  Workstation performed: [id number]       CTA ED chest PE Study    Result Date: 7/18/2021  Narrative: CTA - CHEST WITH IV CONTRAST - PULMONARY ANGIOGRAM INDICATION:   PE suspected, high pretest prob sob  COMPARISON: Chest x-ray dated the same  TECHNIQUE: CTA examination of the chest was performed using angiographic technique according to a protocol specifically tailored to evaluate for pulmonary embolism  Axial, sagittal, and coronal 2D reformatted images were created from the source data and  submitted for interpretation  In addition, coronal 3D MIP postprocessing was performed on the acquisition scanner  Radiation dose length product (DLP) for this visit:  723 mGy-cm     This examination, like all CT scans performed in the [facility], was performed utilizing techniques to minimize radiation dose exposure, including the use of iterative reconstruction and automated exposure control  IV Contrast:  85 mL of iohexol (OMNIPAQUE)  FINDINGS: PULMONARY ARTERIAL TREE:  Some images are suboptimal secondary to respiratory motion which decreases sensitivity for evaluation of peripheral pulmonary emboli  Filling defects are seen in the segmental and subsegmental branches in the left upper and left lower lobes (axial image 119, series 2 and 104, series 601, for example)  Additional segmental/subsegmental pulmonary emboli may be present in the right lower lobe (axial image 113, series 2) as well as the right upper lobe (axial image 73-80, series 2)  LUNGS:  Linear scarring/atelectasis dependently and in the bilateral lower lung field  Lungs otherwise appear grossly clear  PLEURA:  Unremarkable  HEART/GREAT VESSELS: Prominence of the right ventricle suggesting right heart strain  Moderate to severe coronary atherosclerosis  Mild aortic atherosclerosis; no aortic aneurysm  Diameter of the ascending aorta is top normal  MEDIASTINUM AND TRINI:  Inspissated secretions in the distal trachea  Large airways appear patent  CHEST WALL AND LOWER NECK:   No acute fracture seen  Bilateral gynecomastia  Otherwise grossly unremarkable  VISUALIZED STRUCTURES IN THE UPPER ABDOMEN:  Nodular contour of the liver, may suggest a component of hepatic cirrhosis  OSSEOUS STRUCTURES: Generalized osteopenia  No acute fracture or destructive osseous lesion  Impression: Multiple segmental and subsegmental filling defects suspicious for pulmonary emboli bilaterally (axial image 119, series 2 and 104, series 601, axial image 113, series 2, and axial image 73-80, series 2 for example)  Prominence of the right ventricle suggesting right heart strain  Nodular contour of the liver, may suggest a component of hepatic cirrhosis  Coronary atherosclerosis, and other findings as above   I personally discussed this study with Spencer Garrison on 7/18/2021 at 4:40 AM  Workstation performed: [id number]     VAS lower limb venous duplex study, complete bilateral    Result Date: 7/19/2021  Narrative:  [facility] REPORT CLINICAL: Indications: Patient presents with recent discovery of pulmonary embolism and physician wants to determine potential source  Patient reports SOB Risk Factors The patient has no history of DVT  FINDINGS:  Segment        Right                   Left                          Impression              Impression       CFV            Normal (Patent)         Normal (Patent)  Gastrocnemius  Occlusive Subsegmental                      CONCLUSION:  Impression: RIGHT LOWER LIMB: There is evidence of acute occlusive chronic deep vein thrombosis noted in one of the gastrocnemius veins in the proximal calf  No evidence of superficial thrombophlebitis noted  Doppler evaluation shows a normal response to augmentation maneuvers  Popliteal, posterior tibial and anterior tibial arterial Doppler waveforms are triphasic/biphasic  LEFT LOWER LIMB: No evidence of acute or chronic deep vein thrombosis  No evidence of superficial thrombophlebitis noted  Doppler evaluation shows a normal response to augmentation maneuvers  Popliteal, posterior tibial and anterior tibial arterial Doppler waveforms are triphasic/biphasic  Technical findings were given to Dr Pinky Fang  SIGNATURE: Electronically Signed by: Hilario Hernandez on 2021-07-19 09:19:20 AM       EKG / Monitor: Personally reviewed  Sinus rhythm, no ectopy    BLAZE Gonzalez        "This note has been constructed using a voice recognition system  Therefore there may be syntax, spelling, and/or grammatical errors   Please call if you have any questions  "

## 2021-07-20 ENCOUNTER — TELEPHONE (OUTPATIENT)
Dept: HEMATOLOGY ONCOLOGY | Facility: MEDICAL CENTER | Age: 76
End: 2021-07-20

## 2021-07-20 LAB
ANION GAP SERPL CALCULATED.3IONS-SCNC: 5 MMOL/L (ref 4–13)
APTT PPP: 155 SECONDS (ref 23–37)
APTT PPP: 38 SECONDS (ref 23–37)
BUN SERPL-MCNC: 13 MG/DL (ref 5–25)
CALCIUM SERPL-MCNC: 8.1 MG/DL (ref 8.3–10.1)
CHLORIDE SERPL-SCNC: 101 MMOL/L (ref 100–108)
CO2 SERPL-SCNC: 32 MMOL/L (ref 21–32)
CREAT SERPL-MCNC: 1.14 MG/DL (ref 0.6–1.3)
ERYTHROCYTE [DISTWIDTH] IN BLOOD BY AUTOMATED COUNT: 15.2 % (ref 11.6–15.1)
GFR SERPL CREATININE-BSD FRML MDRD: 62 ML/MIN/1.73SQ M
GLUCOSE SERPL-MCNC: 172 MG/DL (ref 65–140)
GLUCOSE SERPL-MCNC: 180 MG/DL (ref 65–140)
GLUCOSE SERPL-MCNC: 192 MG/DL (ref 65–140)
GLUCOSE SERPL-MCNC: 193 MG/DL (ref 65–140)
GLUCOSE SERPL-MCNC: 229 MG/DL (ref 65–140)
HCT VFR BLD AUTO: 28.5 % (ref 36.5–49.3)
HGB BLD-MCNC: 8.7 G/DL (ref 12–17)
MCH RBC QN AUTO: 27.2 PG (ref 26.8–34.3)
MCHC RBC AUTO-ENTMCNC: 30.5 G/DL (ref 31.4–37.4)
MCV RBC AUTO: 89 FL (ref 82–98)
PLATELET # BLD AUTO: 224 THOUSANDS/UL (ref 149–390)
PMV BLD AUTO: 10.5 FL (ref 8.9–12.7)
POTASSIUM SERPL-SCNC: 4.1 MMOL/L (ref 3.5–5.3)
RBC # BLD AUTO: 3.2 MILLION/UL (ref 3.88–5.62)
SODIUM SERPL-SCNC: 138 MMOL/L (ref 136–145)
WBC # BLD AUTO: 6.02 THOUSAND/UL (ref 4.31–10.16)

## 2021-07-20 PROCEDURE — 99232 SBSQ HOSP IP/OBS MODERATE 35: CPT | Performed by: INTERNAL MEDICINE

## 2021-07-20 PROCEDURE — 99233 SBSQ HOSP IP/OBS HIGH 50: CPT | Performed by: PHYSICIAN ASSISTANT

## 2021-07-20 PROCEDURE — 86334 IMMUNOFIX E-PHORESIS SERUM: CPT | Performed by: PATHOLOGY

## 2021-07-20 PROCEDURE — 85027 COMPLETE CBC AUTOMATED: CPT | Performed by: INTERNAL MEDICINE

## 2021-07-20 PROCEDURE — 84165 PROTEIN E-PHORESIS SERUM: CPT | Performed by: PATHOLOGY

## 2021-07-20 PROCEDURE — 97110 THERAPEUTIC EXERCISES: CPT

## 2021-07-20 PROCEDURE — 86334 IMMUNOFIX E-PHORESIS SERUM: CPT | Performed by: PHYSICIAN ASSISTANT

## 2021-07-20 PROCEDURE — 85730 THROMBOPLASTIN TIME PARTIAL: CPT | Performed by: INTERNAL MEDICINE

## 2021-07-20 PROCEDURE — 97163 PT EVAL HIGH COMPLEX 45 MIN: CPT

## 2021-07-20 PROCEDURE — 82948 REAGENT STRIP/BLOOD GLUCOSE: CPT

## 2021-07-20 PROCEDURE — 94760 N-INVAS EAR/PLS OXIMETRY 1: CPT

## 2021-07-20 PROCEDURE — 80048 BASIC METABOLIC PNL TOTAL CA: CPT | Performed by: INTERNAL MEDICINE

## 2021-07-20 PROCEDURE — 83883 ASSAY NEPHELOMETRY NOT SPEC: CPT | Performed by: PHYSICIAN ASSISTANT

## 2021-07-20 PROCEDURE — 97167 OT EVAL HIGH COMPLEX 60 MIN: CPT

## 2021-07-20 PROCEDURE — 99233 SBSQ HOSP IP/OBS HIGH 50: CPT | Performed by: INTERNAL MEDICINE

## 2021-07-20 PROCEDURE — 94761 N-INVAS EAR/PLS OXIMETRY MLT: CPT

## 2021-07-20 PROCEDURE — 84165 PROTEIN E-PHORESIS SERUM: CPT | Performed by: PHYSICIAN ASSISTANT

## 2021-07-20 RX ORDER — FOLIC ACID 1 MG/1
1 TABLET ORAL DAILY
Status: DISCONTINUED | OUTPATIENT
Start: 2021-07-20 | End: 2021-07-21 | Stop reason: HOSPADM

## 2021-07-20 RX ORDER — CYANOCOBALAMIN 1000 UG/ML
1000 INJECTION INTRAMUSCULAR; SUBCUTANEOUS DAILY
Status: DISCONTINUED | OUTPATIENT
Start: 2021-07-20 | End: 2021-07-21 | Stop reason: HOSPADM

## 2021-07-20 RX ADMIN — INSULIN LISPRO 1 UNITS: 100 INJECTION, SOLUTION INTRAVENOUS; SUBCUTANEOUS at 18:30

## 2021-07-20 RX ADMIN — PRAVASTATIN SODIUM 40 MG: 40 TABLET ORAL at 21:16

## 2021-07-20 RX ADMIN — CYANOCOBALAMIN 1000 MCG: 1000 INJECTION, SOLUTION INTRAMUSCULAR; SUBCUTANEOUS at 12:09

## 2021-07-20 RX ADMIN — SITAGLIPTIN 100 MG: 100 TABLET, FILM COATED ORAL at 08:29

## 2021-07-20 RX ADMIN — INSULIN LISPRO 2 UNITS: 100 INJECTION, SOLUTION INTRAVENOUS; SUBCUTANEOUS at 21:15

## 2021-07-20 RX ADMIN — DOCUSATE SODIUM 100 MG: 100 CAPSULE, LIQUID FILLED ORAL at 17:10

## 2021-07-20 RX ADMIN — INSULIN LISPRO 2 UNITS: 100 INJECTION, SOLUTION INTRAVENOUS; SUBCUTANEOUS at 08:28

## 2021-07-20 RX ADMIN — CEFTRIAXONE 1000 MG: 1 INJECTION, SOLUTION INTRAVENOUS at 03:27

## 2021-07-20 RX ADMIN — HEPARIN SODIUM 8400 UNITS: 1000 INJECTION INTRAVENOUS; SUBCUTANEOUS at 02:04

## 2021-07-20 RX ADMIN — STANDARDIZED SENNA CONCENTRATE 17.2 MG: 8.6 TABLET ORAL at 21:16

## 2021-07-20 RX ADMIN — APIXABAN 5 MG: 5 TABLET, FILM COATED ORAL at 17:10

## 2021-07-20 RX ADMIN — DOCUSATE SODIUM 100 MG: 100 CAPSULE, LIQUID FILLED ORAL at 08:29

## 2021-07-20 RX ADMIN — FOLIC ACID 1 MG: 1 TABLET ORAL at 12:09

## 2021-07-20 RX ADMIN — TAMSULOSIN HYDROCHLORIDE 0.4 MG: 0.4 CAPSULE ORAL at 17:10

## 2021-07-20 RX ADMIN — INSULIN LISPRO 2 UNITS: 100 INJECTION, SOLUTION INTRAVENOUS; SUBCUTANEOUS at 12:07

## 2021-07-20 NOTE — OCCUPATIONAL THERAPY NOTE
OT EVALUATION       07/20/21 1016   Note Type   Note type Evaluation   Restrictions/Precautions   Other Precautions Chair Alarm; Bed Alarm; Fall Risk;O2   Pain Assessment   Pain Assessment Tool Pain Assessment not indicated - pt denies pain   Home Living   Type of [address] One level  (1 step to enter )   Bathroom Shower/Tub Walk-in shower   Home Equipment Walker;Cane   Additional Comments pt reports he was mowing the grass up until 2 weeks ago    Prior Function   Level of [place] Independent with ADLs and functional mobility; Needs assistance with IADLs   Lives With Alone   Receives Help From Family  (sisters )   ADL Assistance Independent   IADLs Needs assistance   Comments pt reports he doesnt wear socks unless he goes out and he has his family who is transporting him put his socks on, offer pt education on sock aid/reacher however pt refused      ADL   Eating Assistance 7  Independent   Grooming Assistance 5  Supervision/Setup   UB Bathing Assistance 5  Supervision/Setup   LB Bathing Assistance 4  Minimal Assistance   UB Dressing Assistance 5  Supervision/Setup   LB Dressing Assistance 4  Minimal Assistance   LB Dressing Deficit   (denies LB adaptive equipment needs )   Toileting Assistance  5  Supervision/Setup   Transfers   Sit to Stand 5  Supervision   Stand to Sit 5  Supervision   Functional Mobility   Functional Mobility 5  Supervision   Additional Comments 25 feet, 3L O2, O2 maintained at 92% and above throughout    Additional items Rolling walker   Balance   Static Sitting Fair +   Dynamic Sitting Fair   Static Standing Fair   Dynamic Standing Fair -   Activity Tolerance   Activity Tolerance Patient limited by fatigue   Nurse Made Aware yes   RUE Assessment   RUE Assessment WFL   LUE Assessment   LUE Assessment WFL   Cognition   Overall Cognitive Status WFL   Arousal/Participation Cooperative   Attention Within functional limits   Orientation Level Oriented X4   Following Commands Follows all commands and directions without difficulty   Assessment   Limitation Decreased ADL status; Decreased UE strength;Decreased Safe judgement during ADL;Decreased endurance;Decreased high-level ADLs; Decreased self-care trans  (decreased balance and mobility )   Prognosis Good   Assessment Patient evaluated by Occupational Therapy  Patient admitted with Bilateral pulmonary embolism ([facility] [address] )  The patients occupational profile, medical and therapy history includes a extensive additional review of physical, cognitive, or psychosocial history related to current functional performance  Comorbidities affecting functional mobility and ADLS include: arthritis, diabetes and hypertension and B hand tremors  Prior to admission, patient was independent with functional mobility without assistive device, independent with ADLS and requiring assist for IADLS  The evaluation identifies the following performance deficits: weakness, impaired balance, decreased endurance, increased fall risk, new onset of impairment of functional mobility, decreased ADLS, decreased IADLS, decreased activity tolerance, decreased safety awareness, impaired judgement and decreased strength, that result in activity limitations and/or participation restrictions  This evaluation requires clinical decision making of high complexity, because the patient presents with comorbidites that affect occupational performance and required significant modification of tasks or assistance with consideration of multiple treatment options  The Barthel Index was used as a functional outcome tool presenting with a score of 60, indicating moderate limitations of functional mobility and ADLS  The patient's raw score on the -PAC Daily Activity inpatient short form is 21, standardized score is 44 27, greater than 39 4  Patients at this level are likely to benefit from DC to home  Please refer to the recommendation of the Occupational Therapist for safe DC planning   Patient will benefit from skilled Occupational Therapy services to address above deficits and facilitate a safe return to prior level of function  Goals   Patient Goals to go home    STG Time Frame   (1-7 days)   Short Term Goal  Goals established to promote patient goal of going home:  Patient will increase standing tolerance to 3 minutes during ADL task to decrease assistance level and decrease fall risk; Patient will increase bed mobility to independent in preparation for ADLS and transfers; Patient will increase functional mobility to and from bathroom with rolling walker independently to increase performance with ADLS and to use a toilet; Patient will tolerate 10 minutes of UE ROM/strengthening to increase general activity tolerance and performance in ADLS/IADLS; Patient will improve functional activity tolerance to 10 minutes of sustained functional tasks to increase participation in basic self-care and decrease assistance level;  Patient will be able to to verbalize understanding and perform energy conservation/proper body mechanics during ADLS and functional mobility at least 75% of the time with minimal cueing to decrease signs of fatigue and increase stamina to return to prior level of function; Patient will increase dynamic sitting balance to fair+ to improve the ability to sit at edge of bed or on a chair for ADLS;  Patient will increase dynamic standing balance to fair to improve postural stability and decrease fall risk during standing ADLS and transfers       LTG Time Frame   (8-14 days)   Long Term Goal Patient will increase standing tolerance to 6 minutes during ADL task to decrease assistance level and decrease fall risk;  Patient will increase functional mobility to and from bathroom without assistive device independently to increase performance with ADLS and to use a toilet; Patient will tolerate 20 minutes of UE ROM/strengthening to increase general activity tolerance and performance in ADLS/IADLS; Patient will improve functional activity tolerance to 20 minutes of sustained functional tasks to increase participation in basic self-care and decrease assistance level;  Patient will be able to to verbalize understanding and perform energy conservation/proper body mechanics during ADLS and functional mobility at least 90% of the time without cueing to decrease signs of fatigue and increase stamina to return to prior level of function; Patient will increase dynamic sitting balance to good to improve the ability to sit at edge of bed or on a chair for ADLS;  Patient will increase dynamic standing balance to fair+ to improve postural stability and decrease fall risk during standing ADLS and transfers  Pt will score >/= 24/24 on AM-PAC Daily Activity Inpatient scale to promote safe independence with ADLs and functional mobility; Pt will score >/= 90/100 on Barthel Index in order to decrease caregiver assistance needed and increase ability to perform ADLs and functional mobility  Functional Transfer Goals   Pt Will Perform All Functional Transfers   (STG independent )   ADL Goals   Pt Will Perform Grooming Standing at sink  (STG independent )   Pt Will Perform Bathing   (STG supervision LTG independent )   Pt Will Perform LE Dressing   (STG supervision LTG Independent )   Pt Will Perform Toileting   (STG independent)   Plan   Treatment Interventions ADL retraining;Functional transfer training;UE strengthening/ROM; Endurance training;Patient/family training;Equipment evaluation/education; Activityengagement; Compensatory technique education   Goal Expiration Date 08/03/21   OT Frequency 3-5x/wk   Recommendation   OT Discharge Recommendation Home with home health rehabilitation   AM-[facility] Daily Activity Inpatient   Lower Body Dressing 3   Bathing 3   Toileting 3   Upper Body Dressing 4   Grooming 4   Eating 4   Daily Activity Raw Score 21   Daily Activity Standardized Score (Calc for Raw Score >=11) 44 27   AM-PAC Applied Cognition Inpatient   Following a Speech/Presentation 4   Understanding Ordinary Conversation 4   Taking Medications 4   Remembering Where Things Are Placed or Put Away 4   Remembering List of 4-5 Errands 4   Taking Care of Complicated Tasks 4   Applied Cognition Raw Score 24   Applied Cognition Standardized Score 62 21   Barthel Index   Feeding 10   Bathing 0   Grooming Score 5   Dressing Score 5   Bladder Score 10   Bowels Score 10   Toilet Use Score 5   Transfers (Bed/Chair) Score 10   Mobility (Level Surface) Score 0   Stairs Score 5   Barthel Index Score 60   Licensure   [place] License Number  Orbie Null [address] OTR/L [id number]

## 2021-07-20 NOTE — RESPIRATORY THERAPY NOTE
Home Oxygen Qualifying Test       Patient name: Lupillo Celestin        : 1945   Date of Test:  2021  Diagnosis:      Home Oxygen Test:    Medicare Guidelines require item(s) 1-5 on all ambulatory patients or 1 and 2 on non-ambulatory patients  1   Baseline SPO2 on Room Air at rest 92%  2   SPO2 during exercise on Room Air 92 %  During exercise monitor SpO2  If SPO2 increases >=89% with ambulation do not add supplemental             oxygen  If <= 88% on room air add O2 via NC and titrate patient  Patient must be ambulated with O2 and titrated to > 88% with exertion  3   SPO2 on Oxygen at rest 95 % 2 lpm     4   SPO2 during exercise on Oxygen  NA% a liter flow of NA lpm     5   Exercise performed: PT walked around the unit about 300Ft                   []  Supplemental Home Oxygen is indicated  [x]  Client does not qualify for home oxygen        Respiratory Additional Notes- No respiratory distress noticed    Brad Boles, RT

## 2021-07-20 NOTE — TELEPHONE ENCOUNTER
Lab orders have been placed and lab scripts mailed to the address on file for the patient with an appt reminder card for his hospital F/U appt scheduled for 8/19/21 at 1:30 pm with ASHLEE Curran at the [facility]

## 2021-07-20 NOTE — TELEPHONE ENCOUNTER
----- Message from Kayleigh Herr PA-C sent at 7/20/2021 10:22 AM EDT -----  Will need 4-5 Week hospital follow up with [facility], Iron panel, sed rate and CMP with Me or Dr John Caceres please put in labs and mail to his home

## 2021-07-20 NOTE — PHYSICAL THERAPY NOTE
PT EVALUATION     07/20/21 1150   Note Type   Note type Evaluation   Pain Assessment   Pain Assessment Tool Pain Assessment not indicated - pt denies pain   Home Living   Type of [address] One level;Stairs to enter without rails  (1 step to enter, 2 steps into add on bathroom)   Home Equipment Walker;Cane   Additional Comments Patient independent without assistive device although holding furniture at times as patient   Prior Function   Lives With Alone   Receives Help From Family  (Sisters assisting)   ADL Assistance Independent   IADLs Needs assistance   Comments Patient reports sisters even assisting with bathing times due to indwelling Flores catheter   Restrictions/Precautions   Other Precautions Chair Alarm; Bed Alarm; Fall Risk;O2   General   Additional Pertinent History Chart reviewed, patient admitted with bilateral pulmonary embolism  Patient now reports as generally weak and deconditioned although tolerating gait and will benefit increasing functional mobility   Family/Caregiver Present No   Cognition   Overall Cognitive Status WFL   Arousal/Participation Cooperative   Attention Within functional limits   Orientation Level Oriented X4   Following Commands Follows all commands and directions without difficulty   RLE Assessment   RLE Assessment   (ROM WFL, strength 3+/4-)   LLE Assessment   LLE Assessment   (ROM WFL, strength 3+/ 4-)   Coordination   Movements are Fluid and Coordinated 0   Transfers   Sit to Stand 5  Supervision   Stand to Sit 5  Supervision   Ambulation/Elevation   Gait Assistance 4  Minimal assist   Additional items Assist x 1;Verbal cues; Tactile cues   Assistive Device   (none)   Distance 15 ft with change in direction widened base of support   Balance   Static Sitting Fair +   Dynamic Sitting Fair   Static Standing Fair   Dynamic Standing Fair -   Ambulatory Fair -   Activity Tolerance   Activity Tolerance Patient tolerated treatment well   Nurse Made Aware yes Assessment   Prognosis Good   Problem List Decreased strength;Decreased range of motion;Decreased endurance; Impaired balance;Decreased mobility; Decreased coordination   Assessment Patient seen for Physical Therapy evaluation  Patient admitted with Bilateral pulmonary embolism ([facility] [address] )  Comorbidities affecting patient's physical performance include:HTN, HLD, CKD stage 3, DM2 with recent issues of hypoglycemia on previous admission, urinary retention with Flores catheter, BPH, elevated PSA without going workup with concern for prostate cancer, anemia   Personal factors affecting patient at time of initial evaluation include: inability to ambulate household distances, inability to navigate community distances, inability to navigate level surfaces without external assistance, inability to perform dynamic tasks in community, limited home support, inability to perform physical activity, inability to perform ADLS and inability to perform IADLS   Prior to admission, patient was independent with functional mobility without assistive device, independent with ADLS, requiring assist for IADLS, ambulating household distance and home with family assist   Please find objective findings from Physical Therapy assessment regarding body systems outlined above with impairments and limitations including weakness, decreased ROM, impaired balance, decreased endurance, impaired coordination, gait deviations, decreased activity tolerance, decreased functional mobility tolerance and fall risk  The Barthel Index was used as a functional outcome tool presenting with a score of 55 today indicating marked limitations of functional mobility and ADLS  Patient's clinical presentation is currently unstable/unpredictable as seen in patient's presentation of vital sign response, changing level of pain, increased fall risk, new onset of impairment of functional mobility, decreased endurance and new onset of weakness   Pt would benefit from continued Physical Therapy treatment to address deficits as defined above and maximize level of functional mobility  As demonstrated by objective findings, the assigned level of complexity for this evaluation is high  The patient's AM-PAC Basic Mobility Inpatient Short Form Raw Score is 20, Standardized Score is 43 99  A standardized score greater than 42 9 suggests the patient may benefit from discharge to home PT services in home  Please also refer to the recommendation of the Physical Therapist for safe discharge planning  Goals   Patient Goals To go home   STG Expiration Date 07/27/21   Short Term Goal #1 Transfers and gait out assistive device independently   Short Term Goal #2 Gait endurance to functional household distances, strength bilateral lower extremities 4/5 all to meet patient goal of returning home   LTG Expiration Date 08/03/21   Long Term Goal #1 Gait endurance to functional community distances   Plan   Treatment/Interventions ADL retraining;Functional transfer training;LE strengthening/ROM; Elevations; Therapeutic exercise; Endurance training;Patient/family training;Equipment eval/education;Gait training; Compensatory technique education   PT Frequency 5x/wk   Recommendation   PT Discharge Recommendation Home with home health rehabilitation   [address] [place] [address] Mobility Inpatient   Turning in Bed Without Bedrails 4   Lying on Back to Sitting on Edge of Flat Bed 4   Moving Bed to Chair 3   Standing Up From Chair 3   Walk in Room 3   Climb 3-5 Stairs 3   Basic Mobility Inpatient Raw Score 20   Basic Mobility Standardized Score 43 99   Barthel Index   Feeding 10   Bathing 0   Grooming Score 0   Dressing Score 5   Bladder Score 10   Bowels Score 10   Toilet Use Score 5   Transfers (Bed/Chair) Score 10   Mobility (Level Surface) Score 0   Stairs Score 5   Barthel Index Score 54   Licensure   [place] License Number  López Shepherd PT [id number]     PT TREATMENT  Time In:1140  Time Out:1150  Total Time: 10min      S:  Patient without pain  O:  -standing balance activity completed with sidestepping and backward walking  -bilateral lower extremity exercise completed sitting in bedside chair:  Ankle pumps, hip flexion, long arch quads all 10 reps each alternating activity coordination as well  A:  Patient cooperative motivated to return home and will benefit increasing functional mobility and home PT  P: home PT    Piero Gunter, PT

## 2021-07-20 NOTE — PROGRESS NOTES
Paul 43  Progress Note Valentine Villegas 1945, 66 y o  male MRN: [id number]  Unit/Bed#: [address] [place] [address] Encounter: [id number]  Primary Care Provider: Mimi Ruggiero DO   Date and time admitted to hospital: 7/17/2021 11:48 PM    * Bilateral pulmonary embolism [facility])  Assessment & Plan  Patient presented with progressively worsening SOB and arrived to the ED hypoxic to 88% on room air  He was placed on 3 L nasal cannula O2 and SpO2 improved to 90 to 95%  CTA PE study showed multiple segmental and subsegmental filling defects suspicious for pulmonary emboli bilaterally with prominence of the right ventricle suggesting right heart strain  There is a family history of ? Factor V Leiden, both brothers (+)  Venous Doppler bilateral lower extremity-  acute occlusive chronic deep vein thrombosis noted in one of the gastrocnemius veins in the proximal calf on right  Noted to have elevated proBNP, troponin  Remains hemodynamically stable  Possibly precipitated by likely malignancy, possible history of hypercoagulable state and limited activity recently  - discussed with Hematology, and switch the patient to Eliquis 5 mg b i d   Discontinue IV heparin  - 2D echocardiogram showed left and right ventricular systolic function within normal limits, and grade 1 diastolic dysfunction  No major valvular abnormalities noted  - monitor vital signs  - hematology/oncology evaluation appreciated  - monitor respiratory status    Syncope  Assessment & Plan  Reported having syncopal episode on Wednesday this time associated with shortness of breath and dizziness - he lives alone at home and is unsure how long he was out for  Reports Hx of heart murmur, no recent echo   - Diagnosed with bilateral PE's on admission, see plan above  - Monitor on telemetry-no evidence of arrhythmia so far    - EKG sinus rhythm with PACs, QtC 472  - will check orthostasis   - 2D echocardiogram showed left and right ventricular systolic function within normal limits, and grade 1 diastolic dysfunction  No major valvular abnormalities noted  Some Of the episodes of syncope were secondary to hypoglycemia  Appreciate cardiology recommendations  Respiratory failure with hypoxia ([facility] [address] )  Assessment & Plan  On admission patient was hypoxic, however patient currently is saturating 92% on room air  Underwent home oxygen protocol and did not qualify for oxygen even on exertion  Non MI troponin elevation  Assessment & Plan  Initial troponin 1 06 on presentation to the ED  EKG showing sinus rhythm with PACs with right bundle-branch block, similar to prior EKG no T-wave flattening in II and inversion III  Troponin trend noted, remains flat  Likely secondary to pulmonary embolism  · Monitor on telemetry  · Did not show any regional wall motion abnormalities  · Cardiology consult appreciated, may require ischemic workup    Urinary retention  Assessment & Plan  Recently discharged with Flores catheter, following up with Urology outpatient  Continue to monitor urine output    CKD stage 3 due to type 2 diabetes mellitus [facility]  Assessment & Plan  Creatinine 1 35 on admission, appears similar to previous labs , likely baseline  Continue to monitor with daily labs    Urinary tract infection without hematuria  Assessment & Plan  Suspected UTI due to indwelling urethral catheter  Patient has indwelling Flores catheter due to urinary retention, following outpatient with Urology  Abnormal UA  Reflex Urine culture never sent  Will finish a course of oral antibiotic with Keflex  Last urine culture positive for E coli which is pansensitive  Continue ceftriaxone while in hospital  Monitor I/Os    Elevated PSA  Assessment & Plan  PSA level noted to be elevated 68 8 concerning for prostate cancer  Patient is following with Urology awaiting further workup including biopsy, CT scan, bone scan    Will follow-up Hematology/Oncology recommendation    Dyslipidemia  Assessment & Plan  Continue pravastatin 40 mg daily    Anemia  Assessment & Plan  Chronic in nature, at baseline with hemoglobin 9 8 on admission  Remains relatively stable, no evidence of overt bleeding  Recent iron study noted, iron sat 18%  Follow-up B12, folate  Continue to monitor on anticoagulation    Type 2 diabetes mellitus, without long-term current use of insulin [facility])  Assessment & Plan  Lab Results   Component Value Date    HGBA1C 6 0 (H) 2021       Recent Labs     21  0711 21  1137 21  1550 21  2123   POCGLU 150* 182* 216* 191*       Blood Sugar Average: Last 72 hrs:  (P) 198 75     Patient now follows Endocrinology outpatient, recently had issues with multiple episodes of hypoglycemia  No further hypoglycemia  Medications have since been cut down is currently on Januvia and metformin  Hold metformin  Continue Januvia  Sliding scale insulin coverage with meals and at bedtime    Sepsis (HCC)-resolved as of 2021  Assessment & Plan  Vs SIRS secondary to thromboembolism  POA as evidence by tachycardia, tachypnea, elevated lactic, abnormal UA suggestive of UTI  - patient received ceftriaxone   - urine culture, blood culture results pending  - Procalcitonin -negative        Subjective:   I have seen and Examined the patient at the bedside  No CP or Sob  No fevers or chills, No nausea or vomiting  Overnight events reviewed with the RN  No Other complains  Patient's appetite is improving  Denies any abdominal pain or any dysuria  Did ambulate with physical therapy and denies any shortness of breath on exertion  Review of System:   Denies any CP or SOB  Denies any Cough or Cold  Denies any Fevers or chills  Denies any focal tingling numbness or weakness in any extremities  Denies any abdominal pain, Nausea or vomiting       Objective:   Temp (24hrs), Av 2 °F (36 8 °C), Min:97 4 °F (36 3 °C), Max:99 4 °F (37 4 °C)    Temp:  [97 4 °F (36 3 °C)-99 4 °F (37 4 °C)] 99 4 °F (37 4 °C)  HR:  [] 108  Resp:  [18-22] 18  BP: (101-133)/(55-91) 101/71  SpO2:  [87 %-96 %] 90 %  Body mass index is 33 65 kg/m²  Input and Output Summary (last 24 hours): Intake/Output Summary (Last 24 hours) at 7/20/2021 1648  Last data filed at 7/20/2021 1048  Gross per 24 hour   Intake 460 ml   Output 1500 ml   Net -1040 ml     I/O       07/18 0701 - 07/19 0700 07/19 0701 - 07/20 0700 07/20 0701 - 07/21 0700    P  O   240 200    I V  (mL/kg) 20 (0 2) 20 (0 2)     IV Piggyback       Total Intake(mL/kg) 20 (0 2) 260 (2 5) 200 (1 9)    Urine (mL/kg/hr) 2250 (0 9) 1950 (0 8)     Total Output 2250 1950     Net -2230 -1690 +200                 Physical Exam:   General : Alert, Awake and oriented x 2-3, NAD  Obese  Neck : Supple  Short neck  Eyes:  AMELIA, EOMI  CVS : S1, S2, RRR    R/S : Clear to auscultate anteriorly  No rales rhonchi or any wheezes heard  Abd: Soft, NT, ND  Bs+ve  Extremity: Trace pedal edema noted  Skin: No acute Rash noted  CNS: No acute FND  Additional Data:     Labs & Imaging Data Reviewed :    Results from last 7 days   Lab Units 07/20/21  0637 07/18/21  0750   WBC Thousand/uL 6 02 7 60   HEMOGLOBIN g/dL 8 7* 9 3*   HEMATOCRIT % 28 5* 31 1*   PLATELETS Thousands/uL 224 209   NEUTROS PCT %  --  62   LYMPHS PCT %  --  27   MONOS PCT %  --  8   EOS PCT %  --  2     Results from last 7 days   Lab Units 07/20/21  0637 07/18/21  0014   POTASSIUM mmol/L 4 1 4 5   CHLORIDE mmol/L 101 98*   CO2 mmol/L 32 29   BUN mg/dL 13 24   CREATININE mg/dL 1 14 1 35*   CALCIUM mg/dL 8 1* 8 2*   ALK PHOS U/L  --  75   ALT U/L  --  28   AST U/L  --  21     Results from last 7 days   Lab Units 07/18/21  0531   INR  1 07     Lab Results   Component Value Date    HGBA1C 6 0 (H) 06/30/2021      RADIOLOGY RESULTS   Final Result by  (07/20 1680)      NM bone scan whole body   Final Result by Inocencio Sanchez MD (07/19 7659)      1    Small focus of mild activity in a left mid lateral rib is nonspecific but possibly from old trauma  This may be reassessed on follow-up exam    2   Otherwise no scintigraphic evidence of osseous metastasis  Workstation performed: [id number]         CT abdomen pelvis w contrast   Final Result by Supriya Kidd MD (07/19 7789)      Marked diffuse bladder wall thickening  Flores catheter present  Minimal fullness of the ureters left greater than right  Prostate appears within normal limits  Stable mild bibasilar atelectasis  Mild bilateral symmetric gynecomastia  Workstation performed: [id number]         VAS lower limb venous duplex study, complete bilateral   Final Result by Nathan Fernandez MD (07/19 9814)      CTA ED chest PE Study   Final Result by Becky Hernandes DO (07/18 1390)      Multiple segmental and subsegmental filling defects suspicious for pulmonary emboli bilaterally (axial image 119, series 2 and 104, series 601, axial image 113, series 2, and axial image 73-80, series 2 for example)  Prominence of the right ventricle suggesting right heart strain  Nodular contour of the liver, may suggest a component of hepatic cirrhosis  Coronary atherosclerosis, and other findings as above  I personally discussed this study with Shyann Campos on 7/18/2021 at 4:40 AM                Workstation performed: [id number]         XR chest 1 view portable   Final Result by Moy Castro MD (07/18 7576)      No acute cardiopulmonary disease                 Workstation performed: [id number]             Cultures:   Blood Culture:   Lab Results   Component Value Date    BLOODCX No Growth at 48 hrs  07/18/2021    BLOODCX No Growth at 48 hrs  07/18/2021     Urine Culture:   Lab Results   Component Value Date    URINECX >100,000 cfu/ml Escherichia coli (A) 07/01/2021     Sputum Culture: No components found for: SPUTUMCX  Wound Culture: No results found for: WOUNDCULT    Last 24 Hours Medication List:   Current Facility-Administered Medications   Medication Dose Route Frequency Provider Last Rate    acetaminophen  650 mg Oral Q6H PRN Laura Nicholson PA-C      apixaban  5 mg Oral BID Olivia Barba MD      bisacodyl  10 mg Rectal Once PRN Harshad Freitas MD      calcium carbonate  1,000 mg Oral Daily PRN Laura Nicholsno PA-C      cefTRIAXone  1,000 mg Intravenous Q24H Laura Nicholson PA-C 1,000 mg (07/20/21 0327)    cyanocobalamin  1,000 mcg Intramuscular Daily Elizabeth Rojas PA-C      docusate sodium  100 mg Oral BID Harshad Freitas MD      folic acid  1 mg Oral Daily Elizabeth Rojas PA-C      insulin lispro  1-6 Units Subcutaneous TID [facility] Laura Nicholson PA-C      insulin lispro  1-6 Units Subcutaneous HS Laura Nicholson PA-C      ondansetron  4 mg Intravenous Q6H PRN Laura Nicholson PA-C      polyethylene glycol  17 g Oral Daily PRN Laura Nicholson PA-C      pravastatin  40 mg Oral HS Josiejerad Plascencia, DO      senna  2 tablet Oral HS Harshad Freitas MD      sitaGLIPtin  100 mg Oral Daily Harshad Freitas MD      tamsulosin  0 4 mg Oral Daily With Dinner Laura Nicholson PA-C         Patient is at moderate risk for morbidity and mortality due to above mentioned illness and comorbidities

## 2021-07-20 NOTE — PROGRESS NOTES
Progress Note - Cardiology   [facility]     Veronica Stephens 66 y o  male MRN: [id number]  : 1945  Unit/Bed#: [address] [place] [address] Encounter: [id number]    Assessment and Plan:   1  Bilateral pulmonary emboli:  Patient found to have multiple segmental and subsegmental filling deficits on CTA suspicious for bilateral PE  There was also prominence of the RV concerning for RV strain  -  continue IV heparin weight based protocol with close monitoring of PTT and for any bleeding    -  hemodynamically stable    -  2D echocardiogram performed on 2021 demonstrated EF of 60% with no regional wall motion abnormalities, mild aortic stenosis and mild mitral and tricuspid valve regurgitation  -  right ventricle within normal size     2  Acute DVT:  Venous duplex with right lower extremity acute occlusive DVT of the gastrocnemius vein in proximal calf  -  patient denies previous history of DVT    -  continue IV heparin as above    -  per hematology note, patient has family history of factor 5 Leiden     3  Recurrent syncope:  Patient notes multiple syncopal episodes over the last few months  Previous evaluation was thought that episodes were due to patient's hypoglycemia  -   no further episodes of dizziness per patient     4  Non MI troponin elevation secondary to PE/DVT and RV strain:  Peak troponin 1 13 and relatively flat    -  no regional wall motion abnormalities seen on echocardiogram    -   patient is scheduled for Lexiscan nuclear stress test in a       5  Diabetes:  Hemoglobin A1c 6  Appears patient does have episodes of hypoglycemia, review of chart demonstrates that at times patient will skip meals or eats very little  -  managed per primary team    -  may benefit from Meals on wheels or and other meal plan program      6  Chronic anemia:  Appears baseline hemoglobin is 9  Followed by primary team     7   Dyslipidemia:  On Pravachol     8  Elevated PSA:  Now has indwelling Flores catheter  Followed by Urology in outpatient setting      9  Chronic lower extremity edema:  Patient with +2 lower extremity edema  Per patient this is chronic     -  not on any oral diuretics as outpatient  -   appears slightly better today  Subjective / Objective:   Patient seen and examined  He states that he is feeling much better  No further episodes of dizziness or lightheadedness when moving around the unit  Continues on IV heparin  Hematology consult appreciated, will transition to oral agent prior to discharge  Patient for [facility] nuclear stress test in the a m  To rule out ischemia  Vitals: Blood pressure 105/55, pulse 89, temperature 97 9 °F (36 6 °C), resp  rate 20, height 5' 9" (1 753 m), weight 103 kg (227 lb 14 4 oz), SpO2 96 %  Vitals:    07/17/21 2350 07/18/21 1228   Weight: 105 kg (231 lb 0 7 oz) 103 kg (227 lb 14 4 oz)     Body mass index is 33 65 kg/m²  BP Readings from Last 3 Encounters:   07/20/21 105/55   07/16/21 124/62   07/13/21 128/70     Orthostatic Blood Pressures      Most Recent Value   Blood Pressure  105/55 filed at 07/20/2021 1129   Patient Position - Orthostatic VS  Standing - Orthostatic VS filed at 07/19/2021 1102        I/O       07/18 0701 - 07/19 0700 07/19 0701 - 07/20 0700 07/20 0701 - 07/21 0700    P  O   240     I V  (mL/kg) 20 (0 2) 20 (0 2)     IV Piggyback       Total Intake(mL/kg) 20 (0 2) 260 (2 5)     Urine (mL/kg/hr) 2250 (0 9) 1950 (0 8)     Total Output 2250 1950     Net -2230 -1690                Invasive Devices     Peripheral Intravenous Line            Peripheral IV 07/18/21 Left Antecubital 2 days    Peripheral IV 07/18/21 Right Antecubital 2 days          Drain            Urethral Catheter 2 days                  Intake/Output Summary (Last 24 hours) at 7/20/2021 1305  Last data filed at 7/20/2021 0600  Gross per 24 hour   Intake 260 ml   Output 1500 ml   Net -1240 ml         Physical Exam:   Physical Exam  Vitals and nursing note reviewed  Constitutional:       General: He is not in acute distress  Appearance: Normal appearance  He is well-developed  He is obese  HENT:      Head: Normocephalic  Right Ear: External ear normal       Left Ear: External ear normal    Eyes:      General: No scleral icterus  Right eye: No discharge  Left eye: No discharge  Pupils: Pupils are equal, round, and reactive to light  Neck:      Thyroid: No thyromegaly  Cardiovascular:      Rate and Rhythm: Normal rate and regular rhythm  Pulses: Normal pulses  Heart sounds: Murmur heard  Pulmonary:      Effort: Pulmonary effort is normal  No accessory muscle usage or respiratory distress  Breath sounds: Examination of the right-lower field reveals decreased breath sounds  Examination of the left-lower field reveals decreased breath sounds  Decreased breath sounds present  No wheezing  Abdominal:      General: Bowel sounds are normal  There is no distension  Palpations: Abdomen is soft  Musculoskeletal:      Cervical back: Normal range of motion and neck supple  Right lower le+ Edema present  Left lower le+ Edema present  Comments: Right slightly greater than left   Skin:     General: Skin is warm and dry  Capillary Refill: Capillary refill takes less than 2 seconds  Neurological:      General: No focal deficit present  Mental Status: He is alert and oriented to person, place, and time  Mental status is at baseline  Psychiatric:         Mood and Affect: Mood normal          Behavior: Behavior normal          Thought Content:  Thought content normal          Judgment: Judgment normal                    Medications/ Allergies:     Current Facility-Administered Medications   Medication Dose Route Frequency Provider Last Rate    acetaminophen  650 mg Oral Q6H PRN Ioana Lambert PA-C      apixaban  5 mg Oral BID Yesi Hoover MD      bisacodyl  10 mg Rectal Once PRN Narcisa Juárez MD      calcium carbonate  1,000 mg Oral Daily PRN Gordy Prado PA-C      cefTRIAXone  1,000 mg Intravenous Q24H Gordy Prado PA-C 1,000 mg (07/20/21 0327)    cyanocobalamin  1,000 mcg Intramuscular Daily Elizabeth Rojas PA-C      docusate sodium  100 mg Oral BID Narcisa Juárez MD      folic acid  1 mg Oral Daily Elizabeth Rojas PA-C      insulin lispro  1-6 Units Subcutaneous TID [facility] Gordy Prado PA-C      insulin lispro  1-6 Units Subcutaneous HS Gordy Prado PA-C      ondansetron  4 mg Intravenous Q6H PRN Gordy Prado PA-C      polyethylene glycol  17 g Oral Daily PRN Gordy Prado PA-C      pravastatin  40 mg Oral HS Josiejerad Plascencia DO      senna  2 tablet Oral HS Narcisa Juárez MD      sitaGLIPtin  100 mg Oral Daily Narcisa Juárez MD      tamsulosin  0 4 mg Oral Daily With Dinner Gordy Prado PA-C       acetaminophen, 650 mg, Q6H PRN  bisacodyl, 10 mg, Once PRN  calcium carbonate, 1,000 mg, Daily PRN  ondansetron, 4 mg, Q6H PRN  polyethylene glycol, 17 g, Daily PRN      No Known Allergies    VTE Pharmacologic Prophylaxis:   Sequential compression device (Venodyne)  and heparin    Labs:   Troponins:  Results from last 7 days   Lab Units 07/18/21  1432 07/18/21  1102 07/18/21  0750   TROPONIN I ng/mL 1 12* 1 13* 1 12*     CBC with diff:  Results from last 7 days   Lab Units 07/20/21  0637 07/19/21  0459 07/18/21  0750 07/18/21  0531 07/18/21  0014   WBC Thousand/uL 6 02 6 55 7 60 6 92 8 90   HEMOGLOBIN g/dL 8 7* 8 9* 9 3* 8 8* 9 8*   HEMATOCRIT % 28 5* 30 0* 31 1* 28 8* 31 7*   MCV fL 89 90 91 89 88   PLATELETS Thousands/uL 224 219 209 208 241   MCH pg 27 2 26 8 27 1 27 2 27 3   MCHC g/dL 30 5* 29 7* 29 9* 30 6* 30 9*   RDW % 15 2* 15 6* 15 5* 15 5* 15 3*   MPV fL 10 5 11 2 10 7 10 6 10 5   NRBC AUTO /100 WBCs  --   --  0  --  0     CMP:  Results from last 7 days   Lab Units 07/20/21  0307 07/19/21  0459 07/18/21  0750 07/18/21  0014   SODIUM mmol/L 138 140 137 138   POTASSIUM mmol/L 4 1 4 3 4 5 4 5   CHLORIDE mmol/L 101 102 100 98*   CO2 mmol/L 32 31 29 29   ANION GAP mmol/L 5 7 8 11   BUN mg/dL 13 15 21 24   CREATININE mg/dL 1 14 1 10 1 16 1 35*   CALCIUM mg/dL 8 1* 8 2* 8 2* 8 2*   AST U/L  --   --   --  21   ALT U/L  --   --   --  28   ALK PHOS U/L  --   --   --  75   TOTAL PROTEIN g/dL  --   --   --  7 1   ALBUMIN g/dL  --   --   --  2 8*   TOTAL BILIRUBIN mg/dL  --   --   --  0 25   EGFR ml/min/1 73sq m 62 64 60 50     Coags:  Results from last 7 days   Lab Units 07/20/21  0904 07/20/21  0102 07/19/21  1729 07/19/21  0930 07/19/21  0256 07/18/21  1856 07/18/21  1115 07/18/21  0531   PTT seconds 155* 38* 95* 56* 92* 115* 184* 26   INR   --   --   --   --   --   --   --  1 07     TSH:  Results from last 7 days   Lab Units 07/18/21  0014   TSH 3RD GENERATON uIU/mL 2 943     NT-proBNP:   Recent Labs     07/18/21  0014   NTBNP 2,051*        Imaging & Testing   I have personally reviewed pertinent reports  XR chest 1 view portable    Result Date: 7/18/2021  Narrative: CHEST INDICATION:   sob  Pulmonary emboli on subsequent chest CT  COMPARISON: Chest radiograph from 6/29/2021 and chest CT from 7/18/2021  EXAM PERFORMED/VIEWS:  XR CHEST PORTABLE  FINDINGS: Cardiomediastinal silhouette normal  Low lung volumes with mild bibasilar atelectasis  No effusion or pneumothorax  Osseous structures normal for age  Impression: No acute cardiopulmonary disease  Workstation performed: [id number]       NM bone scan whole body    Result Date: 7/19/2021  Narrative: BONE SCAN  WHOLE BODY INDICATION: Elevated PSA, now with BL PE, R/O metastatic Cancer PREVIOUS FILM CORRELATION:    No prior pertinent studies for comparison  TECHNIQUE:   This study was performed following the intravenous administration of 26 9 mCi Tc-99m labeled MDP    Delayed, anterior and posterior whole body images were acquired, 2-3 hours after radiopharmaceutical administration  FINDINGS: Small focus of mild activity in a left mid lateral rib is nonspecific but possibly from old trauma  Probable degenerative change in a right lower costovertebral junction  There is otherwise an unremarkable distribution of the radiotracer  No additional lesions that would be concerning for osseous metastases  Impression: 1  Small focus of mild activity in a left mid lateral rib is nonspecific but possibly from old trauma  This may be reassessed on follow-up exam  2   Otherwise no scintigraphic evidence of osseous metastasis  Workstation performed: [id number]     CTA ED chest PE Study    Result Date: 7/18/2021  Narrative: CTA - CHEST WITH IV CONTRAST - PULMONARY ANGIOGRAM INDICATION:   PE suspected, high pretest prob sob  COMPARISON: Chest x-ray dated the same  TECHNIQUE: CTA examination of the chest was performed using angiographic technique according to a protocol specifically tailored to evaluate for pulmonary embolism  Axial, sagittal, and coronal 2D reformatted images were created from the source data and  submitted for interpretation  In addition, coronal 3D MIP postprocessing was performed on the acquisition scanner  Radiation dose length product (DLP) for this visit:  723 mGy-cm   This examination, like all CT scans performed in the [facility], was performed utilizing techniques to minimize radiation dose exposure, including the use of iterative reconstruction and automated exposure control  IV Contrast:  85 mL of iohexol (OMNIPAQUE)  FINDINGS: PULMONARY ARTERIAL TREE:  Some images are suboptimal secondary to respiratory motion which decreases sensitivity for evaluation of peripheral pulmonary emboli  Filling defects are seen in the segmental and subsegmental branches in the left upper and left lower lobes (axial image 119, series 2 and 104, series 601, for example)    Additional segmental/subsegmental pulmonary emboli may be present in the right lower lobe (axial image 113, series 2) as well as the right upper lobe (axial image 73-80, series 2)  LUNGS:  Linear scarring/atelectasis dependently and in the bilateral lower lung field  Lungs otherwise appear grossly clear  PLEURA:  Unremarkable  HEART/GREAT VESSELS: Prominence of the right ventricle suggesting right heart strain  Moderate to severe coronary atherosclerosis  Mild aortic atherosclerosis; no aortic aneurysm  Diameter of the ascending aorta is top normal  MEDIASTINUM AND TRINI:  Inspissated secretions in the distal trachea  Large airways appear patent  CHEST WALL AND LOWER NECK:   No acute fracture seen  Bilateral gynecomastia  Otherwise grossly unremarkable  VISUALIZED STRUCTURES IN THE UPPER ABDOMEN:  Nodular contour of the liver, may suggest a component of hepatic cirrhosis  OSSEOUS STRUCTURES: Generalized osteopenia  No acute fracture or destructive osseous lesion  Impression: Multiple segmental and subsegmental filling defects suspicious for pulmonary emboli bilaterally (axial image 119, series 2 and 104, series 601, axial image 113, series 2, and axial image 73-80, series 2 for example)  Prominence of the right ventricle suggesting right heart strain  Nodular contour of the liver, may suggest a component of hepatic cirrhosis  Coronary atherosclerosis, and other findings as above  I personally discussed this study with Heather Etienne on 7/18/2021 at 4:40 AM  Workstation performed: [id number]     VAS lower limb venous duplex study, complete bilateral    Result Date: 7/19/2021  Narrative:  [facility] REPORT CLINICAL: Indications: Patient presents with recent discovery of pulmonary embolism and physician wants to determine potential source  Patient reports SOB Risk Factors The patient has no history of DVT    FINDINGS:  Segment        Right                   Left                          Impression              Impression       CFV            Normal (Patent)         Normal (Patent)  Gastrocnemius  Occlusive Subsegmental                      CONCLUSION:  Impression: RIGHT LOWER LIMB: There is evidence of acute occlusive chronic deep vein thrombosis noted in one of the gastrocnemius veins in the proximal calf  No evidence of superficial thrombophlebitis noted  Doppler evaluation shows a normal response to augmentation maneuvers  Popliteal, posterior tibial and anterior tibial arterial Doppler waveforms are triphasic/biphasic  LEFT LOWER LIMB: No evidence of acute or chronic deep vein thrombosis  No evidence of superficial thrombophlebitis noted  Doppler evaluation shows a normal response to augmentation maneuvers  Popliteal, posterior tibial and anterior tibial arterial Doppler waveforms are triphasic/biphasic  Technical findings were given to Dr Kinjal Kent  SIGNATURE: Electronically Signed by: Jairo Fox on 2021-07-19 09:19:20 AM    CT abdomen pelvis w contrast    Result Date: 7/19/2021  Narrative: CT ABDOMEN AND PELVIS WITH IV CONTRAST INDICATION:   Prostate cancer, initial staging, high risk Staging for suspected Prostate Cancer PSA = 68  COMPARISON:  July 18, 21 TECHNIQUE:  CT examination of the abdomen and pelvis was performed  Axial, sagittal, and coronal 2D reformatted images were created from the source data and submitted for interpretation  Radiation dose length product (DLP) for this visit:  1176 89 mGy-cm   This examination, like all CT scans performed in the [facility], was performed utilizing techniques to minimize radiation dose exposure, including the use of iterative reconstruction and automated exposure control  IV Contrast:  100 mL of iohexol (OMNIPAQUE) 50 mL of iohexol (OMNIPAQUE) Enteric Contrast:  Enteric contrast was not administered  FINDINGS: ABDOMEN LOWER CHEST:  Dependent mild subsegmental atelectasis in both lung bases and right middle lobe without significant change  Trace bilateral pleural fluid   Mild bilateral symmetric gynecomastia  LIVER/BILIARY TREE:  Unremarkable  GALLBLADDER:  No calcified gallstones  No pericholecystic inflammatory change  SPLEEN:  Unremarkable  PANCREAS:  Unremarkable  ADRENAL GLANDS:  Unremarkable  KIDNEYS/URETERS:  Mild fullness of the ureters, left greater than right, no pelvocaliectasis  Presumably related to below described marked bladder wall thickening  STOMACH AND BOWEL:  Unremarkable  APPENDIX:  No findings to suggest appendicitis  ABDOMINOPELVIC CAVITY:  No ascites  No pneumoperitoneum  No lymphadenopathy  VESSELS:  Unremarkable for patient's age  PELVIS REPRODUCTIVE ORGANS:  Prostate appears within normal limits  No discrete measurable lesion  No regional adenopathy  URINARY BLADDER:  Flores catheter present  Bladder wall is diffusely markedly abnormally thickened  Correlate with clinical findings for thickening related to cystitis or underlying neoplasm  ABDOMINAL WALL/INGUINAL REGIONS:  Unremarkable  OSSEOUS STRUCTURES:  Multilevel degenerative changes  Moderate compression deformity at L4 and mild at T12  No lytic or sclerotic lesions evident  Impression: Marked diffuse bladder wall thickening  Flores catheter present  Minimal fullness of the ureters left greater than right  Prostate appears within normal limits  Stable mild bibasilar atelectasis  Mild bilateral symmetric gynecomastia  Workstation performed: [id number]        EKG / Monitor: Personally reviewed      Sinus rhythm    Cardiac testing:   Results for orders placed during the hospital encounter of 21    Echo complete with contrast if indicated    Narrative  [address]  [address] [address]  [phone]    Transthoracic Echocardiogram  2D, M-mode, Doppler, and Color Doppler    Study date:  2021    Patient: Ede Clark  MR number: [id number]  Account number: [de-identified]  : [id number]  Age: 66 years  Gender: Male  Status: Inpatient  Location: Bedside  Height: 69 in  Weight: 226 6 lb  BP: 122/ 74 mmHg    Indications: Pulmonary Embolism    Diagnoses: I74 9 - Embolism and thrombosis of unspecified artery    Sonographer:  Leena Khoury RDCS  Referring Physician:  Alcus Dance, PA-C  Group:  [facility]  Interpreting Physician:  Kina Patino DO    SUMMARY    LEFT VENTRICLE:  Systolic function was normal by visual assessment  Ejection fraction was estimated in the range of 55 % to 60 %  There were no regional wall motion abnormalities  There was mild concentric hypertrophy  Doppler parameters were consistent with abnormal left ventricular relaxation (grade 1 diastolic dysfunction)  MITRAL VALVE:  There was mild regurgitation  AORTIC VALVE:  The valve was trileaflet  Leaflets exhibited normal thickness, mild calcification, and moderately reduced cuspal separation  There was mild stenosis  There was no regurgitation  Valve mean gradient was 11 mmHg  Aortic valve area was 1 7 cmï¾² by the continuity equation  TRICUSPID VALVE:  There was mild regurgitation  Pulmonary artery systolic pressure was moderately increased  HISTORY: PRIOR HISTORY: Diabetes Mellitus; Chronic Kidney Disease; Anemia; Dyslipidemia; Sepsis; Non MI Troponin Elevation; Hypertension    PROCEDURE: The procedure was performed at the bedside  This was a routine study  The transthoracic approach was used  The study included complete 2D imaging, M-mode, complete spectral Doppler, and color Doppler  The heart rate was 79 bpm,  at the start of the study  Intravenous contrast ( 0 4ml/min Definity in NSS) was administered  Intravenous contrast was administered to opacify the left ventricle  Echocardiographic views were limited due to poor acoustic window  availability, decreased penetration, and lung interference  This was a technically difficult study  LEFT VENTRICLE: Size was normal  Systolic function was normal by visual assessment  Ejection fraction was estimated in the range of 55 % to 60 %  There were no regional wall motion abnormalities  There was mild concentric hypertrophy  DOPPLER: Doppler parameters were consistent with abnormal left ventricular relaxation (grade 1 diastolic dysfunction)  RIGHT VENTRICLE: The size was normal  Systolic function was normal  DOPPLER: Systolic pressure was within the normal range  LEFT ATRIUM: Size was normal  No thrombus was identified  RIGHT ATRIUM: Size was normal     MITRAL VALVE: Valve structure was normal  There was normal leaflet separation  No echocardiographic evidence for prolapse  DOPPLER: The transmitral velocity was within the normal range  There was no evidence for stenosis  There was mild  regurgitation  AORTIC VALVE: The valve was trileaflet  Leaflets exhibited normal thickness, mild calcification, and moderately reduced cuspal separation  DOPPLER: Transaortic velocity was increased due to valvular stenosis  There was mild stenosis  There  was no regurgitation  TRICUSPID VALVE: The valve structure was normal  There was normal leaflet separation  DOPPLER: The transtricuspid velocity was within the normal range  There was mild regurgitation  Pulmonary artery systolic pressure was moderately  increased  Estimated peak PA pressure was 64 mmHg  PULMONIC VALVE: Leaflets exhibited normal thickness, no calcification, and normal cuspal separation  DOPPLER: The transpulmonic velocity was within the normal range  There was no regurgitation  PERICARDIUM: There was no thickening  There was no pericardial effusion  AORTA: The root exhibited normal size      PULMONARY ARTERY: The size was normal  The morphology appeared normal     MEASUREMENT TABLES    DOPPLER MEASUREMENTS  Aortic valve   (Reference normals)  Peak gradient   21 mmHg   (--)  Mean gradient   11 mmHg   (--)  Valve area, cont   1 7 cmï¾²   (--)    SYSTEM MEASUREMENT TABLES    2D  EF (Teich): 54 78 %  %FS: 28 23 %  Ao Diam: 3 71 cm  Ao asc: 4 23 cm  EDV(Teich): 87 43 ml  ESV(Teich): 39 54 ml  IVSd: 1 19 cm  LA Area: 13 05 cm2  LA Diam: 2 75 cm  LVEDV MOD A4C: 78 34 ml  LVEF MOD A4C: 80 3 %  LVESV MOD A4C: 15 43 ml  LVIDd: 4 39 cm  LVIDs: 3 15 cm  LVLd A4C: 7 01 cm  LVLs A4C: 5 23 cm  LVOT Diam: 1 97 cm  LVPWd: 1 23 cm  RA Area: 13 4 cm2  RVIDd: 3 82 cm  SV (Teich): 47 89 ml  SV MOD A4C: 62 91 ml    CW  AV Env  Ti: 298 13 ms  AV VTI: 45 96 cm  AV Vmax: 2 23 m/s  AV Vmean: 1 54 m/s  AV maxP 85 mmHg  AV meanPG: 10 84 mmHg  TR Vmax: 3 77 m/s  TR maxP 93 mmHg    MM  TAPSE: 2 2 cm    PW  MV E/A Ratio: 0 85  E' Sept: 0 06 m/s  E/E' Sept: 12 19  LVOT Env  Ti: 365 37 ms  LVOT VTI: 25 4 cm  LVOT Vmax: 1 2 m/s  LVOT Vmean: 0 7 m/s  LVOT maxP 76 mmHg  LVOT meanP 51 mmHg  MV A Melvin: 0 93 m/s  MV Dec Robertson: 3 07 m/s2  MV DecT: 255 81 ms  MV E Melvin: 0 79 m/s  MV PHT: 74 19 ms  MVA By PHT: 2 97 cm2    [facility] Acc[facility]chocardi[facility]    Prepared and electronically signed by    Michelle Thompson DO  Signed 2021 10:21:25        Paul Green, [facility]  Cardiology      "This note has been constructed using a voice recognition system  Therefore there may be syntax, spelling, and/or grammatical errors   Please call if you have any questions  "

## 2021-07-20 NOTE — PROGRESS NOTES
Medical Oncology/Hematology Progress Note  Jaylen Pittman, 66 y  o , 1945  [address] [phone]  07/20/21    Assessment and Plan:    1  B/L Pulmonary Emboli and DVT of the gastrocnemius vein of the proximal calf with family history of Factor V Leiden  Patient is breathing well and still remains on heparin  When it is appropriate transition to NOAC is advisable- Eliquis 5 mg PO twice a day  Provoking features such as factor five Leiden gene mutation versus prothrombin gene mutation are pending  Patient does not have metastatic cancer and therefore unlikely to truly contribute to pulmonary embolism  2   Normocytic Anemia, baseline hemoglobin of 9 g/dL  Patient's previous vitamin studies did not demonstrate iron deficiency that demonstrated an anemia of chronic inflammation likely related to patient's other baseline medical issues  However, nutrition analysis was requested  Patient's B12 and folate were on the low end of normal   Supplementation has been advised  Free light chain ratio and SPEP are still pending  I will follow up with the patient with the results of this  Either way, as long as patient's hemoglobin remained stable during hospitalization, workup can continue as an outpatient  Recommendations:  · B12 1000 micro g IM injection daily x3 days; followed by B12 1000 micro g by mouth daily  Sublingual formulation of tablet is recommended  · Folate 1 mg p o  daily    3  Elevated PSA concerning for prostate cancer  Systemic workup including CT of the abdomen and pelvis and nuclear medicine bone scan did not result in distant metastatic disease  In fact CT of the abdomen and pelvis demonstrated a normal-sized prostate with some bladder wall thickening  Patient will need to follow-up with Urology for potential cystoscopy as well as prostate biopsy  Patient is aware findings of CT and nuclear medicine scan    Patient understands that this does not show metastatic prostate cancer and that biopsy is still needed to assess localized prostate cancer   _____________________________________________________________    Reason for Consultation: B/L pulmonary embolism  Elevated PSA  Anemia    Chief Complaint   Patient presents with    Shortness of Breath     Pt brought to ED by EMS for low O2 saturation and SOB  Pt states he had a syncopal episode with a fall and injury to L rib cage on Wed  Pt discharged for Yannick Nelson for same two weeks ago  No fever chills n/v/d  History of present illness: This is a 68-year-old male with past medical history of diabetes mellitus, hyperlipidemia, hypertension, BPH and edema who presented to the emergency room on 7/18/21 secondary to a syncopal episode and trouble breathing      7/18/2021:  CTA findings included Multiple segmental and subsegmental filling defects suspicious for pulmonary emboli bilaterally +  Prominence of the right ventricle suggesting right heart strain  +  Nodular contour of the liver, may suggest a component of hepatic cirrhosis  +  Coronary atherosclerosis, and other findings as above      7/18/2021:  Lower extremity VAS:  RIGHT LOWER LIMB:  There is evidence of acute occlusive chronic deep vein thrombosis noted in one  of the gastrocnemius veins in the proximal calf  No evidence of superficial thrombophlebitis noted  Doppler evaluation shows a normal response to augmentation maneuvers  Popliteal, posterior tibial and anterior tibial arterial Doppler waveforms are  triphasic/biphasic      LEFT LOWER LIMB:  No evidence of acute or chronic deep vein thrombosis  No evidence of superficial thrombophlebitis noted  Doppler evaluation shows a normal response to augmentation maneuvers  Popliteal, posterior tibial and anterior tibial arterial Doppler waveforms are  triphasic/biphasic         Earlier this month this patient went to his primary doctor and to the urologist   Patient's PSA was elevated at 68  Patient had been recommended to follow-up with additional imaging tests to overall out metastatic spread  Patient was also found to be somewhat anemic  Workup did not demonstrate iron deficiency but did demonstrate anemia of chronic inflammation  Oncology work up during hospitalization:  7/19/2021  NM bone Scan : 1   Small focus of mild activity in a left mid lateral rib is nonspecific but possibly from old trauma  This may be reassessed on follow-up exam   2   Otherwise no scintigraphic evidence of osseous metastasis  7/19/2021: CT AP  Marked diffuse bladder wall thickening  Flores catheter present  Minimal fullness of the ureters left greater than right  Prostate appears within normal limits  Stable mild bibasilar atelectasis  Mild bilateral symmetric gynecomastia  Lab Results   Component Value Date    SVUSIUOK97 141 07/19/2021     Lab Results   Component Value Date    FOLATE 3 4 07/19/2021     Interval history:  Resting  Feeling well  Review of Systems   Constitutional: Negative for activity change, appetite change, fatigue and fever  HENT: Positive for hearing loss  Negative for nosebleeds  Respiratory: Negative for cough, choking and shortness of breath  Cardiovascular: Negative for chest pain, palpitations and leg swelling  Gastrointestinal: Negative for abdominal distention, abdominal pain, anal bleeding, blood in stool, constipation, diarrhea, nausea and vomiting  Endocrine: Negative for cold intolerance  Genitourinary: Negative for hematuria  Musculoskeletal: Negative for myalgias  Skin: Negative for color change, pallor and rash  Allergic/Immunologic: Negative for immunocompromised state  Neurological: Negative for headaches  Hematological: Negative for adenopathy  Does not bruise/bleed easily  All other systems reviewed and are negative      Past medical history:   Past Medical History:   Diagnosis Date    Arthritis     BPH associated with nocturia     Diabetes mellitus ([address] )     Edema     lower legs    Hearing aid worn     bilateral    Hyperlipidemia     Hypertension     controlled    Tremor of both hands      Past surgical history:   Past Surgical History:   Procedure Laterality Date    CATARACT EXTRACTION      SC XCAPSL CTRC RMVL INSJ IO LENS PROSTH W/O ECP Right 8/6/2018    Procedure: EXTRACTION EXTRACAPSULAR CATARACT PHACO INTRAOCULAR LENS (IOL); Surgeon: Vijay Thomas MD;  Location: [facility] MAIN OR;  Service: Ophthalmology    SC XCAPSL CTRC RMVL INSJ IO LENS PROSTH W/O ECP Left 10/1/2018    Procedure: EXTRACTION EXTRACAPSULAR CATARACT PHACO INTRAOCULAR LENS (IOL);   Surgeon: Vijay Thomas MD;  Location: [facility] MAIN OR;  Service: Ophthalmology     Allergies: No Known Allergies    Home medications:   Medications Prior to Admission   Medication    Continuous Blood Gluc Sensor (FreeStyle [place] 14 Day Sensor) MISC    metFORMIN (GLUCOPHAGE) 500 mg tablet    pravastatin (PRAVACHOL) 40 mg tablet    sitaGLIPtin (JANUVIA) 100 mg tablet    tamsulosin (FLOMAX) 0 4 mg       Hospital medications:   Current Facility-Administered Medications:     acetaminophen (TYLENOL) tablet 650 mg, 650 mg, Oral, Q6H PRN, Renelle Self, PA-C    bisacodyl (DULCOLAX) rectal suppository 10 mg, 10 mg, Rectal, Once PRN, Melody Watkins MD    calcium carbonate (TUMS) chewable tablet 1,000 mg, 1,000 mg, Oral, Daily PRN, Renelle Self, PA-C    cefTRIAXone (ROCEPHIN) IVPB (premix in dextrose) 1,000 mg 50 mL, 1,000 mg, Intravenous, Q24H, Renhremann Self, PA-C, Last Rate: 100 mL/hr at 07/20/21 0327, 1,000 mg at 07/20/21 0327    cyanocobalamin injection 1,000 mcg, 1,000 mcg, Intramuscular, Daily, Elizabeth Rojas PA-C    docusate sodium (COLACE) capsule 100 mg, 100 mg, Oral, BID, Melody Watkins MD, 100 mg at 66/56/03 6768    folic acid (FOLVITE) tablet 1 mg, 1 mg, Oral, Daily, Elizabeth Rojas PA-C    heparin (porcine) 25,000 units in 0 45% NaCl 250 mL infusion (premix), 3-30 Units/kg/hr (Order-Specific), Intravenous, Titrated, Mardel Jorge, PA-C, Last Rate: 15 6 mL/hr at 07/20/21 0208, 14 857 Units/kg/hr at 07/20/21 0208    heparin (porcine) injection 4,200 Units, 4,200 Units, Intravenous, Q1H PRN, Mardel Jorge, PA-C, 4,200 Units at 07/19/21 1043    heparin (porcine) injection 8,400 Units, 8,400 Units, Intravenous, Q1H PRN, Mardel Jorge, PA-C, 8,400 Units at 07/20/21 0204    insulin lispro (HumaLOG) 100 units/mL subcutaneous injection 1-6 Units, 1-6 Units, Subcutaneous, TID AC, 2 Units at 07/20/21 6519 **AND** Fingerstick Glucose (POCT), , , TID AC, Mardel Jorge, PA-C    insulin lispro (HumaLOG) 100 units/mL subcutaneous injection 1-6 Units, 1-6 Units, Subcutaneous, HS, Mardel Jorge, PA-C, 2 Units at 07/19/21 2225    ondansetron (ZOFRAN) injection 4 mg, 4 mg, Intravenous, Q6H PRN, Mardel Jorge, PA-C    polyethylene glycol (MIRALAX) packet 17 g, 17 g, Oral, Daily PRN, Mardel Jorge, PA-C, 17 g at 07/19/21 1430    pravastatin (PRAVACHOL) tablet 40 mg, 40 mg, Oral, HS, Josie Plascencia DO, 40 mg at 07/19/21 2223    senna (SENOKOT) tablet 17 2 mg, 2 tablet, Oral, HS, Gorge Escamilla MD, 17 2 mg at 07/19/21 2223    sitaGLIPtin (JANUVIA) tablet 100 mg, 100 mg, Oral, Daily, Gorge Escamilla MD, 100 mg at 07/20/21 0829    tamsulosin (FLOMAX) capsule 0 4 mg, 0 4 mg, Oral, Daily With Dinner, Robert Jorge, PA-C, 0 4 mg at 07/19/21 1553    Social history:   Social History     Tobacco Use    Smoking status: Never Smoker    Smokeless tobacco: Never Used   Vaping Use    Vaping Use: Never used   Substance Use Topics    Alcohol use: Never    Drug use: Never       Family history:   Family History   Problem Relation Age of Onset    Cancer Mother     Cancer Father         lung-smoker    Early death Brother     Cancer Brother         "bone cancer"     Vitals:  Vitals:    07/20/21 0735   BP: 118/61   Pulse: 73   Resp:    Temp: (!) 97 4 °F (36 3 °C)   SpO2: 94%     Physical Exam  Vitals and nursing note reviewed  Constitutional:       Appearance: He is well-developed  HENT:      Head: Normocephalic and atraumatic  Eyes:      Conjunctiva/sclera: Conjunctivae normal    Cardiovascular:      Rate and Rhythm: Normal rate  Heart sounds: No murmur heard  Pulmonary:      Effort: Pulmonary effort is normal  No respiratory distress  Breath sounds: Normal breath sounds  Abdominal:      Palpations: Abdomen is soft  Musculoskeletal:      Cervical back: Neck supple  Neurological:      Mental Status: He is alert  Labs and Pertinent Reports Reviewed  Lab Results   Component Value Date    WBC 6 02 07/20/2021    HGB 8 7 (L) 07/20/2021    HCT 28 5 (L) 07/20/2021    MCV 89 07/20/2021     07/20/2021     Lab Results   Component Value Date    SODIUM 138 07/20/2021    K 4 1 07/20/2021     07/20/2021    CO2 32 07/20/2021    AGAP 5 07/20/2021    BUN 13 07/20/2021    CREATININE 1 14 07/20/2021    GLUC 180 (H) 07/20/2021    GLUF 159 (H) 06/30/2021    CALCIUM 8 1 (L) 07/20/2021    AST 21 07/18/2021    ALT 28 07/18/2021    ALKPHOS 75 07/18/2021    TP 7 1 07/18/2021    TBILI 0 25 07/18/2021    EGFR 62 07/20/2021     Lab Results   Component Value Date    PSA 68 8 (H) 07/02/2021     Lab Results   Component Value Date    VHTEXYOD21 141 07/19/2021     Lab Results   Component Value Date    IRON 40 (L) 06/30/2021    TIBC 228 (L) 06/30/2021    FERRITIN 88 06/30/2021     Lab Results   Component Value Date    FOLATE 3 4 07/19/2021       Please note: This report has been generated by voice recognition software system  Therefore, there may be syntax, spelling and/or grammatical errors  Please call if you have any questions

## 2021-07-21 ENCOUNTER — APPOINTMENT (INPATIENT)
Dept: RADIOLOGY | Facility: HOSPITAL | Age: 76
DRG: 698 | End: 2021-07-21
Payer: MEDICARE

## 2021-07-21 ENCOUNTER — APPOINTMENT (INPATIENT)
Dept: NON INVASIVE DIAGNOSTICS | Facility: HOSPITAL | Age: 76
DRG: 698 | End: 2021-07-21
Payer: MEDICARE

## 2021-07-21 VITALS
BODY MASS INDEX: 33.75 KG/M2 | RESPIRATION RATE: 20 BRPM | DIASTOLIC BLOOD PRESSURE: 62 MMHG | WEIGHT: 227.9 LBS | TEMPERATURE: 97.7 F | OXYGEN SATURATION: 94 % | HEIGHT: 69 IN | SYSTOLIC BLOOD PRESSURE: 128 MMHG | HEART RATE: 113 BPM

## 2021-07-21 LAB
ANION GAP SERPL CALCULATED.3IONS-SCNC: 5 MMOL/L (ref 4–13)
BASOPHILS # BLD AUTO: 0.02 THOUSANDS/ΜL (ref 0–0.1)
BASOPHILS NFR BLD AUTO: 0 % (ref 0–1)
BUN SERPL-MCNC: 14 MG/DL (ref 5–25)
CALCIUM SERPL-MCNC: 8.1 MG/DL (ref 8.3–10.1)
CHEST PAIN STATEMENT: NORMAL
CHLORIDE SERPL-SCNC: 101 MMOL/L (ref 100–108)
CO2 SERPL-SCNC: 32 MMOL/L (ref 21–32)
CREAT SERPL-MCNC: 1.1 MG/DL (ref 0.6–1.3)
EOSINOPHIL # BLD AUTO: 0.12 THOUSAND/ΜL (ref 0–0.61)
EOSINOPHIL NFR BLD AUTO: 2 % (ref 0–6)
ERYTHROCYTE [DISTWIDTH] IN BLOOD BY AUTOMATED COUNT: 15.1 % (ref 11.6–15.1)
GFR SERPL CREATININE-BSD FRML MDRD: 64 ML/MIN/1.73SQ M
GLUCOSE SERPL-MCNC: 149 MG/DL (ref 65–140)
GLUCOSE SERPL-MCNC: 153 MG/DL (ref 65–140)
GLUCOSE SERPL-MCNC: 207 MG/DL (ref 65–140)
HCT VFR BLD AUTO: 28.9 % (ref 36.5–49.3)
HGB BLD-MCNC: 8.6 G/DL (ref 12–17)
IMM GRANULOCYTES # BLD AUTO: 0.01 THOUSAND/UL (ref 0–0.2)
IMM GRANULOCYTES NFR BLD AUTO: 0 % (ref 0–2)
KAPPA LC FREE SER-MCNC: 44.9 MG/L (ref 3.3–19.4)
KAPPA LC FREE/LAMBDA FREE SER: 0.34 {RATIO} (ref 0.26–1.65)
LAMBDA LC FREE SERPL-MCNC: 132.9 MG/L (ref 5.7–26.3)
LYMPHOCYTES # BLD AUTO: 1.63 THOUSANDS/ΜL (ref 0.6–4.47)
LYMPHOCYTES NFR BLD AUTO: 29 % (ref 14–44)
MAX DIASTOLIC BP: 68 MMHG
MAX HEART RATE: 105 BPM
MAX PREDICTED HEART RATE: 143 BPM
MAX. SYSTOLIC BP: 133 MMHG
MCH RBC QN AUTO: 26.6 PG (ref 26.8–34.3)
MCHC RBC AUTO-ENTMCNC: 29.8 G/DL (ref 31.4–37.4)
MCV RBC AUTO: 90 FL (ref 82–98)
MONOCYTES # BLD AUTO: 0.48 THOUSAND/ΜL (ref 0.17–1.22)
MONOCYTES NFR BLD AUTO: 9 % (ref 4–12)
NEUTROPHILS # BLD AUTO: 3.39 THOUSANDS/ΜL (ref 1.85–7.62)
NEUTS SEG NFR BLD AUTO: 60 % (ref 43–75)
NRBC BLD AUTO-RTO: 0 /100 WBCS
PLATELET # BLD AUTO: 251 THOUSANDS/UL (ref 149–390)
PMV BLD AUTO: 10.5 FL (ref 8.9–12.7)
POTASSIUM SERPL-SCNC: 4.3 MMOL/L (ref 3.5–5.3)
PROTOCOL NAME: NORMAL
RBC # BLD AUTO: 3.23 MILLION/UL (ref 3.88–5.62)
REASON FOR TERMINATION: NORMAL
SODIUM SERPL-SCNC: 138 MMOL/L (ref 136–145)
TARGET HR FORMULA: NORMAL
TEST INDICATION: NORMAL
TIME IN EXERCISE PHASE: NORMAL
WBC # BLD AUTO: 5.65 THOUSAND/UL (ref 4.31–10.16)

## 2021-07-21 PROCEDURE — 80048 BASIC METABOLIC PNL TOTAL CA: CPT | Performed by: INTERNAL MEDICINE

## 2021-07-21 PROCEDURE — A9502 TC99M TETROFOSMIN: HCPCS

## 2021-07-21 PROCEDURE — 93016 CV STRESS TEST SUPVJ ONLY: CPT | Performed by: INTERNAL MEDICINE

## 2021-07-21 PROCEDURE — 78452 HT MUSCLE IMAGE SPECT MULT: CPT | Performed by: INTERNAL MEDICINE

## 2021-07-21 PROCEDURE — 93017 CV STRESS TEST TRACING ONLY: CPT

## 2021-07-21 PROCEDURE — 78452 HT MUSCLE IMAGE SPECT MULT: CPT

## 2021-07-21 PROCEDURE — 85025 COMPLETE CBC W/AUTO DIFF WBC: CPT | Performed by: INTERNAL MEDICINE

## 2021-07-21 PROCEDURE — G1004 CDSM NDSC: HCPCS

## 2021-07-21 PROCEDURE — 93018 CV STRESS TEST I&R ONLY: CPT | Performed by: INTERNAL MEDICINE

## 2021-07-21 PROCEDURE — 99239 HOSP IP/OBS DSCHRG MGMT >30: CPT | Performed by: INTERNAL MEDICINE

## 2021-07-21 PROCEDURE — 99233 SBSQ HOSP IP/OBS HIGH 50: CPT | Performed by: INTERNAL MEDICINE

## 2021-07-21 PROCEDURE — 82948 REAGENT STRIP/BLOOD GLUCOSE: CPT

## 2021-07-21 RX ORDER — BISACODYL 10 MG
10 SUPPOSITORY, RECTAL RECTAL ONCE AS NEEDED
Qty: 12 SUPPOSITORY | Refills: 0 | Status: SHIPPED | OUTPATIENT
Start: 2021-07-21

## 2021-07-21 RX ORDER — POLYETHYLENE GLYCOL 3350 17 G/17G
17 POWDER, FOR SOLUTION ORAL DAILY PRN
Qty: 14 EACH | Refills: 0 | Status: SHIPPED | OUTPATIENT
Start: 2021-07-21 | End: 2022-06-20

## 2021-07-21 RX ORDER — FOLIC ACID 1 MG/1
1 TABLET ORAL DAILY
Qty: 30 TABLET | Refills: 0 | Status: SHIPPED | OUTPATIENT
Start: 2021-07-22 | End: 2021-08-11 | Stop reason: SDUPTHER

## 2021-07-21 RX ORDER — SENNOSIDES 8.6 MG
17.2 TABLET ORAL
Qty: 60 TABLET | Refills: 0 | Status: SHIPPED | OUTPATIENT
Start: 2021-07-21 | End: 2022-07-26

## 2021-07-21 RX ORDER — DOCUSATE SODIUM 100 MG/1
100 CAPSULE, LIQUID FILLED ORAL 2 TIMES DAILY
Qty: 10 CAPSULE | Refills: 0 | Status: SHIPPED | OUTPATIENT
Start: 2021-07-21

## 2021-07-21 RX ORDER — CEPHALEXIN 500 MG/1
500 CAPSULE ORAL EVERY 12 HOURS SCHEDULED
Qty: 14 CAPSULE | Refills: 0 | Status: SHIPPED | OUTPATIENT
Start: 2021-07-21 | End: 2021-07-28

## 2021-07-21 RX ORDER — CALCIUM CARBONATE 200(500)MG
1000 TABLET,CHEWABLE ORAL DAILY PRN
Qty: 60 TABLET | Refills: 0 | Status: SHIPPED | OUTPATIENT
Start: 2021-07-21 | End: 2021-08-20

## 2021-07-21 RX ADMIN — APIXABAN 5 MG: 5 TABLET, FILM COATED ORAL at 08:24

## 2021-07-21 RX ADMIN — SITAGLIPTIN 100 MG: 100 TABLET, FILM COATED ORAL at 08:24

## 2021-07-21 RX ADMIN — FOLIC ACID 1 MG: 1 TABLET ORAL at 08:24

## 2021-07-21 RX ADMIN — REGADENOSON 0.4 MG: 0.08 INJECTION, SOLUTION INTRAVENOUS at 09:42

## 2021-07-21 RX ADMIN — DOCUSATE SODIUM 100 MG: 100 CAPSULE, LIQUID FILLED ORAL at 08:24

## 2021-07-21 RX ADMIN — INSULIN LISPRO 2 UNITS: 100 INJECTION, SOLUTION INTRAVENOUS; SUBCUTANEOUS at 12:48

## 2021-07-21 RX ADMIN — CEFTRIAXONE 1000 MG: 1 INJECTION, SOLUTION INTRAVENOUS at 04:40

## 2021-07-21 RX ADMIN — INSULIN LISPRO 1 UNITS: 100 INJECTION, SOLUTION INTRAVENOUS; SUBCUTANEOUS at 08:27

## 2021-07-21 RX ADMIN — CYANOCOBALAMIN 1000 MCG: 1000 INJECTION, SOLUTION INTRAMUSCULAR; SUBCUTANEOUS at 08:24

## 2021-07-21 NOTE — CASE MANAGEMENT
CM spoke with [address] [place] [address] and patient will have a $47/month OOP cost for Elistephani [employer] and patient made aware

## 2021-07-21 NOTE — PROGRESS NOTES
Progress Note - Cardiology   [facility]     Meek Gentile 66 y o  male MRN: [id number]  : 1945  Unit/Bed#: [address] [place] [address] Encounter: [id number]    Assessment and Plan:   1  Bilateral pulmonary emboli:  Patient found to have multiple segmental and subsegmental filling deficits on CTA suspicious for bilateral PE   There was also prominence of the RV concerning for RV strain    -  hemodynamically stable    -  2D echocardiogram performed on 2021 demonstrated EF of 60% with no regional wall motion abnormalities, mild aortic stenosis and mild mitral and tricuspid valve regurgitation  -  right ventricle within normal size    -  patient transition from IV heparin to Eliquis on 2021     2  Acute DVT:  Venous duplex with right lower extremity acute occlusive DVT of the gastrocnemius vein in proximal calf     -  patient denies previous history of DVT    -  per hematology note, patient has family history of factor 5 Leiden    -  transition from IV heparin to Eliquis on 2021     3  Recurrent syncope:  Patient notes multiple syncopal episodes over the last few months   Previous evaluation was thought that episodes were due to patient's hypoglycemia    -  no further episodes of dizziness per patient    -  initially with borderline orthostatic hypotension on admission, will have staff recheck blood pressure     4  Non MI troponin elevation secondary to PE/DVT and RV strain:  Peak troponin 1 13 and relatively flat    -  no regional wall motion abnormalities seen on echocardiogram    -  patient is scheduled for Lexiscan nuclear stress test today     5  Diabetes:  Hemoglobin A1c 6   Appears patient does have episodes of hypoglycemia, review of chart demonstrates that at times patient will skip meals or eats very little     -  managed per primary team    -  may benefit from Meals on wheels or and other meal plan program      6   Chronic anemia:  Appears baseline hemoglobin is 9  Followed by primary team     7  Dyslipidemia:  On Pravachol     8  Elevated PSA:  Now has indwelling Flores catheter   Followed by Urology in outpatient setting      9  Chronic lower extremity edema:  Patient with +2 lower extremity edema   Per patient this is chronic     -  not on any oral diuretics as outpatient     -  slowly improving  Subjective / Objective:   Patient seen and examined  He is in good spirits this morning  Feels that his breathing is stable eyes  No further dizziness  For Sekou Caraballo nuclear stress test today    Vitals: Blood pressure 124/61, pulse 78, temperature 98 2 °F (36 8 °C), temperature source Oral, resp  rate 18, height 5' 9" (1 753 m), weight 103 kg (227 lb 14 4 oz), SpO2 91 %  Vitals:    07/17/21 2350 07/18/21 1228   Weight: 105 kg (231 lb 0 7 oz) 103 kg (227 lb 14 4 oz)     Body mass index is 33 65 kg/m²  BP Readings from Last 3 Encounters:   07/21/21 124/61   07/16/21 124/62   07/13/21 128/70     Orthostatic Blood Pressures      Most Recent Value   Blood Pressure  124/61 filed at 07/21/2021 0238   Patient Position - Orthostatic VS  Standing - Orthostatic VS filed at 07/19/2021 1102        I/O       07/19 0701 - 07/20 0700 07/20 0701 - 07/21 0700 07/21 0701 - 07/22 0700    P  O  240 400     I V  (mL/kg) 20 (0 2)      Total Intake(mL/kg) 260 (2 5) 400 (3 9)     Urine (mL/kg/hr) 1950 (0 8) 1600 (0 6)     Total Output 1950 1600     Net -1690 -1200                Invasive Devices     Peripheral Intravenous Line            Peripheral IV 07/18/21 Left Antecubital 3 days    Peripheral IV 07/18/21 Right Antecubital 3 days          Drain            Urethral Catheter 3 days                  Intake/Output Summary (Last 24 hours) at 7/21/2021 0818  Last data filed at 7/21/2021 0530  Gross per 24 hour   Intake 400 ml   Output 1600 ml   Net -1200 ml         Physical Exam:   Physical Exam  Vitals and nursing note reviewed  Constitutional:       General: He is not in acute distress  Appearance: Normal appearance  He is well-developed  He is obese  HENT:      Head: Normocephalic  Right Ear: External ear normal       Left Ear: External ear normal    Eyes:      General: No scleral icterus  Right eye: No discharge  Left eye: No discharge  Pupils: Pupils are equal, round, and reactive to light  Neck:      Thyroid: No thyromegaly  Cardiovascular:      Rate and Rhythm: Normal rate and regular rhythm  Pulses: Normal pulses  Heart sounds: Murmur heard  Pulmonary:      Effort: Pulmonary effort is normal  No respiratory distress  Breath sounds: Normal breath sounds  No wheezing, rhonchi or rales  Abdominal:      General: Bowel sounds are normal       Palpations: Abdomen is soft  Musculoskeletal:      Cervical back: Normal range of motion and neck supple  Right lower leg: Edema present  Left lower leg: Edema present  Skin:     General: Skin is warm and dry  Capillary Refill: Capillary refill takes less than 2 seconds  Neurological:      General: No focal deficit present  Mental Status: He is alert and oriented to person, place, and time  Mental status is at baseline     Psychiatric:         Mood and Affect: Mood normal                    Medications/ Allergies:     Current Facility-Administered Medications   Medication Dose Route Frequency Provider Last Rate    acetaminophen  650 mg Oral Q6H PRN Chuck Rashid PA-C      apixaban  5 mg Oral BID Arnulfo Dubon MD      bisacodyl  10 mg Rectal Once PRN Hever Villegas MD      calcium carbonate  1,000 mg Oral Daily PRN Chuck Rashid PA-C      cefTRIAXone  1,000 mg Intravenous Q24H Chuck Rashid PA-C 1,000 mg (07/21/21 0440)    cyanocobalamin  1,000 mcg Intramuscular Daily Elizabeth Rojas PA-C      docusate sodium  100 mg Oral BID Hever Villegas MD      folic acid  1 mg Oral Daily Elizabeth Rojas PA-C      insulin lispro  1-6 Units Subcutaneous TID AC Oriana Young PA-C      insulin lispro  1-6 Units Subcutaneous HS Oriana Young PA-C      ondansetron  4 mg Intravenous Q6H PRN Oriana Young PA-C      polyethylene glycol  17 g Oral Daily PRN Oriana Young PA-C      pravastatin  40 mg Oral HS Josie Plascencia DO      senna  2 tablet Oral HS Norberto Fitzgerald MD      sitaGLIPtin  100 mg Oral Daily Norberto Fitzgerald MD      tamsulosin  0 4 mg Oral Daily With Dinner Oriana Young PA-C       acetaminophen, 650 mg, Q6H PRN  bisacodyl, 10 mg, Once PRN  calcium carbonate, 1,000 mg, Daily PRN  ondansetron, 4 mg, Q6H PRN  polyethylene glycol, 17 g, Daily PRN      No Known Allergies    VTE Pharmacologic Prophylaxis:   Sequential compression device (Venodyne)     Labs:   Troponins:  Results from last 7 days   Lab Units 07/18/21  1432 07/18/21  1102 07/18/21  0750   TROPONIN I ng/mL 1 12* 1 13* 1 12*     CBC with diff:  Results from last 7 days   Lab Units 07/21/21  0450 07/20/21  0637 07/19/21  0459 07/18/21  0750 07/18/21  0531 07/18/21  0014   WBC Thousand/uL 5 65 6 02 6 55 7 60 6 92 8 90   HEMOGLOBIN g/dL 8 6* 8 7* 8 9* 9 3* 8 8* 9 8*   HEMATOCRIT % 28 9* 28 5* 30 0* 31 1* 28 8* 31 7*   MCV fL 90 89 90 91 89 88   PLATELETS Thousands/uL 251 224 219 209 208 241   MCH pg 26 6* 27 2 26 8 27 1 27 2 27 3   MCHC g/dL 29 8* 30 5* 29 7* 29 9* 30 6* 30 9*   RDW % 15 1 15 2* 15 6* 15 5* 15 5* 15 3*   MPV fL 10 5 10 5 11 2 10 7 10 6 10 5   NRBC AUTO /100 WBCs 0  --   --  0  --  0     CMP:  Results from last 7 days   Lab Units 07/21/21  0450 07/20/21  0637 07/19/21  0459 07/18/21  0750 07/18/21  0014   SODIUM mmol/L 138 138 140 137 138   POTASSIUM mmol/L 4 3 4 1 4 3 4 5 4 5   CHLORIDE mmol/L 101 101 102 100 98*   CO2 mmol/L 32 32 31 29 29   ANION GAP mmol/L 5 5 7 8 11   BUN mg/dL 14 13 15 21 24   CREATININE mg/dL 1 10 1 14 1 10 1 16 1 35*   CALCIUM mg/dL 8 1* 8 1* 8 2* 8 2* 8 2*   AST U/L  --   --   --   --  21   ALT U/L  --   -- --   --  28   ALK PHOS U/L  --   --   --   --  75   TOTAL PROTEIN g/dL  --   --   --   --  7 1   ALBUMIN g/dL  --   --   --   --  2 8*   TOTAL BILIRUBIN mg/dL  --   --   --   --  0 25   EGFR ml/min/1 73sq m 64 62 64 60 50     Coags:  Results from last 7 days   Lab Units 07/20/21  0904 07/20/21  0102 07/19/21  1729 07/19/21  0930 07/19/21  0256 07/18/21  1856 07/18/21  1115 07/18/21  0531   PTT seconds 155* 38* 95* 56* 92* 115* 184* 26   INR   --   --   --   --   --   --   --  1 07     TSH:  Results from last 7 days   Lab Units 07/18/21  0014   TSH 3RD GENERATON uIU/mL 2 943       Imaging & Testing   I have personally reviewed pertinent reports  XR chest 1 view portable    Result Date: 7/18/2021  Narrative: CHEST INDICATION:   sob  Pulmonary emboli on subsequent chest CT  COMPARISON: Chest radiograph from 6/29/2021 and chest CT from 7/18/2021  EXAM PERFORMED/VIEWS:  XR CHEST PORTABLE  FINDINGS: Cardiomediastinal silhouette normal  Low lung volumes with mild bibasilar atelectasis  No effusion or pneumothorax  Osseous structures normal for age  Impression: No acute cardiopulmonary disease  Workstation performed: [id number]     XR chest 1 view portable    Result Date: 6/30/2021  Narrative: CHEST INDICATION:   hypoglycemia  COMPARISON:  None EXAM PERFORMED/VIEWS:  XR CHEST PORTABLE FINDINGS: Cardiomediastinal silhouette appears unremarkable  Right hemidiaphragm mildly elevated  There is adjacent right basilar scarring or subsegmental atelectasis  No pulmonary edema, pneumothorax or significant effusion  Osseous structures appear within normal limits for patient age  Impression: Mildly elevated right hemidiaphragm with adjacent right basilar scarring or subsegmental atelectasis    Otherwise no acute pulmonary disease Workstation performed: [id number]     NM bone scan whole body    Result Date: 7/19/2021  Narrative: BONE SCAN  WHOLE BODY INDICATION: Elevated PSA, now with BL PE, R/O metastatic Cancer PREVIOUS FILM CORRELATION:    No prior pertinent studies for comparison  TECHNIQUE:   This study was performed following the intravenous administration of 26 9 mCi Tc-99m labeled MDP  Delayed, anterior and posterior whole body images were acquired, 2-3 hours after radiopharmaceutical administration  FINDINGS: Small focus of mild activity in a left mid lateral rib is nonspecific but possibly from old trauma  Probable degenerative change in a right lower costovertebral junction  There is otherwise an unremarkable distribution of the radiotracer  No additional lesions that would be concerning for osseous metastases  Impression: 1  Small focus of mild activity in a left mid lateral rib is nonspecific but possibly from old trauma  This may be reassessed on follow-up exam  2   Otherwise no scintigraphic evidence of osseous metastasis  Workstation performed: [id number]     CTA ED chest PE Study    Result Date: 7/18/2021  Narrative: CTA - CHEST WITH IV CONTRAST - PULMONARY ANGIOGRAM INDICATION:   PE suspected, high pretest prob sob  COMPARISON: Chest x-ray dated the same  TECHNIQUE: CTA examination of the chest was performed using angiographic technique according to a protocol specifically tailored to evaluate for pulmonary embolism  Axial, sagittal, and coronal 2D reformatted images were created from the source data and  submitted for interpretation  In addition, coronal 3D MIP postprocessing was performed on the acquisition scanner  Radiation dose length product (DLP) for this visit:  723 mGy-cm   This examination, like all CT scans performed in the [facility], was performed utilizing techniques to minimize radiation dose exposure, including the use of iterative reconstruction and automated exposure control   IV Contrast:  85 mL of iohexol (OMNIPAQUE)  FINDINGS: PULMONARY ARTERIAL TREE:  Some images are suboptimal secondary to respiratory motion which decreases sensitivity for evaluation of peripheral pulmonary emboli  Filling defects are seen in the segmental and subsegmental branches in the left upper and left lower lobes (axial image 119, series 2 and 104, series 601, for example)  Additional segmental/subsegmental pulmonary emboli may be present in the right lower lobe (axial image 113, series 2) as well as the right upper lobe (axial image 73-80, series 2)  LUNGS:  Linear scarring/atelectasis dependently and in the bilateral lower lung field  Lungs otherwise appear grossly clear  PLEURA:  Unremarkable  HEART/GREAT VESSELS: Prominence of the right ventricle suggesting right heart strain  Moderate to severe coronary atherosclerosis  Mild aortic atherosclerosis; no aortic aneurysm  Diameter of the ascending aorta is top normal  MEDIASTINUM AND TRINI:  Inspissated secretions in the distal trachea  Large airways appear patent  CHEST WALL AND LOWER NECK:   No acute fracture seen  Bilateral gynecomastia  Otherwise grossly unremarkable  VISUALIZED STRUCTURES IN THE UPPER ABDOMEN:  Nodular contour of the liver, may suggest a component of hepatic cirrhosis  OSSEOUS STRUCTURES: Generalized osteopenia  No acute fracture or destructive osseous lesion  Impression: Multiple segmental and subsegmental filling defects suspicious for pulmonary emboli bilaterally (axial image 119, series 2 and 104, series 601, axial image 113, series 2, and axial image 73-80, series 2 for example)  Prominence of the right ventricle suggesting right heart strain  Nodular contour of the liver, may suggest a component of hepatic cirrhosis  Coronary atherosclerosis, and other findings as above   I personally discussed this study with Shyann Campos on 7/18/2021 at 4:40 AM  Workstation performed: [id number]     VAS lower limb venous duplex study, complete bilateral    Result Date: 7/19/2021  Narrative:  [facility] REPORT CLINICAL: Indications: Patient presents with recent discovery of pulmonary embolism and physician wants to determine potential source  Patient reports SOB Risk Factors The patient has no history of DVT  FINDINGS:  Segment        Right                   Left                          Impression              Impression       CFV            Normal (Patent)         Normal (Patent)  Gastrocnemius  Occlusive Subsegmental                      CONCLUSION:  Impression: RIGHT LOWER LIMB: There is evidence of acute occlusive chronic deep vein thrombosis noted in one of the gastrocnemius veins in the proximal calf  No evidence of superficial thrombophlebitis noted  Doppler evaluation shows a normal response to augmentation maneuvers  Popliteal, posterior tibial and anterior tibial arterial Doppler waveforms are triphasic/biphasic  LEFT LOWER LIMB: No evidence of acute or chronic deep vein thrombosis  No evidence of superficial thrombophlebitis noted  Doppler evaluation shows a normal response to augmentation maneuvers  Popliteal, posterior tibial and anterior tibial arterial Doppler waveforms are triphasic/biphasic  Technical findings were given to Dr Adriana Neal  SIGNATURE: Electronically Signed by: Jackie Boyle on 2021-07-19 09:19:20 AM    CT abdomen pelvis w contrast    Result Date: 7/19/2021  Narrative: CT ABDOMEN AND PELVIS WITH IV CONTRAST INDICATION:   Prostate cancer, initial staging, high risk Staging for suspected Prostate Cancer PSA = 68  COMPARISON:  July 18, 21 TECHNIQUE:  CT examination of the abdomen and pelvis was performed  Axial, sagittal, and coronal 2D reformatted images were created from the source data and submitted for interpretation  Radiation dose length product (DLP) for this visit:  1176 89 mGy-cm   This examination, like all CT scans performed in the [facility], was performed utilizing techniques to minimize radiation dose exposure, including the use of iterative reconstruction and automated exposure control   IV Contrast:  100 mL of iohexol (OMNIPAQUE) 50 mL of iohexol (OMNIPAQUE) Enteric Contrast:  Enteric contrast was not administered  FINDINGS: ABDOMEN LOWER CHEST:  Dependent mild subsegmental atelectasis in both lung bases and right middle lobe without significant change  Trace bilateral pleural fluid  Mild bilateral symmetric gynecomastia  LIVER/BILIARY TREE:  Unremarkable  GALLBLADDER:  No calcified gallstones  No pericholecystic inflammatory change  SPLEEN:  Unremarkable  PANCREAS:  Unremarkable  ADRENAL GLANDS:  Unremarkable  KIDNEYS/URETERS:  Mild fullness of the ureters, left greater than right, no pelvocaliectasis  Presumably related to below described marked bladder wall thickening  STOMACH AND BOWEL:  Unremarkable  APPENDIX:  No findings to suggest appendicitis  ABDOMINOPELVIC CAVITY:  No ascites  No pneumoperitoneum  No lymphadenopathy  VESSELS:  Unremarkable for patient's age  PELVIS REPRODUCTIVE ORGANS:  Prostate appears within normal limits  No discrete measurable lesion  No regional adenopathy  URINARY BLADDER:  Flores catheter present  Bladder wall is diffusely markedly abnormally thickened  Correlate with clinical findings for thickening related to cystitis or underlying neoplasm  ABDOMINAL WALL/INGUINAL REGIONS:  Unremarkable  OSSEOUS STRUCTURES:  Multilevel degenerative changes  Moderate compression deformity at L4 and mild at T12  No lytic or sclerotic lesions evident  Impression: Marked diffuse bladder wall thickening  Flores catheter present  Minimal fullness of the ureters left greater than right  Prostate appears within normal limits  Stable mild bibasilar atelectasis  Mild bilateral symmetric gynecomastia  Workstation performed: [id number]        EKG / Monitor: Personally reviewed      Sinus rhythm    Cardiac testing:   Results for orders placed during the hospital encounter of 07/17/21    Echo complete with contrast if indicated    [address] [facility] [address]  [phone]    Transthoracic Echocardiogram  2D, M-mode, Doppler, and Color Doppler    Study date:  2021    Patient: Alexander Ashby  MR number: [id number]  Account number: [de-identified]  : 1945  Age: 66 years  Gender: Male  Status: Inpatient  Location: Bedside  Height: 69 in  Weight: 226 6 lb  BP: 122/ 74 mmHg    Indications: Pulmonary Embolism    Diagnoses: I74 9 - Embolism and thrombosis of unspecified artery    Sonographer:  Kemar Ma RDCS  Referring Physician:  Becky Yarbrough PA-C  Group:  [facility]  Interpreting Physician:  Finesse Mejias DO    SUMMARY    LEFT VENTRICLE:  Systolic function was normal by visual assessment  Ejection fraction was estimated in the range of 55 % to 60 %  There were no regional wall motion abnormalities  There was mild concentric hypertrophy  Doppler parameters were consistent with abnormal left ventricular relaxation (grade 1 diastolic dysfunction)  MITRAL VALVE:  There was mild regurgitation  AORTIC VALVE:  The valve was trileaflet  Leaflets exhibited normal thickness, mild calcification, and moderately reduced cuspal separation  There was mild stenosis  There was no regurgitation  Valve mean gradient was 11 mmHg  Aortic valve area was 1 7 cmï¾² by the continuity equation  TRICUSPID VALVE:  There was mild regurgitation  Pulmonary artery systolic pressure was moderately increased  HISTORY: PRIOR HISTORY: Diabetes Mellitus; Chronic Kidney Disease; Anemia; Dyslipidemia; Sepsis; Non MI Troponin Elevation; Hypertension    PROCEDURE: The procedure was performed at the bedside  This was a routine study  The transthoracic approach was used  The study included complete 2D imaging, M-mode, complete spectral Doppler, and color Doppler  The heart rate was 79 bpm,  at the start of the study  Intravenous contrast ( 0 4ml/min Definity in NSS) was administered   Intravenous contrast was administered to opacify the left ventricle  Echocardiographic views were limited due to poor acoustic window  availability, decreased penetration, and lung interference  This was a technically difficult study  LEFT VENTRICLE: Size was normal  Systolic function was normal by visual assessment  Ejection fraction was estimated in the range of 55 % to 60 %  There were no regional wall motion abnormalities  There was mild concentric hypertrophy  DOPPLER: Doppler parameters were consistent with abnormal left ventricular relaxation (grade 1 diastolic dysfunction)  RIGHT VENTRICLE: The size was normal  Systolic function was normal  DOPPLER: Systolic pressure was within the normal range  LEFT ATRIUM: Size was normal  No thrombus was identified  RIGHT ATRIUM: Size was normal     MITRAL VALVE: Valve structure was normal  There was normal leaflet separation  No echocardiographic evidence for prolapse  DOPPLER: The transmitral velocity was within the normal range  There was no evidence for stenosis  There was mild  regurgitation  AORTIC VALVE: The valve was trileaflet  Leaflets exhibited normal thickness, mild calcification, and moderately reduced cuspal separation  DOPPLER: Transaortic velocity was increased due to valvular stenosis  There was mild stenosis  There  was no regurgitation  TRICUSPID VALVE: The valve structure was normal  There was normal leaflet separation  DOPPLER: The transtricuspid velocity was within the normal range  There was mild regurgitation  Pulmonary artery systolic pressure was moderately  increased  Estimated peak PA pressure was 64 mmHg  PULMONIC VALVE: Leaflets exhibited normal thickness, no calcification, and normal cuspal separation  DOPPLER: The transpulmonic velocity was within the normal range  There was no regurgitation  PERICARDIUM: There was no thickening  There was no pericardial effusion  AORTA: The root exhibited normal size      PULMONARY ARTERY: The size was normal  The morphology appeared normal     MEASUREMENT TABLES    DOPPLER MEASUREMENTS  Aortic valve   (Reference normals)  Peak gradient   21 mmHg   (--)  Mean gradient   11 mmHg   (--)  Valve area, cont   1 7 cmï¾²   (--)    SYSTEM MEASUREMENT TABLES    2D  EF (Teich): 54 78 %  %FS: 28 23 %  Ao Diam: 3 71 cm  Ao asc: 4 23 cm  EDV(Teich): 87 43 ml  ESV(Teich): 39 54 ml  IVSd: 1 19 cm  LA Area: 13 05 cm2  LA Diam: 2 75 cm  LVEDV MOD A4C: 78 34 ml  LVEF MOD A4C: 80 3 %  LVESV MOD A4C: 15 43 ml  LVIDd: 4 39 cm  LVIDs: 3 15 cm  LVLd A4C: 7 01 cm  LVLs A4C: 5 23 cm  LVOT Diam: 1 97 cm  LVPWd: 1 23 cm  RA Area: 13 4 cm2  RVIDd: 3 82 cm  SV (Teich): 47 89 ml  SV MOD A4C: 62 91 ml    CW  AV Env  Ti: 298 13 ms  AV VTI: 45 96 cm  AV Vmax: 2 23 m/s  AV Vmean: 1 54 m/s  AV maxP 85 mmHg  AV meanPG: 10 84 mmHg  TR Vmax: 3 77 m/s  TR maxP 93 mmHg    MM  TAPSE: 2 2 cm    PW  MV E/A Ratio: 0 85  E' Sept: 0 06 m/s  E/E' Sept: 12 19  LVOT Env  Ti: 365 37 ms  LVOT VTI: 25 4 cm  LVOT Vmax: 1 2 m/s  LVOT Vmean: 0 7 m/s  LVOT maxP 76 mmHg  LVOT meanP 51 mmHg  MV A Melvin: 0 93 m/s  MV Dec [place]: 3 07 m/s2  MV DecT: 255 81 ms  MV E Melvin: 0 79 m/s  MV PHT: 74 19 ms  MVA By PHT: 2 97 cm2    [employer] Accredited [facility]chocardi[facility]    Prepared and electronically signed by    Lauro Davila DO  Signed 2021 10:21:25      Kimo Cuevas        "This note has been constructed using a voice recognition system  Therefore there may be syntax, spelling, and/or grammatical errors   Please call if you have any questions  "

## 2021-07-21 NOTE — CASE MANAGEMENT
LOS: 3 DAYS  UNPLANNED RA SCORE: 14 GREEN  RA: YES  BUNDLE: NO    CM met with patient and discussed dc planning, available services and the role of CM  Patient lives by himself in a bi-level  There are no steps-he lives on the first floor  Patient is independent of ADL's and ambulates with a walker  Other DME includes a cane, and BSC  Patient still drives and owns a car  Patient is currently receiving HHS with [facility]  PT is recommending HHS still on this admission  Patient is aware and was provided with [place] [facility] and he states he would like to continue with [facility]  Referral was sent in [facility]  CM called [facility] and spoke with Yvette Lopez  Confirmed patient is a current client  Patient is a 30 day RA with different diagnosis  His last admission was 6/29/21-7/3/21 for hypoglycemia  This admission patient was admitted with B/L PE  Patient states he does not feel there is anything that could have been done differently to prevent readmission  Patient has medication coverage and uses the [employer]  His PCP is Dr Hall Stake  He transports himself to appointments  No H/O MH or D&A problems  No POA or LW and is not interested in information  Patient will have a ride home with family at dc  IMM discussed and he is agreeable to dc today

## 2021-07-21 NOTE — NURSING NOTE
AVS discharge and medication reviewed with patient  He understood the directions  Medication reviiewed with sister  Pt discharged to home

## 2021-07-21 NOTE — DISCHARGE SUMMARY
Discharge Summary - [facility] Internal Medicine    Patient Information: Malaika Hector 66 y o  male MRN: [id number]  Unit/Bed#: [address]- Encounter: [id number]    Discharging Physician / Practitioner: Joseph Mcmahan MD  PCP: Ethel Fernandez DO  Admission Date: 7/17/2021  Discharge Date: 07/21/21    Reason for Admission: Shortness of Breath (Pt brought to ED by EMS for low O2 saturation and SOB  Pt states he had a syncopal episode with a fall and injury to L rib cage on Wed  Pt discharged for Deborrah Locus for same two weeks ago  No fever chills n/v/d )      Discharge Diagnoses:     Primary Discharge Diagnosis:   Principal Problem:    Bilateral pulmonary embolism (HCC)  Active Problems:    Respiratory failure with hypoxia (HCC)    Syncope    Non MI troponin elevation    Urinary retention    CKD stage 3 due to type 2 diabetes mellitus (HCC)    Type 2 diabetes mellitus, without long-term current use of insulin (HCC)    Anemia    Dyslipidemia    Elevated PSA    Urinary tract infection without hematuria  Resolved Problems:    Sepsis ([address] )  Consultations During Hospital Stay:  IP CONSULT TO CARDIOLOGY  IP CONSULT TO CASE MANAGEMENT  IP CONSULT TO HEMATOLOGY    Procedures Performed:   Significant Findings:   · Refer to hospital course and above listed diagnosis related plan for details    Imaging while in hospital:  Whole Body Bone scan  CT A/P with contrast  Venous duplex bilateral lower extremity  CT angiogram of the chest PE study  Chest x-ray  Incidental Findings:   · Acute Bilateral Pulmonary embolism     Test Results Pending at Discharge (will require follow up):   · As per After Visit Summary     Outpatient Tests Requested:  Complications:  Refer to hospital course and above listed diagnosis related plan, if any    Hospital Course:     Malaika Hector is a 66 y o  male patient with PMHx of HTN, HLD, CKD stage 3, DM2 with recent issues of hypoglycemia on previous admission, urinary retention with Flores catheter, BPH, elevated PSA without going workup with concern for prostate cancer, anemia, who originally presented to the hospital on 7/17/2021 due to c/c of SOB  Patient underwent a CT angio of the chest PE study and was found to have acute bilateral pulmonary embolism  Patient does bilateral lower extremity venous duplex and was found to have right lower extremity DVT acute  Patient was then placed on heparin drip monitored  Patient has history of BPH and has a chronic Flores which was continued  He was seen by Urology as  Patient has anemia and was seen by [facility] as well and continue on anticoagulation and was recommended to be switched over to Eliquis when appropriate  Patient has a family history affect related 5 deficiency and, currently has a DVT as well  Hence patient was recommended Eliquis  Patient will follow up with Urology as an outpatient to get a biopsy of his prostate a later on  Patient initially presented with an episode of syncope  He was well when Cardiology and underwent a 2D echocardiogram which did not show any valvular or regional wall motion abnormalities  Patient had normal left ventricular and right ventricular systolic function  Patient did not require any more workup for syncope  Patient was evaluated by PT and OT and recommended home with home health  He also underwent home oxygen protocol and did not require any oxygen  Patient's O2 saturations were 92% on exertion  Patient was recommended follow-up with PCP as an outpatient in 1-2 weeks  Please see above list of diagnoses and related plan for additional information  Condition at Discharge: fair     Discharge Day Visit / Exam:     Subjective:  I have seen and examined the patient at bedside  Overnight events reviewed with the RN       Vitals: Blood Pressure: 131/65 (07/21/21 0800)  Pulse: 82 (07/21/21 0800)  Temperature: 97 9 °F (36 6 °C) (07/21/21 0800)  Temp Source: Oral (07/21/21 0800)  Respirations: 16 (07/21/21 0800)  Height: 5' 9" (175 3 cm) (07/18/21 1228)  Weight - Scale: 103 kg (227 lb 14 4 oz) (07/18/21 1228)  SpO2: 92 % (07/21/21 0800)  Exam:   Physical Exam  General : Alert, Awake and oriented x 2-3, NAD  Obese  Neck : Supple  Short neck  Eyes:  AMELIA, EOMI  CVS : S1, S2, RRR    R/S : Clear to auscultate anteriorly  No rales rhonchi or any wheezes heard  Abd: Soft, NT, ND  Bs+ve  Extremity: Trace pedal edema noted  Skin: No acute Rash noted  CNS: No acute FND  Discharge instructions/Information to patient and family:(Discharge Medications and Follow up):   See after visit summary for information provided to patient and family  Provisions for Follow-Up Care:  See after visit summary for information related to follow-up care and any pertinent home health orders  Disposition: Home with Home Health/VNA    Planned Readmission:  No     Discharge Statement:  I spent 35 minutes discharging the patient  This time was spent on the day of discharge  I had direct contact with the patient on the day of discharge  Greater than 50% of the total time was spent examining patient, answering all patient questions, arranging and discussing plan of care with patient as well as directly providing post-discharge instructions  Additional time then spent on discharge activities  Discharge Medications:  See after visit summary for reconciled discharge medications provided to patient and family  ** Please Note: "This note has been constructed using a voice recognition system  Therefore there may be syntax, spelling, and/or grammatical errors   Please call if you have any questions  "**

## 2021-07-22 ENCOUNTER — TRANSITIONAL CARE MANAGEMENT (OUTPATIENT)
Dept: FAMILY MEDICINE CLINIC | Facility: CLINIC | Age: 76
End: 2021-07-22

## 2021-07-22 ENCOUNTER — TELEPHONE (OUTPATIENT)
Dept: PULMONOLOGY | Facility: MEDICAL CENTER | Age: 76
End: 2021-07-22

## 2021-07-22 LAB
ALBUMIN SERPL ELPH-MCNC: 2.93 G/DL (ref 3.5–5)
ALBUMIN SERPL ELPH-MCNC: 48.1 % (ref 52–65)
ALPHA1 GLOB SERPL ELPH-MCNC: 0.39 G/DL (ref 0.1–0.4)
ALPHA1 GLOB SERPL ELPH-MCNC: 6.4 % (ref 2.5–5)
ALPHA2 GLOB SERPL ELPH-MCNC: 1.23 G/DL (ref 0.4–1.2)
ALPHA2 GLOB SERPL ELPH-MCNC: 20.1 % (ref 7–13)
BETA GLOB ABNORMAL SERPL ELPH-MCNC: 0.31 G/DL (ref 0.4–0.8)
BETA1 GLOB SERPL ELPH-MCNC: 5.1 % (ref 5–13)
BETA2 GLOB SERPL ELPH-MCNC: 6.1 % (ref 2–8)
BETA2+GAMMA GLOB SERPL ELPH-MCNC: 0.37 G/DL (ref 0.2–0.5)
GAMMA GLOB ABNORMAL SERPL ELPH-MCNC: 0.87 G/DL (ref 0.5–1.6)
GAMMA GLOB SERPL ELPH-MCNC: 14.2 % (ref 12–22)
IGG/ALB SER: 0.93 {RATIO} (ref 1.1–1.8)
INTERPRETATION UR IFE-IMP: NORMAL
PROT PATTERN SERPL ELPH-IMP: ABNORMAL
PROT SERPL-MCNC: 6.1 G/DL (ref 6.4–8.2)

## 2021-07-22 NOTE — TELEPHONE ENCOUNTER
Left message for patient to call back to schedule NP appointment with Dr Sudheer William  for Hypoxia referral in chart

## 2021-07-23 LAB
BACTERIA BLD CULT: NORMAL
BACTERIA BLD CULT: NORMAL

## 2021-07-26 ENCOUNTER — TELEPHONE (OUTPATIENT)
Dept: NEUROLOGY | Facility: CLINIC | Age: 76
End: 2021-07-26

## 2021-07-26 ENCOUNTER — PATIENT OUTREACH (OUTPATIENT)
Dept: CASE MANAGEMENT | Facility: OTHER | Age: 76
End: 2021-07-26

## 2021-07-26 LAB
F2 GENE MUT ANL BLD/T: NORMAL
F5 GENE MUT ANL BLD/T: ABNORMAL

## 2021-07-26 NOTE — TELEPHONE ENCOUNTER
Best contact number for patient:  [phone]  Emergency Contact name and number:  Christina Xavier: [phone]  Referring provider and telephone number:  [facility] Provider Name and if affiliated with Sean Juarezs:     Reason for Appointment/Dx:tremors    Have you seen and followed up with a pediatric Neurologist for this disease in the past?      No (If yes ok to schedule with Dr Farooq Hart)    Neurology Location patient would like to be seen:    Order received? Yes                                                 Records Received? Yes    Have you ever seen another Neurologist?       Yes, Dexter Rogers one time    [employer] Name:    ID/Policy #:    Secondary Insurance:    ID/Policy#: Workman's Comp/ Accident/ School  Information      Workman's Comp/Accident/School related? No    If yes name of Insurance company:    Claim #:    Date of Injury:    Type of Injury:     Name and Telephone Number:    Notes:                   Appointment date: 11/23/21 @ 10:40am, w/Dr Urszula Alexander in [facility]  Verified address/phone/insurance-all current  Sent appt details/directions

## 2021-07-26 NOTE — PROGRESS NOTES
Spoke to the patient  He states he is doing well  He denies SOB or chest pain  He is checking his BS QID, before meals & at bedtime  He reports -180  He states his sister, Savanah Stringer, makes his follow up appointments  He is "doing everything the doctors told [him]"  He has not heard from [facility]   His sister in law is a RN & checks on him daily, she also helps with light housekeeping  He is not interested in outpatient care management  He recommends contacting Yolanda regarding VNA  Called [facility]  They state they are 1-2 weeks behind on opening [facility] residents because of high volume  Left a voicemail for the scheduling department to check on estimated start date & to provide Yolanda's contact information per the patient's request     Left Yolanda a detailed message      OP CM removed from the care team

## 2021-07-28 ENCOUNTER — OFFICE VISIT (OUTPATIENT)
Dept: FAMILY MEDICINE CLINIC | Facility: CLINIC | Age: 76
End: 2021-07-28
Payer: MEDICARE

## 2021-07-28 VITALS
BODY MASS INDEX: 32.14 KG/M2 | TEMPERATURE: 98 F | OXYGEN SATURATION: 92 % | HEART RATE: 85 BPM | SYSTOLIC BLOOD PRESSURE: 134 MMHG | DIASTOLIC BLOOD PRESSURE: 62 MMHG | RESPIRATION RATE: 18 BRPM | HEIGHT: 69 IN | WEIGHT: 217 LBS

## 2021-07-28 DIAGNOSIS — R25.1 TREMOR: ICD-10-CM

## 2021-07-28 DIAGNOSIS — N18.30 CKD STAGE 3 DUE TO TYPE 2 DIABETES MELLITUS (HCC): ICD-10-CM

## 2021-07-28 DIAGNOSIS — E66.9 OBESITY (BMI 30-39.9): ICD-10-CM

## 2021-07-28 DIAGNOSIS — N39.0 URINARY TRACT INFECTION WITHOUT HEMATURIA, SITE UNSPECIFIED: ICD-10-CM

## 2021-07-28 DIAGNOSIS — I26.99 BILATERAL PULMONARY EMBOLISM (HCC): Primary | ICD-10-CM

## 2021-07-28 DIAGNOSIS — E11.22 CKD STAGE 3 DUE TO TYPE 2 DIABETES MELLITUS (HCC): ICD-10-CM

## 2021-07-28 DIAGNOSIS — E11.9 TYPE 2 DIABETES MELLITUS WITHOUT COMPLICATION, WITHOUT LONG-TERM CURRENT USE OF INSULIN (HCC): ICD-10-CM

## 2021-07-28 DIAGNOSIS — R97.20 ELEVATED PSA: ICD-10-CM

## 2021-07-28 PROCEDURE — 99496 TRANSJ CARE MGMT HIGH F2F 7D: CPT | Performed by: NURSE PRACTITIONER

## 2021-07-28 NOTE — PROGRESS NOTES
Assessment/Plan:    1  Bilateral pulmonary embolism [facility])  Assessment & Plan:  Started on Eliquis  Has appt with hematology arranged    Orders:  -     Ambulatory referral to social work care management program; Future    2  Obesity (BMI 30-39 9)  -     Ambulatory referral to social work care management program; Future    3  Type 2 diabetes mellitus without complication, without long-term current use of insulin (HCC)  Assessment & Plan:  Stable, has appt with endo arranged  Lab Results   Component Value Date    HGBA1C 6 0 (H) 06/30/2021       Orders:  -     Ambulatory referral to social work care management program; Future    4  CKD stage 3 due to type 2 diabetes mellitus [facility])  Assessment & Plan:  Creatinine stable    Lab Results   Component Value Date    HGBA1C 6 0 (H) 06/30/2021       Orders:  -     Ambulatory referral to social work care management program; Future    5  Urinary tract infection without hematuria, site unspecified  Assessment & Plan:  Recurrent, d/t chronic indwelling catheter  He reports issues with self hygiene  Discussed home health aid to help with hygiene  Family has been coming in every other day to help      6  Tremor  Assessment & Plan: Will be seeing neurology      7  Elevated PSA  Assessment & Plan:  Has appt with Dr Ny Guerrero later this week to discuss plan              Patient Instructions   Need appointment with pulmonology and cardiology arranged  Follow up with Dr Ny Guerrero as scheduled  Reach out to [facility] for assistance with home care  Return in about 4 weeks (around 8/25/2021)  Subjective:      Patient ID: Meek Gentile is a 66 y o  male  Chief Complaint   Patient presents with    Transition of Care Management     sas/cma       Here today for hospital f/u  He was admitted with bilateral PE and DVT  Finishing antibiotics today for presumptive UTI,  Seeing Dr Ny Guerrero later this week for f/u on PSA and UTI     He has a chronic indwelling catheter and has been having recurrent infections  Family history of Factor VII, hypercoagulation workup done in the hospital     Has appt 8/19 with hematology for f/u  Started on Folate for anemia and borderline low folate levels  VNA has been ordered however they have not yet been in to see him  Has orders for PT as well  Pt reports he has been feeling well since his discharge  Reports he never had any difficulties with his breathing  Has some left sided abdominal pain from his fall prior to hospitalization, which is improving  CT done in the hospital without any acute findings  Reports appetite has been okay and he is moving his bowels normally  Has appt with neurology for tremors for November      TCM Call (since 6/28/2021)     Date and time call was made  7/22/2021 10:34 AM    Hospital care reviewed  Records reviewed    Patient was hospitialized at  [address] [place] [address]        Date of Admission  07/17/21    Date of discharge  07/21/21    Diagnosis  Bilateral pulmonary embolism     Disposition  Home    Were the patients medications reviewed and updated  No (Comment)  His sister Ester Garzon manages and she is at work  She knows to call if any questions    Current Symptoms  None      TCM Call (since 6/28/2021)     Post hospital issues  None    Should patient be enrolled in anticoag monitoring? No    Scheduled for follow up?   Yes    Patients specialists  Cardiologist    Cardiologist name  Dr Avila Jules    Did you obtain your prescribed medications  Yes    Do you need help managing your prescriptions or medications  Yes    Why type of assitance do you need  Sister manages    Is transportation to your appointment needed  Yes    Specify why  He is not driving    I have advised the patient to call PCP with any new or worsening symptoms  [place]  Family    The type of support provided  Physical; Emotional    Do you have social support  Yes, as much as I need    Are you recieving any outpatient services  No    Interperter language line needed  No    Counseling  Patient    Counseling topics  Activities of daily living; Importance of RX compliance; patient and family education; instructions for management; Risk factor reduction; Home health agency benefits    Comments  I spoke briefly with Stiven Guzman who states that he is doing fine  NO c/o  He is hard of hearing so he asked me to call his sister Brandi Bond  She is at work and unable to review his medications at this time but she manages them and knows to call with any questions  She knows to take him to the ER if any chest pain, dypsnea, weakness, etc Mortimer Christine LPN          The following portions of the patient's history were reviewed and updated as appropriate: allergies, current medications, past family history, past medical history, past social history, past surgical history and problem list     Review of Systems   Constitutional: Negative for chills, fatigue and fever  Respiratory: Negative for cough, shortness of breath and wheezing  Cardiovascular: Negative for chest pain, palpitations and leg swelling  Gastrointestinal: Negative for abdominal pain, diarrhea, nausea and vomiting  Genitourinary:        See HPI   Skin: Negative for rash  Neurological: Negative for dizziness and headaches           Current Outpatient Medications   Medication Sig Dispense Refill    apixaban (ELIQUIS) 5 mg Take 1 tablet (5 mg total) by mouth 2 (two) times a day 60 tablet 0    bisacodyl (DULCOLAX) 10 mg suppository Insert 1 suppository (10 mg total) into the rectum once as needed for constipation (If no bowel movement) for up to 1 dose 12 suppository 0    calcium carbonate (TUMS) 500 mg chewable tablet Chew 2 tablets (1,000 mg total) daily as needed for indigestion or heartburn 60 tablet 0    Continuous Blood Gluc Sensor (FreeStyle [place] 14 Day Sensor) MISC Change sensor every 14 days 2 each 12    docusate sodium (COLACE) 100 mg capsule Take 1 capsule (100 mg total) by mouth 2 (two) times a day 10 capsule 0    folic acid (FOLVITE) 1 mg tablet Take 1 tablet (1 mg total) by mouth daily 30 tablet 0    metFORMIN (GLUCOPHAGE) 500 mg tablet Take 1 tablet (500 mg total) by mouth 2 (two) times a day with meals 180 tablet 3    polyethylene glycol (MIRALAX) 17 g packet Take 17 g by mouth daily as needed (constipation) for up to 14 days 14 each 0    pravastatin (PRAVACHOL) 40 mg tablet Take 1 tablet (40 mg total) by mouth every morning 90 tablet 1    senna (SENOKOT) 8 6 mg Take 2 tablets (17 2 mg total) by mouth daily at bedtime 60 tablet 0    sitaGLIPtin (JANUVIA) 100 mg tablet Take 1 tablet (100 mg total) by mouth daily 90 tablet 3    tamsulosin (FLOMAX) 0 4 mg Take 1 capsule (0 4 mg total) by mouth daily with dinner 30 capsule 0     No current facility-administered medications for this visit  Objective:    /62   Pulse 85   Temp 98 °F (36 7 °C)   Resp 18   Ht 5' 9" (1 753 m)   Wt 98 4 kg (217 lb)   SpO2 92%   BMI 32 05 kg/m²        Physical Exam  Vitals and nursing note reviewed  Constitutional:       Appearance: Normal appearance  He is well-developed  HENT:      Head: Normocephalic and atraumatic  Right Ear: Tympanic membrane, ear canal and external ear normal       Left Ear: Tympanic membrane, ear canal and external ear normal       Nose: No mucosal edema or rhinorrhea  Mouth/Throat:      Pharynx: Uvula midline  Eyes:      Conjunctiva/sclera: Conjunctivae normal    Neck:      Thyroid: No thyromegaly  Cardiovascular:      Rate and Rhythm: Normal rate and regular rhythm  Heart sounds: Normal heart sounds  No murmur heard  Pulmonary:      Effort: Pulmonary effort is normal       Breath sounds: Normal breath sounds  Abdominal:      General: Bowel sounds are normal  There is no distension  Palpations: There is no hepatomegaly or splenomegaly  Tenderness: There is no abdominal tenderness  Musculoskeletal:      Cervical back: Neck supple  No edema  Lymphadenopathy:      Cervical:      Right cervical: No superficial cervical adenopathy  Left cervical: No superficial cervical adenopathy  Skin:     General: Skin is warm and dry  Findings: No rash  Neurological:      Mental Status: He is alert     Psychiatric:         Mood and Affect: Mood normal          Behavior: Behavior normal                 Jeanne Lundborg, CRNP

## 2021-07-28 NOTE — PATIENT INSTRUCTIONS
Need appointment with pulmonology and cardiology arranged  Follow up with Dr Kiarra Lucero as scheduled  [place] [facility]  [place] with [place] care

## 2021-07-29 ENCOUNTER — PATIENT OUTREACH (OUTPATIENT)
Dept: CASE MANAGEMENT | Facility: OTHER | Age: 76
End: 2021-07-29

## 2021-07-29 NOTE — PROGRESS NOTES
Spoke to the patient's sister, Marisa Helms  She has not heard from [facility] & has not contacted them yet  If they are not able to do start of care, then she would like to try a different agency  She doesn't have a preference  Marisa Helms is interested in home health for the patient  Provided her with the number for the [facility] division of aging to inquire about [facility]  If he is ineligible for those programs, recommended she get a list of private home health agencies for [facility]  She states understanding & agreement  [facility]  They do not service [facility]  They recommended [facility], neither of who are accepting new patients  Called [facility] back to see if there was an update  Spoke to scheduling who will call the patient's sister Marisa Helms to see if they could do start of care for this weekend  Called Shandra khalil  She hasn't gotten a call from [facility] yet  She will call this OP CM back this afternoon if she still hasn't heard from them  She states no other questions at this time

## 2021-07-29 NOTE — ASSESSMENT & PLAN NOTE
Recurrent, d/t chronic indwelling catheter  He reports issues with self hygiene  Discussed home health aid to help with hygiene    Family has been coming in every other day to help

## 2021-08-02 ENCOUNTER — PATIENT OUTREACH (OUTPATIENT)
Dept: FAMILY MEDICINE CLINIC | Facility: CLINIC | Age: 76
End: 2021-08-02

## 2021-08-02 NOTE — PROGRESS NOTES
SW received referral from JANNETTE Olivares  SW spoke with Trae Poole and Dominic Walton about details of what this pt needs  Originally, the [facility] [address] could not accept pts at the time he was referred  Pt's wife Kathryn Vasquez was provided with resources for [employer] and other agencies  SW called Kathryn Vasquez, introduced myself and reason for call  Kathryn Vasquez said, surprisingly, the VNA showeed up Saturday while she was home  They are now waiting to hear from PT  I asked Kathryn Vasquez if she was looking for asisstance with anything additional to this  Kathryn Vasquez declined  I told her she may reach me at any time with questions or concerns  Note routed to [facility] and JANNETTE Poole

## 2021-08-11 DIAGNOSIS — I26.99 BILATERAL PULMONARY EMBOLISM (HCC): ICD-10-CM

## 2021-08-11 DIAGNOSIS — D64.9 ANEMIA, UNSPECIFIED TYPE: ICD-10-CM

## 2021-08-11 DIAGNOSIS — R06.02 SOB (SHORTNESS OF BREATH): ICD-10-CM

## 2021-08-11 RX ORDER — FOLIC ACID 1 MG/1
1 TABLET ORAL DAILY
Qty: 30 TABLET | Refills: 1 | Status: SHIPPED | OUTPATIENT
Start: 2021-08-11 | End: 2021-10-18

## 2021-08-13 ENCOUNTER — APPOINTMENT (OUTPATIENT)
Dept: LAB | Facility: HOSPITAL | Age: 76
End: 2021-08-13
Attending: INTERNAL MEDICINE
Payer: MEDICARE

## 2021-08-13 DIAGNOSIS — D64.9 ANEMIA, UNSPECIFIED TYPE: ICD-10-CM

## 2021-08-13 DIAGNOSIS — I26.99 BILATERAL PULMONARY EMBOLISM (HCC): ICD-10-CM

## 2021-08-13 DIAGNOSIS — E46 PROTEIN-CALORIE MALNUTRITION, UNSPECIFIED SEVERITY (HCC): ICD-10-CM

## 2021-08-13 DIAGNOSIS — D50.9 IRON DEFICIENCY ANEMIA, UNSPECIFIED IRON DEFICIENCY ANEMIA TYPE: ICD-10-CM

## 2021-08-13 DIAGNOSIS — N18.31 STAGE 3A CHRONIC KIDNEY DISEASE (HCC): ICD-10-CM

## 2021-08-13 LAB
ALBUMIN SERPL BCP-MCNC: 3.2 G/DL (ref 3.5–5)
ALP SERPL-CCNC: 121 U/L (ref 46–116)
ALT SERPL W P-5'-P-CCNC: 20 U/L (ref 12–78)
ANION GAP SERPL CALCULATED.3IONS-SCNC: 8 MMOL/L (ref 4–13)
AST SERPL W P-5'-P-CCNC: 10 U/L (ref 5–45)
BASOPHILS # BLD AUTO: 0.02 THOUSANDS/ΜL (ref 0–0.1)
BASOPHILS NFR BLD AUTO: 0 % (ref 0–1)
BILIRUB SERPL-MCNC: 0.36 MG/DL (ref 0.2–1)
BUN SERPL-MCNC: 16 MG/DL (ref 5–25)
CALCIUM ALBUM COR SERPL-MCNC: 9.4 MG/DL (ref 8.3–10.1)
CALCIUM SERPL-MCNC: 8.8 MG/DL (ref 8.3–10.1)
CHLORIDE SERPL-SCNC: 102 MMOL/L (ref 100–108)
CO2 SERPL-SCNC: 29 MMOL/L (ref 21–32)
CREAT SERPL-MCNC: 1.15 MG/DL (ref 0.6–1.3)
CRP SERPL QL: 11.4 MG/L
EOSINOPHIL # BLD AUTO: 0.15 THOUSAND/ΜL (ref 0–0.61)
EOSINOPHIL NFR BLD AUTO: 2 % (ref 0–6)
ERYTHROCYTE [DISTWIDTH] IN BLOOD BY AUTOMATED COUNT: 15.5 % (ref 11.6–15.1)
ERYTHROCYTE [SEDIMENTATION RATE] IN BLOOD: 40 MM/HOUR (ref 0–19)
FERRITIN SERPL-MCNC: 56 NG/ML (ref 8–388)
FOLATE SERPL-MCNC: >20 NG/ML (ref 3.1–17.5)
GFR SERPL CREATININE-BSD FRML MDRD: 61 ML/MIN/1.73SQ M
GLUCOSE P FAST SERPL-MCNC: 162 MG/DL (ref 65–99)
HCT VFR BLD AUTO: 35.8 % (ref 36.5–49.3)
HGB BLD-MCNC: 10.7 G/DL (ref 12–17)
IMM GRANULOCYTES # BLD AUTO: 0.02 THOUSAND/UL (ref 0–0.2)
IMM GRANULOCYTES NFR BLD AUTO: 0 % (ref 0–2)
IRON SATN MFR SERPL: 15 %
IRON SERPL-MCNC: 43 UG/DL (ref 65–175)
LYMPHOCYTES # BLD AUTO: 1.99 THOUSANDS/ΜL (ref 0.6–4.47)
LYMPHOCYTES NFR BLD AUTO: 26 % (ref 14–44)
MCH RBC QN AUTO: 27 PG (ref 26.8–34.3)
MCHC RBC AUTO-ENTMCNC: 29.9 G/DL (ref 31.4–37.4)
MCV RBC AUTO: 90 FL (ref 82–98)
MONOCYTES # BLD AUTO: 0.49 THOUSAND/ΜL (ref 0.17–1.22)
MONOCYTES NFR BLD AUTO: 7 % (ref 4–12)
NEUTROPHILS # BLD AUTO: 4.89 THOUSANDS/ΜL (ref 1.85–7.62)
NEUTS SEG NFR BLD AUTO: 65 % (ref 43–75)
NRBC BLD AUTO-RTO: 0 /100 WBCS
PLATELET # BLD AUTO: 219 THOUSANDS/UL (ref 149–390)
PMV BLD AUTO: 10.4 FL (ref 8.9–12.7)
POTASSIUM SERPL-SCNC: 4.8 MMOL/L (ref 3.5–5.3)
PROT SERPL-MCNC: 7.3 G/DL (ref 6.4–8.2)
RBC # BLD AUTO: 3.97 MILLION/UL (ref 3.88–5.62)
SODIUM SERPL-SCNC: 139 MMOL/L (ref 136–145)
TIBC SERPL-MCNC: 279 UG/DL (ref 250–450)
URATE SERPL-MCNC: 4.6 MG/DL (ref 4.2–8)
VIT B12 SERPL-MCNC: 311 PG/ML (ref 100–900)
WBC # BLD AUTO: 7.56 THOUSAND/UL (ref 4.31–10.16)

## 2021-08-13 PROCEDURE — 83550 IRON BINDING TEST: CPT

## 2021-08-13 PROCEDURE — 82607 VITAMIN B-12: CPT

## 2021-08-13 PROCEDURE — 84550 ASSAY OF BLOOD/URIC ACID: CPT

## 2021-08-13 PROCEDURE — 82746 ASSAY OF FOLIC ACID SERUM: CPT

## 2021-08-13 PROCEDURE — 85025 COMPLETE CBC W/AUTO DIFF WBC: CPT

## 2021-08-13 PROCEDURE — 85652 RBC SED RATE AUTOMATED: CPT

## 2021-08-13 PROCEDURE — 83615 LACTATE (LD) (LDH) ENZYME: CPT

## 2021-08-13 PROCEDURE — 80053 COMPREHEN METABOLIC PANEL: CPT

## 2021-08-13 PROCEDURE — 82728 ASSAY OF FERRITIN: CPT

## 2021-08-13 PROCEDURE — 86140 C-REACTIVE PROTEIN: CPT

## 2021-08-13 PROCEDURE — 36415 COLL VENOUS BLD VENIPUNCTURE: CPT

## 2021-08-13 PROCEDURE — 83540 ASSAY OF IRON: CPT

## 2021-08-13 PROCEDURE — 83625 ASSAY OF LDH ENZYMES: CPT

## 2021-08-17 LAB
LDH SERPL-CCNC: 147 IU/L (ref 121–224)
LDH1 CFR SERPL ELPH: 23 % (ref 17–32)
LDH2 CFR SERPL ELPH: 37 % (ref 25–40)
LDH3 CFR SERPL ELPH: 20 % (ref 17–27)
LDH4 CFR SERPL ELPH: 8 % (ref 5–13)
LDH5 CFR SERPL ELPH: 12 % (ref 4–20)

## 2021-08-19 ENCOUNTER — OFFICE VISIT (OUTPATIENT)
Dept: HEMATOLOGY ONCOLOGY | Facility: MEDICAL CENTER | Age: 76
End: 2021-08-19
Payer: MEDICARE

## 2021-08-19 VITALS
TEMPERATURE: 97.6 F | HEIGHT: 69 IN | WEIGHT: 228 LBS | RESPIRATION RATE: 16 BRPM | SYSTOLIC BLOOD PRESSURE: 128 MMHG | HEART RATE: 98 BPM | BODY MASS INDEX: 33.77 KG/M2 | OXYGEN SATURATION: 92 % | DIASTOLIC BLOOD PRESSURE: 82 MMHG

## 2021-08-19 DIAGNOSIS — R97.20 ELEVATED PSA: ICD-10-CM

## 2021-08-19 DIAGNOSIS — D47.2 MGUS (MONOCLONAL GAMMOPATHY OF UNKNOWN SIGNIFICANCE): ICD-10-CM

## 2021-08-19 DIAGNOSIS — E53.8 VITAMIN B 12 DEFICIENCY: ICD-10-CM

## 2021-08-19 DIAGNOSIS — D64.9 NORMOCYTIC ANEMIA: ICD-10-CM

## 2021-08-19 DIAGNOSIS — D68.51 HETEROZYGOUS FACTOR V LEIDEN MUTATION (HCC): ICD-10-CM

## 2021-08-19 DIAGNOSIS — I26.99 BILATERAL PULMONARY EMBOLISM (HCC): Primary | ICD-10-CM

## 2021-08-19 PROCEDURE — 99215 OFFICE O/P EST HI 40 MIN: CPT | Performed by: PHYSICIAN ASSISTANT

## 2021-08-19 NOTE — PATIENT INSTRUCTIONS
Today you were diagnosed with Factor V Leiden - One copy of the gene  · Two copies of the gene means you are more likely to have blood clots  · Please tell your family members as this gene was passed down from your parents and it its likely they may have the gene too  Please have the pharmacist assist you in finding a sublingual tablet for b12  Take a dose of  1000 mcg to be taken daily  Vitamin B12 Deficiency   AMBULATORY CARE:   Vitamin B12 deficiency  is a low level of vitamin B12 in your body  Vitamin B12 is only found in foods that come from animal sources such as fish, beef, dairy products, and eggs  Vitamin B12 deficiency should be treated as early as possible  Without treatment, it can cause permanent nerve damage over time  Common symptoms include the following:   · Fatigue or weakness    · Loss of appetite or weight loss    · Constipation    · Depression, confusion, dementia, or poor memory    · Mouth or tongue soreness    · Trouble keeping your balance    · Numbness or tingling in your hands or feet    · In infants and children, irritability, poor growth, developmental delay, or movement disorders    Call your doctor or dietitian if:   · You continue to have symptoms, or your symptoms get worse  · You have questions or concerns about your condition or care  Treatment for vitamin B12 deficiency  may include any of the following:  · For low intake deficiency , you may need to eat more foods that contain or are fortified with vitamin B12  You may also need to take an over-the-counter supplement  · For low absorption deficiency , you may need several high doses of vitamin B12 to increase your levels  These doses of vitamin B12 may be given as a shot or pill  You may need to take these vitamin B12 supplements for the rest of your life      Good sources of vitamin B12:       · 3 ounces of cooked clams, 84 1 mcg    · 3 ounces of cooked beef liver, 70 7 mcg    · Fortified breakfast cereals, 1 5 to 6 mcg per serving    · 3 ounces of salmon, rainbow trout, or canned tuna fish, 2 5 to 4 8 mcg    · 3 ounces of top sirloin beef, 1 4 mcg    · 1 cup of milk or yogurt, 1 1 to 1 2 mcg    · 1 cup of a soy milk product, 0 9 to 3 3 mcg    · 1 ounce of a meat substitute, 0 5 to 1 2 mcg    · 1 ounce Swiss cheese, 0 9 mcg    · 1 large egg, 0 6 mcg  Amount of vitamin B12 you need each day:   · Infants 0 to 12 months: 0 4 micrograms (mcg) to 0 5 mcg    · Children 1 to 3 years: 0 9 mcg    · Children 4 to 8 years: 1 2 mcg    · Children 9 to 13 years: 1 8 mcg    · Children over 14 years and adults: 1 8 mcg    · Pregnant women and adolescents (over 14 years): 2 6 mcg    · Breastfeeding women and adolescents (over 14 years): 2 8 mcg    Follow up with your doctor or dietitian as directed:  Write down your questions so you remember to ask them during your visits  © Copyright [employer] 2021 Information is for End User's use only and may not be sold, redistributed or otherwise used for commercial purposes  All illustrations and images included in CareNotes® are the copyrighted property of [employer]  or Ravinder Lopez  The above information is an  only  It is not intended as medical advice for individual conditions or treatments  Talk to your doctor, nurse or pharmacist before following any medical regimen to see if it is safe and effective for you

## 2021-08-19 NOTE — PROGRESS NOTES
[facility] [address] [address]  Hematology Ambulatory Follow-Up  Gareth Estes, 1945, [id number]  8/19/2021    Assessment/Plan:    1  Bilateral pulmonary embolism ([facility] [address] ); 2  Heterozygous factor V Leiden mutation ([facility] [address] )    7/18/21 1st episode of DVT +PE  DVT was noted to be occlusive in the distal gastrocnemius vein and proximal calf  Patient was noted to have right heart strain  Due to severity of 1st blood clot and heterozygosity of factor five Leiden, patient may benefit on continued anticoagulation beyond six months of full-dose therapy  This will be discussed after hypercoagulable testing is completed  I discussed with the patient and his sister Savannah Scott today the timing of this  Patient will be eligible for testing in January of 2022  Patient will be seen in approximately two months with repeat blood tests regarding anemia below  Regimen:  Eliquis 5 mg PO BID    - CBC and differential; Future  - Comprehensive metabolic panel; Future      3  Normocytic anemia    Patient is more moderate to severe anemia was a result of systemic inflammation, body stresses related to the above during hospitalization  Patient was also found to have a B12 deficiency which has been since corrected  Hemoglobin has increased and is now at 10 grams/deciliter  Continued observation is advisable  No other cell line deficits exists  - CBC and differential; Future  - Comprehensive metabolic panel; Future  - Protein electrophoresis, serum; Future  - IgG, IgA, IgM; Future  - Immunoglobulin free LT chains blood; Future  - Vitamin B12; Future  - Iron Panel (Includes Ferritin, Iron Sat%, Iron, and TIBC); Future    4  Vitamin B 12 deficiency    Patient was given information regarding dietary sources of B12  Regimen:  B12 IM compelted  B 12 PO 1000mcg orally dissolvable       - CBC and differential; Future  - Vitamin B12; Future    5  ? MGUS (monoclonal gammopathy of unknown significance)  FLC = 0 34; SPEP Fixation: cannot r/o small gammopathy  laboratory assessment suggested retesting in 3-6 months  Patient will come back to the office in two months which would be three months from the previous lab tests completed in the hospital   Patient will repeat the laboratory assessment as outlined below  I explained to the patient and his sister that the clinical significance is unknown at this time  Is likely  The etiology is related to stress from the hospitalization /pulmonary embolisms/ect  - CBC and differential; Future  - Comprehensive metabolic panel; Future  - Protein electrophoresis, serum; Future  - IgG, IgA, IgM; Future  - Immunoglobulin free LT chains blood; Future      6  Elevated PSA  Following with Dr Rodrigo Garcia  Patient is due for MRI on September 10th  Patient is not having a biopsy secondary to recent pulmonary embolism and discomfort with coming off of anticoagulation  If needed, Lovenox could be considered  Patient would start on Lovenox and would hold dose the day of biopsy and would either restart Lovenox right after if there is a bleeding issue or back on Eliquis  The patient is scheduled for follow-up in approximately 2 months with Dr [place]  Patient voiced agreement and understanding to the above  Patient knows to call the Hematology/Oncology office with any questions and concerns regarding the above  Barrier(s) to care: None  The patient is able to self care   ------------------------------------------------------------------------------------------------------    Chief Complaint   Patient presents with    Consult     Anemia, unspecified type       History of present illness:  This is a 64-year-old male with past medical history of diabetes mellitus, hyperlipidemia, hypertension, BPH and edema who presented to the emergency room on 7/18/21 secondary to a syncopal episode      7/18/2021:  CTA findings included Multiple segmental and subsegmental filling defects suspicious for pulmonary emboli bilaterally +  Prominence of the right ventricle suggesting right heart strain  +  Nodular contour of the liver, may suggest a component of hepatic cirrhosis    +  Coronary atherosclerosis, and other findings as above      7/18/2021:  Lower extremity VAS:  RIGHT LOWER LIMB:  There is evidence of acute occlusive chronic deep vein thrombosis noted in one  of the gastrocnemius veins in the proximal calf  No evidence of superficial thrombophlebitis noted  Doppler evaluation shows a normal response to augmentation maneuvers  Popliteal, posterior tibial and anterior tibial arterial Doppler waveforms are  triphasic/biphasic      LEFT LOWER LIMB:  No evidence of acute or chronic deep vein thrombosis  No evidence of superficial thrombophlebitis noted  Doppler evaluation shows a normal response to augmentation maneuvers  Popliteal, posterior tibial and anterior tibial arterial Doppler waveforms are  triphasic/biphasic         Earlier this month this patient went to his primary doctor and to the urologist   Patient's PSA was elevated at 76   Patient had been recommended to follow-up with additional imaging tests to overall out metastatic spread   Patient was also found to be somewhat anemic   Workup did not demonstrate iron deficiency but did demonstrate anemia of chronic inflammation      Oncology work up during hospitalization:  7/19/2021  NM bone Scan : 1   Small focus of mild activity in a left mid lateral rib is nonspecific but possibly from old trauma   This may be reassessed on follow-up exam   2   Otherwise no scintigraphic evidence of osseous metastasis      7/19/2021: CT AP  Marked diffuse bladder wall thickening   Flores catheter present   Minimal fullness of the ureters left greater than right  Prostate appears within normal limits  Stable mild bibasilar atelectasis    Mild bilateral symmetric gynecomastia  Concomitant B12 deficiency was found patient was given IM supplementations  Prothrombin mutation = negative  Factor V Leiden =  Heterozygous  Interval history:   Patient was back to see Dr Natalio oFwler, MRI was ordered because biopsy was contraindicated with starting on anticoagulation  Patient is having obstructive issues with prostate and has a Flores catheter  Family history review demonstrates three other siblings with blood clots  Specifically his brother Reye ND who also was diagnosed with mesothelioma due to asbestos contact had factor five Leiden mutation but unsure if this was one or two copies  Review of Systems   Constitutional: Negative for activity change, appetite change, fatigue and fever  HENT: Negative for nosebleeds  Respiratory: Negative for cough, choking and shortness of breath  Cardiovascular: Negative for chest pain, palpitations and leg swelling  Gastrointestinal: Negative for abdominal distention, abdominal pain, anal bleeding, blood in stool, constipation, diarrhea, nausea and vomiting  Endocrine: Negative for cold intolerance  Genitourinary: Negative for hematuria  Musculoskeletal: Negative for myalgias  Skin: Negative for color change, pallor and rash  Allergic/Immunologic: Negative for immunocompromised state  Neurological: Negative for headaches  Hematological: Negative for adenopathy  Does not bruise/bleed easily  All other systems reviewed and are negative  Patient Active Problem List   Diagnosis    Type 2 diabetes mellitus, without long-term current use of insulin ([facility] [address] )    CKD stage 3 due to type 2 diabetes mellitus (HCC)    Anemia    Dyslipidemia    Elevated PSA    Urinary retention    Acute cystitis with hematuria    Obesity (BMI 30-39  9)    Tremor    Hypoxia    Generalized weakness    Protein-calorie malnutrition (HCC)    Bilateral pulmonary embolism (HCC)    Respiratory failure with hypoxia ([address] )    Urinary tract infection without hematuria    Non MI troponin elevation    Syncope       Past Medical History:   Diagnosis Date    Arthritis     BPH associated with nocturia     Diabetes mellitus ([address] )     Edema     lower legs    Hearing aid worn     bilateral    Hyperlipidemia     Hypertension     controlled    Tremor of both hands        Past Surgical History:   Procedure Laterality Date    CATARACT EXTRACTION      AK XCAPSL CTRC RMVL INSJ IO LENS PROSTH W/O ECP Right 8/6/2018    Procedure: EXTRACTION EXTRACAPSULAR CATARACT PHACO INTRAOCULAR LENS (IOL); Surgeon: Fior Junior MD;  Location: [facility] MAIN OR;  Service: Ophthalmology    AK XCAPSL CTRC RMVL INSJ IO LENS PROSTH W/O ECP Left 10/1/2018    Procedure: EXTRACTION EXTRACAPSULAR CATARACT PHACO INTRAOCULAR LENS (IOL); Surgeon: Fior Junior MD;  Location: [facility] MAIN OR;  Service: Ophthalmology       Family History   Problem Relation Age of Onset    Cancer Mother     Cancer Father         lung-smoker    Early death Brother    Wendie Vora Cancer Brother         "bone cancer"       Social History     Socioeconomic History    Marital status: Unknown     Spouse name: None    Number of children: None    Years of education: None    Highest education level: None   Occupational History    None   Tobacco Use    Smoking status: Never Smoker    Smokeless tobacco: Never Used   Vaping Use    Vaping Use: Never used   Substance and Sexual Activity    Alcohol use: Never    Drug use: Never    Sexual activity: Never   Other Topics Concern    None   Social History Narrative    None     Social Determinants of Health     Financial Resource Strain:     Difficulty of Paying Living Expenses:    Food Insecurity:     Worried About Running Out of Food in the Last Year:     Ran Out of Food in the Last Year:    Transportation Needs:     Lack of Transportation (Medical):      Lack of Transportation (Non-Medical):    Physical Activity:     Days of Exercise per Week:     Minutes of Exercise per Session:    Stress:     Feeling of Stress :    Social Connections:     Frequency of Communication with Friends and Family:     Frequency of Social Gatherings with Friends and Family:     Attends Yazdanism Services:     Active Member of Clubs or Organizations:     Attends Club or Organization Meetings:     Marital Status:    Intimate Partner Violence:     Fear of Current or Ex-Partner:     Emotionally Abused:     Physically Abused:     Sexually Abused:          Current Outpatient Medications:     apixaban (ELIQUIS) 5 mg, Take 1 tablet (5 mg total) by mouth 2 (two) times a day, Disp: 60 tablet, Rfl: 1    docusate sodium (COLACE) 100 mg capsule, Take 1 capsule (100 mg total) by mouth 2 (two) times a day, Disp: 10 capsule, Rfl: 0    folic acid (FOLVITE) 1 mg tablet, Take 1 tablet (1 mg total) by mouth daily, Disp: 30 tablet, Rfl: 1    metFORMIN (GLUCOPHAGE) 500 mg tablet, Take 1 tablet (500 mg total) by mouth 2 (two) times a day with meals, Disp: 180 tablet, Rfl: 3    pravastatin (PRAVACHOL) 40 mg tablet, Take 1 tablet (40 mg total) by mouth every morning, Disp: 90 tablet, Rfl: 1    senna (SENOKOT) 8 6 mg, Take 2 tablets (17 2 mg total) by mouth daily at bedtime, Disp: 60 tablet, Rfl: 0    sitaGLIPtin (JANUVIA) 100 mg tablet, Take 1 tablet (100 mg total) by mouth daily, Disp: 90 tablet, Rfl: 3    tamsulosin (FLOMAX) 0 4 mg, Take 1 capsule (0 4 mg total) by mouth daily with dinner, Disp: 30 capsule, Rfl: 0    bisacodyl (DULCOLAX) 10 mg suppository, Insert 1 suppository (10 mg total) into the rectum once as needed for constipation (If no bowel movement) for up to 1 dose (Patient not taking: Reported on 8/19/2021), Disp: 12 suppository, Rfl: 0    calcium carbonate (TUMS) 500 mg chewable tablet, Chew 2 tablets (1,000 mg total) daily as needed for indigestion or heartburn (Patient not taking: Reported on 8/19/2021), Disp: 60 tablet, Rfl: 0    Continuous Blood Gluc Sensor (FreeStyle [place] 14 Day Sensor) MISC, Change sensor every 14 days (Patient not taking: Reported on 8/19/2021), Disp: 2 each, Rfl: 12    polyethylene glycol (MIRALAX) 17 g packet, Take 17 g by mouth daily as needed (constipation) for up to 14 days (Patient not taking: Reported on 8/19/2021), Disp: 14 each, Rfl: 0    No Known Allergies    Objective:  /82 (BP Location: Right arm, Patient Position: Sitting, Cuff Size: Adult)   Pulse 98   Temp 97 6 °F (36 4 °C) (Temporal)   Resp 16   Ht 5' 9" (1 753 m)   Wt 103 kg (228 lb)   SpO2 92%   BMI 33 67 kg/m²    Physical Exam  Constitutional:       General: He is not in acute distress  Appearance: He is well-developed  HENT:      Head: Normocephalic and atraumatic  Mouth/Throat:      Pharynx: No oropharyngeal exudate  Eyes:      General: No scleral icterus  Conjunctiva/sclera: Conjunctivae normal       Pupils: Pupils are equal, round, and reactive to light  Cardiovascular:      Rate and Rhythm: Normal rate and regular rhythm  Heart sounds: No murmur heard  Pulmonary:      Effort: Pulmonary effort is normal  No respiratory distress  Breath sounds: Normal breath sounds  Abdominal:      General: Bowel sounds are normal  There is no distension  Palpations: Abdomen is soft  Tenderness: There is no abdominal tenderness  Comments: Flores cath noted  Yellow urin incollection bag  Musculoskeletal:         General: No tenderness  Cervical back: Normal range of motion  Right lower leg: Edema present  Left lower leg: Edema present  Lymphadenopathy:      Cervical: No cervical adenopathy  Skin:     Coloration: Skin is not pale  Findings: No erythema or rash  Neurological:      Mental Status: He is alert and oriented to person, place, and time  Cranial Nerves: No cranial nerve deficit           Result Review  Labs:  Appointment on 08/13/2021   Component Date Value Ref Range Status  Vitamin B-12 08/13/2021 311  100 - 900 pg/mL Final    Folate 08/13/2021 >20 0* 3 1 - 17 5 ng/mL Final    E521    CRP 08/13/2021 11 4* <3 0 mg/L Final    Sed Rate 08/13/2021 40* 0 - 19 mm/hour Final    Uric Acid 08/13/2021 4 6  4 2 - 8 0 mg/dL Final    Specimen collection should occur prior to Metamizole administration due to the potential for falsely depressed results      LDH 08/13/2021 147  121 - 224 IU/L Final    LD-1 08/13/2021 23  17 - 32 % Final    LD-2 08/13/2021 37  25 - 40 % Final    LD-3 08/13/2021 20  17 - 27 % Final    LD-4 08/13/2021 8  5 - 13 % Final    LD-5 08/13/2021 12  4 - 20 % Final    WBC 08/13/2021 7 56  4 31 - 10 16 Thousand/uL Final    RBC 08/13/2021 3 97  3 88 - 5 62 Million/uL Final    Hemoglobin 08/13/2021 10 7* 12 0 - 17 0 g/dL Final    Hematocrit 08/13/2021 35 8* 36 5 - 49 3 % Final    MCV 08/13/2021 90  82 - 98 fL Final    MCH 08/13/2021 27 0  26 8 - 34 3 pg Final    MCHC 08/13/2021 29 9* 31 4 - 37 4 g/dL Final    RDW 08/13/2021 15 5* 11 6 - 15 1 % Final    MPV 08/13/2021 10 4  8 9 - 12 7 fL Final    Platelets 38/07/1667 219  149 - 390 Thousands/uL Final    nRBC 08/13/2021 0  /100 WBCs Final    Neutrophils Relative 08/13/2021 65  43 - 75 % Final    Immat GRANS % 08/13/2021 0  0 - 2 % Final    Lymphocytes Relative 08/13/2021 26  14 - 44 % Final    Monocytes Relative 08/13/2021 7  4 - 12 % Final    Eosinophils Relative 08/13/2021 2  0 - 6 % Final    Basophils Relative 08/13/2021 0  0 - 1 % Final    Neutrophils Absolute 08/13/2021 4 89  1 85 - 7 62 Thousands/µL Final    Immature Grans Absolute 08/13/2021 0 02  0 00 - 0 20 Thousand/uL Final    Lymphocytes Absolute 08/13/2021 1 99  0 60 - 4 47 Thousands/µL Final    Monocytes Absolute 08/13/2021 0 49  0 17 - 1 22 Thousand/µL Final    Eosinophils Absolute 08/13/2021 0 15  0 00 - 0 61 Thousand/µL Final    Basophils Absolute 08/13/2021 0 02  0 00 - 0 10 Thousands/µL Final    Sodium 08/13/2021 139  136 - 145 mmol/L Final    Potassium 08/13/2021 4 8  3 5 - 5 3 mmol/L Final    Chloride 08/13/2021 102  100 - 108 mmol/L Final    CO2 08/13/2021 29  21 - 32 mmol/L Final    ANION GAP 08/13/2021 8  4 - 13 mmol/L Final    BUN 08/13/2021 16  5 - 25 mg/dL Final    Creatinine 08/13/2021 1 15  0 60 - 1 30 mg/dL Final    Standardized to IDMS reference method    Glucose, Fasting 08/13/2021 162* 65 - 99 mg/dL Final    Specimen collection should occur prior to Sulfasalazine administration due to the potential for falsely depressed results  Specimen collection should occur prior to Sulfapyridine administration due to the potential for falsely elevated results   Calcium 08/13/2021 8 8  8 3 - 10 1 mg/dL Final    Corrected Calcium 08/13/2021 9 4  8 3 - 10 1 mg/dL Final    AST 08/13/2021 10  5 - 45 U/L Final    Specimen collection should occur prior to Sulfasalazine administration due to the potential for falsely depressed results   ALT 08/13/2021 20  12 - 78 U/L Final    Specimen collection should occur prior to Sulfasalazine administration due to the potential for falsely depressed results   Alkaline Phosphatase 08/13/2021 121* 46 - 116 U/L Final    Total Protein 08/13/2021 7 3  6 4 - 8 2 g/dL Final    Albumin 08/13/2021 3 2* 3 5 - 5 0 g/dL Final    Total Bilirubin 08/13/2021 0 36  0 20 - 1 00 mg/dL Final    Use of this assay is not recommended for patients undergoing treatment with eltrombopag due to the potential for falsely elevated results   eGFR 08/13/2021 61  ml/min/1 73sq m Final    Iron Saturation 08/13/2021 15  % Final    TIBC 08/13/2021 279  250 - 450 ug/dL Final    Iron 08/13/2021 43* 65 - 175 ug/dL Final    Patients treated with metal-binding drugs (ie  Deferoxamine) may have depressed iron values   Ferritin 08/13/2021 56  8 - 388 ng/mL Final       Please note: This report has been generated by a voice recognition software system   Therefore there may be syntax, spelling, and/or grammatical errors  Please call if you have any questions

## 2021-08-31 ENCOUNTER — OFFICE VISIT (OUTPATIENT)
Dept: FAMILY MEDICINE CLINIC | Facility: CLINIC | Age: 76
End: 2021-08-31
Payer: MEDICARE

## 2021-08-31 VITALS
WEIGHT: 225 LBS | HEIGHT: 69 IN | OXYGEN SATURATION: 94 % | BODY MASS INDEX: 33.33 KG/M2 | SYSTOLIC BLOOD PRESSURE: 150 MMHG | HEART RATE: 97 BPM | TEMPERATURE: 97.5 F | DIASTOLIC BLOOD PRESSURE: 80 MMHG | RESPIRATION RATE: 16 BRPM

## 2021-08-31 DIAGNOSIS — I26.99 BILATERAL PULMONARY EMBOLISM (HCC): ICD-10-CM

## 2021-08-31 DIAGNOSIS — R97.20 ELEVATED PSA: ICD-10-CM

## 2021-08-31 DIAGNOSIS — E78.5 DYSLIPIDEMIA: ICD-10-CM

## 2021-08-31 DIAGNOSIS — E11.9 TYPE 2 DIABETES MELLITUS WITHOUT COMPLICATION, WITHOUT LONG-TERM CURRENT USE OF INSULIN (HCC): ICD-10-CM

## 2021-08-31 DIAGNOSIS — D64.9 ANEMIA, UNSPECIFIED TYPE: ICD-10-CM

## 2021-08-31 DIAGNOSIS — R55 SYNCOPE, UNSPECIFIED SYNCOPE TYPE: ICD-10-CM

## 2021-08-31 DIAGNOSIS — Z00.00 MEDICARE ANNUAL WELLNESS VISIT, SUBSEQUENT: Primary | ICD-10-CM

## 2021-08-31 PROCEDURE — 99214 OFFICE O/P EST MOD 30 MIN: CPT | Performed by: FAMILY MEDICINE

## 2021-08-31 PROCEDURE — G0438 PPPS, INITIAL VISIT: HCPCS | Performed by: FAMILY MEDICINE

## 2021-08-31 NOTE — PROGRESS NOTES
Assessment/Plan:    No problem-specific Assessment & Plan notes found for this encounter  B/l PE with FVL, has seen hematology, 6m or more plan of NOAC    S/p syncope, no recurrence since dc    Anemia, w/u in progress, H56 and folic acid were normal    DM2, improving, has cgm, f/u endocrinology    HLD on pravastatin, continue meds for risk reduction    Urology f/u for elevated psa     Diagnoses and all orders for this visit:    Dyslipidemia    Type 2 diabetes mellitus without complication, without long-term current use of insulin (HCC)    Bilateral pulmonary embolism (HCC)    Syncope, unspecified syncope type    Anemia, unspecified type    Elevated PSA    Medicare annual wellness visit, subsequent        No follow-ups on file  Subjective:      Patient ID: Mahala Aschoff is a 66 y o  male  Chief Complaint   Patient presents with    Follow-up     [facility]    Medicare Wellness Visit       HPI    Has FVL  On NOAC  Some bruising  On b12 and folate    Has cardio f/u [facility] next month  No syncope since dc        The following portions of the patient's history were reviewed and updated as appropriate: allergies, current medications, past family history, past medical history, past social history, past surgical history and problem list     Review of Systems   Constitutional: Negative for fever  Respiratory: Negative for shortness of breath            Current Outpatient Medications   Medication Sig Dispense Refill    apixaban (ELIQUIS) 5 mg Take 1 tablet (5 mg total) by mouth 2 (two) times a day 60 tablet 1    docusate sodium (COLACE) 100 mg capsule Take 1 capsule (100 mg total) by mouth 2 (two) times a day 10 capsule 0    folic acid (FOLVITE) 1 mg tablet Take 1 tablet (1 mg total) by mouth daily 30 tablet 1    metFORMIN (GLUCOPHAGE) 500 mg tablet Take 1 tablet (500 mg total) by mouth 2 (two) times a day with meals 180 tablet 3    pravastatin (PRAVACHOL) 40 mg tablet Take 1 tablet (40 mg total) by mouth every morning 90 tablet 1    senna (SENOKOT) 8 6 mg Take 2 tablets (17 2 mg total) by mouth daily at bedtime 60 tablet 0    sitaGLIPtin (JANUVIA) 100 mg tablet Take 1 tablet (100 mg total) by mouth daily 90 tablet 3    tamsulosin (FLOMAX) 0 4 mg Take 1 capsule (0 4 mg total) by mouth daily with dinner 30 capsule 0    bisacodyl (DULCOLAX) 10 mg suppository Insert 1 suppository (10 mg total) into the rectum once as needed for constipation (If no bowel movement) for up to 1 dose (Patient not taking: Reported on 8/19/2021) 12 suppository 0    Continuous Blood Gluc Sensor (FreeStyle [place] 14 Day Sensor) MISC Change sensor every 14 days (Patient not taking: Reported on 8/19/2021) 2 each 12    polyethylene glycol (MIRALAX) 17 g packet Take 17 g by mouth daily as needed (constipation) for up to 14 days (Patient not taking: Reported on 8/19/2021) 14 each 0     No current facility-administered medications for this visit  Objective:    /80   Pulse 97   Temp 97 5 °F (36 4 °C)   Resp 16   Ht 5' 9" (1 753 m)   Wt 102 kg (225 lb)   SpO2 94%   BMI 33 23 kg/m²        Physical Exam  Vitals and nursing note reviewed  Constitutional:       General: He is not in acute distress  Appearance: He is well-developed  He is not ill-appearing  HENT:      Head: Normocephalic  Right Ear: Tympanic membrane normal       Left Ear: Tympanic membrane normal    Eyes:      Conjunctiva/sclera: Conjunctivae normal    Cardiovascular:      Rate and Rhythm: Normal rate and regular rhythm  Pulmonary:      Effort: Pulmonary effort is normal  No respiratory distress  Breath sounds: No wheezing or rales  Abdominal:      General: There is no distension  Palpations: Abdomen is soft  Tenderness: There is no abdominal tenderness  Musculoskeletal:         General: No deformity  Cervical back: Neck supple  Skin:     General: Skin is warm and dry  Coloration: Skin is not pale     Neurological:      Mental Status: He is alert  Psychiatric:         Behavior: Behavior normal          Thought Content:  Thought content normal        welch in place         Perry Slater DO

## 2021-08-31 NOTE — PATIENT INSTRUCTIONS
SHINGRIX is the new, more effective vaccine for Shingles  It is more than 90% effective  You should check with your local pharmacy and insurance company for availability and coverage  It is a 2 shot series, with the second shot given between 2-6 months after the first, and is approved for ages 48 and up  Medicare Preventive Visit Patient Instructions  Thank you for completing your Welcome to Medicare Visit or Medicare Annual Wellness Visit today  Your next wellness visit will be due in one year (9/2/2022)  The screening/preventive services that you may require over the next 5-10 years are detailed below  Some tests may not apply to you based off risk factors and/or age  Screening tests ordered at today's visit but not completed yet may show as past due  Also, please note that scanned in results may not display below  Preventive Screenings:  Service Recommendations Previous Testing/Comments   Colorectal Cancer Screening  · Colonoscopy    · Fecal Occult Blood Test (FOBT)/Fecal Immunochemical Test (FIT)  · Fecal DNA/Cologuard Test  · Flexible Sigmoidoscopy Age: 54-65 years old   Colonoscopy: every 10 years (May be performed more frequently if at higher risk)  OR  FOBT/FIT: every 1 year  OR  Cologuard: every 3 years  OR  Sigmoidoscopy: every 5 years  Screening may be recommended earlier than age 48 if at higher risk for colorectal cancer  Also, an individualized decision between you and your healthcare provider will decide whether screening between the ages of 74-80 would be appropriate   Colonoscopy: Not on file  FOBT/FIT: Not on file  Cologuard: Not on file  Sigmoidoscopy: Not on file    Screening Not Indicated     Prostate Cancer Screening Individualized decision between patient and health care provider in men between ages of 53-78   Medicare will cover every 12 months beginning on the day after your 50th birthday PSA: 68 8 ng/mL     History Prostate Cancer  Screening Not Indicated     Hepatitis C Screening Once for adults born between 1945 and 1965  More frequently in patients at high risk for Hepatitis C Hep C Antibody: Not on file    Patient Declines   Diabetes Screening 1-2 times per year if you're at risk for diabetes or have pre-diabetes Fasting glucose: 162 mg/dL   A1C: 6 0 %    Screening Not Indicated  History Diabetes   Cholesterol Screening Once every 5 years if you don't have a lipid disorder  May order more often based on risk factors  Lipid panel: Not on file    Screening Current      Other Preventive Screenings Covered by Medicare:  1  Abdominal Aortic Aneurysm (AAA) Screening: covered once if your at risk  You're considered to be at risk if you have a family history of AAA or a male between the age of 73-68 who smoking at least 100 cigarettes in your lifetime  2  Lung Cancer Screening: covers low dose CT scan once per year if you meet all of the following conditions: (1) Age 50-69; (2) No signs or symptoms of lung cancer; (3) Current smoker or have quit smoking within the last 15 years; (4) You have a tobacco smoking history of at least 30 pack years (packs per day x number of years you smoked); (5) You get a written order from a healthcare provider  3  Glaucoma Screening: covered annually if you're considered high risk: (1) You have diabetes OR (2) Family history of glaucoma OR (3)  aged 48 and older OR (3)  American aged 72 and older  3  Osteoporosis Screening: covered every 2 years if you meet one of the following conditions: (1) Have a vertebral abnormality; (2) On glucocorticoid therapy for more than 3 months; (3) Have primary hyperparathyroidism; (4) On osteoporosis medications and need to assess response to drug therapy  5  HIV Screening: covered annually if you're between the age of 12-76  Also covered annually if you are younger than 13 and older than 72 with risk factors for HIV infection   For pregnant patients, it is covered up to 3 times per pregnancy  Immunizations:  Immunization Recommendations   Influenza Vaccine Annual influenza vaccination during flu season is recommended for all persons aged >= 6 months who do not have contraindications   Pneumococcal Vaccine (Prevnar and Pneumovax)  * Prevnar = PCV13  * Pneumovax = PPSV23 Adults 25-60 years old: 1-3 doses may be recommended based on certain risk factors  Adults 72 years old: Prevnar (PCV13) vaccine recommended followed by Pneumovax (PPSV23) vaccine  If already received PPSV23 since turning 65, then PCV13 recommended at least one year after PPSV23 dose  Hepatitis B Vaccine 3 dose series if at intermediate or high risk (ex: diabetes, end stage renal disease, liver disease)   Tetanus (Td) Vaccine - COST NOT COVERED BY MEDICARE PART B Following completion of primary series, a booster dose should be given every 10 years to maintain immunity against tetanus  Td may also be given as tetanus wound prophylaxis  Tdap Vaccine - COST NOT COVERED BY MEDICARE PART B Recommended at least once for all adults  For pregnant patients, recommended with each pregnancy  Shingles Vaccine (Shingrix) - COST NOT COVERED BY MEDICARE PART B  2 shot series recommended in those aged 48 and above     Health Maintenance Due:      Topic Date Due    Hepatitis C Screening  Never done     Immunizations Due:      Topic Date Due    Pneumococcal Vaccine: 65+ Years (1 of 2 - PPSV23) Never done    Influenza Vaccine (1) 09/01/2021     Advance Directives   What are advance directives? Advance directives are legal documents that state your wishes and plans for medical care  These plans are made ahead of time in case you lose your ability to make decisions for yourself  Advance directives can apply to any medical decision, such as the treatments you want, and if you want to donate organs  What are the types of advance directives? There are many types of advance directives, and each state has rules about how to use them   You may choose a combination of any of the following:  · Living will: This is a written record of the treatment you want  You can also choose which treatments you do not want, which to limit, and which to stop at a certain time  This includes surgery, medicine, IV fluid, and tube feedings  · Durable power of  for healthcare [facility]): This is a written record that states who you want to make healthcare choices for you when you are unable to make them for yourself  This person, called a proxy, is usually a family member or a friend  You may choose more than 1 proxy  · Do not resuscitate (DNR) order:  A DNR order is used in case your heart stops beating or you stop breathing  It is a request not to have certain forms of treatment, such as CPR  A DNR order may be included in other types of advance directives  · Medical directive: This covers the care that you want if you are in a coma, near death, or unable to make decisions for yourself  You can list the treatments you want for each condition  Treatment may include pain medicine, surgery, blood transfusions, dialysis, IV or tube feedings, and a ventilator (breathing machine)  · Values history: This document has questions about your views, beliefs, and how you feel and think about life  This information can help others choose the care that you would choose  Why are advance directives important? An advance directive helps you control your care  Although spoken wishes may be used, it is better to have your wishes written down  Spoken wishes can be misunderstood, or not followed  Treatments may be given even if you do not want them  An advance directive may make it easier for your family to make difficult choices about your care  Fall Prevention    Fall prevention  includes ways to make your home and other areas safer  It also includes ways you can move more carefully to prevent a fall   Health conditions that cause changes in your blood pressure, vision, or muscle strength and coordination may increase your risk for falls  Medicines may also increase your risk for falls if they make you dizzy, weak, or sleepy  Fall prevention tips:   · Stand or sit up slowly  · Use assistive devices as directed  · Wear shoes that fit well and have soles that   · Wear a personal alarm  · Stay active  · Manage your medical conditions  Home Safety Tips:  · Add items to prevent falls in the bathroom  · Keep paths clear  · Install bright lights in your home  · Keep items you use often on shelves within reach  · Paint or place reflective tape on the edges of your stairs  Weight Management   Why it is important to manage your weight:  Being overweight increases your risk of health conditions such as heart disease, high blood pressure, type 2 diabetes, and certain types of cancer  It can also increase your risk for osteoarthritis, sleep apnea, and other respiratory problems  Aim for a slow, steady weight loss  Even a small amount of weight loss can lower your risk of health problems  How to lose weight safely:  A safe and healthy way to lose weight is to eat fewer calories and get regular exercise  You can lose up about 1 pound a week by decreasing the number of calories you eat by 500 calories each day  Healthy meal plan for weight management:  A healthy meal plan includes a variety of foods, contains fewer calories, and helps you stay healthy  A healthy meal plan includes the following:  · Eat whole-grain foods more often  A healthy meal plan should contain fiber  Fiber is the part of grains, fruits, and vegetables that is not broken down by your body  Whole-grain foods are healthy and provide extra fiber in your diet  Some examples of whole-grain foods are whole-wheat breads and pastas, oatmeal, brown rice, and bulgur  · Eat a variety of vegetables every day  Include dark, leafy greens such as spinach, kale, rafael greens, and mustard greens  Eat yellow and orange vegetables such as carrots, sweet potatoes, and winter squash  · Eat a variety of fruits every day  Choose fresh or canned fruit (canned in its own juice or light syrup) instead of juice  Fruit juice has very little or no fiber  · Eat low-fat dairy foods  Drink fat-free (skim) milk or 1% milk  Eat fat-free yogurt and low-fat cottage cheese  Try low-fat cheeses such as mozzarella and other reduced-fat cheeses  · Choose meat and other protein foods that are low in fat  Choose beans or other legumes such as split peas or lentils  Choose fish, skinless poultry (chicken or turkey), or lean cuts of red meat (beef or pork)  Before you cook meat or poultry, cut off any visible fat  · Use less fat and oil  Try baking foods instead of frying them  Add less fat, such as margarine, sour cream, regular salad dressing and mayonnaise to foods  Eat fewer high-fat foods  Some examples of high-fat foods include french fries, doughnuts, ice cream, and cakes  · Eat fewer sweets  Limit foods and drinks that are high in sugar  This includes candy, cookies, regular soda, and sweetened drinks  Exercise:  Exercise at least 30 minutes per day on most days of the week  Some examples of exercise include walking, biking, dancing, and swimming  You can also fit in more physical activity by taking the stairs instead of the elevator or parking farther away from stores  Ask your healthcare provider about the best exercise plan for you  © Copyright [employer] 2018 Information is for End User's use only and may not be sold, redistributed or otherwise used for commercial purposes   All illustrations and images included in CareNotes® are the copyrighted property of A D A M , [employer]  or [address] [place] [address]

## 2021-09-01 PROBLEM — Z00.00 MEDICARE ANNUAL WELLNESS VISIT, SUBSEQUENT: Status: ACTIVE | Noted: 2021-09-01

## 2021-09-01 NOTE — PROGRESS NOTES
Assessment and Plan:     Problem List Items Addressed This Visit        Active Problems    Dyslipidemia - Primary    Anemia    Syncope    Type 2 diabetes mellitus, without long-term current use of insulin (HCC)    Elevated PSA    Bilateral pulmonary embolism ([address] )    Medicare annual wellness visit, subsequent           Preventive health issues were discussed with patient, and age appropriate screening tests were ordered as noted in patient's After Visit Summary  Personalized health advice and appropriate referrals for health education or preventive services given if needed, as noted in patient's After Visit Summary  History of Present Illness:     Patient presents for Medicare Annual Wellness visit    Patient Care Team:  Lawson Dugan DO as PCP - General (Family Medicine)     Problem List:     Patient Active Problem List   Diagnosis    Type 2 diabetes mellitus, without long-term current use of insulin ([address] )    CKD stage 3 due to type 2 diabetes mellitus ([address] )    Anemia    Dyslipidemia    Elevated PSA    Urinary retention    Acute cystitis with hematuria    Obesity (BMI 30-39  9)    Tremor    Hypoxia    Generalized weakness    Protein-calorie malnutrition (HCC)    Bilateral pulmonary embolism (HCC)    Respiratory failure with hypoxia (HCC)    Urinary tract infection without hematuria    Non MI troponin elevation    Syncope    Medicare annual wellness visit, subsequent      Past Medical and Surgical History:     Past Medical History:   Diagnosis Date    Arthritis     BPH associated with nocturia     Diabetes mellitus ([address] )     Edema     lower legs    Hearing aid worn     bilateral    Hyperlipidemia     Hypertension     controlled    Tremor of both hands      Past Surgical History:   Procedure Laterality Date    CATARACT EXTRACTION      AK XCAPSL CTRC RMVL INSJ IO LENS PROSTH W/O ECP Right 8/6/2018    Procedure: EXTRACTION EXTRACAPSULAR CATARACT PHACO INTRAOCULAR LENS (IOL); Surgeon: Maria A Richards MD;  Location: [facility] MAIN OR;  Service: Ophthalmology    [place] XCAPSL CTRC RMVL INSJ IO LENS PROSTH W/O ECP Left 10/1/2018    Procedure: EXTRACTION EXTRACAPSULAR CATARACT PHACO INTRAOCULAR LENS (IOL); Surgeon: Maria A Richards MD;  Location: [facility] MAIN OR;  Service: Ophthalmology      Family History:     Family History   Problem Relation Age of Onset    Cancer Mother     Cancer Father         lung-smoker    Early death Brother     Cancer Brother         "bone cancer"      Social History:     Social History     Socioeconomic History    Marital status: Unknown     Spouse name: None    Number of children: None    Years of education: None    Highest education level: None   Occupational History    None   Tobacco Use    Smoking status: Never Smoker    Smokeless tobacco: Never Used   Vaping Use    Vaping Use: Never used   Substance and Sexual Activity    Alcohol use: Never    Drug use: Never    Sexual activity: Never   Other Topics Concern    None   Social History Narrative    None     Social Determinants of Health     Financial Resource Strain:     Difficulty of Paying Living Expenses:    Food Insecurity:     Worried About Running Out of Food in the Last Year:     Ran Out of Food in the Last Year:    Transportation Needs:     Lack of Transportation (Medical):      Lack of Transportation (Non-Medical):    Physical Activity:     Days of Exercise per Week:     Minutes of Exercise per Session:    Stress:     Feeling of Stress :    Social Connections:     Frequency of Communication with Friends and Family:     Frequency of Social Gatherings with Friends and Family:     Attends [employer]oration[employer] Services:     Active Member of Clubs or Organizations:     Attends Club or Organization Meetings:     Marital Status:    Intimate Partner Violence:     Fear of Current or Ex-Partner:     Emotionally Abused:     Physically Abused:     Sexually Abused:       Medications and Allergies: Current Outpatient Medications   Medication Sig Dispense Refill    apixaban (ELIQUIS) 5 mg Take 1 tablet (5 mg total) by mouth 2 (two) times a day 60 tablet 1    docusate sodium (COLACE) 100 mg capsule Take 1 capsule (100 mg total) by mouth 2 (two) times a day 10 capsule 0    folic acid (FOLVITE) 1 mg tablet Take 1 tablet (1 mg total) by mouth daily 30 tablet 1    metFORMIN (GLUCOPHAGE) 500 mg tablet Take 1 tablet (500 mg total) by mouth 2 (two) times a day with meals 180 tablet 3    pravastatin (PRAVACHOL) 40 mg tablet Take 1 tablet (40 mg total) by mouth every morning 90 tablet 1    senna (SENOKOT) 8 6 mg Take 2 tablets (17 2 mg total) by mouth daily at bedtime 60 tablet 0    sitaGLIPtin (JANUVIA) 100 mg tablet Take 1 tablet (100 mg total) by mouth daily 90 tablet 3    tamsulosin (FLOMAX) 0 4 mg Take 1 capsule (0 4 mg total) by mouth daily with dinner 30 capsule 0    bisacodyl (DULCOLAX) 10 mg suppository Insert 1 suppository (10 mg total) into the rectum once as needed for constipation (If no bowel movement) for up to 1 dose (Patient not taking: Reported on 8/19/2021) 12 suppository 0    Continuous Blood Gluc Sensor (FreeStyle [place] 14 Day Sensor) MISC Change sensor every 14 days (Patient not taking: Reported on 8/19/2021) 2 each 12    polyethylene glycol (MIRALAX) 17 g packet Take 17 g by mouth daily as needed (constipation) for up to 14 days (Patient not taking: Reported on 8/19/2021) 14 each 0     No current facility-administered medications for this visit       No Known Allergies   Immunizations:     Immunization History   Administered Date(s) Administered    SARS-CoV-2 / COVID-19 mRNA IM ([employer]) 04/10/2021, 05/01/2021      Health Maintenance:         Topic Date Due    Hepatitis C Screening  Never done         Topic Date Due    Pneumococcal Vaccine: 65+ Years (1 of 2 - PPSV23) Never done    Influenza Vaccine (1) 09/01/2021      Medicare Health Risk Assessment:     /80 Pulse 97   Temp 97 5 °F (36 4 °C)   Resp 16   Ht 5' 9" (1 753 m)   Wt 102 kg (225 lb)   SpO2 94%   BMI 33 23 kg/m²      Zahra Grajeda is here for his Subsequent Wellness visit  Last Medicare Wellness visit information reviewed, patient interviewed and updates made to the record today  Health Risk Assessment:   Patient rates overall health as good  Patient feels that their physical health rating is same  Patient is satisfied with their life  Eyesight was rated as same  Hearing was rated as same  Patient feels that their emotional and mental health rating is same  Patients states they are never, rarely angry  Patient states they are sometimes unusually tired/fatigued  Pain experienced in the last 7 days has been none  Patient states that he has experienced no weight loss or gain in last 6 months  Depression Screening:   PHQ-2 Score: 0      Fall Risk Screening: In the past year, patient has experienced: history of falling in past year    Number of falls: 2 or more  Injured during fall?: No    Feels unsteady when standing or walking?: Yes    Worried about falling?: Yes      Home Safety:  Patient has trouble with stairs inside or outside of their home  Patient has working smoke alarms and has no working carbon monoxide detector  Home safety hazards include: none  Nutrition:   Current diet is Diabetic, Low Carb and Frequent junk food  Medications:   Patient is currently taking over-the-counter supplements  OTC medications include: see medication list  Patient is not able to manage medications  Activities of Daily Living (ADLs)/Instrumental Activities of Daily Living (IADLs):   Walk and transfer into and out of bed and chair?: Yes  Dress and groom yourself?: Yes    Bathe or shower yourself?: Yes    Feed yourself?  Yes  Do your laundry/housekeeping?: No  Manage your money, pay your bills and track your expenses?: No  Make your own meals?: Yes    Do your own shopping?: No    Advance Care Planning: Living will: Yes    Durable POA for healthcare: Yes    Advanced directive: Yes    End of Life Decisions reviewed with patient: No      Cognitive Screening:   Provider or family/friend/caregiver concerned regarding cognition?: No    PREVENTIVE SCREENINGS      Cardiovascular Screening:    General: Screening Current      Diabetes Screening:     General: Screening Not Indicated and History Diabetes      Colorectal Cancer Screening:     General: Screening Not Indicated      Prostate Cancer Screening:    General: History Prostate Cancer and Screening Not Indicated      Osteoporosis Screening:    General: Screening Not Indicated      Abdominal Aortic Aneurysm (AAA) Screening:        General: Screening Not Indicated      Lung Cancer Screening:     General: Screening Not Indicated      Hepatitis C Screening:    General: Patient Declines    Hep C Screening Accepted: No     Screening, Brief Intervention, and Referral to Treatment (SBIRT)    Screening  Typical number of drinks in a day: 0  Typical number of drinks in a week: 0  Interpretation: Low risk drinking behavior  Single Item Drug Screening:  How often have you used an illegal drug (including marijuana) or a prescription medication for non-medical reasons in the past year? never    Single Item Drug Screen Score: 0  Interpretation: Negative screen for possible drug use disorder    Other Counseling Topics:   Car/seat belt/driving safety and regular weightbearing exercise         Araseli Grullon,

## 2021-09-20 ENCOUNTER — OFFICE VISIT (OUTPATIENT)
Dept: CARDIOLOGY CLINIC | Facility: CLINIC | Age: 76
End: 2021-09-20
Payer: MEDICARE

## 2021-09-20 VITALS
SYSTOLIC BLOOD PRESSURE: 136 MMHG | TEMPERATURE: 97.9 F | WEIGHT: 223 LBS | HEIGHT: 69 IN | OXYGEN SATURATION: 93 % | BODY MASS INDEX: 33.03 KG/M2 | HEART RATE: 92 BPM | DIASTOLIC BLOOD PRESSURE: 70 MMHG

## 2021-09-20 DIAGNOSIS — R01.1 CARDIAC MURMUR: ICD-10-CM

## 2021-09-20 DIAGNOSIS — R55 SYNCOPE, UNSPECIFIED SYNCOPE TYPE: ICD-10-CM

## 2021-09-20 DIAGNOSIS — E11.22 CKD STAGE 3 DUE TO TYPE 2 DIABETES MELLITUS (HCC): ICD-10-CM

## 2021-09-20 DIAGNOSIS — E11.9 TYPE 2 DIABETES MELLITUS WITHOUT COMPLICATION, WITHOUT LONG-TERM CURRENT USE OF INSULIN (HCC): ICD-10-CM

## 2021-09-20 DIAGNOSIS — E66.9 OBESITY (BMI 30-39.9): ICD-10-CM

## 2021-09-20 DIAGNOSIS — R00.0 TACHYCARDIA: ICD-10-CM

## 2021-09-20 DIAGNOSIS — N18.30 CKD STAGE 3 DUE TO TYPE 2 DIABETES MELLITUS (HCC): ICD-10-CM

## 2021-09-20 DIAGNOSIS — E78.5 DYSLIPIDEMIA: ICD-10-CM

## 2021-09-20 DIAGNOSIS — I26.99 BILATERAL PULMONARY EMBOLISM (HCC): ICD-10-CM

## 2021-09-20 PROCEDURE — 99214 OFFICE O/P EST MOD 30 MIN: CPT | Performed by: INTERNAL MEDICINE

## 2021-09-20 PROCEDURE — 93000 ELECTROCARDIOGRAM COMPLETE: CPT | Performed by: INTERNAL MEDICINE

## 2021-09-20 RX ORDER — METOPROLOL SUCCINATE 25 MG/1
25 TABLET, EXTENDED RELEASE ORAL DAILY
Qty: 90 TABLET | Refills: 1 | Status: SHIPPED | OUTPATIENT
Start: 2021-09-20 | End: 2022-01-17 | Stop reason: SDUPTHER

## 2021-09-20 NOTE — PROGRESS NOTES
Progress Note - Cardiology Office  [facility]    Inocencio Gregg 66 y o  male MRN: [id number]  : 1945  Encounter: [id number]      Assessment:     1  Syncope, unspecified syncope type    2  Cardiac murmur    3  Bilateral pulmonary embolism ([facility] [address] )    4  Tachycardia    5  Dyslipidemia    6  CKD stage 3 due to type 2 diabetes mellitus (HCC)    7  Obesity (BMI 30-39 9)    8  Type 2 diabetes mellitus without complication, without long-term current use of insulin ([facility] [address] )        Discussion Summary and Plan:  1  Syncope  No more episodes of syncope most likely etiology of  His syncope was his severe pulmonary hypertension pulmonary embolism  During monitor stair no tachy-linda arrhythmia was noted  Heart rate 80-90 beats per minute whilst start low-dose metoprolol  Patient's workup reviewed with him    2  Bilateral pulmonary embolism  patient was admitted with bilateral pulmonary embolism and RV strain  PA pressure was up to 64 mmHg  He is feeling clinically great no more shortness of breath no syncope  At some point he will repeat echo Doppler  He also had acute DVT at that time he follows with Hematology   Continue  Eliquis at this time      3  History of DVT as above     4   Cardiac murmur  Patient is diagnosed to have cardiac murmur workup shows his mild aortic stenosis  He had a low normal LV systolic function by echo and by nuclear EF was noted to be lower most likely due to his tachycardia and PVC at that time  He denies any symptoms of chest pain or shortness of breath  He had a fixed inferior wall defect no significant ischemia was noted  Will continue to monitor     5  Dyslipidemia  Continue statin he is at pravastatin tolerating it very well      6  Diabetes mellitus      Blood sugar has been up and down management as per medical team     7  Elevated PSA with possible prostate cancer with possible urinary obstruction status post chronic Flores catheter management as per Urology      8  Tachycardia  Heart rate around 92 beats per minute with bifascicular block  I start him on Toprol XL 25 mg initially every day if he becomes symptomatic in the form of dizziness lightheadedness V wave make it every other day  Continue other Rx as before  All issues discussed with patient and patient's sister  Please call [phone] if any questions  Counseling :  A description of the counseling  Goals and Barriers  Patient's ability to self care: Yes  Medication side effect reviewed with patient in detail and all their questions answered to their satisfaction  HPI :     Leah Benson is a 66y o  year old male who came for follow up  Patient  Was admitted to [facility] with recurrent syncope and was found to have pulmonary embolism and elevated pulmonary artery pressure  He has medical history significant for hypertension, hyperlipidemia, CKD stage 3, diabetes mellitus and mildly abnormal stress test   Later on he developed retention of his urine and has Flores catheter placed  He has cardiac workup in the form of echo and stress test and was found to have mild aortic stenosis, moderate pulmonary hypertension, and by echo EF was normal by stress test he has decreased EF he was noted to have inferior wall fixed defect no ischemia and he has frequent PACs and PVC at that time which may be showing his low ejection fraction  He feels great now he walks with the help of cane he has no more episodes of syncope dizziness lightheadedness and he is not requiring any oxygen therapy  He is still using Flores catheter he may need to follow-up with urology for that  His EKG reviewed he had a bifascicular block with heart rate 92 beats per minute and Q-wave in inferior lead noted  He never  he lives with his family he has good support system  His blood test from August 13, 2020 reviewed      Review of Systems   Constitutional: Negative for activity change, chills, diaphoresis, fever and unexpected weight change  HENT: Negative for congestion  Eyes: Negative for discharge and redness  Respiratory: Negative for cough, chest tightness, shortness of breath and wheezing  Exertional shortness of breath is significantly improved   Cardiovascular: Negative  Negative for chest pain, palpitations and leg swelling  Gastrointestinal: Negative for abdominal pain, diarrhea and nausea  Endocrine: Negative  Genitourinary: Positive for difficulty urinating  Negative for decreased urine volume and urgency  Musculoskeletal: Positive for arthralgias and back pain  Negative for gait problem  Skin: Negative for rash and wound  Allergic/Immunologic: Negative  Neurological: Negative for dizziness, seizures, syncope, weakness, light-headedness and headaches  No more episodes of dizziness syncope or near syncope   Hematological: Negative  Psychiatric/Behavioral: Negative for agitation and confusion  The patient is nervous/anxious  Historical Information   Past Medical History:   Diagnosis Date    Arthritis     BPH associated with nocturia     Diabetes mellitus ([address] )     Edema     lower legs    Hearing aid worn     bilateral    Hyperlipidemia     Hypertension     controlled    Tremor of both hands      Past Surgical History:   Procedure Laterality Date    CATARACT EXTRACTION      CT XCAPSL CTRC RMVL INSJ IO LENS PROSTH W/O ECP Right 8/6/2018    Procedure: EXTRACTION EXTRACAPSULAR CATARACT PHACO INTRAOCULAR LENS (IOL); Surgeon: Fior Junior MD;  Location: [facility] MAIN OR;  Service: Ophthalmology    CT XCAPSL CTRC RMVL INSJ IO LENS PROSTH W/O ECP Left 10/1/2018    Procedure: EXTRACTION EXTRACAPSULAR CATARACT PHACO INTRAOCULAR LENS (IOL);   Surgeon: Fior Junior MD;  Location: [facility] OR;  Service: Ophthalmology     Social History     Substance and Sexual Activity   Alcohol Use Never     Social History Substance and Sexual Activity   Drug Use Never     Social History     Tobacco Use   Smoking Status Never Smoker   Smokeless Tobacco Never Used     Family History:   Family History   Problem Relation Age of Onset    Cancer Mother     Cancer Father         lung-smoker    Early death Brother     Cancer Brother         "bone cancer"       Meds/Allergies     No Known Allergies    Current Outpatient Medications:     apixaban (ELIQUIS) 5 mg, Take 1 tablet (5 mg total) by mouth 2 (two) times a day, Disp: 60 tablet, Rfl: 1    docusate sodium (COLACE) 100 mg capsule, Take 1 capsule (100 mg total) by mouth 2 (two) times a day, Disp: 10 capsule, Rfl: 0    folic acid (FOLVITE) 1 mg tablet, Take 1 tablet (1 mg total) by mouth daily, Disp: 30 tablet, Rfl: 1    metFORMIN (GLUCOPHAGE) 500 mg tablet, Take 1 tablet (500 mg total) by mouth 2 (two) times a day with meals, Disp: 180 tablet, Rfl: 3    pravastatin (PRAVACHOL) 40 mg tablet, Take 1 tablet (40 mg total) by mouth every morning, Disp: 90 tablet, Rfl: 1    senna (SENOKOT) 8 6 mg, Take 2 tablets (17 2 mg total) by mouth daily at bedtime, Disp: 60 tablet, Rfl: 0    sitaGLIPtin (JANUVIA) 100 mg tablet, Take 1 tablet (100 mg total) by mouth daily, Disp: 90 tablet, Rfl: 3    tamsulosin (FLOMAX) 0 4 mg, Take 1 capsule (0 4 mg total) by mouth daily with dinner (Patient taking differently: Take 0 8 mg by mouth daily with dinner ), Disp: 30 capsule, Rfl: 0    bisacodyl (DULCOLAX) 10 mg suppository, Insert 1 suppository (10 mg total) into the rectum once as needed for constipation (If no bowel movement) for up to 1 dose (Patient not taking: Reported on 8/19/2021), Disp: 12 suppository, Rfl: 0    Continuous Blood Gluc Sensor (FreeStyle [place] 14 Day Sensor) MISC, Change sensor every 14 days (Patient not taking: Reported on 8/19/2021), Disp: 2 each, Rfl: 12    metoprolol succinate (TOPROL-XL) 25 mg 24 hr tablet, Take 1 tablet (25 mg total) by mouth daily, Disp: 90 tablet, Rfl: 1    polyethylene glycol (MIRALAX) 17 g packet, Take 17 g by mouth daily as needed (constipation) for up to 14 days (Patient not taking: Reported on 8/19/2021), Disp: 14 each, Rfl: 0    Vitals: Blood pressure 136/70, pulse 92, temperature 97 9 °F (36 6 °C), temperature source Temporal, height 5' 9" (1 753 m), weight 101 kg (223 lb), SpO2 93 %  Body mass index is 32 93 kg/m²  Wt Readings from Last 3 Encounters:   09/20/21 101 kg (223 lb)   08/31/21 102 kg (225 lb)   08/19/21 103 kg (228 lb)     Vitals:    09/20/21 1456   Weight: 101 kg (223 lb)     BP Readings from Last 3 Encounters:   09/20/21 136/70   08/31/21 150/80   08/19/21 128/82       Physical Exam:  Physical Exam    Neurologic:  Alert & oriented x 3, no new focal deficits, Not in any acute distress,  Constitutional:  Well developed, well nourished, non-toxic appearance   Eyes:  Pupil equal and reacting to light, conjunctiva normal,   HENT:  Atraumatic, oropharynx moist, Neck- normal range of motion, no tenderness,  Neck supple, No JVP, No LNP   Respiratory:  Bilateral air entry, mostly clear to auscultation  Cardiovascular: S1-S2 regular with a 2/6 ejection systolic murmur and S4 is present  GI:  Soft, nondistended, normal bowel sounds, nontender, no hepatosplenomegaly appreciated  Musculoskeletal:  No edema, no tenderness, no deformities  Skin:  Well hydrated, no rash   Lymphatic:  No lymphadenopathy noted   Extremities:  No edema and distal pulses are present    Diagnostic Studies Review Cardio:      Nuclear stress test   Nuclear stress test was abnormal with fixed inferior apical defect which is worse in the rest images and is most likely due to body attenuation artifact EF was around 40%  But no significant ischemia was noted  Echo Doppler  Echo Doppler shows EF 55 60% mild LVH, mild aortic stenosis, mild MR, mild TR and PA pressure was 64 mmHg        EKG:  Twelve lead EKG 09/20/2021 shows sinus rhythm heart rate 92 beats per minute bifascicular block Q-wave in inferior lead noted cannot rule out old inferior wall infarct  Cardiac testing:   Results for orders placed during the hospital encounter of 21    Echo complete with contrast if indicated    Narrative  [address]  [address] [facility]  [address]  [phone]    Transthoracic Echocardiogram  2D, M-mode, Doppler, and Color Doppler    Study date:  2021    Patient: Nj Gore  MR number: [id number]  Account number: [de-identified]  : 1945  Age: 66 years  Gender: Male  Status: Inpatient  Location: Bedside  Height: 69 in  Weight: 226 6 lb  BP: 122/ 74 mmHg    Indications: Pulmonary Embolism    Diagnoses: I74 9 - Embolism and thrombosis of unspecified artery    Sonographer:  Renetta Hastings RDCS  Referring Physician:  Oral Mota PA-C  Group:  [facility]  Interpreting Physician:  DO ROSALINA Godfrey    LEFT VENTRICLE:  Systolic function was normal by visual assessment  Ejection fraction was estimated in the range of 55 % to 60 %  There were no regional wall motion abnormalities  There was mild concentric hypertrophy  Doppler parameters were consistent with abnormal left ventricular relaxation (grade 1 diastolic dysfunction)  MITRAL VALVE:  There was mild regurgitation  AORTIC VALVE:  The valve was trileaflet  Leaflets exhibited normal thickness, mild calcification, and moderately reduced cuspal separation  There was mild stenosis  There was no regurgitation  Valve mean gradient was 11 mmHg  Aortic valve area was 1 7 cmï¾² by the continuity equation  TRICUSPID VALVE:  There was mild regurgitation  Pulmonary artery systolic pressure was moderately increased  HISTORY: PRIOR HISTORY: Diabetes Mellitus; Chronic Kidney Disease; Anemia; Dyslipidemia; Sepsis; Non MI Troponin Elevation; Hypertension    PROCEDURE: The procedure was performed at the bedside  This was a routine study   The transthoracic approach was used  The study included complete 2D imaging, M-mode, complete spectral Doppler, and color Doppler  The heart rate was 79 bpm,  at the start of the study  Intravenous contrast ( 0 4ml/min Definity in NSS) was administered  Intravenous contrast was administered to opacify the left ventricle  Echocardiographic views were limited due to poor acoustic window  availability, decreased penetration, and lung interference  This was a technically difficult study  LEFT VENTRICLE: Size was normal  Systolic function was normal by visual assessment  Ejection fraction was estimated in the range of 55 % to 60 %  There were no regional wall motion abnormalities  There was mild concentric hypertrophy  DOPPLER: Doppler parameters were consistent with abnormal left ventricular relaxation (grade 1 diastolic dysfunction)  RIGHT VENTRICLE: The size was normal  Systolic function was normal  DOPPLER: Systolic pressure was within the normal range  LEFT ATRIUM: Size was normal  No thrombus was identified  RIGHT ATRIUM: Size was normal     MITRAL VALVE: Valve structure was normal  There was normal leaflet separation  No echocardiographic evidence for prolapse  DOPPLER: The transmitral velocity was within the normal range  There was no evidence for stenosis  There was mild  regurgitation  AORTIC VALVE: The valve was trileaflet  Leaflets exhibited normal thickness, mild calcification, and moderately reduced cuspal separation  DOPPLER: Transaortic velocity was increased due to valvular stenosis  There was mild stenosis  There  was no regurgitation  TRICUSPID VALVE: The valve structure was normal  There was normal leaflet separation  DOPPLER: The transtricuspid velocity was within the normal range  There was mild regurgitation  Pulmonary artery systolic pressure was moderately  increased  Estimated peak PA pressure was 64 mmHg      PULMONIC VALVE: Leaflets exhibited normal thickness, no calcification, and normal cuspal separation  DOPPLER: The transpulmonic velocity was within the normal range  There was no regurgitation  PERICARDIUM: There was no thickening  There was no pericardial effusion  AORTA: The root exhibited normal size  PULMONARY ARTERY: The size was normal  The morphology appeared normal     MEASUREMENT TABLES    DOPPLER MEASUREMENTS  Aortic valve   (Reference normals)  Peak gradient   21 mmHg   (--)  Mean gradient   11 mmHg   (--)  Valve area, cont   1 7 cmï¾²   (--)    SYSTEM MEASUREMENT TABLES    2D  EF (Teich): 54 78 %  %FS: 28 23 %  Ao Diam: 3 71 cm  Ao asc: 4 23 cm  EDV(Teich): 87 43 ml  ESV(Teich): 39 54 ml  IVSd: 1 19 cm  LA Area: 13 05 cm2  LA Diam: 2 75 cm  LVEDV MOD A4C: 78 34 ml  LVEF MOD A4C: 80 3 %  LVESV MOD A4C: 15 43 ml  LVIDd: 4 39 cm  LVIDs: 3 15 cm  LVLd A4C: 7 01 cm  LVLs A4C: 5 23 cm  LVOT Diam: 1 97 cm  LVPWd: 1 23 cm  RA Area: 13 4 cm2  RVIDd: 3 82 cm  SV (Teich): 47 89 ml  SV MOD A4C: 62 91 ml    CW  AV Env  Ti: 298 13 ms  AV VTI: 45 96 cm  AV Vmax: 2 23 m/s  AV Vmean: 1 54 m/s  AV maxP 85 mmHg  AV meanPG: 10 84 mmHg  TR Vmax: 3 77 m/s  TR maxP 93 mmHg    MM  TAPSE: 2 2 cm    PW  MV E/A Ratio: 0 85  E' Sept: 0 06 m/s  E/E' Sept: 12 19  LVOT Env  Ti: 365 37 ms  LVOT VTI: 25 4 cm  LVOT Vmax: 1 2 m/s  LVOT Vmean: 0 7 m/s  LVOT maxP 76 mmHg  LVOT meanP 51 mmHg  MV A Melvin: 0 93 m/s  MV Dec Weakley: 3 07 m/s2  MV DecT: 255 81 ms  MV E Melvin: 0 79 m/s  MV PHT: 74 19 ms  MVA By PHT: 2 97 cm2    [employer] Accredited Echocardiography Laboratory    Prepared and electronically signed by    Ld Early DO  Signed 2021 10:21:25        Results for orders placed during the hospital encounter of 21    NM Myocardial Perfusion Spect (Pharmacological Induced Stress and/or Rest)    [facility]  [address]  [address] [facility] [address]   [phone]    Rest/Stress Gated SPECT Myocardial Perfusion Imaging After Regadenoson    Patient: Alexander Ashby  MR number: [id number]  Account number: [de-identified]  : 1945  Age: 66 years  Gender: Male  Status: Inpatient  Location: Stress lab  Height: 69 in  Weight: 227 lb  BP: 133/ 68 mmHg    Allergies: NO KNOWN ALLERGIES    Diagnosis: R06 02 - Shortness of breath, R74 8 - Abnormal levels of other serum enzymes    Primary Physician:  Tejinder Mcgowan DO  RN:  GABY Love  Referring Physician:  BLAZE Berry  Group:  Nurys Rodrigues  Report Prepared By[de-identified]  GABY Love  Interpreting Physician:  Taryn Willett MD    INDICATIONS: Evaluation for coronary artery disease  HISTORY: The patient is a 66year old  male  Chest pain status: no chest pain  Other symptoms: dyspnea  Coronary artery disease risk factors: dyslipidemia, hypertension, and diabetes mellitus  Cardiovascular history: peripheral  vascular occlusive disease  Medications:an anticoagulant, a lipid lowering agent and diabetic medications  PHYSICAL EXAM: Baseline physical exam screening: no wheezes audible  REST ECG: Normal sinus rhythm  The ECG showed right bundle branch block  PROCEDURE: The study was performed in the the Stress lab  A regadenoson infusion pharmacologic stress test was performed  Gated SPECT myocardial perfusion imaging was performed after stress and at rest  Systolic blood pressure was 133  mmHg, at the start of the study  Diastolic blood pressure was 68 mmHg, at the start of the study  The heart rate was 83 bpm, at the start of the study  IV double checked  Regadenoson protocol:  Time HR bpm SBP mmHg DBP mmHg Symptoms Rhythm/conduct  Baseline 09:36 83 133 68 none NSR, RBBB  Immediate 09:42 100 96 54 none same as above  1 min 09:43 99 106 59 none same as above  2 min 09:44 95 118 60 none same as above  3 min 09:45 98 118 56 none --  4 min 09:46 100 118 60 none same as above  No medications or fluids given      STRESS SUMMARY: Duration of pharmacologic stress was 3 min  Maximal heart rate during stress was 105 bpm  The heart rate response to stress was normal  There was normal resting blood pressure with an appropriate response to stress  The  rate-pressure product for the peak heart rate and blood pressure was 15044  There was no chest pain during stress  The stress test was terminated due to protocol completion  On 2l/min of O2 by nasal cannula  Pre oxygen saturation: 94 %  Peak oxygen saturation: 94 %  The stress ECG was negative for ischemia and normal  There were no stress arrhythmias or conduction abnormalities  Arrhythmia during stress: isolated premature ventricular beats  ISOTOPE ADMINISTRATION:  Resting isotope administration Stress isotope administration  Agent Tetrofosmin Tetrofosmin  Dose 11 mCi 33 mCi  Date 07/21/2021 07/21/2021    The radiopharmaceutical was injected at the peak effect of pharmacologic stress  MYOCARDIAL PERFUSION IMAGING:  The image quality was fair  Rotating projection images reveal mild diaphragmatic attenuation and mild patient motion  Left ventricular size was normal  The TID ratio was 1 25  The right ventricle was dilated  PERFUSION DEFECTS:  -  There was a moderate-sized, mildly severe, fixed myocardial perfusion defect of the apical apical and inferior wall  GATED SPECT:  The calculated left ventricular ejection fraction was 40 %  Left ventricular ejection fraction was mildly decreased by visual estimate  There was no diagnostic evidence for left ventricular regional abnormality  SUMMARY:  -  Stress results: There was no chest pain during stress  -  ECG conclusions: The stress ECG was negative for ischemia and normal   -  Perfusion imaging: There was a moderate-sized, mildly severe, fixed myocardial perfusion defect of the apical apical and inferior wall  -  Gated SPECT: The calculated left ventricular ejection fraction was 40 %  Left ventricular ejection fraction was mildly decreased by visual estimate   There was no diagnostic evidence for left ventricular regional abnormality   -  Impressions and recommendations: Abnormal study after pharmacologic vasodilation  There is a fixed inferior apical defect which is worse in rest images than stress images most likely secondary to body attenuation artifact  Cannot rule  out old nontransmural myocardial infarction  EF calculated around 40% appears to be mildly decreased  No significant ischemia is noted    IMPRESSIONS: Abnormal study after pharmacologic vasodilation  There is a fixed inferior apical defect which is worse in rest images than stress images most likely secondary to body attenuation artifact  Cannot rule out old nontransmural  myocardial infarction  EF calculated around 40% appears to be mildly decreased  No significant ischemia is noted Left ventricular systolic function was reduced, without distinct regional wall motion abnormalities      Prepared and signed by    Salo Edwards MD  Signed 07/21/2021 16:58:46          Lab Review   Lab Results   Component Value Date    WBC 7 56 08/13/2021    HGB 10 7 (L) 08/13/2021    HCT 35 8 (L) 08/13/2021    MCV 90 08/13/2021    RDW 15 5 (H) 08/13/2021     08/13/2021     BMP:  Lab Results   Component Value Date    SODIUM 139 08/13/2021    K 4 8 08/13/2021     08/13/2021    CO2 29 08/13/2021    BUN 16 08/13/2021    CREATININE 1 15 08/13/2021    GLUC 149 (H) 07/21/2021    GLUF 162 (H) 08/13/2021    CALCIUM 8 8 08/13/2021    CORRECTEDCA 9 4 08/13/2021    EGFR 61 08/13/2021     LFT:  Lab Results   Component Value Date    AST 10 08/13/2021    ALT 20 08/13/2021    ALKPHOS 121 (H) 08/13/2021    TP 7 3 08/13/2021    ALB 3 2 (L) 08/13/2021      No components found for: [employer] [facility]  Lab Results   Component Value Date    [id number] 2 943 07/18/2021     Lab Results   Component Value Date    HGBA1C 6 0 (H) 06/30/2021     Lipid Profile:     Lab Results   Component Value Date    TROPONINI 1 12 (H) 07/18/2021     Lab Results   Component Value Date    NTBNP 2,051 (H) 07/18/2021      No results found for this or any previous visit (from the past 672 hour(s))  Dr Chiquis Dodd MD [facility]      "This note has been constructed using a voice recognition system  Therefore there may be syntax, spelling, and/or grammatical errors   Please call if you have any questions  "

## 2021-09-22 ENCOUNTER — HOSPITAL ENCOUNTER (OUTPATIENT)
Dept: RADIOLOGY | Age: 76
Discharge: HOME | End: 2021-09-22
Attending: SPECIALIST
Payer: MEDICARE

## 2021-09-22 DIAGNOSIS — R97.20 ELEVATED PSA: ICD-10-CM

## 2021-09-22 PROCEDURE — A9585 GADOBUTROL INJECTION: HCPCS | Performed by: SPECIALIST

## 2021-09-22 PROCEDURE — 76377 3D RENDER W/INTRP POSTPROCES: CPT

## 2021-09-22 PROCEDURE — 72197 MRI PELVIS W/O & W/DYE: CPT

## 2021-09-22 RX ADMIN — GADOBUTROL 10 ML: 604.72 INJECTION INTRAVENOUS at 12:27

## 2021-09-27 ENCOUNTER — APPOINTMENT (OUTPATIENT)
Dept: LAB | Facility: HOSPITAL | Age: 76
End: 2021-09-27
Attending: INTERNAL MEDICINE
Payer: MEDICARE

## 2021-09-27 DIAGNOSIS — E11.649 TYPE 2 DIABETES MELLITUS WITH HYPOGLYCEMIA WITHOUT COMA, WITHOUT LONG-TERM CURRENT USE OF INSULIN (HCC): ICD-10-CM

## 2021-09-27 DIAGNOSIS — E11.65 TYPE 2 DIABETES MELLITUS WITH HYPERGLYCEMIA, WITHOUT LONG-TERM CURRENT USE OF INSULIN (HCC): ICD-10-CM

## 2021-09-27 DIAGNOSIS — N18.31 STAGE 3A CHRONIC KIDNEY DISEASE (HCC): ICD-10-CM

## 2021-09-27 DIAGNOSIS — E78.5 DYSLIPIDEMIA: ICD-10-CM

## 2021-09-27 LAB
ANION GAP SERPL CALCULATED.3IONS-SCNC: 6 MMOL/L (ref 4–13)
BUN SERPL-MCNC: 25 MG/DL (ref 5–25)
CALCIUM SERPL-MCNC: 8.8 MG/DL (ref 8.3–10.1)
CHLORIDE SERPL-SCNC: 101 MMOL/L (ref 100–108)
CHOLEST SERPL-MCNC: 167 MG/DL (ref 50–200)
CO2 SERPL-SCNC: 32 MMOL/L (ref 21–32)
CREAT SERPL-MCNC: 1.12 MG/DL (ref 0.6–1.3)
EST. AVERAGE GLUCOSE BLD GHB EST-MCNC: 192 MG/DL
GFR SERPL CREATININE-BSD FRML MDRD: 63 ML/MIN/1.73SQ M
GLUCOSE P FAST SERPL-MCNC: 188 MG/DL (ref 65–99)
HBA1C MFR BLD: 8.3 %
HDLC SERPL-MCNC: 45 MG/DL
LDLC SERPL CALC-MCNC: 95 MG/DL (ref 0–100)
NONHDLC SERPL-MCNC: 122 MG/DL
POTASSIUM SERPL-SCNC: 5.7 MMOL/L (ref 3.5–5.3)
SODIUM SERPL-SCNC: 139 MMOL/L (ref 136–145)
TRIGL SERPL-MCNC: 134 MG/DL

## 2021-09-27 PROCEDURE — 36415 COLL VENOUS BLD VENIPUNCTURE: CPT

## 2021-09-27 PROCEDURE — 83036 HEMOGLOBIN GLYCOSYLATED A1C: CPT

## 2021-09-27 PROCEDURE — 80048 BASIC METABOLIC PNL TOTAL CA: CPT

## 2021-09-27 PROCEDURE — 80061 LIPID PANEL: CPT

## 2021-09-30 ENCOUNTER — TELEPHONE (OUTPATIENT)
Dept: ENDOCRINOLOGY | Facility: CLINIC | Age: 76
End: 2021-09-30

## 2021-09-30 NOTE — TELEPHONE ENCOUNTER
Spoke with patients sister and reviewed lab results  She understood    She questioned if metoprolol could raise his bg  How much water should he drink in a day  Could not drinking enough cause high bg?     Sent lab slip in mail

## 2021-09-30 NOTE — TELEPHONE ENCOUNTER
----- Message from Julio C Scott MD sent at 9/27/2021  4:01 PM EDT -----  Please call the patient and inform of results  A1c has trended up to 8 3%, signifying poor glycemic control    Needs sooner follow-up  Potassium on BMP 5 7 which is high Recommend repeat BMP, follow-up with primary care

## 2021-10-01 ENCOUNTER — TELEPHONE (OUTPATIENT)
Dept: ENDOCRINOLOGY | Facility: CLINIC | Age: 76
End: 2021-10-01

## 2021-10-14 ENCOUNTER — APPOINTMENT (OUTPATIENT)
Dept: LAB | Facility: HOSPITAL | Age: 76
End: 2021-10-14
Payer: MEDICARE

## 2021-10-14 DIAGNOSIS — E53.8 VITAMIN B 12 DEFICIENCY: ICD-10-CM

## 2021-10-14 DIAGNOSIS — E11.65 TYPE 2 DIABETES MELLITUS WITH HYPERGLYCEMIA, WITHOUT LONG-TERM CURRENT USE OF INSULIN (HCC): ICD-10-CM

## 2021-10-14 DIAGNOSIS — D64.9 NORMOCYTIC ANEMIA: ICD-10-CM

## 2021-10-14 DIAGNOSIS — D47.2 MGUS (MONOCLONAL GAMMOPATHY OF UNKNOWN SIGNIFICANCE): ICD-10-CM

## 2021-10-14 DIAGNOSIS — I26.99 BILATERAL PULMONARY EMBOLISM (HCC): ICD-10-CM

## 2021-10-14 LAB
FERRITIN SERPL-MCNC: 39 NG/ML (ref 8–388)
IGA SERPL-MCNC: 166 MG/DL (ref 70–400)
IGG SERPL-MCNC: 1000 MG/DL (ref 700–1600)
IGM SERPL-MCNC: 54 MG/DL (ref 40–230)
IRON SATN MFR SERPL: 17 % (ref 20–50)
IRON SERPL-MCNC: 55 UG/DL (ref 65–175)
TIBC SERPL-MCNC: 331 UG/DL (ref 250–450)
VIT B12 SERPL-MCNC: 660 PG/ML (ref 100–900)

## 2021-10-14 PROCEDURE — 82607 VITAMIN B-12: CPT

## 2021-10-14 PROCEDURE — 83540 ASSAY OF IRON: CPT

## 2021-10-14 PROCEDURE — 86334 IMMUNOFIX E-PHORESIS SERUM: CPT

## 2021-10-14 PROCEDURE — 82784 ASSAY IGA/IGD/IGG/IGM EACH: CPT

## 2021-10-14 PROCEDURE — 36415 COLL VENOUS BLD VENIPUNCTURE: CPT

## 2021-10-14 PROCEDURE — 82728 ASSAY OF FERRITIN: CPT

## 2021-10-14 PROCEDURE — 84165 PROTEIN E-PHORESIS SERUM: CPT | Performed by: PATHOLOGY

## 2021-10-14 PROCEDURE — 84165 PROTEIN E-PHORESIS SERUM: CPT

## 2021-10-14 PROCEDURE — 83550 IRON BINDING TEST: CPT

## 2021-10-14 PROCEDURE — 84155 ASSAY OF PROTEIN SERUM: CPT

## 2021-10-15 ENCOUNTER — OFFICE VISIT (OUTPATIENT)
Dept: PULMONOLOGY | Facility: MEDICAL CENTER | Age: 76
End: 2021-10-15
Payer: MEDICARE

## 2021-10-15 VITALS
HEART RATE: 92 BPM | OXYGEN SATURATION: 93 % | WEIGHT: 223 LBS | DIASTOLIC BLOOD PRESSURE: 60 MMHG | BODY MASS INDEX: 33.03 KG/M2 | TEMPERATURE: 98.4 F | HEIGHT: 69 IN | SYSTOLIC BLOOD PRESSURE: 120 MMHG | RESPIRATION RATE: 12 BRPM

## 2021-10-15 DIAGNOSIS — R09.02 HYPOXIA: ICD-10-CM

## 2021-10-15 DIAGNOSIS — N42.89 PROSTATE MASS: ICD-10-CM

## 2021-10-15 DIAGNOSIS — E66.09 CLASS 1 OBESITY DUE TO EXCESS CALORIES WITH SERIOUS COMORBIDITY AND BODY MASS INDEX (BMI) OF 32.0 TO 32.9 IN ADULT: ICD-10-CM

## 2021-10-15 DIAGNOSIS — I26.99 BILATERAL PULMONARY EMBOLISM (HCC): Primary | ICD-10-CM

## 2021-10-15 LAB
ALBUMIN SERPL ELPH-MCNC: 3.81 G/DL (ref 3.5–5)
ALBUMIN SERPL ELPH-MCNC: 56.1 % (ref 52–65)
ALPHA1 GLOB SERPL ELPH-MCNC: 0.29 G/DL (ref 0.1–0.4)
ALPHA1 GLOB SERPL ELPH-MCNC: 4.2 % (ref 2.5–5)
ALPHA2 GLOB SERPL ELPH-MCNC: 1.01 G/DL (ref 0.4–1.2)
ALPHA2 GLOB SERPL ELPH-MCNC: 14.8 % (ref 7–13)
BETA GLOB ABNORMAL SERPL ELPH-MCNC: 0.39 G/DL (ref 0.4–0.8)
BETA1 GLOB SERPL ELPH-MCNC: 5.7 % (ref 5–13)
BETA2 GLOB SERPL ELPH-MCNC: 5.3 % (ref 2–8)
BETA2+GAMMA GLOB SERPL ELPH-MCNC: 0.36 G/DL (ref 0.2–0.5)
GAMMA GLOB ABNORMAL SERPL ELPH-MCNC: 0.95 G/DL (ref 0.5–1.6)
GAMMA GLOB SERPL ELPH-MCNC: 13.9 % (ref 12–22)
IGG/ALB SER: 1.28 {RATIO} (ref 1.1–1.8)
INTERPRETATION UR IFE-IMP: NORMAL
PROT PATTERN SERPL ELPH-IMP: ABNORMAL
PROT SERPL-MCNC: 6.8 G/DL (ref 6.4–8.2)

## 2021-10-15 PROCEDURE — 99204 OFFICE O/P NEW MOD 45 MIN: CPT | Performed by: INTERNAL MEDICINE

## 2021-10-16 PROBLEM — E66.811 CLASS 1 OBESITY DUE TO EXCESS CALORIES WITH SERIOUS COMORBIDITY AND BODY MASS INDEX (BMI) OF 32.0 TO 32.9 IN ADULT: Status: ACTIVE | Noted: 2021-10-16

## 2021-10-16 PROBLEM — E66.09 CLASS 1 OBESITY DUE TO EXCESS CALORIES WITH SERIOUS COMORBIDITY AND BODY MASS INDEX (BMI) OF 32.0 TO 32.9 IN ADULT: Status: ACTIVE | Noted: 2021-10-16

## 2021-10-16 PROBLEM — N42.89 PROSTATE MASS: Status: ACTIVE | Noted: 2021-10-16

## 2021-10-16 PROBLEM — I27.21 PULMONARY ARTERY HYPERTENSION (HCC): Status: ACTIVE | Noted: 2021-06-30

## 2021-10-18 DIAGNOSIS — R06.02 SOB (SHORTNESS OF BREATH): ICD-10-CM

## 2021-10-18 DIAGNOSIS — D64.9 ANEMIA, UNSPECIFIED TYPE: ICD-10-CM

## 2021-10-18 DIAGNOSIS — I26.99 BILATERAL PULMONARY EMBOLISM (HCC): ICD-10-CM

## 2021-10-18 RX ORDER — FOLIC ACID 1 MG/1
TABLET ORAL
Qty: 30 TABLET | Refills: 0 | Status: SHIPPED | OUTPATIENT
Start: 2021-10-18 | End: 2021-11-19 | Stop reason: SDUPTHER

## 2021-10-18 RX ORDER — APIXABAN 5 MG/1
TABLET, FILM COATED ORAL
Qty: 60 TABLET | Refills: 0 | Status: SHIPPED | OUTPATIENT
Start: 2021-10-18 | End: 2021-11-19 | Stop reason: SDUPTHER

## 2021-10-19 ENCOUNTER — OFFICE VISIT (OUTPATIENT)
Dept: HEMATOLOGY ONCOLOGY | Facility: MEDICAL CENTER | Age: 76
End: 2021-10-19
Payer: MEDICARE

## 2021-10-19 VITALS
HEIGHT: 69 IN | HEART RATE: 93 BPM | RESPIRATION RATE: 20 BRPM | TEMPERATURE: 97.6 F | DIASTOLIC BLOOD PRESSURE: 58 MMHG | WEIGHT: 226 LBS | BODY MASS INDEX: 33.47 KG/M2 | SYSTOLIC BLOOD PRESSURE: 112 MMHG

## 2021-10-19 DIAGNOSIS — R97.20 ELEVATED PSA: ICD-10-CM

## 2021-10-19 DIAGNOSIS — D64.9 ANEMIA, UNSPECIFIED TYPE: ICD-10-CM

## 2021-10-19 DIAGNOSIS — I26.99 BILATERAL PULMONARY EMBOLISM (HCC): Primary | ICD-10-CM

## 2021-10-19 PROCEDURE — 99215 OFFICE O/P EST HI 40 MIN: CPT | Performed by: INTERNAL MEDICINE

## 2021-11-19 ENCOUNTER — APPOINTMENT (OUTPATIENT)
Dept: LAB | Facility: HOSPITAL | Age: 76
End: 2021-11-19
Attending: INTERNAL MEDICINE
Payer: MEDICARE

## 2021-11-19 ENCOUNTER — IMMUNIZATIONS (OUTPATIENT)
Dept: FAMILY MEDICINE CLINIC | Facility: HOSPITAL | Age: 76
End: 2021-11-19
Payer: MEDICARE

## 2021-11-19 DIAGNOSIS — R06.02 SOB (SHORTNESS OF BREATH): ICD-10-CM

## 2021-11-19 DIAGNOSIS — D64.9 ANEMIA, UNSPECIFIED TYPE: ICD-10-CM

## 2021-11-19 DIAGNOSIS — Z23 ENCOUNTER FOR IMMUNIZATION: Primary | ICD-10-CM

## 2021-11-19 DIAGNOSIS — I26.99 BILATERAL PULMONARY EMBOLISM (HCC): ICD-10-CM

## 2021-11-19 LAB
ALBUMIN SERPL BCP-MCNC: 3.3 G/DL (ref 3.5–5)
ALP SERPL-CCNC: 98 U/L (ref 46–116)
ALT SERPL W P-5'-P-CCNC: 22 U/L (ref 12–78)
ANION GAP SERPL CALCULATED.3IONS-SCNC: 7 MMOL/L (ref 4–13)
AST SERPL W P-5'-P-CCNC: 10 U/L (ref 5–45)
BASOPHILS # BLD AUTO: 0.02 THOUSANDS/ΜL (ref 0–0.1)
BASOPHILS NFR BLD AUTO: 0 % (ref 0–1)
BILIRUB SERPL-MCNC: 0.16 MG/DL (ref 0.2–1)
BUN SERPL-MCNC: 23 MG/DL (ref 5–25)
CALCIUM ALBUM COR SERPL-MCNC: 9.7 MG/DL (ref 8.3–10.1)
CALCIUM SERPL-MCNC: 9.1 MG/DL (ref 8.3–10.1)
CHLORIDE SERPL-SCNC: 103 MMOL/L (ref 100–108)
CO2 SERPL-SCNC: 32 MMOL/L (ref 21–32)
CREAT SERPL-MCNC: 1.14 MG/DL (ref 0.6–1.3)
EOSINOPHIL # BLD AUTO: 0.06 THOUSAND/ΜL (ref 0–0.61)
EOSINOPHIL NFR BLD AUTO: 1 % (ref 0–6)
ERYTHROCYTE [DISTWIDTH] IN BLOOD BY AUTOMATED COUNT: 14.4 % (ref 11.6–15.1)
GFR SERPL CREATININE-BSD FRML MDRD: 62 ML/MIN/1.73SQ M
GLUCOSE SERPL-MCNC: 250 MG/DL (ref 65–140)
HCT VFR BLD AUTO: 36.9 % (ref 36.5–49.3)
HGB BLD-MCNC: 11.1 G/DL (ref 12–17)
IMM GRANULOCYTES # BLD AUTO: 0.02 THOUSAND/UL (ref 0–0.2)
IMM GRANULOCYTES NFR BLD AUTO: 0 % (ref 0–2)
LYMPHOCYTES # BLD AUTO: 2.06 THOUSANDS/ΜL (ref 0.6–4.47)
LYMPHOCYTES NFR BLD AUTO: 26 % (ref 14–44)
MCH RBC QN AUTO: 26.2 PG (ref 26.8–34.3)
MCHC RBC AUTO-ENTMCNC: 30.1 G/DL (ref 31.4–37.4)
MCV RBC AUTO: 87 FL (ref 82–98)
MONOCYTES # BLD AUTO: 0.52 THOUSAND/ΜL (ref 0.17–1.22)
MONOCYTES NFR BLD AUTO: 7 % (ref 4–12)
NEUTROPHILS # BLD AUTO: 5.35 THOUSANDS/ΜL (ref 1.85–7.62)
NEUTS SEG NFR BLD AUTO: 66 % (ref 43–75)
NRBC BLD AUTO-RTO: 0 /100 WBCS
PLATELET # BLD AUTO: 205 THOUSANDS/UL (ref 149–390)
PMV BLD AUTO: 10.4 FL (ref 8.9–12.7)
POTASSIUM SERPL-SCNC: 4.8 MMOL/L (ref 3.5–5.3)
PROT SERPL-MCNC: 7 G/DL (ref 6.4–8.2)
RBC # BLD AUTO: 4.24 MILLION/UL (ref 3.88–5.62)
SODIUM SERPL-SCNC: 142 MMOL/L (ref 136–145)
WBC # BLD AUTO: 8.03 THOUSAND/UL (ref 4.31–10.16)

## 2021-11-19 PROCEDURE — 85025 COMPLETE CBC W/AUTO DIFF WBC: CPT

## 2021-11-19 PROCEDURE — 83883 ASSAY NEPHELOMETRY NOT SPEC: CPT

## 2021-11-19 PROCEDURE — 91306 COVID-19 MODERNA VACC 0.25 ML BOOSTER: CPT

## 2021-11-19 PROCEDURE — 0064A COVID-19 MODERNA VACC 0.25 ML BOOSTER: CPT

## 2021-11-19 PROCEDURE — 80053 COMPREHEN METABOLIC PANEL: CPT

## 2021-11-19 PROCEDURE — 36415 COLL VENOUS BLD VENIPUNCTURE: CPT

## 2021-11-20 LAB
KAPPA LC FREE SER-MCNC: 37.7 MG/L (ref 3.3–19.4)
KAPPA LC FREE/LAMBDA FREE SER: 0.35 {RATIO} (ref 0.26–1.65)
LAMBDA LC FREE SERPL-MCNC: 108.9 MG/L (ref 5.7–26.3)

## 2021-11-20 RX ORDER — FOLIC ACID 1 MG/1
1000 TABLET ORAL DAILY
Qty: 30 TABLET | Refills: 5 | Status: SHIPPED | OUTPATIENT
Start: 2021-11-20 | End: 2022-03-15 | Stop reason: SDUPTHER

## 2021-11-30 ENCOUNTER — OFFICE VISIT (OUTPATIENT)
Dept: HEMATOLOGY ONCOLOGY | Facility: MEDICAL CENTER | Age: 76
End: 2021-11-30
Payer: MEDICARE

## 2021-11-30 VITALS
RESPIRATION RATE: 20 BRPM | SYSTOLIC BLOOD PRESSURE: 138 MMHG | HEIGHT: 69 IN | WEIGHT: 229 LBS | TEMPERATURE: 96.9 F | OXYGEN SATURATION: 98 % | HEART RATE: 88 BPM | DIASTOLIC BLOOD PRESSURE: 64 MMHG | BODY MASS INDEX: 33.92 KG/M2

## 2021-11-30 DIAGNOSIS — I26.99 BILATERAL PULMONARY EMBOLISM (HCC): Primary | ICD-10-CM

## 2021-11-30 DIAGNOSIS — R33.9 URINARY RETENTION: ICD-10-CM

## 2021-11-30 DIAGNOSIS — D64.9 ANEMIA, UNSPECIFIED TYPE: ICD-10-CM

## 2021-11-30 PROBLEM — C61 PROSTATE CANCER (HCC): Status: ACTIVE | Noted: 2021-11-30

## 2021-11-30 PROCEDURE — 99215 OFFICE O/P EST HI 40 MIN: CPT | Performed by: INTERNAL MEDICINE

## 2021-12-15 ENCOUNTER — OFFICE VISIT (OUTPATIENT)
Dept: ENDOCRINOLOGY | Facility: CLINIC | Age: 76
End: 2021-12-15
Payer: MEDICARE

## 2021-12-15 VITALS
HEIGHT: 69 IN | BODY MASS INDEX: 37.33 KG/M2 | HEART RATE: 80 BPM | SYSTOLIC BLOOD PRESSURE: 110 MMHG | TEMPERATURE: 97 F | WEIGHT: 252 LBS | DIASTOLIC BLOOD PRESSURE: 60 MMHG

## 2021-12-15 DIAGNOSIS — E66.01 OBESITY, MORBID (HCC): ICD-10-CM

## 2021-12-15 DIAGNOSIS — N18.31 STAGE 3A CHRONIC KIDNEY DISEASE (HCC): ICD-10-CM

## 2021-12-15 DIAGNOSIS — E11.65 TYPE 2 DIABETES MELLITUS WITH HYPERGLYCEMIA, WITHOUT LONG-TERM CURRENT USE OF INSULIN (HCC): Primary | ICD-10-CM

## 2021-12-15 DIAGNOSIS — E78.5 DYSLIPIDEMIA: ICD-10-CM

## 2021-12-15 PROCEDURE — 99214 OFFICE O/P EST MOD 30 MIN: CPT | Performed by: INTERNAL MEDICINE

## 2022-01-10 ENCOUNTER — APPOINTMENT (OUTPATIENT)
Dept: LAB | Facility: HOSPITAL | Age: 77
End: 2022-01-10
Attending: INTERNAL MEDICINE
Payer: MEDICARE

## 2022-01-10 DIAGNOSIS — I26.99 BILATERAL PULMONARY EMBOLISM (HCC): ICD-10-CM

## 2022-01-10 DIAGNOSIS — D64.9 ANEMIA, UNSPECIFIED TYPE: ICD-10-CM

## 2022-01-10 DIAGNOSIS — R33.9 URINARY RETENTION: ICD-10-CM

## 2022-01-10 LAB
ANION GAP SERPL CALCULATED.3IONS-SCNC: 7 MMOL/L (ref 4–13)
BASOPHILS # BLD AUTO: 0.02 THOUSANDS/ΜL (ref 0–0.1)
BASOPHILS NFR BLD AUTO: 0 % (ref 0–1)
BUN SERPL-MCNC: 17 MG/DL (ref 5–25)
CALCIUM SERPL-MCNC: 8.5 MG/DL (ref 8.3–10.1)
CHLORIDE SERPL-SCNC: 101 MMOL/L (ref 100–108)
CO2 SERPL-SCNC: 30 MMOL/L (ref 21–32)
CREAT SERPL-MCNC: 1.14 MG/DL (ref 0.6–1.3)
EOSINOPHIL # BLD AUTO: 0.1 THOUSAND/ΜL (ref 0–0.61)
EOSINOPHIL NFR BLD AUTO: 1 % (ref 0–6)
ERYTHROCYTE [DISTWIDTH] IN BLOOD BY AUTOMATED COUNT: 14.9 % (ref 11.6–15.1)
GFR SERPL CREATININE-BSD FRML MDRD: 61 ML/MIN/1.73SQ M
GLUCOSE P FAST SERPL-MCNC: 168 MG/DL (ref 65–99)
HCT VFR BLD AUTO: 35.4 % (ref 36.5–49.3)
HGB BLD-MCNC: 10.9 G/DL (ref 12–17)
IMM GRANULOCYTES # BLD AUTO: 0.02 THOUSAND/UL (ref 0–0.2)
IMM GRANULOCYTES NFR BLD AUTO: 0 % (ref 0–2)
LYMPHOCYTES # BLD AUTO: 2.34 THOUSANDS/ΜL (ref 0.6–4.47)
LYMPHOCYTES NFR BLD AUTO: 31 % (ref 14–44)
MCH RBC QN AUTO: 27 PG (ref 26.8–34.3)
MCHC RBC AUTO-ENTMCNC: 30.8 G/DL (ref 31.4–37.4)
MCV RBC AUTO: 88 FL (ref 82–98)
MONOCYTES # BLD AUTO: 0.49 THOUSAND/ΜL (ref 0.17–1.22)
MONOCYTES NFR BLD AUTO: 7 % (ref 4–12)
NEUTROPHILS # BLD AUTO: 4.56 THOUSANDS/ΜL (ref 1.85–7.62)
NEUTS SEG NFR BLD AUTO: 61 % (ref 43–75)
NRBC BLD AUTO-RTO: 0 /100 WBCS
PLATELET # BLD AUTO: 209 THOUSANDS/UL (ref 149–390)
PMV BLD AUTO: 9.8 FL (ref 8.9–12.7)
POTASSIUM SERPL-SCNC: 4.9 MMOL/L (ref 3.5–5.3)
RBC # BLD AUTO: 4.04 MILLION/UL (ref 3.88–5.62)
SODIUM SERPL-SCNC: 138 MMOL/L (ref 136–145)
WBC # BLD AUTO: 7.53 THOUSAND/UL (ref 4.31–10.16)

## 2022-01-10 PROCEDURE — 85025 COMPLETE CBC W/AUTO DIFF WBC: CPT

## 2022-01-10 PROCEDURE — 36415 COLL VENOUS BLD VENIPUNCTURE: CPT

## 2022-01-10 PROCEDURE — 80048 BASIC METABOLIC PNL TOTAL CA: CPT

## 2022-01-13 ENCOUNTER — TELEPHONE (OUTPATIENT)
Dept: ENDOCRINOLOGY | Facility: CLINIC | Age: 77
End: 2022-01-13

## 2022-01-13 ENCOUNTER — OFFICE VISIT (OUTPATIENT)
Dept: DIABETES SERVICES | Facility: CLINIC | Age: 77
End: 2022-01-13
Payer: MEDICARE

## 2022-01-13 VITALS — BODY MASS INDEX: 32.98 KG/M2 | WEIGHT: 223.3 LBS

## 2022-01-13 DIAGNOSIS — E11.65 TYPE 2 DIABETES MELLITUS WITH HYPERGLYCEMIA, WITHOUT LONG-TERM CURRENT USE OF INSULIN (HCC): Primary | ICD-10-CM

## 2022-01-13 DIAGNOSIS — N18.31 STAGE 3A CHRONIC KIDNEY DISEASE (HCC): ICD-10-CM

## 2022-01-13 PROCEDURE — 97802 MEDICAL NUTRITION INDIV IN: CPT

## 2022-01-13 NOTE — PROGRESS NOTES
Medical Nutrition Therapy        Assessment    Visit Type: Initial visit  Chief complaint/Medical Diagnosis/reason for visit Type 2 Diabetes without long term use of insulin    HPI Linda Nair was accompanied today for his initial nutrition visit by his sister, Regi Lisa, as Linda Nair is hard of hearing  Linda Nair stated he has been diabetic for over 25 years  Patient stated he has been avoiding bread because he feels it is "bad" for you  Explained to Linda Nair and his sister Regi Lisa, that all foods are good in moderation  Problems identified in food recall include inconsistent carbohydrate intake, unbalanced meals and portion control  Provided patient with a 7416-8354 calorie meal plan to assist with consistency, balance and portion control  Encouraged the consumption of regular meals at regular times  Advised patient to keep carbohydrate intake to 45 grams per meal and 15 grams per snack to assist with glycemic control  Suggested keeping protein intake to 9 ounces a day to help with calorie control  My Plate, food models, portion booklet and food labels were used to teach basic carbohydrate counting along with his individualized 1330-8562 calorie meal plan  Patient's sister Regi Lisa, agreed to keep daily food logs (as patient cannot write due to shakiness) and bring them to patient's nutrition follow up appointment in 4 weeks for assessment  Patient and or patient's sister were instructed to phone RD MIRIANE if any questions arise prior to his follow up appointment  Ht Readings from Last 1 Encounters:   12/15/21 5' 9" (1 753 m)     Wt Readings from Last 3 Encounters:   01/13/22 101 kg (223 lb 4 8 oz)   12/15/21 114 kg (252 lb)   11/30/21 104 kg (229 lb)     Weight Change: Yes Lost about 12 lbs    Barriers to Learning: hearing    Do you follow any special diet presently?: No  Who shops: Patient's sister Regi Lisa  Who cooks: patient and sister, Regi Lisa      Food Log: Completed via the method of food recall    Breakfast:7AM - 2 scrambled eggs, lttle sausage, 12 oz Pure Protein Drink  Morning Snack:None  HKLCO:00:54GE - 4-5 slices lunch meat, 2 slices cheese, mustard (No Bread)  Afternoon Snack: 2 small apples  RICWRI:8:54AN 6 oz ham slice, fried potatoes 1 cup  Evening Snack:7PM - large handful chocolate covered peanuts  Beverages: unsweetened koolaid, unsweetened ice tea, propel water, water  Eating out/Take out:None  Exercise Daily activities at home only, walks with cane  Calorie needs 1800-2000kcals/day Carbs: 45g/meal, 15g/snack     Fat: 7servings/day    Protein:9servings/day    Nutrition Diagnosis:  Food and nutrition related knowledge deficit  related to Lack of prior exposure to accurate nutrition related information as evidenced by Verbalizes inaccurate or incomplete information    Intervention: plate method, label reading, behavior modification strategies, carbohydrate counting, meal timing, meal planning, individualized meal plan and monitoring portion control     Treatment Goals: Patient will consume 3 meals a day, Patient will monitor portion control and Patient will count carbohydrates    Monitoring and evaluation:    Term code indicator  FH 1 3 2 Food Intake Criteria: Consume 3 meals/day 4-5 hours apart and 1 night snack  Term code indicator  FH 1 6 3 Carbohydrate Intake Criteria: 45 grams Carbohydrate/meal and 15 grams Carbohydrate/snack  Term code indicator  FH 1 2 1 Energy intake Criteria: Follow 6503-7053 calorie individualized meal plan for blood sugar control    Materials Provided: Portion Booklet, individualized meals plan, food record, CHO counting  Patients Response to Instruction:  Comprehensiongood  Motivationgood  Expected Compliancegood    Begin Time: 11 AM   End Time: 12 PM  Referring Provider: Dr Sarah Avelar    Thank you for coming to the [facility] for education today  Please feel free to call with any questions or concerns      [address] [place] [address] Drive  [address] [place] [address] [facility] [id number]  [phone]

## 2022-01-13 NOTE — PATIENT INSTRUCTIONS
1  Consume 3 meals/day 4-5 hours apart and 1 night snack  2  45 grams Carbohydrate/meal and 15 grams Carbohydrate/snack  3  Follow 1800 - 2000 calorie individualized meal plan for blood sugar control

## 2022-01-13 NOTE — TELEPHONE ENCOUNTER
Patient's sister informed   Pt was seen in education on 1/13/2022 with [facility] in [facility]

## 2022-01-13 NOTE — TELEPHONE ENCOUNTER
Reviewed  Has some diet related variability in blood sugars, postprandial high blood sugars    Recommend continuing current regimen, consistent carb diet   If patient is willing, please place I a consult for diabetes education /nutrition therapy

## 2022-01-17 ENCOUNTER — OFFICE VISIT (OUTPATIENT)
Dept: CARDIOLOGY CLINIC | Facility: CLINIC | Age: 77
End: 2022-01-17
Payer: MEDICARE

## 2022-01-17 VITALS
TEMPERATURE: 98.1 F | SYSTOLIC BLOOD PRESSURE: 130 MMHG | HEIGHT: 69 IN | BODY MASS INDEX: 33.18 KG/M2 | DIASTOLIC BLOOD PRESSURE: 60 MMHG | OXYGEN SATURATION: 91 % | HEART RATE: 86 BPM | WEIGHT: 224 LBS

## 2022-01-17 DIAGNOSIS — R55 SYNCOPE, UNSPECIFIED SYNCOPE TYPE: ICD-10-CM

## 2022-01-17 DIAGNOSIS — N18.30 CKD STAGE 3 DUE TO TYPE 2 DIABETES MELLITUS (HCC): ICD-10-CM

## 2022-01-17 DIAGNOSIS — E11.9 TYPE 2 DIABETES MELLITUS WITHOUT COMPLICATION, WITHOUT LONG-TERM CURRENT USE OF INSULIN (HCC): ICD-10-CM

## 2022-01-17 DIAGNOSIS — E66.9 OBESITY (BMI 30-39.9): ICD-10-CM

## 2022-01-17 DIAGNOSIS — I26.99 BILATERAL PULMONARY EMBOLISM (HCC): ICD-10-CM

## 2022-01-17 DIAGNOSIS — E11.22 CKD STAGE 3 DUE TO TYPE 2 DIABETES MELLITUS (HCC): ICD-10-CM

## 2022-01-17 DIAGNOSIS — R01.1 CARDIAC MURMUR: ICD-10-CM

## 2022-01-17 DIAGNOSIS — E78.5 DYSLIPIDEMIA: ICD-10-CM

## 2022-01-17 DIAGNOSIS — R00.0 TACHYCARDIA: ICD-10-CM

## 2022-01-17 PROCEDURE — 93000 ELECTROCARDIOGRAM COMPLETE: CPT | Performed by: INTERNAL MEDICINE

## 2022-01-17 PROCEDURE — 99214 OFFICE O/P EST MOD 30 MIN: CPT | Performed by: INTERNAL MEDICINE

## 2022-01-17 RX ORDER — METOPROLOL SUCCINATE 50 MG/1
50 TABLET, EXTENDED RELEASE ORAL DAILY
Qty: 90 TABLET | Refills: 1 | Status: SHIPPED | OUTPATIENT
Start: 2022-01-17 | End: 2022-07-18

## 2022-01-17 NOTE — PROGRESS NOTES
Progress Note - Cardiology Office  [facility]    Candido Dunne 64 y o  male MRN: [id number]  : 1945  Encounter: [id number]      Assessment:     1  Syncope, unspecified syncope type    2  Cardiac murmur    3  Bilateral pulmonary embolism ([facility] [place] [address] )    4  Dyslipidemia    5  CKD stage 3 due to type 2 diabetes mellitus (HCC)    6  Obesity (BMI 30-39 9)    7  Type 2 diabetes mellitus without complication, without long-term current use of insulin ([facility] [place] [address] )    8  Tachycardia        Discussion Summary and Plan:  1  Syncope  No more episodes of syncope most likely etiology of  His syncope was his severe pulmonary hypertension pulmonary embolism  He has no episodes of tachy-linda arrhythmia when he was in the hospital   Heart rate was noted    2  Bilateral pulmonary embolism  patient was admitted with bilateral pulmonary embolism and RV strain  PA pressure was up to 64 mmHg  He is feeling clinically great no more shortness of breath no syncope  At some point he will repeat echo Doppler  He also had acute DVT at that time he follows with Hematology early increased not to 50 mg daily  Denis Walter He is on Eliquis  He is found to have heterogeneous for factor V Leiden deficiency  He will continue Eliquis but he is holding it now for the surgery      3  History of DVT as above     4   Cardiac murmur  Patient is diagnosed to have cardiac murmur workup shows his mild aortic stenosis  He had a low normal LV systolic function by echo and by nuclear EF was noted to be lower most likely due to his tachycardia and PVC at that time  He denies any symptoms of chest pain or shortness of breath  He had a fixed inferior wall defect no significant ischemia was noted  Will continue to monitor no change in symptom     5  Dyslipidemia  Continue statin he is at pravastatin tolerating it very well  Continue statin     6  Diabetes mellitus      Blood sugar has been up and down management as per medical team     7  Elevated PSA with possible prostate cancer with possible urinary obstruction status post chronic Flores catheter management as per Urology  Now scheduled to have prostate surgery  There is no cardiac contraindication for the procedure      8  Tachycardia  Patient's heart rate has improved but he still have frequent PACs and bifascicular block heart rate 86 beats per minute  Metoprolol increased to total dose 50 mg daily  Continue other Rx as before  All issues discussed with patient and patient's sister  Please call [phone] if any questions  Counseling :  A description of the counseling  Goals and Barriers  Patient's ability to self care: Yes  Medication side effect reviewed with patient in detail and all their questions answered to their satisfaction  HPI :     Katerina Dunham is a 64y o  year old male who came for follow up  Patient  Was admitted to [facility] with recurrent syncope and was found to have pulmonary embolism and elevated pulmonary artery pressure  He has medical history significant for hypertension, hyperlipidemia, CKD stage 3, diabetes mellitus and mildly abnormal stress test   Later on he developed retention of his urine and has Flores catheter placed  He has cardiac workup in the form of echo and stress test and was found to have mild aortic stenosis, moderate pulmonary hypertension, and by echo EF was normal by stress test he has decreased EF he was noted to have inferior wall fixed defect no ischemia and he has frequent PACs and PVC at that time which may be showing his low ejection fraction  He feels great now he walks with the help of cane he has no more episodes of syncope dizziness lightheadedness and he is not requiring any oxygen therapy  He is still using Flores catheter he may need to follow-up with urology for that    His EKG reviewed he had a bifascicular block with heart rate 92 beats per minute and Q-wave in inferior lead noted  He never  he lives with his family he has good support system  His blood test from August 13, 2020 reviewed  01/17/2022  Above reviewed  Patient came for follow-up  He has medical history significant for history of pulmonary embolism with elevated pulmonary artery pressure, hypertension, hyperlipidemia, CKD stage 3, tachycardia, diabetes mellitus who came for follow-up  He had a cardiac murmur and was found to have aortic stenosis which was mild, nuclear stress test shows fixed inferior wall defect but no ischemia  He was noted to have frequent PACs and PVCs and started on low-dose metoprolol  History using his Flores catheter and he need to follow-up with urology  He had history of bifascicular block  He is compliant with his cholesterol medication as well as other diabetic medications  He saw Hematology Oncology and found to have anterior Demetri positive for factor V Leiden  He has repeat blood test done in January 2022  Glucose was noted to be high and his electrolytes were acceptable cholesterol profile is in improved and hemoglobin was 10 9  EKG shows bifascicular block heart rate 86 beats per minute  His nuclear stress test from July 2021 reviewed as well as echo  His EF by echo was around 50-55% with mild valvular disease  Nuclear shows no ischemia the EF was noted to be low most likely due to his frequent PACs and PVCs  Review of Systems   Constitutional: Negative for activity change, chills, diaphoresis, fever and unexpected weight change  HENT: Negative for congestion  Eyes: Negative for discharge and redness  Respiratory: Positive for shortness of breath  Negative for cough, chest tightness and wheezing  Chronic not changed is mostly exertional   Cardiovascular: Negative  Negative for chest pain, palpitations and leg swelling  Gastrointestinal: Negative for abdominal pain, diarrhea and nausea  Endocrine: Negative      Genitourinary: Positive for difficulty urinating  Negative for decreased urine volume and urgency  Musculoskeletal: Positive for arthralgias, back pain and gait problem  Difficulty in walking  Skin: Negative for rash and wound  Allergic/Immunologic: Negative  Neurological: Negative for dizziness, seizures, syncope, weakness, light-headedness and headaches  Hematological: Negative  Psychiatric/Behavioral: Negative for agitation and confusion  The patient is nervous/anxious  Historical Information   Past Medical History:   Diagnosis Date    Arthritis     BPH associated with nocturia     Diabetes mellitus ([address] )     Edema     lower legs    Hearing aid worn     bilateral    Hyperlipidemia     Hypertension     controlled    Tremor of both hands      Past Surgical History:   Procedure Laterality Date    CATARACT EXTRACTION      CO XCAPSL CTRC RMVL INSJ IO LENS PROSTH W/O ECP Right 8/6/2018    Procedure: EXTRACTION EXTRACAPSULAR CATARACT PHACO INTRAOCULAR LENS (IOL); Surgeon: Rhoda Ly MD;  Location: [facility] MAIN OR;  Service: Ophthalmology    CO XCAPSL CTRC RMVL INSJ IO LENS PROSTH W/O ECP Left 10/1/2018    Procedure: EXTRACTION EXTRACAPSULAR CATARACT PHACO INTRAOCULAR LENS (IOL);   Surgeon: Rhoda Ly MD;  Location: [facility] MAIN OR;  Service: Ophthalmology     Social History     Substance and Sexual Activity   Alcohol Use Never     Social History     Substance and Sexual Activity   Drug Use Never     Social History     Tobacco Use   Smoking Status Never Smoker   Smokeless Tobacco Never Used     Family History:   Family History   Problem Relation Age of Onset    Colon cancer Mother     Cancer Father         lung-smoker    Lung cancer Father     Early death Brother     Cancer Brother         "bone cancer"       Meds/Allergies     No Known Allergies    Current Outpatient Medications:     apixaban (Eliquis) 5 mg, Take 1 tablet (5 mg total) by mouth 2 (two) times a day, Disp: 60 tablet, Rfl: 2   bisacodyl (DULCOLAX) 10 mg suppository, Insert 1 suppository (10 mg total) into the rectum once as needed for constipation (If no bowel movement) for up to 1 dose, Disp: 12 suppository, Rfl: 0    Continuous Blood Gluc Sensor (FreeStyle [place] 14 Day Sensor) MISC, Change sensor every 14 days, Disp: 2 each, Rfl: 12    docusate sodium (COLACE) 100 mg capsule, Take 1 capsule (100 mg total) by mouth 2 (two) times a day, Disp: 10 capsule, Rfl: 0    metFORMIN (GLUCOPHAGE) 500 mg tablet, Take 2 tablets (1,000 mg total) by mouth 2 (two) times a day with meals, Disp: 360 tablet, Rfl: 3    metoprolol succinate (TOPROL-XL) 25 mg 24 hr tablet, Take 1 tablet (25 mg total) by mouth daily, Disp: 90 tablet, Rfl: 1    pravastatin (PRAVACHOL) 40 mg tablet, Take 1 tablet (40 mg total) by mouth every morning, Disp: 90 tablet, Rfl: 1    sitaGLIPtin (JANUVIA) 100 mg tablet, Take 1 tablet (100 mg total) by mouth daily, Disp: 90 tablet, Rfl: 3    folic acid (FOLVITE) 1 mg tablet, Take 1 tablet (1,000 mcg total) by mouth daily (Patient not taking: Reported on 1/17/2022 ), Disp: 30 tablet, Rfl: 5    polyethylene glycol (MIRALAX) 17 g packet, Take 17 g by mouth daily as needed (constipation) for up to 14 days (Patient not taking: Reported on 8/19/2021), Disp: 14 each, Rfl: 0    senna (SENOKOT) 8 6 mg, Take 2 tablets (17 2 mg total) by mouth daily at bedtime, Disp: 60 tablet, Rfl: 0    tamsulosin (FLOMAX) 0 4 mg, Take 1 capsule (0 4 mg total) by mouth daily with dinner (Patient not taking: Reported on 10/15/2021), Disp: 30 capsule, Rfl: 0    Vitals: Blood pressure 130/60, pulse 86, temperature 98 1 °F (36 7 °C), temperature source Temporal, height 5' 9" (1 753 m), weight 102 kg (224 lb), SpO2 91 %  Body mass index is 33 08 kg/m²    Wt Readings from Last 3 Encounters:   01/17/22 102 kg (224 lb)   01/13/22 101 kg (223 lb 4 8 oz)   12/15/21 114 kg (252 lb)     Vitals:    01/17/22 1412   Weight: 102 kg (224 lb)     BP Readings from Last 3 Encounters:   01/17/22 130/60   12/15/21 110/60   11/30/21 138/64       Physical Exam:  Physical Exam  Constitutional:       General: He is not in acute distress  Appearance: He is well-developed  He is not diaphoretic  Neck:      Thyroid: No thyromegaly  Vascular: No JVD  Trachea: No tracheal deviation  Cardiovascular:      Rate and Rhythm: Regular rhythm  Heart sounds: S1 normal and S2 normal  Heart sounds not distant  Murmur heard  Systolic (ejection) murmur is present with a grade of 2/6  No friction rub  No gallop  No S3 or S4 sounds  Comments: S1-S2 regular with a 3/6 as murmur S4 present  Pulmonary:      Effort: Pulmonary effort is normal  No respiratory distress  Breath sounds: No wheezing or rales  Comments: Bilateral air entry with decreased breath sounds on bases m  Chest:      Chest wall: No tenderness  Abdominal:      General: Bowel sounds are normal  There is no distension  Palpations: Abdomen is soft  Tenderness: There is no abdominal tenderness  Musculoskeletal:         General: No deformity  Cervical back: Neck supple  Comments: No lower extremity   Skin:     General: Skin is warm and dry  Coloration: Skin is not pale  Findings: No rash  Neurological:      Mental Status: He is alert and oriented to person, place, and time  Psychiatric:         Behavior: Behavior normal          Judgment: Judgment normal            Diagnostic Studies Review Cardio:      Nuclear stress test   Nuclear stress test was abnormal with fixed inferior apical defect which is worse in the rest images and is most likely due to body attenuation artifact EF was around 40%  But no significant ischemia was noted  Echo Doppler  Echo Doppler shows EF 55 % mild LVH, mild aortic stenosis, mild MR, mild TR and PA pressure was 64 mmHg        EKG:  Twelve lead EKG 09/20/2021 shows sinus rhythm heart rate 92 beats per minute bifascicular block Q-wave in inferior lead noted cannot rule out old inferior wall infarct  Twelve lead EKG done on 2022 shows sinus rhythm with frequent PACs heart rate 86 beats per minute bifascicular block noted  Cardiac testing:   Results for orders placed during the hospital encounter of 21    Echo complete with contrast if indicated    [address]  [address] [facility]  [address]  [phone]    Transthoracic Echocardiogram  2D, M-mode, Doppler, and Color Doppler    Study date:  2021    Patient: Vianca Oneil  MR number: [id number]  Account number: [de-identified]  : 1945  Age: 66 years  Gender: Male  Status: Inpatient  Location: Bedside  Height: 69 in  Weight: 226 6 lb  BP: 122/ 74 mmHg    Indications: Pulmonary Embolism    Diagnoses: I74 9 - Embolism and thrombosis of unspecified artery    Sonographer:  Rocio Ayers RDCS  Referring Physician:  Edgar Blackwood PA-C  Group:  [facility]  Interpreting Physician:  DO ROSALINA Santos    LEFT VENTRICLE:  Systolic function was normal by visual assessment  Ejection fraction was estimated in the range of 55 % to 60 %  There were no regional wall motion abnormalities  There was mild concentric hypertrophy  Doppler parameters were consistent with abnormal left ventricular relaxation (grade 1 diastolic dysfunction)  MITRAL VALVE:  There was mild regurgitation  AORTIC VALVE:  The valve was trileaflet  Leaflets exhibited normal thickness, mild calcification, and moderately reduced cuspal separation  There was mild stenosis  There was no regurgitation  Valve mean gradient was 11 mmHg  Aortic valve area was 1 7 cmï¾² by the continuity equation  TRICUSPID VALVE:  There was mild regurgitation  Pulmonary artery systolic pressure was moderately increased  HISTORY: PRIOR HISTORY: Diabetes Mellitus; Chronic Kidney Disease; Anemia; Dyslipidemia; Sepsis;  Non MI Troponin Elevation; Hypertension    PROCEDURE: The procedure was performed at the bedside  This was a routine study  The transthoracic approach was used  The study included complete 2D imaging, M-mode, complete spectral Doppler, and color Doppler  The heart rate was 79 bpm,  at the start of the study  Intravenous contrast ( 0 4ml/min Definity in NSS) was administered  Intravenous contrast was administered to opacify the left ventricle  Echocardiographic views were limited due to poor acoustic window  availability, decreased penetration, and lung interference  This was a technically difficult study  LEFT VENTRICLE: Size was normal  Systolic function was normal by visual assessment  Ejection fraction was estimated in the range of 55 % to 60 %  There were no regional wall motion abnormalities  There was mild concentric hypertrophy  DOPPLER: Doppler parameters were consistent with abnormal left ventricular relaxation (grade 1 diastolic dysfunction)  RIGHT VENTRICLE: The size was normal  Systolic function was normal  DOPPLER: Systolic pressure was within the normal range  LEFT ATRIUM: Size was normal  No thrombus was identified  RIGHT ATRIUM: Size was normal     MITRAL VALVE: Valve structure was normal  There was normal leaflet separation  No echocardiographic evidence for prolapse  DOPPLER: The transmitral velocity was within the normal range  There was no evidence for stenosis  There was mild  regurgitation  AORTIC VALVE: The valve was trileaflet  Leaflets exhibited normal thickness, mild calcification, and moderately reduced cuspal separation  DOPPLER: Transaortic velocity was increased due to valvular stenosis  There was mild stenosis  There  was no regurgitation  TRICUSPID VALVE: The valve structure was normal  There was normal leaflet separation  DOPPLER: The transtricuspid velocity was within the normal range  There was mild regurgitation  Pulmonary artery systolic pressure was moderately  increased   Estimated peak PA pressure was 64 mmHg  PULMONIC VALVE: Leaflets exhibited normal thickness, no calcification, and normal cuspal separation  DOPPLER: The transpulmonic velocity was within the normal range  There was no regurgitation  PERICARDIUM: There was no thickening  There was no pericardial effusion  AORTA: The root exhibited normal size  PULMONARY ARTERY: The size was normal  The morphology appeared normal     MEASUREMENT TABLES    DOPPLER MEASUREMENTS  Aortic valve   (Reference normals)  Peak gradient   21 mmHg   (--)  Mean gradient   11 mmHg   (--)  Valve area, cont   1 7 cmï¾²   (--)    SYSTEM MEASUREMENT TABLES    2D  EF (Teich): 54 78 %  %FS: 28 23 %  Ao Diam: 3 71 cm  Ao asc: 4 23 cm  EDV(Teich): 87 43 ml  ESV(Teich): 39 54 ml  IVSd: 1 19 cm  LA Area: 13 05 cm2  LA Diam: 2 75 cm  LVEDV MOD A4C: 78 34 ml  LVEF MOD A4C: 80 3 %  LVESV MOD A4C: 15 43 ml  LVIDd: 4 39 cm  LVIDs: 3 15 cm  LVLd A4C: 7 01 cm  LVLs A4C: 5 23 cm  LVOT Diam: 1 97 cm  LVPWd: 1 23 cm  RA Area: 13 4 cm2  RVIDd: 3 82 cm  SV (Teich): 47 89 ml  SV MOD A4C: 62 91 ml    CW  AV Env  Ti: 298 13 ms  AV VTI: 45 96 cm  AV Vmax: 2 23 m/s  AV Vmean: 1 54 m/s  AV maxP 85 mmHg  AV meanPG: 10 84 mmHg  TR Vmax: 3 77 m/s  TR maxP 93 mmHg    MM  TAPSE: 2 2 cm    PW  MV E/A Ratio: 0 85  E' Sept: 0 06 m/s  E/E' Sept: 12 19  LVOT Env  Ti: 365 37 ms  LVOT VTI: 25 4 cm  LVOT Vmax: 1 2 m/s  LVOT Vmean: 0 7 m/s  LVOT maxP 76 mmHg  LVOT meanP 51 mmHg  MV A Melvin: 0 93 m/s  MV Dec [place]: 3 07 m/s2  MV DecT: 255 81 ms  MV E Mlevin: 0 79 m/s  MV PHT: 74 19 ms  MVA By PHT: 2 97 cm2    [address] Accredited Echocardiography Laboratory    Prepared and electronically signed by    Joey Sweeney DO  Signed 2021 10:21:25        Results for orders placed during the hospital encounter of 21    NM Myocardial Perfusion Spect (Pharmacological Induced Stress and/or Rest)    [facility]  [address] [facility] [address] [facility] [address] [phone]    Rest/Stress Gated SPECT Myocardial Perfusion Imaging After Regadenoson    Patient: Vianca Oneil  MR number: [id number]  Account number: [de-identified]  : 1945  Age: 66 years  Gender: Male  Status: Inpatient  Location: Stress lab  Height: 69 in  Weight: 227 lb  BP: 133/ 68 mmHg    Allergies: NO KNOWN ALLERGIES    Diagnosis: R06 02 - Shortness of breath, R74 8 - Abnormal levels of other serum enzymes    Primary Physician:  Roney Galeazzi, DO  RN:  GABY Hickey  Referring Physician:  BLAZE Velez  Group:  [facility]  Report Prepared By[de-identified]  GABY Hickey  Interpreting Physician:  Shaq Rosales MD    INDICATIONS: Evaluation for coronary artery disease  HISTORY: The patient is a 66year old  male  Chest pain status: no chest pain  Other symptoms: dyspnea  Coronary artery disease risk factors: dyslipidemia, hypertension, and diabetes mellitus  Cardiovascular history: peripheral  vascular occlusive disease  Medications:an anticoagulant, a lipid lowering agent and diabetic medications  PHYSICAL EXAM: Baseline physical exam screening: no wheezes audible  REST ECG: Normal sinus rhythm  The ECG showed right bundle branch block  PROCEDURE: The study was performed in the the Stress lab  A regadenoson infusion pharmacologic stress test was performed  Gated SPECT myocardial perfusion imaging was performed after stress and at rest  Systolic blood pressure was 133  mmHg, at the start of the study  Diastolic blood pressure was 68 mmHg, at the start of the study  The heart rate was 83 bpm, at the start of the study  IV double checked    Regadenoson protocol:  Time HR bpm SBP mmHg DBP mmHg Symptoms Rhythm/conduct  Baseline 09:36 83 133 68 none NSR, RBBB  Immediate 09:42 100 96 54 none same as above  1 min 09:43 99 106 59 none same as above  2 min 09:44 95 118 60 none same as above  3 min 09:45 98 118 56 none --  4 min 09:46 100 118 60 none same as above  No medications or fluids given  STRESS SUMMARY: Duration of pharmacologic stress was 3 min  Maximal heart rate during stress was 105 bpm  The heart rate response to stress was normal  There was normal resting blood pressure with an appropriate response to stress  The  rate-pressure product for the peak heart rate and blood pressure was 50012  There was no chest pain during stress  The stress test was terminated due to protocol completion  On 2l/min of O2 by nasal cannula  Pre oxygen saturation: 94 %  Peak oxygen saturation: 94 %  The stress ECG was negative for ischemia and normal  There were no stress arrhythmias or conduction abnormalities  Arrhythmia during stress: isolated premature ventricular beats  ISOTOPE ADMINISTRATION:  Resting isotope administration Stress isotope administration  Agent Tetrofosmin Tetrofosmin  Dose 11 mCi 33 mCi  Date 07/21/2021 07/21/2021    The radiopharmaceutical was injected at the peak effect of pharmacologic stress  MYOCARDIAL PERFUSION IMAGING:  The image quality was fair  Rotating projection images reveal mild diaphragmatic attenuation and mild patient motion  Left ventricular size was normal  The TID ratio was 1 25  The right ventricle was dilated  PERFUSION DEFECTS:  -  There was a moderate-sized, mildly severe, fixed myocardial perfusion defect of the apical apical and inferior wall  GATED SPECT:  The calculated left ventricular ejection fraction was 40 %  Left ventricular ejection fraction was mildly decreased by visual estimate  There was no diagnostic evidence for left ventricular regional abnormality  SUMMARY:  -  Stress results: There was no chest pain during stress  -  ECG conclusions: The stress ECG was negative for ischemia and normal   -  Perfusion imaging: There was a moderate-sized, mildly severe, fixed myocardial perfusion defect of the apical apical and inferior wall    -  Gated SPECT: The calculated left ventricular ejection fraction was 40 %  Left ventricular ejection fraction was mildly decreased by visual estimate  There was no diagnostic evidence for left ventricular regional abnormality   -  Impressions and recommendations: Abnormal study after pharmacologic vasodilation  There is a fixed inferior apical defect which is worse in rest images than stress images most likely secondary to body attenuation artifact  Cannot rule  out old nontransmural myocardial infarction  EF calculated around 40% appears to be mildly decreased  No significant ischemia is noted    IMPRESSIONS: Abnormal study after pharmacologic vasodilation  There is a fixed inferior apical defect which is worse in rest images than stress images most likely secondary to body attenuation artifact  Cannot rule out old nontransmural  myocardial infarction  EF calculated around 40% appears to be mildly decreased  No significant ischemia is noted Left ventricular systolic function was reduced, without distinct regional wall motion abnormalities      Prepared and signed by    Tesha Bosch MD  Signed 07/21/2021 16:58:46          Lab Review   Lab Results   Component Value Date    WBC 7 53 01/10/2022    HGB 10 9 (L) 01/10/2022    HCT 35 4 (L) 01/10/2022    MCV 88 01/10/2022    RDW 14 9 01/10/2022     01/10/2022     BMP:  Lab Results   Component Value Date    SODIUM 138 01/10/2022    K 4 9 01/10/2022     01/10/2022    CO2 30 01/10/2022    BUN 17 01/10/2022    CREATININE 1 14 01/10/2022    GLUC 250 (H) 11/19/2021    GLUF 168 (H) 01/10/2022    CALCIUM 8 5 01/10/2022    CORRECTEDCA 9 7 11/19/2021    EGFR 61 01/10/2022     LFT:  Lab Results   Component Value Date    AST 10 11/19/2021    ALT 22 11/19/2021    ALKPHOS 98 11/19/2021    TP 7 0 11/19/2021    ALB 3 3 (L) 11/19/2021      No components found for: [employer] [facility]  Lab Results   Component Value Date    XIC2LTVVVNPF 2 943 07/18/2021     Lab Results   Component Value Date    HGBA1C 8 3 (H) 09/27/2021     Lipid Profile:     Lab Results   Component Value Date    TROPONINI 1 12 (H) 07/18/2021     Lab Results   Component Value Date    NTBNP 2,051 (H) 07/18/2021      Recent Results (from the past 672 hour(s))   CBC and differential    Collection Time: 01/10/22 10:13 AM   Result Value Ref Range    WBC 7 53 4 31 - 10 16 Thousand/uL    RBC 4 04 3 88 - 5 62 Million/uL    Hemoglobin 10 9 (L) 12 0 - 17 0 g/dL    Hematocrit 35 4 (L) 36 5 - 49 3 %    MCV 88 82 - 98 fL    MCH 27 0 26 8 - 34 3 pg    MCHC 30 8 (L) 31 4 - 37 4 g/dL    RDW 14 9 11 6 - 15 1 %    MPV 9 8 8 9 - 12 7 fL    Platelets 713 921 - 028 Thousands/uL    nRBC 0 /100 WBCs    Neutrophils Relative 61 43 - 75 %    Immat GRANS % 0 0 - 2 %    Lymphocytes Relative 31 14 - 44 %    Monocytes Relative 7 4 - 12 %    Eosinophils Relative 1 0 - 6 %    Basophils Relative 0 0 - 1 %    Neutrophils Absolute 4 56 1 85 - 7 62 Thousands/µL    Immature Grans Absolute 0 02 0 00 - 0 20 Thousand/uL    Lymphocytes Absolute 2 34 0 60 - 4 47 Thousands/µL    Monocytes Absolute 0 49 0 17 - 1 22 Thousand/µL    Eosinophils Absolute 0 10 0 00 - 0 61 Thousand/µL    Basophils Absolute 0 02 0 00 - 0 10 Thousands/µL   Basic metabolic panel    Collection Time: 01/10/22 10:13 AM   Result Value Ref Range    Sodium 138 136 - 145 mmol/L    Potassium 4 9 3 5 - 5 3 mmol/L    Chloride 101 100 - 108 mmol/L    CO2 30 21 - 32 mmol/L    ANION GAP 7 4 - 13 mmol/L    BUN 17 5 - 25 mg/dL    Creatinine 1 14 0 60 - 1 30 mg/dL    Glucose, Fasting 168 (H) 65 - 99 mg/dL    Calcium 8 5 8 3 - 10 1 mg/dL    eGFR 61 ml/min/1 73sq m           Dr Chriss Epps MD [facility] [place]      "This note has been constructed using a voice recognition system  Therefore there may be syntax, spelling, and/or grammatical errors   Please call if you have any questions  "

## 2022-01-18 RX ORDER — MELATONIN
1000 DAILY
COMMUNITY
End: 2022-01-20 | Stop reason: HOSPADM

## 2022-01-18 RX ORDER — MULTIVIT WITH MINERALS/LUTEIN
1000 TABLET ORAL DAILY
COMMUNITY
End: 2022-01-20 | Stop reason: HOSPADM

## 2022-01-18 NOTE — PRE-PROCEDURE INSTRUCTIONS
My Surgical Experience    The following information was developed to assist you to prepare for your operation  What do I need to do before coming to the hospital?   Arrange for a responsible person to drive you to and from the hospital    Arrange care for your children at home  Children are not allowed in the recovery areas of the hospital   Plan to wear clothing that is easy to put on and take off  If you are having shoulder surgery, wear a shirt that buttons or zippers in the front  Bathing  o Shower the evening before and the morning of your surgery with an antibacterial soap  Please refer to the Pre Op Showering Instructions for Surgery Patients Sheet   o Remove nail polish and all body piercing jewelry  o Do not shave any body part for at least 24 hours before surgery-this includes face, arms, legs and upper body  Food  o Nothing to eat or drink after midnight the night before your surgery  This includes candy and chewing gum  o Exception: If your surgery is after 12:00pm (noon), you may have clear liquids such as 7-Up®, ginger ale, apple or cranberry juice, Jell-O®, water, or clear broth until 8:00 am  o Do not drink milk or juice with pulp on the morning before surgery  o Do not drink alcohol 24 hours before surgery  Medicine  o Follow instructions you received from your surgeon about which medicines you may take on the day of surgery  o If instructed to take medicine on the morning of surgery, take pills with just a small sip of water  Call your prescribing doctor for specific infroamtion on what to do if you take insulin    What should I bring to the hospital?    Bring:  Geovani Erickson or a walker, if you have them, for foot or knee surgery   A list of the daily medicines, vitamins, minerals, herbals and nutritional supplements you take   Include the dosages of medicines and the time you take them each day   Glasses, dentures or hearing aids   Minimal clothing; you will be wearing hospital sleepwear   Photo ID; required to verify your identity   If you have a Living Will or Power of , bring a copy of the documents   If you have an ostomy, bring an extra pouch and any supplies you use    Do not bring   Medicines or inhalers   Money, valuables or jewelry    What other information should I know about the day of surgery?  Notify your surgeons if you develop a cold, sore throat, cough, fever, rash or any other illness   Report to the Ambulatory Surgical/Same Day Surgery Unit   You will be instructed to stop at Registration only if you have not been pre-registered   Inform your  fi they do not stay that they will be asked by the staff to leave a phone number where they can be reached   Be available to be reached before surgery  In the event the operating room schedule changes, you may be asked to come in earlier or later than expected    *It is important to tell your doctor and others involved in your health care if you are taking or have been taking any non-prescription drugs, vitamins, minerals, herbals or other nutritional supplements  Any of these may interact with some food or medicines and cause a reaction      My Surgical Experience    The following information was developed to assist you to prepare for your operation  What do I need to do before coming to the hospital?   Arrange for a responsible person to drive you to and from the hospital    Arrange care for your children at home  Children are not allowed in the recovery areas of the hospital   Plan to wear clothing that is easy to put on and take off  If you are having shoulder surgery, wear a shirt that buttons or zippers in the front  Bathing  o Shower the evening before and the morning of your surgery with an antibacterial soap   Please refer to the Pre Op Showering Instructions for Surgery Patients Sheet   o Remove nail polish and all body piercing jewelry  o Do not shave any body part for at least 24 hours before surgery-this includes face, arms, legs and upper body  Food  o Nothing to eat or drink after midnight the night before your surgery  This includes candy and chewing gum  o Exception: If your surgery is after 12:00pm (noon), you may have clear liquids such as 7-Up®, ginger ale, apple or cranberry juice, Jell-O®, water, or clear broth until 8:00 am  o Do not drink milk or juice with pulp on the morning before surgery  o Do not drink alcohol 24 hours before surgery  Medicine  o Follow instructions you received from your surgeon about which medicines you may take on the day of surgery  o If instructed to take medicine on the morning of surgery, take pills with just a small sip of water  Call your prescribing doctor for specific infroamtion on what to do if you take insulin    What should I bring to the hospital?    Bring:  Mirna Vance or a walker, if you have them, for foot or knee surgery   A list of the daily medicines, vitamins, minerals, herbals and nutritional supplements you take  Include the dosages of medicines and the time you take them each day   Glasses, dentures or hearing aids   Minimal clothing; you will be wearing hospital sleepwear   Photo ID; required to verify your identity   If you have a Living Will or Power of , bring a copy of the documents   If you have an ostomy, bring an extra pouch and any supplies you use    Do not bring   Medicines or inhalers   Money, valuables or jewelry    What other information should I know about the day of surgery?  Notify your surgeons if you develop a cold, sore throat, cough, fever, rash or any other illness   Report to the Ambulatory Surgical/Same Day Surgery Unit   You will be instructed to stop at Registration only if you have not been pre-registered   Inform your  fi they do not stay that they will be asked by the staff to leave a phone number where they can be reached   Be available to be reached before surgery   In the event the operating room schedule changes, you may be asked to come in earlier or later than expected    *It is important to tell your doctor and others involved in your health care if you are taking or have been taking any non-prescription drugs, vitamins, minerals, herbals or other nutritional supplements  Any of these may interact with some food or medicines and cause a reaction      Pre-Surgery Instructions:   Medication Instructions    apixaban (Eliquis) 5 mg Instructed patient per Anesthesia Guidelines   Ascorbic Acid (vitamin C) 1000 MG tablet Instructed patient per Anesthesia Guidelines   bisacodyl (DULCOLAX) 10 mg suppository Instructed patient per Anesthesia Guidelines   cholecalciferol (VITAMIN D3) 1,000 units tablet Instructed patient per Anesthesia Guidelines   Continuous Blood Gluc Sensor (FreeStyle [place] 14 Day Sensor) MISC Instructed patient per Anesthesia Guidelines   docusate sodium (COLACE) 100 mg capsule Instructed patient per Anesthesia Guidelines   folic acid (FOLVITE) 1 mg tablet Instructed patient per Anesthesia Guidelines   metFORMIN (GLUCOPHAGE) 500 mg tablet Instructed patient per Anesthesia Guidelines   metoprolol succinate (TOPROL-XL) 50 mg 24 hr tablet Instructed patient per Anesthesia Guidelines   pravastatin (PRAVACHOL) 40 mg tablet Instructed patient per Anesthesia Guidelines   senna (SENOKOT) 8 6 mg Instructed patient per Anesthesia Guidelines   sitaGLIPtin (JANUVIA) 100 mg tablet Instructed patient per Anesthesia Guidelines   tamsulosin (FLOMAX) 0 4 mg Instructed patient per Anesthesia Guidelines  To take metoprolol and flomax a m  of surgery

## 2022-01-19 ENCOUNTER — ANESTHESIA EVENT (OUTPATIENT)
Dept: PERIOP | Facility: HOSPITAL | Age: 77
End: 2022-01-19
Payer: MEDICARE

## 2022-01-19 NOTE — ANESTHESIA PREPROCEDURE EVALUATION
Procedure:  CYSTOSCOPY, TRANSURETHRAL RESECTION OF PROSTATE (TURP) (N/A Abdomen)    Relevant Problems   ENDO   (+) Type 2 diabetes mellitus, without long-term current use of insulin (HCC)      /RENAL   (+) CKD stage 3 due to type 2 diabetes mellitus (HCC)   (+) Prostate cancer (HCC)      HEMATOLOGY   (+) Anemia      PULMONARY   (+) Respiratory failure with hypoxia (HCC)        Physical Exam    Airway    Mallampati score: III  TM Distance: >3 FB  Neck ROM: full     Dental       Cardiovascular  Rhythm: irregular, Rate: normal,     Pulmonary  Breath sounds clear to auscultation, Decreased breath sounds,     Other Findings        Anesthesia Plan  ASA Score- 3     Anesthesia Type- general with ASA Monitors  Additional Monitors:   Airway Plan: LMA  Plan Factors-Exercise tolerance (METS): <4 METS  Chart reviewed  Existing labs reviewed  Patient summary reviewed  Patient is not a current smoker  Induction- intravenous  Postoperative Plan- Plan for postoperative opioid use  Informed Consent- Anesthetic plan and risks discussed with patient  I personally reviewed this patient with the CRNA  Discussed and agreed on the Anesthesia Plan with the CRNA  Laura Rivera

## 2022-01-19 NOTE — H&P
H&P Exam - Urology       Patient: Chioma Mack   : 1945 Sex: male   MRN: [id number]     CSN: [id number]      History of Present Illness   HPI:  Chioma Mack is a 64 y o  male who presents with elevated PSA metastatic disease undergoing prostate ultrasound biopsy confirming high-grade adenocarcinoma with chronic urinary tension now to undergo channeling cysto TURP aware risk of anesthesia infection bleeding additional urologic procedures        Review of Systems:   Constitutional:  Negative for activity change, fever, chills and diaphoresis  HENT: Negative for hearing loss and trouble swallowing  Eyes: Negative for itching and visual disturbance  Respiratory: Negative for chest tightness and shortness of breath  Cardiovascular: Negative for chest pain, edema  Gastrointestinal: Negative for abdominal distention, na abdominal pain, constipation, diarrhea, Nausea and vomiting  Genitourinary: Negative for decreased urine volume, difficulty urinating, dysuria, enuresis, frequency, hematuria and urgency  Musculoskeletal: Negative for gait problem and myalgias  Neurological: Negative for dizziness and headaches  Hematological: Does not bruise/bleed easily  Historical Information   Past Medical History:   Diagnosis Date    Arthritis     BPH associated with nocturia     Diabetes mellitus ([address] )     Edema     lower legs    Hearing aid worn     bilateral    Hyperlipidemia     Hypertension     controlled    Tremor of both hands      Past Surgical History:   Procedure Laterality Date    CATARACT EXTRACTION      IA XCAPSL CTRC RMVL INSJ IO LENS PROSTH W/O ECP Right 2018    Procedure: EXTRACTION EXTRACAPSULAR CATARACT PHACO INTRAOCULAR LENS (IOL);   Surgeon: Lalo Way MD;  [facility] MA[facility] OR;  Service: Ophthalmology    IA XCAPSL CTRC RMVL INSJ IO LENS PROSTH W/O ECP Left 10/1/2018    Procedure: EXTRACTION EXTRACAPSULAR CATARACT PHACO INTRAOCULAR LENS (IOL); Surgeon: Halima Orozco MD;  Location: [facility] MAIN OR;  Service: Ophthalmology     Social History   Social History     Substance and Sexual Activity   Alcohol Use Never     Social History     Substance and Sexual Activity   Drug Use Never     Social History     Tobacco Use   Smoking Status Never Smoker   Smokeless Tobacco Never Used     Family History:   Family History   Problem Relation Age of Onset    Colon cancer Mother     Cancer Father         lung-smoker    Lung cancer Father     Early death Brother     Cancer Brother         "bone cancer"       Meds/Allergies   No medications prior to admission  No Known Allergies    Objective   Vitals: There were no vitals taken for this visit  Physical Exam:  General Alert awake   Normocephalic atraumatic PERRLA  Lungs clear bilaterally  Cardiac normal S1 normal S2  Abdomen soft, flank pain  Extremities no edema    No intake/output data recorded      Invasive Devices  Report    Drain            Urethral Catheter 185 days                    Lab Results: CBC:   Lab Results   Component Value Date    WBC 7 53 01/10/2022    HGB 10 9 (L) 01/10/2022    HCT 35 4 (L) 01/10/2022    MCV 88 01/10/2022     01/10/2022    MCH 27 0 01/10/2022    MCHC 30 8 (L) 01/10/2022    RDW 14 9 01/10/2022    MPV 9 8 01/10/2022    NRBC 0 01/10/2022     CMP:   Lab Results   Component Value Date     01/10/2022    CO2 30 01/10/2022    BUN 17 01/10/2022    CREATININE 1 14 01/10/2022    CALCIUM 8 5 01/10/2022    AST 10 11/19/2021    ALT 22 11/19/2021    ALKPHOS 98 11/19/2021    EGFR 61 01/10/2022     Urinalysis:   Lab Results   Component Value Date    COLORU Yellow 07/18/2021    CLARITYU Clear 07/18/2021    SPECGRAV 1 020 07/18/2021    PHUR 5 5 07/18/2021    LEUKOCYTESUR Trace (A) 07/18/2021    NITRITE Positive (A) 07/18/2021    GLUCOSEU 100 (1/10%) (A) 07/18/2021    KETONESU Negative 07/18/2021    BILIRUBINUR Negative 07/18/2021    BLOODU Trace-Intact (A) 07/18/2021     Urine Culture:   Lab Results   Component Value Date    URINECX >100,000 cfu/ml Escherichia coli (A) 07/01/2021     PSA:   Lab Results   Component Value Date    PSA 68 8 (H) 07/02/2021           Assessment/ Plan:  Metastatic prostate cancer  Urinary tension  Channeling cysto TURP      Fausto Jain MD

## 2022-01-20 ENCOUNTER — ANESTHESIA (OUTPATIENT)
Dept: PERIOP | Facility: HOSPITAL | Age: 77
End: 2022-01-20
Payer: MEDICARE

## 2022-01-20 ENCOUNTER — HOSPITAL ENCOUNTER (OUTPATIENT)
Facility: HOSPITAL | Age: 77
Setting detail: OUTPATIENT SURGERY
Discharge: HOME | End: 2022-01-20
Attending: SPECIALIST | Admitting: SPECIALIST
Payer: MEDICARE

## 2022-01-20 VITALS
WEIGHT: 222.4 LBS | TEMPERATURE: 98.7 F | RESPIRATION RATE: 18 BRPM | HEIGHT: 69 IN | DIASTOLIC BLOOD PRESSURE: 74 MMHG | BODY MASS INDEX: 32.94 KG/M2 | HEART RATE: 71 BPM | SYSTOLIC BLOOD PRESSURE: 123 MMHG | OXYGEN SATURATION: 94 %

## 2022-01-20 DIAGNOSIS — R33.9 RETENTION OF URINE, UNSPECIFIED: ICD-10-CM

## 2022-01-20 LAB — GLUCOSE SERPL-MCNC: 204 MG/DL (ref 65–140)

## 2022-01-20 PROCEDURE — 88305 TISSUE EXAM BY PATHOLOGIST: CPT | Performed by: PATHOLOGY

## 2022-01-20 PROCEDURE — 88342 IMHCHEM/IMCYTCHM 1ST ANTB: CPT | Performed by: PATHOLOGY

## 2022-01-20 PROCEDURE — 82948 REAGENT STRIP/BLOOD GLUCOSE: CPT

## 2022-01-20 RX ORDER — GLYCINE 1.5 G/100ML
SOLUTION IRRIGATION AS NEEDED
Status: DISCONTINUED | OUTPATIENT
Start: 2022-01-20 | End: 2022-01-20 | Stop reason: HOSPADM

## 2022-01-20 RX ORDER — FENTANYL CITRATE/PF 50 MCG/ML
25 SYRINGE (ML) INJECTION
Status: DISCONTINUED | OUTPATIENT
Start: 2022-01-20 | End: 2022-01-20 | Stop reason: HOSPADM

## 2022-01-20 RX ORDER — MAGNESIUM HYDROXIDE 1200 MG/15ML
LIQUID ORAL AS NEEDED
Status: DISCONTINUED | OUTPATIENT
Start: 2022-01-20 | End: 2022-01-20 | Stop reason: HOSPADM

## 2022-01-20 RX ORDER — ONDANSETRON 2 MG/ML
4 INJECTION INTRAMUSCULAR; INTRAVENOUS ONCE AS NEEDED
Status: DISCONTINUED | OUTPATIENT
Start: 2022-01-20 | End: 2022-01-20 | Stop reason: HOSPADM

## 2022-01-20 RX ORDER — PROPOFOL 10 MG/ML
INJECTION, EMULSION INTRAVENOUS AS NEEDED
Status: DISCONTINUED | OUTPATIENT
Start: 2022-01-20 | End: 2022-01-20

## 2022-01-20 RX ORDER — CEFAZOLIN SODIUM 2 G/50ML
2000 SOLUTION INTRAVENOUS ONCE
Status: DISCONTINUED | OUTPATIENT
Start: 2022-01-20 | End: 2022-01-20 | Stop reason: HOSPADM

## 2022-01-20 RX ORDER — SODIUM CHLORIDE, SODIUM LACTATE, POTASSIUM CHLORIDE, CALCIUM CHLORIDE 600; 310; 30; 20 MG/100ML; MG/100ML; MG/100ML; MG/100ML
20 INJECTION, SOLUTION INTRAVENOUS CONTINUOUS
Status: CANCELLED | OUTPATIENT
Start: 2022-01-20

## 2022-01-20 RX ORDER — LIDOCAINE HYDROCHLORIDE 10 MG/ML
INJECTION, SOLUTION EPIDURAL; INFILTRATION; INTRACAUDAL; PERINEURAL AS NEEDED
Status: DISCONTINUED | OUTPATIENT
Start: 2022-01-20 | End: 2022-01-20

## 2022-01-20 RX ORDER — CEFAZOLIN SODIUM 1 G/3ML
INJECTION, POWDER, FOR SOLUTION INTRAMUSCULAR; INTRAVENOUS AS NEEDED
Status: DISCONTINUED | OUTPATIENT
Start: 2022-01-20 | End: 2022-01-20

## 2022-01-20 RX ORDER — FENTANYL CITRATE 50 UG/ML
INJECTION, SOLUTION INTRAMUSCULAR; INTRAVENOUS AS NEEDED
Status: DISCONTINUED | OUTPATIENT
Start: 2022-01-20 | End: 2022-01-20

## 2022-01-20 RX ORDER — SODIUM CHLORIDE, SODIUM LACTATE, POTASSIUM CHLORIDE, CALCIUM CHLORIDE 600; 310; 30; 20 MG/100ML; MG/100ML; MG/100ML; MG/100ML
125 INJECTION, SOLUTION INTRAVENOUS CONTINUOUS
Status: DISCONTINUED | OUTPATIENT
Start: 2022-01-20 | End: 2022-01-20 | Stop reason: HOSPADM

## 2022-01-20 RX ADMIN — FENTANYL CITRATE 25 MCG: 50 INJECTION, SOLUTION INTRAMUSCULAR; INTRAVENOUS at 09:11

## 2022-01-20 RX ADMIN — CEFAZOLIN 2000 MG: 1 INJECTION, POWDER, FOR SOLUTION INTRAVENOUS at 08:26

## 2022-01-20 RX ADMIN — SODIUM CHLORIDE, SODIUM LACTATE, POTASSIUM CHLORIDE, AND CALCIUM CHLORIDE: .6; .31; .03; .02 INJECTION, SOLUTION INTRAVENOUS at 07:45

## 2022-01-20 RX ADMIN — PROPOFOL 150 MG: 10 INJECTION, EMULSION INTRAVENOUS at 08:25

## 2022-01-20 RX ADMIN — FENTANYL CITRATE 25 MCG: 50 INJECTION, SOLUTION INTRAMUSCULAR; INTRAVENOUS at 08:30

## 2022-01-20 RX ADMIN — LIDOCAINE HYDROCHLORIDE 50 MG: 10 INJECTION, SOLUTION EPIDURAL; INFILTRATION; INTRACAUDAL; PERINEURAL at 08:25

## 2022-01-20 RX ADMIN — FENTANYL CITRATE 25 MCG: 50 INJECTION, SOLUTION INTRAMUSCULAR; INTRAVENOUS at 08:37

## 2022-01-20 RX ADMIN — FENTANYL CITRATE 25 MCG: 50 INJECTION, SOLUTION INTRAMUSCULAR; INTRAVENOUS at 08:39

## 2022-01-20 NOTE — OP NOTE
PERATIVE REPORT  PATIENT NAME: Angelique Taylor    :  1945  MRN: [id number]  Pt Location: [facility] OR ROOM 04    SURGERY DATE: 2022    Surgeon(s) and Role:     * Quincy Bhat MD - Primary    Preop Diagnosis:  Retention of urine, unspecified [R33 9]  BPH    Post-Op Diagnosis Codes:     * Retention of urine, unspecified [R33 9]    Procedure(s) (LRB):  CYSTOSCOPY, TRANSURETHRAL RESECTION OF PROSTATE (TURP) (N/A)    Specimen(s):  ID Type Source Tests Collected by Time Destination   1 :  Tissue Prostate TISSUE EXAM Quincy Bhta MD 2022 [id number]        Estimated Blood Loss:   Minimal    Drains:  Urethral Catheter Three way 22 Fr  (Active)   Reasons to continue Urinary Catheter  Post-operative urological requirements 22 0955   Goal for Removal N/A- discharging with welch 22 0955   Number of days: 0       Anesthesia Type:   General    Operative Indications:  Retention of urine, unspecified [R359]  58-year-old male with urinary tension metastatic prostate cancer on hormone therapy now wishes to undergo channeling TURP aware risk of anesthesia infection bleeding postop incontinence and recurrent infection    Operative Findings:  Bilobar 3 5 cm obstructing prostatic urethra on view from verumontanum open on view from bladder neck ureteral sent home on    Complications:   None    Procedure and Technique:  After patient identified in the holding area consent signed in the office 5:00 a m  Followed by the right lobe between 11 and 7:00 a m  Followed by the roof between 11 and 1:00 a m  Followed by the floor between 5 and 7:00 a m  From bladder neck to verumontanum to have fibers seen  Hemostasis by cautery    On view from the verumontanum prostatic urethra fully open review from the bladder neck ureteral office for unharmed the scope was removed all chips were evacuated from bladder with the Eyenalyze evacuator a 22 [place] 3 way Welch catheter was inserted with 50 cc in the balloon left to mild traction with continuous bladder irrigation time of dictation CBI is clear  Postop procedure awakened taken recovery stable condition placed in the op suite  After anesthesia induced was placed thigh position draped prep standard fashion  Time-out performed  Twenty-two [place] cystoscope passed through through the bladder  Anterior showed analysis    Posterior the confirmed a 3-3 5 cm bilobar obstructing prostatic urethra upon insertion scope into the bladder the trigone was away from the bladder neck there was a posterior   I was present for the entire procedure    Patient Disposition:  PACU       SIGNATURE: Fausto Jain MD  DATE: January 20, 2022  TIME: 10:19 AM

## 2022-01-20 NOTE — ANESTHESIA POSTPROCEDURE EVALUATION
Post-Op Assessment Note    CV Status:  Stable    Pain management: adequate     Mental Status:  Alert and awake   Hydration Status:  Euvolemic   PONV Controlled:  Controlled   Airway Patency:  Patent      Post Op Vitals Reviewed: Yes      Staff: CRNA         No complications documented      BP   134/75   Temp   98 2   Pulse  78   Resp   20   SpO2   98

## 2022-01-20 NOTE — PERIOPERATIVE NURSING NOTE
Received patient from PACU in room 9, patient is alert and awake, VSS, no distress noted, welch intact, draining clean pink urine  dr Patient is tolerating PO fluids, call bell placed in reach, will continue to monitor patient until d/c criteria met

## 2022-01-20 NOTE — DISCHARGE INSTRUCTIONS
#1 no heavy straining or lifting above 10 pounds for 2 weeks    #2 call office fevers, chills, or worsening blood in the urine  #3 Patient has follow-up Dr Alex Thornton Friday January 21st 10:00 a m  Patient has follow-up to review pathology Friday February 18th 3:30 p m  Stay off Eliquis for 1 week if possible      Darrell BYRD  [address] [place] [address] office  [address] [place], [place] [address] [address]  [phone]  8:30 AM to 4:30 PM  Monday through Friday    [place] office  [phone] [address]  [place], [address] [place] [address]  [phone]  1:00 to 5:00 PM  Wednesday

## 2022-01-20 NOTE — PROGRESS NOTES
Progress Note - Urology      Patient: Sanaz Ellis   : 1945 Sex: male   MRN: [id number]     CSN: [id number]  Unit/Bed#: OR POOL     SUBJECTIVE:   No change history/physical      Objective   Vitals: /57   Pulse 83   Temp 97 8 °F (36 6 °C) (Temporal)   Resp 16   Ht 5' 9" (1 753 m)   Wt 101 kg (222 lb 6 4 oz)   SpO2 99%   BMI 32 84 kg/m²     No intake/output data recorded        Physical Exam:   General Alert awake   Normocephalic atraumatic PERRLA  Lungs clear bilaterally  Cardiac normal S1 normal S2  Abdomen soft, flank pain  Extremities no edema      Lab Results: CBC:   Lab Results   Component Value Date    WBC 7 53 01/10/2022    HGB 10 9 (L) 01/10/2022    HCT 35 4 (L) 01/10/2022    MCV 88 01/10/2022     01/10/2022    MCH 27 0 01/10/2022    MCHC 30 8 (L) 01/10/2022    RDW 14 9 01/10/2022    MPV 9 8 01/10/2022    NRBC 0 01/10/2022     CMP:   Lab Results   Component Value Date     01/10/2022    CO2 30 01/10/2022    BUN 17 01/10/2022    CREATININE 1 14 01/10/2022    CALCIUM 8 5 01/10/2022    AST 10 2021    ALT 22 2021    ALKPHOS 98 2021    EGFR 61 01/10/2022     Urinalysis:   Lab Results   Component Value Date    COLORU Yellow 2021    CLARITYU Clear 2021    SPECGRAV 1 020 2021    PHUR 5 5 2021    LEUKOCYTESUR Trace (A) 2021    NITRITE Positive (A) 2021    GLUCOSEU 100 (1/10%) (A) 2021    KETONESU Negative 2021    BILIRUBINUR Negative 2021    BLOODU Trace-Intact (A) 2021     Urine Culture:   Lab Results   Component Value Date    URINECX >100,000 cfu/ml Escherichia coli (A) 2021     PSA:   Lab Results   Component Value Date    PSA 68 8 (H) 2021         Assessment/ Plan:  Cysto/turp          Dennys Rodriguez MD

## 2022-01-27 ENCOUNTER — OFFICE VISIT (OUTPATIENT)
Dept: HEMATOLOGY ONCOLOGY | Facility: MEDICAL CENTER | Age: 77
End: 2022-01-27
Payer: MEDICARE

## 2022-01-27 VITALS
WEIGHT: 224 LBS | SYSTOLIC BLOOD PRESSURE: 122 MMHG | HEIGHT: 69 IN | DIASTOLIC BLOOD PRESSURE: 62 MMHG | BODY MASS INDEX: 33.18 KG/M2 | OXYGEN SATURATION: 94 % | HEART RATE: 88 BPM | RESPIRATION RATE: 18 BRPM | TEMPERATURE: 97.4 F

## 2022-01-27 DIAGNOSIS — C61 PROSTATE CANCER (HCC): Primary | ICD-10-CM

## 2022-01-27 DIAGNOSIS — I26.99 BILATERAL PULMONARY EMBOLISM (HCC): ICD-10-CM

## 2022-01-27 DIAGNOSIS — D64.9 ANEMIA, UNSPECIFIED TYPE: ICD-10-CM

## 2022-01-27 PROCEDURE — 99215 OFFICE O/P EST HI 40 MIN: CPT | Performed by: INTERNAL MEDICINE

## 2022-01-27 NOTE — PROGRESS NOTES
Rhonda Dotty  1945  [facility] [address] [address]    DISCUSSION/SUMMARY:    02/01/2022 8:00 a m  addendum I was able to speak to Dr Ludwig Drake last night about this patient  As discussed below, patient was diagnosed with prostate adenocarcinoma, status post TURP  Patient is on Eligard, vitamin-C and calcium at this time  The plan is to hold off on any additional treatments for the prostate cancer and see how the patient does with [facility] treatment only  43-year-old male with multiple medical problems recently found to have bilateral PE  Issues:    Bilateral PE, lower extremity DVT  Patient was found to be heterozygous for factor 5 Leiden  Patient has a history of prostate cancer (no evidence of metastatic disease), is obese and not very mobile  The plan is to continue the Eliquis 5 mg twice a day  Patient will monitor for excessive bruising/bleeding  Patient will also monitor for worsening lower extremity edema or any acute respiratory issues  Mr Melly Hamlin understands that he needs to call the office if he is scheduled for any invasive procedure or surgery as the Eliquis needs to be manipulated  Monoclonal gammopathy  At this time, there is no evidence of a clinically active plasma cell dyscrasia  Eventually laboratory test will be rechecked  Anemia  The most recent hemoglobin level from January 10, 2022 is about the same as before  The etiology for the anemia is likely multifactorial (age, comorbidities, prior  bleeding)  No recent  bleeding - Flores is out  Elevated PSA - prostate cancer  Patient was recently seen/evaluated by Dr Ludwig Drake and underwent biopsies  Pathology results demonstrated adenocarcinoma  Mr Melly Hamlin was not able to give specifics but stated that he was given an injection in his stomach    This office will reach out to you to get more information, recent office note etc     Patient is to return in 4 months  Mr Curt Rojas knows to call the hematology/oncology office if there are any other questions or concerns  Carefully review your medication list and verify that the list is accurate and up-to-date  Please call the hematology/oncology office if there are medications missing from the list, medications on the list that you are not currently taking or if there is a dosage or instruction that is different from how you're taking that medication  Patient goals and areas of care:  Monitor hemoglobin level,  follow-up, anticoagulation  Barriers to care: none  Patient is able to self-care   _____________________________________________________________________________________    Chief Complaint   Patient presents with    Follow-up     DVT, PE     History of Present Illness:  24-year-old male with a history of diabetes, hyperlipidemia, hypertension, BPH presenting to the emergency room in July 2021 after syncopal episode  CTA/chest from July 18, 2021 demonstrated multiple segmental and subsegmental filling defects consistent with PE  Right lower extremity Doppler demonstrated an acute DVT  Patient was found to be factor 5 Leiden heterozygous  Patient is on Eliquis 5 mg twice a day and returns for follow-up  Mr Curt Rojas states feeling okay, better than before  No shortness of breath at rest, minimal dyspnea on exertion but slightly less/better than before  No chest pain or pressure  No problems with excessive bruising or bleeding  Lower extremity swelling is the same as before  Activities are limited, same as before  Previously patient had  bleeding after the cystoscopy - this has resolved  Urinary frequency is the same as before  No specific problems with the Eliquis  Patient has received his COVID vaccination    Review of Systems   Constitutional: Positive for activity change and fatigue  HENT: Negative  Eyes: Negative      Respiratory: Negative  Cardiovascular: Positive for leg swelling  Gastrointestinal: Negative  Endocrine: Negative  Genitourinary: Negative  Musculoskeletal: Negative  Skin: Negative  Allergic/Immunologic: Negative  Neurological: Negative  Hematological: Negative  Psychiatric/Behavioral: Negative  All other systems reviewed and are negative  Patient Active Problem List   Diagnosis    Type 2 diabetes mellitus, without long-term current use of insulin ([facility] )    CKD stage 3 due to type 2 diabetes mellitus ([facility] )    Anemia    Pulmonary artery hypertension (HCC)    Dyslipidemia    Elevated PSA    Urinary retention    Acute cystitis with hematuria    Obesity (BMI 30-39  9)    Tremor    Hypoxia    Generalized weakness    Protein-calorie malnutrition (HCC)    Bilateral pulmonary embolism (HCC)    Respiratory failure with hypoxia (HCC)    Urinary tract infection without hematuria    Non MI troponin elevation    Syncope    Medicare annual wellness visit, subsequent    Prostate mass    Class 1 obesity due to excess calories with serious comorbidity and body mass index (BMI) of 32 0 to 32 9 in adult    Prostate cancer [facility])     Past Medical History:   Diagnosis Date    Arthritis     BPH associated with nocturia     Diabetes mellitus (HCC)     Edema     lower legs    Hearing aid worn     bilateral    Hyperlipidemia     Hypertension     controlled    Tremor of both hands      Past Surgical History:   Procedure Laterality Date    CATARACT EXTRACTION      W[place] TRANSURETHRAL ELEC-SURG PROSTATECTOM N/A 1/20/2022    Procedure: CYSTOSCOPY, TRANSURETHRAL RESECTION OF PROSTATE (TURP); Surgeon: Sonia Ivey MD;  Location: [address] [place] [address];  Service: Urology    W[place] XCAPSL CTRC RMVL INSJ IO LENS PROSTH W/O ECP Right 8/6/2018    Procedure: EXTRACTION EXTRACAPSULAR CATARACT PHACO INTRAOCULAR LENS (IOL);   Surgeon: Naomy Benedict MD;  Location: [facility] MAIN OR;  Service: Ophthalmology    W[place] Jazmine Nichole CTRC RMVL INSJ IO LENS PROSTH W/O ECP Left 10/1/2018    Procedure: EXTRACTION EXTRACAPSULAR CATARACT PHACO INTRAOCULAR LENS (IOL);   Surgeon: Maricarmen Joya MD;  [facility] MA[facility];  Service: Ophthalmology     Family History   Problem Relation Age of Onset    Colon cancer Mother     Cancer Father         lung-smoker    Lung cancer Father     Early death Brother     Cancer Brother         "bone cancer"     Social History     Socioeconomic History    Marital status: Single     Spouse name: Not on file    Number of children: Not on file    Years of education: Not on file    Highest education level: Not on file   Occupational History    Not on file   Tobacco Use    Smoking status: Never Smoker    Smokeless tobacco: Never Used   Vaping Use    Vaping Use: Never used   Substance and Sexual Activity    Alcohol use: Never    Drug use: Never    Sexual activity: Never   Other Topics Concern    Not on file   Social History Narrative    Not on file     Social Determinants of Health     Financial Resource Strain: Not on file   Food Insecurity: Not on file   Transportation Needs: Not on file   Physical Activity: Not on file   Stress: Not on file   Social Connections: Not on file   Intimate Partner Violence: Not on file   Housing Stability: Not on file       Current Outpatient Medications:     apixaban (Eliquis) 5 mg, Take 5 mg by mouth in the morning, Disp: , Rfl:     bisacodyl (DULCOLAX) 10 mg suppository, Insert 1 suppository (10 mg total) into the rectum once as needed for constipation (If no bowel movement) for up to 1 dose, Disp: 12 suppository, Rfl: 0    Continuous Blood Gluc Sensor (FreeStyle [place] 14 Day Sensor) MISC, Change sensor every 14 days, Disp: 2 each, Rfl: 12    docusate sodium (COLACE) 100 mg capsule, Take 1 capsule (100 mg total) by mouth 2 (two) times a day, Disp: 10 capsule, Rfl: 0    folic acid (FOLVITE) 1 mg tablet, Take 1 tablet (1,000 mcg total) by mouth daily, Disp: 30 tablet, Rfl: 5    metFORMIN (GLUCOPHAGE) 500 mg tablet, Take 2 tablets (1,000 mg total) by mouth 2 (two) times a day with meals, Disp: 360 tablet, Rfl: 3    metoprolol succinate (TOPROL-XL) 50 mg 24 hr tablet, Take 1 tablet (50 mg total) by mouth daily, Disp: 90 tablet, Rfl: 1    polyethylene glycol (MIRALAX) 17 g packet, Take 17 g by mouth daily as needed (constipation) for up to 14 days (Patient not taking: Reported on 8/19/2021), Disp: 14 each, Rfl: 0    pravastatin (PRAVACHOL) 40 mg tablet, Take 1 tablet (40 mg total) by mouth every morning, Disp: 90 tablet, Rfl: 1    senna (SENOKOT) 8 6 mg, Take 2 tablets (17 2 mg total) by mouth daily at bedtime, Disp: 60 tablet, Rfl: 0    sitaGLIPtin (JANUVIA) 100 mg tablet, Take 1 tablet (100 mg total) by mouth daily, Disp: 90 tablet, Rfl: 3    No Known Allergies    Vitals:    01/27/22 1430   BP: 122/62   Pulse: 88   Resp: 18   Temp: (!) 97 4 °F (36 3 °C)   SpO2: 94%     Physical Exam  Constitutional:       Appearance: He is well-developed  Comments: Obese male, no respiratory distress, no signs of pain   HENT:      Head: Normocephalic and atraumatic  Right Ear: External ear normal       Left Ear: External ear normal    Eyes:      Conjunctiva/sclera: Conjunctivae normal       Pupils: Pupils are equal, round, and reactive to light  Cardiovascular:      Rate and Rhythm: Normal rate and regular rhythm  Heart sounds: Normal heart sounds  Pulmonary:      Effort: Pulmonary effort is normal       Breath sounds: Normal breath sounds  Comments: Fair to good air entry bilaterally, scattered bilateral rhonchi  Abdominal:      General: Bowel sounds are normal       Palpations: Abdomen is soft  Comments: Soft, nontender, obese, cannot palpate liver or spleen, +bowel sounds   Genitourinary:     Comments: Flores catheter in place, urine clear yellow  Musculoskeletal:         General: Normal range of motion        Cervical back: Normal range of motion and neck supple  Skin:     General: Skin is warm  Comments: Slightly pale, warm, moist, no petechiae or ecchymoses   Neurological:      Mental Status: He is alert and oriented to person, place, and time  Deep Tendon Reflexes: Reflexes are normal and symmetric  Psychiatric:         Behavior: Behavior normal          Thought Content: Thought content normal          Judgment: Judgment normal      Extremities:  1-+bilateral lower extremity edema, chronic venous changes, slight pink/red color to the lower extremities, no signs of cellulitis, pulses are decreased, no cords  Lymphatics:  No adenopathy in the neck, supraclavicular region, axilla bilaterally    Performance Status: 2 - Symptomatic, <50% confined to bed    Labs    01/10/2022 WBC = 7 5 hemoglobin = 10 9 hematocrit = 35 4 MCV = 88 platelet = 2 9 neutrophil = 61% lymphocytes = 31% monocyte = 7% BUN = 17 creatinine = 1 14    11/19/2021 WBC = 8 03 hemoglobin = 11 1 hematocrit = 37 MCV = 87 platelet = 857 neutrophil = 66% BUN = 23 creatinine = 1 14 calcium = 9 1 LFTs WNL free kappa = 38 free lambda = 109 kappa/lambda ratio = 0 35  10/14/2021 SPEP and immunofixation: IF demonstrated uncertain findings, specimen sent to [facility] for further study  10/14/2021 IgA = 165 IgG = 1000 IgM = 54 vitamin B12 = 660 iron = 55 iron saturation = 17% TIBC = 3 in 31 ferritin = 39  09/27/2021 BUN = 25 creatinine = 1 12 GFR = 63  08/13/2021 WBC = 7 56 hemoglobin = 10 7 hematocrit = 36 MCV = 90 platelet = 844 neutrophil = 65%  07/02/2021 PSA = 69    Imaging    07/19/2021 bone scan whole body    1  Small focus of mild activity in a left mid lateral rib is nonspecific but possibly from old trauma  This may be reassessed on follow-up exam   2   Otherwise no scintigraphic evidence of osseous metastasis  07/19/2021 CT scan abdomen pelvis    Marked diffuse bladder wall thickening  Flores catheter present  Minimal fullness of the ureters left greater than right    Prostate appears within normal limits  Stable mild bibasilar atelectasis  Mild bilateral symmetric gynecomastia     07/18/2021 vascular lower limb venous duplex study    RIGHT LOWER LIMB:  There is evidence of acute occlusive chronic deep vein thrombosis noted in one  of the gastrocnemius veins in the proximal calf  No evidence of superficial thrombophlebitis noted  Doppler evaluation shows a normal response to augmentation maneuvers  Popliteal, posterior tibial and anterior tibial arterial Doppler waveforms are  triphasic/biphasic  LEFT LOWER LIMB:  No evidence of acute or chronic deep vein thrombosis  No evidence of superficial thrombophlebitis noted  Doppler evaluation shows a normal response to augmentation maneuvers  Popliteal, posterior tibial and anterior tibial arterial Doppler waveforms are  Triphasic/biphasic     07/18/2021 CTA chest PE study    Multiple segmental and subsegmental filling defects suspicious for pulmonary emboli bilaterally (axial image 119, series 2 and 104, series 601, axial image 113, series 2, and axial image 73-80, series 2 for example)  Prominence of the right ventricle suggesting right heart strain  Nodular contour of the liver, may suggest a component of hepatic cirrhosis  Coronary atherosclerosis, and other findings as above  Pathology    Case Report   Surgical Pathology Report                         Case: [id number]                                    Authorizing Provider: Rissa Naidu MD         Collected:           01/20/2022 0855               Ordering Location:     [address] [place] [address] Received:            01/20/2022 6354                                      Operating Room                                                                Pathologist:           Aminata Ann MD                                                                 Specimen:    Prostate                                                                                   Final Diagnosis   A   Prostate chips (transurethral resection):     - Extensive prostatic adenocarcinoma, [place] score 9 (grade group 5; approximate equal portions Song pattern 4 and 5)  - Tumor involves approximately 85% of submitted tissue  Electronically signed by Ofilia Harada, MD on 1/26/2022 at 10:25 AM   Comments: This is an appended report  These results have been appended to a previously preliminary verified report  Preliminary Diagnosis   A  Prostate chips (transurethral resection):     - Extensive high-grade carcinoma, pending additional studies  Preliminary result electronically signed by Ofilia Harada, MD on 1/25/2022 at  9:27 AM   Microscopic Description    - Positive NKX3 1; Negative MEENU-3  Controls stain appropriately  - Representative section: A2

## 2022-01-30 DIAGNOSIS — E78.5 DYSLIPIDEMIA: ICD-10-CM

## 2022-01-31 ENCOUNTER — DOCUMENTATION (OUTPATIENT)
Dept: HEMATOLOGY ONCOLOGY | Facility: MEDICAL CENTER | Age: 77
End: 2022-01-31

## 2022-01-31 RX ORDER — PRAVASTATIN SODIUM 40 MG
TABLET ORAL
Qty: 90 TABLET | Refills: 0 | Status: SHIPPED | OUTPATIENT
Start: 2022-01-31 | End: 2022-03-15 | Stop reason: SDUPTHER

## 2022-01-31 NOTE — PROGRESS NOTES
I contacted Dr Mo Mann office and asked that they fax over the last office note  Dr Montes  is particularly looking to see what treatment Mr Raji Sepulveda is on for the Prostate Cancer    Fax was also sent to Dr Mo Mann office with request

## 2022-02-21 ENCOUNTER — TELEPHONE (OUTPATIENT)
Dept: NEUROLOGY | Facility: CLINIC | Age: 77
End: 2022-02-21

## 2022-02-23 ENCOUNTER — CONSULT (OUTPATIENT)
Dept: NEUROLOGY | Facility: CLINIC | Age: 77
End: 2022-02-23
Payer: MEDICARE

## 2022-02-23 ENCOUNTER — APPOINTMENT (OUTPATIENT)
Dept: LAB | Facility: HOSPITAL | Age: 77
End: 2022-02-23
Attending: INTERNAL MEDICINE
Payer: MEDICARE

## 2022-02-23 VITALS
WEIGHT: 229 LBS | TEMPERATURE: 96.8 F | HEART RATE: 78 BPM | BODY MASS INDEX: 33.92 KG/M2 | SYSTOLIC BLOOD PRESSURE: 115 MMHG | HEIGHT: 69 IN | DIASTOLIC BLOOD PRESSURE: 64 MMHG

## 2022-02-23 DIAGNOSIS — E11.65 TYPE 2 DIABETES MELLITUS WITH HYPERGLYCEMIA, WITHOUT LONG-TERM CURRENT USE OF INSULIN (HCC): ICD-10-CM

## 2022-02-23 DIAGNOSIS — C61 PROSTATE CANCER (HCC): ICD-10-CM

## 2022-02-23 DIAGNOSIS — G25.0 TREMOR, ESSENTIAL: Primary | ICD-10-CM

## 2022-02-23 DIAGNOSIS — E78.5 DYSLIPIDEMIA: ICD-10-CM

## 2022-02-23 DIAGNOSIS — Z86.711 HISTORY OF PULMONARY EMBOLISM: ICD-10-CM

## 2022-02-23 DIAGNOSIS — N18.31 STAGE 3A CHRONIC KIDNEY DISEASE (HCC): ICD-10-CM

## 2022-02-23 DIAGNOSIS — R25.1 TREMOR: ICD-10-CM

## 2022-02-23 LAB — PSA SERPL-MCNC: 0.1 NG/ML (ref 0–4)

## 2022-02-23 PROCEDURE — 99204 OFFICE O/P NEW MOD 45 MIN: CPT | Performed by: PSYCHIATRY & NEUROLOGY

## 2022-02-23 PROCEDURE — G0103 PSA SCREENING: HCPCS

## 2022-02-23 PROCEDURE — 36415 COLL VENOUS BLD VENIPUNCTURE: CPT

## 2022-02-23 RX ORDER — OMEGA-3S/DHA/EPA/FISH OIL/D3 300MG-1000
400 CAPSULE ORAL DAILY
COMMUNITY

## 2022-02-23 RX ORDER — PRIMIDONE 50 MG/1
TABLET ORAL
Qty: 60 TABLET | Refills: 4 | Status: SHIPPED | OUTPATIENT
Start: 2022-02-23 | End: 2022-07-26

## 2022-02-23 RX ORDER — TAMSULOSIN HYDROCHLORIDE 0.4 MG/1
0.4 CAPSULE ORAL 2 TIMES DAILY
COMMUNITY
Start: 2022-02-15

## 2022-02-23 NOTE — PROGRESS NOTES
Outpatient Neurology History and Physical  Alfonso Stewart  [id number]  33 y o   1945          Consult: Yes    Gareth Smith DO      Chief Complaint   Patient presents with    Tremors           History Obtained from: patient and wife     HPI:     Alfonso Stewart is a 63 yo M that presents to discuss his tremors  He started noticing hand tremors 3-4 years  He thinks he was seen by Dr Paige Farias  He says that he was placed on medication and was doing better but since pandemic hasn't gone back for refills  Last time he used it was prior to 2020  After contacting his pharmacy, we are finding that he was on primidone 50mg bid  Wife has only noticed them only when he's doing something  When he thinks about it, his tremor is worse  Holding utensils, screw  can be difficult  His hand writing is poor  Heavier or lighter objects do not affect him  They haven't noticed them at rest  His gait difficulty is only due to knee pain         Past Medical History:   Diagnosis Date    Arthritis     BPH associated with nocturia     Diabetes mellitus ([address] )     Edema     lower legs    Hearing aid worn     bilateral    Hyperlipidemia     Hypertension     controlled    Tremor of both hands                Current Outpatient Medications on File Prior to Visit   Medication Sig Dispense Refill    apixaban (Eliquis) 5 mg Take 5 mg by mouth 2 (two) times a day        Ascorbic Acid (VITAMIN C PO) Take by mouth      cholecalciferol (VITAMIN D3) 400 units tablet Take 400 Units by mouth daily      Continuous Blood Gluc Sensor (FreeStyle [place] 14 Day Sensor) MISC Change sensor every 14 days 2 each 12    Cyanocobalamin (VITAMIN B-12 CR PO) Take by mouth      docusate sodium (COLACE) 100 mg capsule Take 1 capsule (100 mg total) by mouth 2 (two) times a day 10 capsule 0    folic acid (FOLVITE) 1 mg tablet Take 1 tablet (1,000 mcg total) by mouth daily 30 tablet 5    metFORMIN (GLUCOPHAGE) 500 mg tablet Take 2 tablets (1,000 mg total) by mouth 2 (two) times a day with meals 360 tablet 3    metoprolol succinate (TOPROL-XL) 50 mg 24 hr tablet Take 1 tablet (50 mg total) by mouth daily 90 tablet 1    pravastatin (PRAVACHOL) 40 mg tablet TAKE 1 TABLET BY MOUTH ONCE DAILY IN THE MORNING 90 tablet 0    sitaGLIPtin (JANUVIA) 100 mg tablet Take 1 tablet (100 mg total) by mouth daily 90 tablet 3    tamsulosin (FLOMAX) 0 4 mg Take 0 4 mg by mouth 2 (two) times a day      bisacodyl (DULCOLAX) 10 mg suppository Insert 1 suppository (10 mg total) into the rectum once as needed for constipation (If no bowel movement) for up to 1 dose (Patient not taking: Reported on 2/23/2022 ) 12 suppository 0    polyethylene glycol (MIRALAX) 17 g packet Take 17 g by mouth daily as needed (constipation) for up to 14 days (Patient not taking: Reported on 8/19/2021) 14 each 0    senna (SENOKOT) 8 6 mg Take 2 tablets (17 2 mg total) by mouth daily at bedtime 60 tablet 0     No current facility-administered medications on file prior to visit  No Known Allergies      Family History   Problem Relation Age of Onset    Colon cancer Mother    [place] Cancer Father         lung-smoker    Lung cancer Father     Early death Brother     Cancer Brother         "bone cancer"                Past Surgical History:   Procedure Laterality Date    CATARACT EXTRACTION      [place] TRANSURETHRAL ELEC-SURG PROSTATECTOM N/A 1/20/2022    Procedure: CYSTOSCOPY, TRANSURETHRAL RESECTION OF PROSTATE (TURP); Surgeon: Hayder Moeller MD;  Location: [address] [place] [address];  Service: Urology    [place] XCAPSL CTRC RMVL INSJ IO LENS PROSTH W/O ECP Right 8/6/2018    Procedure: EXTRACTION EXTRACAPSULAR CATARACT PHACO INTRAOCULAR LENS (IOL); Surgeon: Rhoda Ly MD;  Location: [facility] OR;  Service: Ophthalmology    [place] XCAPSL CTRC RMVL INSJ IO LENS PROSTH W/O ECP Left 10/1/2018    Procedure: EXTRACTION EXTRACAPSULAR CATARACT PHACO INTRAOCULAR LENS (IOL);   Surgeon: Rhoda Ly MD;  Location: [facility] MAIN OR;  Service: Ophthalmology           Social History     Socioeconomic History    Marital status: Single     Spouse name: Not on file    Number of children: Not on file    Years of education: Not on file    Highest education level: Not on file   Occupational History    Not on file   Tobacco Use    Smoking status: Never Smoker    Smokeless tobacco: Never Used   Vaping Use    Vaping Use: Never used   Substance and Sexual Activity    Alcohol use: Never    Drug use: Never    Sexual activity: Never   Other Topics Concern    Not on file   Social History Narrative    Not on file     Social Determinants of Health     Financial Resource Strain: Not on file   Food Insecurity: Not on file   Transportation Needs: Not on file   Physical Activity: Not on file   Stress: Not on file   Social Connections: Not on file   Intimate Partner Violence: Not on file   Housing Stability: Not on file       Review of Systems  Refer to positive review of systems in HPI  Constitutional- No fever  Eyes- No visual change  ENT- Hearing normal  CV- No chest pain  Resp- No Shortness of breath  GI- No diarrhea  - Bladder normal  MS- No Arthritis   Skin- No rash  Psych- No depression  Endo- No DM  Heme- No nodes    PHYSICAL EXAM:    Vitals:    02/23/22 1106   BP: 115/64   BP Location: Right arm   Patient Position: Sitting   Cuff Size: Standard   Pulse: 78   Temp: (!) 96 8 °F (36 °C)   TempSrc: Tympanic   Weight: 104 kg (229 lb)   Height: 5' 9" (1 753 m)         Appearance: No Acute Distress  Ophthalmoscopic: Disc Flat, Normal fundus  Carotid/Heart/Peripheral Vascular: No Bruits, RRR  Orientation: Awake, Alert, and Oriented x 3  Mental status:  Memory: Registation 3/3 Recall 3/3  Attention: Normal  Knowledge: Appropriate  Language: No aphasia  Speech: No dysarthria  Cranial Nerves:  2 No Visual Defect on Confrontation; Pupils round, equal, reactive to light  3,4,6 Extraocular Movements Intact; no nystagmus  5 Facial Sensation Intact  7 No facial asymmetry  8 Intact hearing  9,10 Palate symmetric, normal gag  11 Good shoulder shrug  12 Tongue Midline  Gait: Stable, No ataxia, can perform tandem walking  Coordination: moderate ET worse in LUE  No ataxia with finger to nose testing and heel to shin testing  Sensory: decreased to  Pinprick, Light Touch in lower legs, decrease in Vibration at toes, and Joint Position  Muscle Tone: Normal  Muscle exam  Arm Right Left Leg Right Left   Deltoid 5/5 5/5 Iliopsoas 5/5 5/5   Biceps 5/5 5/5 Quads 5/5 5/5   Triceps 5/5 5/5 Hamstrings 5/5 5/5   Wrist Extension 5/5 5/5 Ankle Dorsi Flexion 5/5 5/5   Wrist Flexion 5/5 5/5 Ankle Plantar Flexion 5/5 5/5   Interossei 5/5 5/5 Ankle Eversion 5/5 5/5   APB 5/5 5/5 Ankle Inversion 5/5 5/5       Reflexes   RJ BJ TJ KJ AJ Plantars Kevin's   Right 2+ 2+ 2+ 1+ 0 Downgoing Not present   Left 2+ 2+ 2+ 1+ 0 Downgoing Not present       Personal review of         None relevant         Assessment/Plan:     1  Tremor, essential  primidone (MYSOLINE) 50 mg tablet   2  Tremor  Ambulatory referral to Neurology   3  History of pulmonary embolism           Patient exhibits signs of ET  We will restart him on primindone upto 100mg daily  For h/o PE, made sure that patient is taking eliquis bid and not once daily dose  Counseling Documentation:  The patient and/or patient's family were  counseled regarding diagnostic results  Instructions for management,risk factor reductions,prognosis of disease were discussed  Patient and family were educated regarding impressions,risks and benefits of treatment options,importance of compliance with treatment  Total time of encounter: 45 min  More than 50% of time was spent in counseling and coordination of care of patient  DEDRICK Castillo    [facility]  [address]  [address]

## 2022-02-27 DIAGNOSIS — I26.99 BILATERAL PULMONARY EMBOLISM (HCC): ICD-10-CM

## 2022-02-27 DIAGNOSIS — D68.51 FACTOR 5 LEIDEN MUTATION, HETEROZYGOUS (HCC): Primary | ICD-10-CM

## 2022-02-28 RX ORDER — APIXABAN 5 MG/1
TABLET, FILM COATED ORAL
Qty: 60 TABLET | Refills: 0 | Status: SHIPPED | OUTPATIENT
Start: 2022-02-28 | End: 2022-03-15 | Stop reason: SDUPTHER

## 2022-03-08 ENCOUNTER — RA CDI HCC (OUTPATIENT)
Dept: OTHER | Facility: HOSPITAL | Age: 77
End: 2022-03-08

## 2022-03-08 NOTE — PROGRESS NOTES
[facility]  coding opportunities             Chart Reviewed * (Number of) Inbasket suggestions sent to Provider: 1      [id number]    Appt:3/15/2022            Patients insurance company: [facility]

## 2022-03-12 PROBLEM — D68.51 FACTOR 5 LEIDEN MUTATION, HETEROZYGOUS (HCC): Status: ACTIVE | Noted: 2022-03-12

## 2022-03-15 ENCOUNTER — OFFICE VISIT (OUTPATIENT)
Dept: FAMILY MEDICINE CLINIC | Facility: CLINIC | Age: 77
End: 2022-03-15
Payer: MEDICARE

## 2022-03-15 VITALS
WEIGHT: 230 LBS | RESPIRATION RATE: 20 BRPM | HEART RATE: 88 BPM | HEIGHT: 69 IN | OXYGEN SATURATION: 96 % | TEMPERATURE: 97.6 F | DIASTOLIC BLOOD PRESSURE: 60 MMHG | BODY MASS INDEX: 34.07 KG/M2 | SYSTOLIC BLOOD PRESSURE: 118 MMHG

## 2022-03-15 DIAGNOSIS — D68.51 FACTOR 5 LEIDEN MUTATION, HETEROZYGOUS (HCC): ICD-10-CM

## 2022-03-15 DIAGNOSIS — E66.9 OBESITY (BMI 30-39.9): ICD-10-CM

## 2022-03-15 DIAGNOSIS — I26.99 BILATERAL PULMONARY EMBOLISM (HCC): Primary | ICD-10-CM

## 2022-03-15 DIAGNOSIS — C61 PROSTATE CANCER (HCC): ICD-10-CM

## 2022-03-15 DIAGNOSIS — E78.5 DYSLIPIDEMIA: ICD-10-CM

## 2022-03-15 DIAGNOSIS — E11.9 TYPE 2 DIABETES MELLITUS WITHOUT COMPLICATION, WITHOUT LONG-TERM CURRENT USE OF INSULIN (HCC): ICD-10-CM

## 2022-03-15 DIAGNOSIS — R25.1 TREMOR: ICD-10-CM

## 2022-03-15 DIAGNOSIS — D64.9 ANEMIA, UNSPECIFIED TYPE: ICD-10-CM

## 2022-03-15 PROCEDURE — 99214 OFFICE O/P EST MOD 30 MIN: CPT | Performed by: FAMILY MEDICINE

## 2022-03-15 RX ORDER — FOLIC ACID 1 MG/1
1000 TABLET ORAL DAILY
Qty: 90 TABLET | Refills: 1 | Status: SHIPPED | OUTPATIENT
Start: 2022-03-15

## 2022-03-15 RX ORDER — PRAVASTATIN SODIUM 40 MG
40 TABLET ORAL EVERY MORNING
Qty: 90 TABLET | Refills: 1 | Status: SHIPPED | OUTPATIENT
Start: 2022-03-15

## 2022-03-15 NOTE — PROGRESS NOTES
Assessment/Plan:    No problem-specific Assessment & Plan notes found for this encounter  PE with FVL, lifelong anticoagulation presumed but they will discuss with Dr Rupesh Roth    DM2 unchanged, f/u endo    Tremor unchanged  On primidone  Dosage adjustments pending    HLD stable on pravachol    Lab Results   Component Value Date    HGBA1C 8 3 (H) 09/27/2021    HGBA1C 6 0 (H) 06/30/2021     Lab Results   Component Value Date    GLUF 168 (H) 01/10/2022    [facility] 95 09/27/2021    CREATININE 1 14 01/10/2022          Diagnoses and all orders for this visit:    Bilateral pulmonary embolism (HCC)  -     apixaban (Eliquis) 5 mg; Take 1 tablet (5 mg total) by mouth 2 (two) times a day  -     CBC and differential; Future    Type 2 diabetes mellitus without complication, without long-term current use of insulin (HCC)  -     Cancel: IRIS Diabetic eye exam    Tremor    Factor 5 Leiden mutation, heterozygous (HCC)  -     apixaban (Eliquis) 5 mg; Take 1 tablet (5 mg total) by mouth 2 (two) times a day    Dyslipidemia  -     pravastatin (PRAVACHOL) 40 mg tablet; Take 1 tablet (40 mg total) by mouth every morning  -     Comprehensive metabolic panel; Future    Anemia, unspecified type  -     folic acid (FOLVITE) 1 mg tablet; Take 1 tablet (1,000 mcg total) by mouth daily    Prostate cancer (HCC)    Obesity (BMI 30-39  9)          Return in about 6 months (around 9/15/2022) for Recheck  Subjective:      Patient ID: Chioma Mack is a 64 y o  male      Chief Complaint   Patient presents with    Follow-up     on meds, jlopezcma        HPI  No bleeding episodes  Has FVL heterzygous present  Has seen hematology    Aware of diets  Difficult to exercise  Tires easily    psa from 68 8 to 0 1    The following portions of the patient's history were reviewed and updated as appropriate: allergies, current medications, past family history, past medical history, past social history, past surgical history and problem list     Review of Systems   Constitutional: Positive for fatigue  Negative for chills and fever  Current Outpatient Medications   Medication Sig Dispense Refill    apixaban (Eliquis) 5 mg Take 1 tablet (5 mg total) by mouth 2 (two) times a day 180 tablet 1    Ascorbic Acid (VITAMIN C PO) Take by mouth      cholecalciferol (VITAMIN D3) 400 units tablet Take 400 Units by mouth daily      Continuous Blood Gluc Sensor (FreeStyle [place] 14 Day Sensor) MISC Change sensor every 14 days 2 each 12    Cyanocobalamin (VITAMIN B-12 CR PO) Take by mouth      docusate sodium (COLACE) 100 mg capsule Take 1 capsule (100 mg total) by mouth 2 (two) times a day 10 capsule 0    folic acid (FOLVITE) 1 mg tablet Take 1 tablet (1,000 mcg total) by mouth daily 90 tablet 1    metFORMIN (GLUCOPHAGE) 500 mg tablet Take 2 tablets (1,000 mg total) by mouth 2 (two) times a day with meals 360 tablet 3    metoprolol succinate (TOPROL-XL) 50 mg 24 hr tablet Take 1 tablet (50 mg total) by mouth daily 90 tablet 1    polyethylene glycol (MIRALAX) 17 g packet Take 17 g by mouth daily as needed (constipation) for up to 14 days 14 each 0    pravastatin (PRAVACHOL) 40 mg tablet Take 1 tablet (40 mg total) by mouth every morning 90 tablet 1    primidone (MYSOLINE) 50 mg tablet Start with 1 tab at bedtime for 2 weeks and if tolerated then 1 in morning and 1 tab at night  60 tablet 4    senna (SENOKOT) 8 6 mg Take 2 tablets (17 2 mg total) by mouth daily at bedtime 60 tablet 0    sitaGLIPtin (JANUVIA) 100 mg tablet Take 1 tablet (100 mg total) by mouth daily 90 tablet 3    tamsulosin (FLOMAX) 0 4 mg Take 0 4 mg by mouth 2 (two) times a day      bisacodyl (DULCOLAX) 10 mg suppository Insert 1 suppository (10 mg total) into the rectum once as needed for constipation (If no bowel movement) for up to 1 dose (Patient not taking: Reported on 2/23/2022 ) 12 suppository 0     No current facility-administered medications for this visit         Objective:    BP 118/60   Pulse 88   Temp 97 6 °F (36 4 °C)   Resp 20   Ht 5' 9" (1 753 m)   Wt 104 kg (230 lb)   SpO2 96%   BMI 33 97 kg/m²        Physical Exam  Vitals and nursing note reviewed  Constitutional:       General: He is not in acute distress  Appearance: He is well-developed  He is obese  He is not ill-appearing  HENT:      Head: Normocephalic  Right Ear: Tympanic membrane normal       Left Ear: Tympanic membrane normal    Eyes:      General: No scleral icterus  Conjunctiva/sclera: Conjunctivae normal    Cardiovascular:      Rate and Rhythm: Normal rate and regular rhythm  Pulses: no weak pulses          Dorsalis pedis pulses are 2+ on the right side and 2+ on the left side  Heart sounds: No murmur heard  Pulmonary:      Effort: Pulmonary effort is normal  No respiratory distress  Breath sounds: No wheezing  Abdominal:      Palpations: Abdomen is soft  Tenderness: There is no abdominal tenderness  Musculoskeletal:         General: No deformity  Cervical back: Neck supple  Right lower leg: Edema present  Left lower leg: Edema present  Skin:     General: Skin is warm and dry  Coloration: Skin is not pale  Neurological:      Mental Status: He is alert  Gait: Gait abnormal    Psychiatric:         Mood and Affect: Mood normal          Behavior: Behavior normal          Thought Content: Thought content normal           Diabetic Foot Exam    Patient's shoes and socks removed  Right Foot/Ankle   Right Foot Inspection  Skin Exam: skin intact  No abnormal color  Sensory   Monofilament testing: intact    Vascular  The right DP pulse is 2+  Left Foot/Ankle  Left Foot Inspection  Skin Exam: skin intact  Normal color  Sensory   Monofilament testing: intact    Vascular  The left DP pulse is 2+       Assign Risk Category  No deformity present  No loss of protective sensation  No weak pulses  Risk: 0         Alexa Sides, DO

## 2022-03-16 PROBLEM — R97.20 ELEVATED PSA: Status: RESOLVED | Noted: 2021-07-01 | Resolved: 2022-03-16

## 2022-05-11 ENCOUNTER — APPOINTMENT (OUTPATIENT)
Dept: LAB | Facility: HOSPITAL | Age: 77
End: 2022-05-11
Payer: MEDICARE

## 2022-05-11 DIAGNOSIS — E11.65 TYPE 2 DIABETES MELLITUS WITH HYPERGLYCEMIA, WITHOUT LONG-TERM CURRENT USE OF INSULIN (HCC): ICD-10-CM

## 2022-05-11 LAB
ANION GAP SERPL CALCULATED.3IONS-SCNC: 9 MMOL/L (ref 4–13)
BUN SERPL-MCNC: 22 MG/DL (ref 5–25)
CALCIUM SERPL-MCNC: 8.5 MG/DL (ref 8.3–10.1)
CHLORIDE SERPL-SCNC: 98 MMOL/L (ref 100–108)
CHOLEST SERPL-MCNC: 143 MG/DL
CO2 SERPL-SCNC: 30 MMOL/L (ref 21–32)
CREAT SERPL-MCNC: 1.06 MG/DL (ref 0.6–1.3)
GFR SERPL CREATININE-BSD FRML MDRD: 66 ML/MIN/1.73SQ M
GLUCOSE P FAST SERPL-MCNC: 197 MG/DL (ref 65–99)
HDLC SERPL-MCNC: 40 MG/DL
LDLC SERPL CALC-MCNC: 69 MG/DL (ref 0–100)
NONHDLC SERPL-MCNC: 103 MG/DL
POTASSIUM SERPL-SCNC: 4.5 MMOL/L (ref 3.5–5.3)
SODIUM SERPL-SCNC: 137 MMOL/L (ref 136–145)
TRIGL SERPL-MCNC: 169 MG/DL

## 2022-05-11 PROCEDURE — 80048 BASIC METABOLIC PNL TOTAL CA: CPT

## 2022-05-11 PROCEDURE — 83036 HEMOGLOBIN GLYCOSYLATED A1C: CPT

## 2022-05-11 PROCEDURE — 80061 LIPID PANEL: CPT

## 2022-05-11 PROCEDURE — 36415 COLL VENOUS BLD VENIPUNCTURE: CPT

## 2022-05-12 LAB
EST. AVERAGE GLUCOSE BLD GHB EST-MCNC: 197 MG/DL
HBA1C MFR BLD: 8.5 %

## 2022-05-19 ENCOUNTER — APPOINTMENT (OUTPATIENT)
Dept: LAB | Facility: HOSPITAL | Age: 77
End: 2022-05-19
Payer: MEDICARE

## 2022-05-19 DIAGNOSIS — C61 MALIGNANT NEOPLASM OF PROSTATE (HCC): ICD-10-CM

## 2022-05-19 DIAGNOSIS — I26.99 BILATERAL PULMONARY EMBOLISM (HCC): ICD-10-CM

## 2022-05-19 DIAGNOSIS — E78.5 DYSLIPIDEMIA: ICD-10-CM

## 2022-05-19 LAB
ALBUMIN SERPL BCP-MCNC: 3.1 G/DL (ref 3.5–5)
ALP SERPL-CCNC: 80 U/L (ref 46–116)
ALT SERPL W P-5'-P-CCNC: 20 U/L (ref 12–78)
ANION GAP SERPL CALCULATED.3IONS-SCNC: 8 MMOL/L (ref 4–13)
AST SERPL W P-5'-P-CCNC: 10 U/L (ref 5–45)
BASOPHILS # BLD AUTO: 0.01 THOUSANDS/ΜL (ref 0–0.1)
BASOPHILS NFR BLD AUTO: 0 % (ref 0–1)
BILIRUB SERPL-MCNC: 0.18 MG/DL (ref 0.2–1)
BUN SERPL-MCNC: 16 MG/DL (ref 5–25)
CALCIUM ALBUM COR SERPL-MCNC: 9.4 MG/DL (ref 8.3–10.1)
CALCIUM SERPL-MCNC: 8.7 MG/DL (ref 8.3–10.1)
CHLORIDE SERPL-SCNC: 100 MMOL/L (ref 100–108)
CO2 SERPL-SCNC: 31 MMOL/L (ref 21–32)
CREAT SERPL-MCNC: 1.03 MG/DL (ref 0.6–1.3)
EOSINOPHIL # BLD AUTO: 0.08 THOUSAND/ΜL (ref 0–0.61)
EOSINOPHIL NFR BLD AUTO: 1 % (ref 0–6)
ERYTHROCYTE [DISTWIDTH] IN BLOOD BY AUTOMATED COUNT: 14.1 % (ref 11.6–15.1)
GFR SERPL CREATININE-BSD FRML MDRD: 69 ML/MIN/1.73SQ M
GLUCOSE P FAST SERPL-MCNC: 170 MG/DL (ref 65–99)
HCT VFR BLD AUTO: 34 % (ref 36.5–49.3)
HGB BLD-MCNC: 10.5 G/DL (ref 12–17)
IMM GRANULOCYTES # BLD AUTO: 0.01 THOUSAND/UL (ref 0–0.2)
IMM GRANULOCYTES NFR BLD AUTO: 0 % (ref 0–2)
LYMPHOCYTES # BLD AUTO: 1.74 THOUSANDS/ΜL (ref 0.6–4.47)
LYMPHOCYTES NFR BLD AUTO: 30 % (ref 14–44)
MCH RBC QN AUTO: 27.3 PG (ref 26.8–34.3)
MCHC RBC AUTO-ENTMCNC: 30.9 G/DL (ref 31.4–37.4)
MCV RBC AUTO: 88 FL (ref 82–98)
MONOCYTES # BLD AUTO: 0.39 THOUSAND/ΜL (ref 0.17–1.22)
MONOCYTES NFR BLD AUTO: 7 % (ref 4–12)
NEUTROPHILS # BLD AUTO: 3.67 THOUSANDS/ΜL (ref 1.85–7.62)
NEUTS SEG NFR BLD AUTO: 62 % (ref 43–75)
NRBC BLD AUTO-RTO: 0 /100 WBCS
PLATELET # BLD AUTO: 180 THOUSANDS/UL (ref 149–390)
PMV BLD AUTO: 10.1 FL (ref 8.9–12.7)
POTASSIUM SERPL-SCNC: 4.6 MMOL/L (ref 3.5–5.3)
PROT SERPL-MCNC: 6.5 G/DL (ref 6.4–8.2)
PSA SERPL-MCNC: <0.1 NG/ML (ref 0–4)
RBC # BLD AUTO: 3.85 MILLION/UL (ref 3.88–5.62)
SODIUM SERPL-SCNC: 139 MMOL/L (ref 136–145)
WBC # BLD AUTO: 5.9 THOUSAND/UL (ref 4.31–10.16)

## 2022-05-19 PROCEDURE — 85025 COMPLETE CBC W/AUTO DIFF WBC: CPT

## 2022-05-19 PROCEDURE — 36415 COLL VENOUS BLD VENIPUNCTURE: CPT

## 2022-05-19 PROCEDURE — 80053 COMPREHEN METABOLIC PANEL: CPT

## 2022-05-19 PROCEDURE — 84153 ASSAY OF PSA TOTAL: CPT

## 2022-06-14 ENCOUNTER — OFFICE VISIT (OUTPATIENT)
Dept: HEMATOLOGY ONCOLOGY | Facility: MEDICAL CENTER | Age: 77
End: 2022-06-14
Payer: MEDICARE

## 2022-06-14 VITALS
RESPIRATION RATE: 16 BRPM | BODY MASS INDEX: 33.53 KG/M2 | WEIGHT: 226.4 LBS | SYSTOLIC BLOOD PRESSURE: 122 MMHG | DIASTOLIC BLOOD PRESSURE: 68 MMHG | HEIGHT: 69 IN | OXYGEN SATURATION: 95 % | HEART RATE: 95 BPM | TEMPERATURE: 97.5 F

## 2022-06-14 DIAGNOSIS — D47.2 MGUS (MONOCLONAL GAMMOPATHY OF UNKNOWN SIGNIFICANCE): ICD-10-CM

## 2022-06-14 DIAGNOSIS — D64.9 NORMOCYTIC ANEMIA: ICD-10-CM

## 2022-06-14 DIAGNOSIS — D68.51 HETEROZYGOUS FACTOR V LEIDEN MUTATION (HCC): ICD-10-CM

## 2022-06-14 DIAGNOSIS — C61 PROSTATE CANCER (HCC): Primary | ICD-10-CM

## 2022-06-14 DIAGNOSIS — I26.99 BILATERAL PULMONARY EMBOLISM (HCC): ICD-10-CM

## 2022-06-14 PROCEDURE — 99215 OFFICE O/P EST HI 40 MIN: CPT | Performed by: PHYSICIAN ASSISTANT

## 2022-06-14 NOTE — PROGRESS NOTES
[address] [facility]Y [place] [id number] [facility] [address]  Oncology Progress Note  Guru Muro 1945, [id number]  6/14/2022        Assessment/Plan:   1  Prostate cancer ([address] )  Adenocarcinoma Song patter 4 + 4, grade group 4, on several bx specimen, S/P TURP  Discovered via elevated PSA and on Bx  Work up included old fracture on bone scan and CTA Chest and CT AP, negative for systemic disease  Lab Results   Component Value Date    PSA <0 1 05/19/2022    PSA 0 1 02/23/2022    PSA 68 8 (H) 07/02/2021     Patient continues to do well with hormonal blockade  Patient continues to follow up with Urology  We will continue to screen the patient's PSAs on three-month intervals  Patient will follow-up in approximately 3-4 months with additional blood work as outlined below  Regimen:  [facility] Dr Lianne Martinez  PSA screening Q 3 months    - PSA Total, Diagnostic; Future  - CBC and differential; Future  - Comprehensive metabolic panel; Future    2  Normocytic anemia  Patient has had anemia secondary to blood loss from Flores catheter  Patient no longer has a Flores catheter but is on a blood thinner  Hemoglobin is slightly depressed  We will recheck iron studies when the patient returns to the office in approximately 3-4 months  - Iron Panel (Includes Ferritin, Iron Sat%, Iron, and TIBC); Future    3  Bilateral pulmonary embolism (HCC); 4  Heterozygous factor V Leiden mutation [facility])  Patient had a significant episode of bilateral pulmonary embolisms and a distal DVT  It is possible that the patient's DVT was actually more extensive and the reason for the significant pulmonary burden which caused the patient's syncopal episode prior to hospitalization  For this reason and because of underlying cancer issue, patient should continue on anticoagulation indefinitely    I discussed with the patient that if there is any bleeding issues temporary discontinuation of the medication would be advisable  We could also potentially discussed IVC filter if patient can not tolerate anticoagulation  Patient's risk factors include prior history of PE and DVT, male gender, age and decreased mobility  Recommendation:  Eliquis 5 mg twice a day  Length of anticoagulation = indefinitely    - CBC and differential; Future    5  MGUS (monoclonal gammopathy of unknown significance)  Faint Band on IF 10/14/2021  Follow up MGUS lab testing at follow up as follows:   - CBC and differential; Future  - Comprehensive metabolic panel; Future  - Immunoglobulin free LT chains blood; Future  - IgG, IgA, IgM; Future  - Protein electrophoresis, serum; Future  - Beta 2 microglobulin, serum; Future      The patient is scheduled for follow-up in approximately 3- 4 months with Dr Bridget Corrigan  Patient voiced agreement and understanding to the above  Patient knows to call the Hematology/Oncology office with any questions and concerns regarding the above  Goals and Barriers:    Current Goal:   Prolong Survival from Cancer  Barriers: None  Patient's Capacity to Self Care:  Patient able to self care  Elizabeth Rojas PA-C  Medical Oncology/Hematology  [address]  ______________________________________________________________________________________________________________    Subjective     Chief Complaint   Patient presents with    Follow-up     Bilateral pulmonary embolism        History of present illness/Cancer History: This is a 64-year-old male with past medical history of diabetes mellitus, hyperlipidemia, hypertension, BPH and edema who presented to the emergency room on 7/18/21 secondary to a syncopal episode      7/18/2021:  CTA findings included Multiple segmental and subsegmental filling defects suspicious for pulmonary emboli bilaterally +  Prominence of the right ventricle suggesting right heart strain   +  Nodular contour of the liver, may suggest a component of hepatic cirrhosis    +  Coronary atherosclerosis, and other findings as above      7/18/2021:  Lower extremity VAS:  RIGHT LOWER LIMB:  There is evidence of acute occlusive chronic deep vein thrombosis noted in one  of the gastrocnemius veins in the proximal calf  No evidence of superficial thrombophlebitis noted  Doppler evaluation shows a normal response to augmentation maneuvers  Popliteal, posterior tibial and anterior tibial arterial Doppler waveforms are  triphasic/biphasic      LEFT LOWER LIMB:  No evidence of acute or chronic deep vein thrombosis  No evidence of superficial thrombophlebitis noted  Doppler evaluation shows a normal response to augmentation maneuvers  Popliteal, posterior tibial and anterior tibial arterial Doppler waveforms are  triphasic/biphasic         Earlier this month this patient went to his primary doctor and to the urologist   Patient's PSA was elevated at 76   Patient had been recommended to follow-up with additional imaging tests to overall out metastatic spread   Patient was also found to be somewhat anemic   Workup did not demonstrate iron deficiency but did demonstrate anemia of chronic inflammation      Oncology work up during hospitalization:  7/19/2021  NM bone Scan : 1   Small focus of mild activity in a left mid lateral rib is nonspecific but possibly from old trauma   This may be reassessed on follow-up exam   2   Otherwise no scintigraphic evidence of osseous metastasis      7/19/2021: CT AP  Marked diffuse bladder wall thickening   Flores catheter present   Minimal fullness of the ureters left greater than right   Prostate appears within normal limits   Stable mild bibasilar atelectasis   Mild bilateral symmetric gynecomastia      Concomitant B12 deficiency was found patient was given IM supplementations      Prothrombin mutation = negative  Factor V Leiden =  Heterozygous       9/25/2021 MRI Prostate wo and w contrast  1  PI-RADSv2 1 Category 5 - Very high (clinically significant cancer is highly likely to be present)  Large extensive lesion measuring 4 3 x 3 4 x 3 6 cm nearly involving the entire prostate gland from base to apex, with predominant involvement of the entire left transitional zone, large portions of right transitional zone, left anterior peripheral zone and region of AFMS  2  Broad capsular bulge with approximately 3-4 mm extra prostatic extension at the prostate mid gland to apex anteriorly  3   No seminal vesicle invasion, pelvic lymphadenopathy, or pelvic osseous metastatic disease  4  Calculated prostate volume of 58 cc  PSAD= 1 18 ng/ml^2      Eligard started in late   Interval history:  Feeling well  No c/o  ECO - Asymptomatic    Review of Systems   Constitutional: Positive for fatigue  Negative for activity change, appetite change and fever  HENT: Negative for nosebleeds  Respiratory: Negative for cough, choking and shortness of breath  Cardiovascular: Negative for chest pain, palpitations and leg swelling  Gastrointestinal: Negative for abdominal distention, abdominal pain, anal bleeding, blood in stool, constipation, diarrhea, nausea and vomiting  Endocrine: Negative for cold intolerance  Genitourinary: Negative for hematuria  Musculoskeletal: Negative for myalgias  Skin: Negative for color change, pallor and rash  Allergic/Immunologic: Negative for immunocompromised state  Neurological: Positive for weakness (global)  Negative for headaches  Hematological: Negative for adenopathy  Does not bruise/bleed easily  All other systems reviewed and are negative        Current Outpatient Medications:     apixaban (Eliquis) 5 mg, Take 1 tablet (5 mg total) by mouth 2 (two) times a day, Disp: 180 tablet, Rfl: 1    Ascorbic Acid (VITAMIN C PO), Take by mouth, Disp: , Rfl:     cholecalciferol (VITAMIN D3) 400 units tablet, Take 400 Units by mouth daily, Disp: , Rfl:     Continuous Blood Gluc Sensor (FreeStyle [place] 14 Day Sensor) MISC, Change sensor every 14 days, Disp: 2 each, Rfl: 12    Cyanocobalamin (VITAMIN B-12 CR PO), Take by mouth, Disp: , Rfl:     docusate sodium (COLACE) 100 mg capsule, Take 1 capsule (100 mg total) by mouth 2 (two) times a day, Disp: 10 capsule, Rfl: 0    folic acid (FOLVITE) 1 mg tablet, Take 1 tablet (1,000 mcg total) by mouth daily, Disp: 90 tablet, Rfl: 1    metoprolol succinate (TOPROL-XL) 50 mg 24 hr tablet, Take 1 tablet (50 mg total) by mouth daily, Disp: 90 tablet, Rfl: 1    pravastatin (PRAVACHOL) 40 mg tablet, Take 1 tablet (40 mg total) by mouth every morning, Disp: 90 tablet, Rfl: 1    primidone (MYSOLINE) 50 mg tablet, Start with 1 tab at bedtime for 2 weeks and if tolerated then 1 in morning and 1 tab at night , Disp: 60 tablet, Rfl: 4    sitaGLIPtin (JANUVIA) 100 mg tablet, Take 1 tablet (100 mg total) by mouth daily, Disp: 90 tablet, Rfl: 3    tamsulosin (FLOMAX) 0 4 mg, Take 0 4 mg by mouth 2 (two) times a day, Disp: , Rfl:     bisacodyl (DULCOLAX) 10 mg suppository, Insert 1 suppository (10 mg total) into the rectum once as needed for constipation (If no bowel movement) for up to 1 dose (Patient not taking: No sig reported), Disp: 12 suppository, Rfl: 0    metFORMIN (GLUCOPHAGE) 500 mg tablet, Take 2 tablets (1,000 mg total) by mouth 2 (two) times a day with meals, Disp: 360 tablet, Rfl: 3    polyethylene glycol (MIRALAX) 17 g packet, Take 17 g by mouth daily as needed (constipation) for up to 14 days, Disp: 14 each, Rfl: 0    senna (SENOKOT) 8 6 mg, Take 2 tablets (17 2 mg total) by mouth daily at bedtime, Disp: 60 tablet, Rfl: 0  No Known Allergies    Advance Directive and Living Will:            Objective   /68 (BP Location: Left arm, Patient Position: Sitting, Cuff Size: Large)   Pulse 95   Temp 97 5 °F (36 4 °C) (Temporal)   Resp 16   Ht 5' 9" (1 753 m)   Wt 103 kg (226 lb 6 4 oz)   SpO2 95% BMI 33 43 kg/m²   Wt Readings from Last 6 Encounters:   06/14/22 103 kg (226 lb 6 4 oz)   03/15/22 104 kg (230 lb)   02/23/22 104 kg (229 lb)   01/27/22 102 kg (224 lb)   01/20/22 101 kg (222 lb 6 4 oz)   01/17/22 102 kg (224 lb)       Physical Exam  Constitutional:       General: He is not in acute distress  Appearance: He is well-developed  He is obese  HENT:      Head: Normocephalic and atraumatic  Right Ear: External ear normal       Left Ear: External ear normal       Nose: Nose normal    Eyes:      General: No scleral icterus  Conjunctiva/sclera: Conjunctivae normal    Cardiovascular:      Rate and Rhythm: Normal rate  Pulmonary:      Effort: No respiratory distress  Abdominal:      General: There is no distension  Palpations: Abdomen is soft  Musculoskeletal:      Comments: Ambulates with a cane     Skin:     Findings: No rash (on exposed skin  )  Neurological:      Mental Status: He is alert and oriented to person, place, and time  Psychiatric:         Thought Content: Thought content normal        Pertinent Laboratory Results and Imaging Review:  No visits with results within 3 Week(s) from this visit  Latest known visit with results is:   Appointment on 05/19/2022   Component Date Value Ref Range Status    Sodium 05/19/2022 139  136 - 145 mmol/L Final    Potassium 05/19/2022 4 6  3 5 - 5 3 mmol/L Final    Chloride 05/19/2022 100  100 - 108 mmol/L Final    CO2 05/19/2022 31  21 - 32 mmol/L Final    ANION GAP 05/19/2022 8  4 - 13 mmol/L Final    BUN 05/19/2022 16  5 - 25 mg/dL Final    Creatinine 05/19/2022 1 03  0 60 - 1 30 mg/dL Final    Standardized to IDMS reference method    Glucose, Fasting 05/19/2022 170 (A) 65 - 99 mg/dL Final    Specimen collection should occur prior to Sulfasalazine administration due to the potential for falsely depressed results   Specimen collection should occur prior to Sulfapyridine administration due to the potential for falsely elevated results   Calcium 05/19/2022 8 7  8 3 - 10 1 mg/dL Final    Corrected Calcium 05/19/2022 9 4  8 3 - 10 1 mg/dL Final    AST 05/19/2022 10  5 - 45 U/L Final    Specimen collection should occur prior to Sulfasalazine administration due to the potential for falsely depressed results   ALT 05/19/2022 20  12 - 78 U/L Final    Specimen collection should occur prior to Sulfasalazine administration due to the potential for falsely depressed results   Alkaline Phosphatase 05/19/2022 80  46 - 116 U/L Final    Total Protein 05/19/2022 6 5  6 4 - 8 2 g/dL Final    Albumin 05/19/2022 3 1 (A) 3 5 - 5 0 g/dL Final    Total Bilirubin 05/19/2022 0 18 (A) 0 20 - 1 00 mg/dL Final    Use of this assay is not recommended for patients undergoing treatment with eltrombopag due to the potential for falsely elevated results      eGFR 05/19/2022 69  ml/min/1 73sq m Final    WBC 05/19/2022 5 90  4 31 - 10 16 Thousand/uL Final    RBC 05/19/2022 3 85 (A) 3 88 - 5 62 Million/uL Final    Hemoglobin 05/19/2022 10 5 (A) 12 0 - 17 0 g/dL Final    Hematocrit 05/19/2022 34 0 (A) 36 5 - 49 3 % Final    MCV 05/19/2022 88  82 - 98 fL Final    MCH 05/19/2022 27 3  26 8 - 34 3 pg Final    MCHC 05/19/2022 30 9 (A) 31 4 - 37 4 g/dL Final    RDW 05/19/2022 14 1  11 6 - 15 1 % Final    MPV 05/19/2022 10 1  8 9 - 12 7 fL Final    Platelets 72/80/6698 180  149 - 390 Thousands/uL Final    nRBC 05/19/2022 0  /100 WBCs Final    Neutrophils Relative 05/19/2022 62  43 - 75 % Final    Immat GRANS % 05/19/2022 0  0 - 2 % Final    Lymphocytes Relative 05/19/2022 30  14 - 44 % Final    Monocytes Relative 05/19/2022 7  4 - 12 % Final    Eosinophils Relative 05/19/2022 1  0 - 6 % Final    Basophils Relative 05/19/2022 0  0 - 1 % Final    Neutrophils Absolute 05/19/2022 3 67  1 85 - 7 62 Thousands/µL Final    Immature Grans Absolute 05/19/2022 0 01  0 00 - 0 20 Thousand/uL Final    Lymphocytes Absolute 05/19/2022 1 74  0 60 - 4 47 Thousands/µL Final    Monocytes Absolute 05/19/2022 0 39  0 17 - 1 22 Thousand/µL Final    Eosinophils Absolute 05/19/2022 0 08  0 00 - 0 61 Thousand/µL Final    Basophils Absolute 05/19/2022 0 01  0 00 - 0 10 Thousands/µL Final    PSA, Diagnostic 05/19/2022 <0 1  0 0 - 4 0 ng/mL Final    American Urological Association Guidelines define biochemical recurrence of prostate cancer as a detectable or rising PSA value post-radical prostatectomy that is greater than or equal to 0 2 ng/mL with a second confirmatory level of greater than or equal to 0 2 ng/mL  The following historical data was reviewed:  Past Medical History:   Diagnosis Date    Arthritis     BPH associated with nocturia     Diabetes mellitus ([address] )     Edema     lower legs    Hearing aid worn     bilateral    Hyperlipidemia     Hypertension     controlled    Tremor of both hands      Past Surgical History:   Procedure Laterality Date    CATARACT EXTRACTION      OR TRANSURETHRAL ELEC-SURG PROSTATECTOM N/A 1/20/2022    Procedure: CYSTOSCOPY, TRANSURETHRAL RESECTION OF PROSTATE (TURP); Surgeon: Rhonda Thakkar MD;  Location: [address] [place] [address];  Service: Urology    OR XCAPSL CTRC RMVL INSJ IO LENS PROSTH W/O ECP Right 8/6/2018    Procedure: EXTRACTION EXTRACAPSULAR CATARACT PHACO INTRAOCULAR LENS (IOL); Surgeon: Eduar Pang MD;  Location: [facility] OR;  Service: Ophthalmology    OR XCAPSL CTRC RMVL INSJ IO LENS PROSTH W/O ECP Left 10/1/2018    Procedure: EXTRACTION EXTRACAPSULAR CATARACT PHACO INTRAOCULAR LENS (IOL);   Surgeon: Eduar Pang MD;  Location: [facility] OR;  Service: Ophthalmology     Social History     Socioeconomic History    Marital status: Single     Spouse name: None    Number of children: None    Years of education: None    Highest education level: None   Occupational History    None   Tobacco Use    Smoking status: Never Smoker    Smokeless tobacco: Never Used   Vaping Use    Vaping Use: Never used Substance and Sexual Activity    Alcohol use: Never    Drug use: Never    Sexual activity: Never   Other Topics Concern    None   Social History Narrative    None     Social Determinants of Health     Financial Resource Strain: Not on file   Food Insecurity: Not on file   Transportation Needs: Not on file   Physical Activity: Not on file   Stress: Not on file   Social Connections: Not on file   Intimate Partner Violence: Not on file   Housing Stability: Not on file     Family History   Problem Relation Age of Onset    Colon cancer Mother     Cancer Father         lung-smoker    Lung cancer Father     Early death Brother     Cancer Brother         "bone cancer"       Please note: This report has been generated by a voice recognition software system  Therefore there may be syntax, spelling, and/or grammatical errors  Please call if you have any questions

## 2022-06-20 ENCOUNTER — OFFICE VISIT (OUTPATIENT)
Dept: ENDOCRINOLOGY | Facility: CLINIC | Age: 77
End: 2022-06-20
Payer: MEDICARE

## 2022-06-20 VITALS
SYSTOLIC BLOOD PRESSURE: 130 MMHG | HEIGHT: 69 IN | HEART RATE: 90 BPM | WEIGHT: 219.6 LBS | BODY MASS INDEX: 32.53 KG/M2 | DIASTOLIC BLOOD PRESSURE: 70 MMHG

## 2022-06-20 DIAGNOSIS — N18.31 STAGE 3A CHRONIC KIDNEY DISEASE (HCC): ICD-10-CM

## 2022-06-20 DIAGNOSIS — E11.65 TYPE 2 DIABETES MELLITUS WITH HYPERGLYCEMIA, WITHOUT LONG-TERM CURRENT USE OF INSULIN (HCC): Primary | ICD-10-CM

## 2022-06-20 DIAGNOSIS — L29.9 ITCHING: ICD-10-CM

## 2022-06-20 DIAGNOSIS — E66.9 CLASS 1 OBESITY WITH SERIOUS COMORBIDITY AND BODY MASS INDEX (BMI) OF 32.0 TO 32.9 IN ADULT, UNSPECIFIED OBESITY TYPE: ICD-10-CM

## 2022-06-20 DIAGNOSIS — R25.1 TREMOR: ICD-10-CM

## 2022-06-20 DIAGNOSIS — E78.5 DYSLIPIDEMIA: ICD-10-CM

## 2022-06-20 PROBLEM — E66.01 OBESITY, MORBID (HCC): Status: ACTIVE | Noted: 2021-10-16

## 2022-06-20 PROCEDURE — 99215 OFFICE O/P EST HI 40 MIN: CPT | Performed by: INTERNAL MEDICINE

## 2022-06-20 RX ORDER — OYSTER SHELL CALCIUM WITH VITAMIN D 500; 200 MG/1; [IU]/1
1 TABLET, FILM COATED ORAL DAILY
COMMUNITY

## 2022-06-20 NOTE — PATIENT INSTRUCTIONS
Please have labs done as ordered   Reach out to your primary care physician if itching does not improve       Please call your insurance and inquire which of the following would be covered  - SGLT 2 inhibitors -jardiance, farxiga, Invokana, steglarto  - DDP4 inhibitor: Januvia, linagliptin, saxagliptin, alogliptin

## 2022-06-20 NOTE — PROGRESS NOTES
Candy Cisneros 64 y o  male MRN: [id number]    Encounter: [id number]      Assessment/Plan     1  Type 2 diabetes mellitus not on long-term insulin therapy with hyperglycemia  2  Obesity  3  CKD  Poorly controlled with Last A1c 8 5%, has been gradually trending up  Dietary related variability in blood sugars   Reluctant to start any new medication; does not want any injectables   Will inquire about coverage of alternative 4 inhibitors SGLT 2 inhibitors, likely start the latter ones coverage is known  Would find using pre meal Prandin cumbersome, so will avoid  Had hypoglycemia needing hospitalization while on glimepiride     Recommend the following at this time  Continue current regimen for now   Diabetic eye exam     4  Hyperlipidemia  - continue statin therapy    5  Hypertension  Blood pressure at goal  - continue current medications     6  Itching  In addition last 6 weeks, has been having diarrhea, lost 7 lb in the last 1 week-check CMP, TSH, free T4   If within normal limits, recommend following up with primary care    CC: Diabetes    History of Present Illness     HPI:  Candy Cisneros is a 64 y o  male presents for a follow-up visit regarding diabetes management  Also has hypertension, hyperlipidemia, CKD, prostate cancer    Last seen in 12/2021  Last Eye exam: - due for an eye exam     Current regimen:   Metformin 1 gm orally twice a day   Januvia 100 mg orally daily    januvia is expensive     C/o itching X 1 week , similar a few months ago  Lasted a few weeks   has been active recently   Lost some weight   Admits that he likes sweets , diet is very variable  Has not been having soda, but sometimes had OJ  No numbness/tingling   Vision is ok  No CP/SOB -/  Has bene hvaing shakes   Diarrhea/ loose stools X 6 weeks      Statin:  Pravastatin  ACE-I/ARB: --    Home glucose monitoring:   Will be scanned into chart    147- 262mg/dl   Symptoms of hypoglycemia :  No lows   Has a laeena - did not get for download    All other systems were reviewed and are negative  Review of Systems      Historical Information   Past Medical History:   Diagnosis Date    Arthritis     BPH associated with nocturia     Diabetes mellitus ([address] )     Edema     lower legs    Hearing aid worn     bilateral    Hyperlipidemia     Hypertension     controlled    Tremor of both hands      Past Surgical History:   Procedure Laterality Date    CATARACT EXTRACTION      HI TRANSURETHRAL ELEC-SURG PROSTATECTOM N/A 1/20/2022    Procedure: CYSTOSCOPY, TRANSURETHRAL RESECTION OF PROSTATE (TURP); Surgeon: Sonny Sin MD;  Location: [address] [place] [address];  Service: Urology    HI XCAPSL CTRC RMVL INSJ IO LENS PROSTH W/O ECP Right 8/6/2018    Procedure: EXTRACTION EXTRACAPSULAR CATARACT PHACO INTRAOCULAR LENS (IOL); Surgeon: Jonnathan Thornton MD;  Location: [facility] [place] [facility];  Service: Ophthalmology    HI XCAPSL CTRC RMVL INSJ IO LENS PROSTH W/O ECP Left 10/1/2018    Procedure: EXTRACTION EXTRACAPSULAR CATARACT PHACO INTRAOCULAR LENS (IOL);   Surgeon: Jonnathan Thornton MD;  Location: [facility] [place] [facility];  Service: Ophthalmology     Social History   Social History     Substance and Sexual Activity   Alcohol Use Never     Social History     Substance and Sexual Activity   Drug Use Never     Social History     Tobacco Use   Smoking Status Never Smoker   Smokeless Tobacco Never Used     Family History:   Family History   Problem Relation Age of Onset    Colon cancer Mother     Cancer Father         lung-smoker    Lung cancer Father     Early death Brother     Cancer Brother         "bone cancer"       Meds/Allergies   Current Outpatient Medications   Medication Sig Dispense Refill    apixaban (Eliquis) 5 mg Take 1 tablet (5 mg total) by mouth 2 (two) times a day 180 tablet 1    Ascorbic Acid (VITAMIN C PO) Take by mouth in the morning      calcium-vitamin D (OSCAL 500 + D) 500 mg-200 units per tablet Take 1 tablet by mouth daily      Continuous Blood Gluc Sensor (FreeStyle [place] 14 Day Sensor) MISC Change sensor every 14 days 2 each 12    Cyanocobalamin (VITAMIN B-12 CR PO) Take by mouth in the morning      docusate sodium (COLACE) 100 mg capsule Take 1 capsule (100 mg total) by mouth 2 (two) times a day (Patient taking differently: Take 100 mg by mouth in the morning) 10 capsule 0    folic acid (FOLVITE) 1 mg tablet Take 1 tablet (1,000 mcg total) by mouth daily 90 tablet 1    metFORMIN (GLUCOPHAGE) 500 mg tablet Take 2 tablets (1,000 mg total) by mouth 2 (two) times a day with meals 360 tablet 3    metoprolol succinate (TOPROL-XL) 50 mg 24 hr tablet Take 1 tablet (50 mg total) by mouth daily 90 tablet 1    polyethylene glycol (MIRALAX) 17 g packet Take 17 g by mouth daily as needed (constipation) for up to 14 days 14 each 0    pravastatin (PRAVACHOL) 40 mg tablet Take 1 tablet (40 mg total) by mouth every morning 90 tablet 1    primidone (MYSOLINE) 50 mg tablet Start with 1 tab at bedtime for 2 weeks and if tolerated then 1 in morning and 1 tab at night  (Patient taking differently: 2 (two) times a day) 60 tablet 4    sitaGLIPtin (JANUVIA) 100 mg tablet Take 1 tablet (100 mg total) by mouth daily 90 tablet 3    tamsulosin (FLOMAX) 0 4 mg Take 0 4 mg by mouth 2 (two) times a day      bisacodyl (DULCOLAX) 10 mg suppository Insert 1 suppository (10 mg total) into the rectum once as needed for constipation (If no bowel movement) for up to 1 dose (Patient not taking: No sig reported) 12 suppository 0    cholecalciferol (VITAMIN D3) 400 units tablet Take 400 Units by mouth daily (Patient not taking: Reported on 6/20/2022)      senna (SENOKOT) 8 6 mg Take 2 tablets (17 2 mg total) by mouth daily at bedtime (Patient not taking: Reported on 6/20/2022) 60 tablet 0     No current facility-administered medications for this visit       No Known Allergies    Objective   Vitals: Blood pressure 130/70, pulse 90, height 5' 9" (1 753 m), weight 99 6 kg (219 lb 9 6 oz)     Physical Exam  Constitutional:       General: He is not in acute distress  Appearance: He is well-developed  He is not diaphoretic  HENT:      Head: Normocephalic and atraumatic  Eyes:      Conjunctiva/sclera: Conjunctivae normal       Pupils: Pupils are equal, round, and reactive to light  Cardiovascular:      Rate and Rhythm: Normal rate and regular rhythm  Heart sounds: Normal heart sounds  No murmur heard  Pulmonary:      Effort: Pulmonary effort is normal  No respiratory distress  Breath sounds: Normal breath sounds  No wheezing  Abdominal:      General: There is no distension  Palpations: Abdomen is soft  Tenderness: There is no abdominal tenderness  There is no guarding  Musculoskeletal:      Cervical back: Normal range of motion and neck supple  Skin:     General: Skin is warm and dry  Findings: No erythema or rash  Neurological:      Mental Status: He is alert and oriented to person, place, and time  Psychiatric:         Behavior: Behavior normal          Thought Content: Thought content normal          The history was obtained from the review of the chart, patient and family      Lab Results:   Lab Results   Component Value Date/Time    Hemoglobin A1C 8 5 (H) 05/11/2022 10:11 AM    Hemoglobin A1C 8 3 (H) 09/27/2021 10:12 AM    Hemoglobin A1C 6 0 (H) 06/30/2021 04:42 AM    WBC 5 90 05/19/2022 12:18 PM    WBC 7 53 01/10/2022 10:13 AM    WBC 8 03 11/19/2021 12:11 PM    Hemoglobin 10 5 (L) 05/19/2022 12:18 PM    Hemoglobin 10 9 (L) 01/10/2022 10:13 AM    Hemoglobin 11 1 (L) 11/19/2021 12:11 PM    Hematocrit 34 0 (L) 05/19/2022 12:18 PM    Hematocrit 35 4 (L) 01/10/2022 10:13 AM    Hematocrit 36 9 11/19/2021 12:11 PM    MCV 88 05/19/2022 12:18 PM    MCV 88 01/10/2022 10:13 AM    MCV 87 11/19/2021 12:11 PM    Platelets 601 01/92/5800 12:18 PM    Platelets 042 26/74/4923 10:13 AM    Platelets 454 53/05/0087 12:11 PM    BUN 16 05/19/2022 12:18 PM    BUN 22 05/11/2022 10:11 AM    BUN 17 01/10/2022 10:13 AM    Potassium 4 6 05/19/2022 12:18 PM    Potassium 4 5 05/11/2022 10:11 AM    Potassium 4 9 01/10/2022 10:13 AM    Chloride 100 05/19/2022 12:18 PM    Chloride 98 (L) 05/11/2022 10:11 AM    Chloride 101 01/10/2022 10:13 AM    CO2 31 05/19/2022 12:18 PM    CO2 30 05/11/2022 10:11 AM    CO2 30 01/10/2022 10:13 AM    Creatinine 1 03 05/19/2022 12:18 PM    Creatinine 1 06 05/11/2022 10:11 AM    Creatinine 1 14 01/10/2022 10:13 AM    AST 10 05/19/2022 12:18 PM    AST 10 11/19/2021 12:11 PM    AST 10 08/13/2021 11:06 AM    ALT 20 05/19/2022 12:18 PM    ALT 22 11/19/2021 12:11 PM    ALT 20 08/13/2021 11:06 AM    Albumin 3 1 (L) 05/19/2022 12:18 PM    Albumin 3 3 (L) 11/19/2021 12:11 PM    Albumin 3 2 (L) 08/13/2021 11:06 AM    HDL, Direct 40 05/11/2022 10:11 AM    HDL, Direct 45 09/27/2021 10:12 AM    Triglycerides 169 (H) 05/11/2022 10:11 AM    Triglycerides 134 09/27/2021 10:12 AM         Imaging Studies: I have personally reviewed pertinent reports  Portions of the record may have been created with voice recognition software  Occasional wrong word or "sound a like" substitutions may have occurred due to the inherent limitations of voice recognition software  Read the chart carefully and recognize, using context, where substitutions have occurred

## 2022-06-29 ENCOUNTER — APPOINTMENT (OUTPATIENT)
Dept: LAB | Facility: HOSPITAL | Age: 77
End: 2022-06-29
Payer: MEDICARE

## 2022-06-29 DIAGNOSIS — R25.1 TREMOR: ICD-10-CM

## 2022-06-29 DIAGNOSIS — E11.65 TYPE 2 DIABETES MELLITUS WITH HYPERGLYCEMIA, WITHOUT LONG-TERM CURRENT USE OF INSULIN (HCC): ICD-10-CM

## 2022-06-29 DIAGNOSIS — L29.9 ITCHING: ICD-10-CM

## 2022-06-29 LAB
ALBUMIN SERPL BCP-MCNC: 3.2 G/DL (ref 3.5–5)
ALP SERPL-CCNC: 66 U/L (ref 46–116)
ALT SERPL W P-5'-P-CCNC: 23 U/L (ref 12–78)
ANION GAP SERPL CALCULATED.3IONS-SCNC: 7 MMOL/L (ref 4–13)
AST SERPL W P-5'-P-CCNC: 14 U/L (ref 5–45)
BILIRUB SERPL-MCNC: 0.2 MG/DL (ref 0.2–1)
BUN SERPL-MCNC: 20 MG/DL (ref 5–25)
CALCIUM ALBUM COR SERPL-MCNC: 9.2 MG/DL (ref 8.3–10.1)
CALCIUM SERPL-MCNC: 8.6 MG/DL (ref 8.3–10.1)
CHLORIDE SERPL-SCNC: 99 MMOL/L (ref 100–108)
CO2 SERPL-SCNC: 32 MMOL/L (ref 21–32)
CREAT SERPL-MCNC: 1 MG/DL (ref 0.6–1.3)
GFR SERPL CREATININE-BSD FRML MDRD: 71 ML/MIN/1.73SQ M
GLUCOSE P FAST SERPL-MCNC: 175 MG/DL (ref 65–99)
POTASSIUM SERPL-SCNC: 4.7 MMOL/L (ref 3.5–5.3)
PROT SERPL-MCNC: 6.7 G/DL (ref 6.4–8.2)
SODIUM SERPL-SCNC: 138 MMOL/L (ref 136–145)
T4 FREE SERPL-MCNC: 0.83 NG/DL (ref 0.76–1.46)
TSH SERPL DL<=0.05 MIU/L-ACNC: 2.5 UIU/ML (ref 0.45–4.5)

## 2022-06-29 PROCEDURE — 84443 ASSAY THYROID STIM HORMONE: CPT

## 2022-06-29 PROCEDURE — 84439 ASSAY OF FREE THYROXINE: CPT

## 2022-06-29 PROCEDURE — 36415 COLL VENOUS BLD VENIPUNCTURE: CPT

## 2022-06-29 PROCEDURE — 80053 COMPREHEN METABOLIC PANEL: CPT

## 2022-07-10 DIAGNOSIS — E11.65 TYPE 2 DIABETES MELLITUS WITH HYPERGLYCEMIA, WITHOUT LONG-TERM CURRENT USE OF INSULIN (HCC): ICD-10-CM

## 2022-07-11 RX ORDER — FLASH GLUCOSE SENSOR
KIT MISCELLANEOUS
Qty: 2 EACH | Refills: 0 | Status: SHIPPED | OUTPATIENT
Start: 2022-07-11 | End: 2022-08-11

## 2022-07-13 ENCOUNTER — OFFICE VISIT (OUTPATIENT)
Dept: CARDIOLOGY CLINIC | Facility: CLINIC | Age: 77
End: 2022-07-13
Payer: MEDICARE

## 2022-07-13 VITALS
WEIGHT: 225 LBS | DIASTOLIC BLOOD PRESSURE: 80 MMHG | BODY MASS INDEX: 33.33 KG/M2 | HEIGHT: 69 IN | OXYGEN SATURATION: 93 % | HEART RATE: 77 BPM | SYSTOLIC BLOOD PRESSURE: 130 MMHG | TEMPERATURE: 97 F

## 2022-07-13 DIAGNOSIS — E66.9 OBESITY (BMI 30-39.9): ICD-10-CM

## 2022-07-13 DIAGNOSIS — E11.22 CKD STAGE 3 DUE TO TYPE 2 DIABETES MELLITUS (HCC): ICD-10-CM

## 2022-07-13 DIAGNOSIS — E78.5 DYSLIPIDEMIA: ICD-10-CM

## 2022-07-13 DIAGNOSIS — N18.30 CKD STAGE 3 DUE TO TYPE 2 DIABETES MELLITUS (HCC): ICD-10-CM

## 2022-07-13 DIAGNOSIS — R00.0 TACHYCARDIA: ICD-10-CM

## 2022-07-13 DIAGNOSIS — R01.1 CARDIAC MURMUR: ICD-10-CM

## 2022-07-13 DIAGNOSIS — R55 SYNCOPE, UNSPECIFIED SYNCOPE TYPE: ICD-10-CM

## 2022-07-13 DIAGNOSIS — E11.9 TYPE 2 DIABETES MELLITUS WITHOUT COMPLICATION, WITHOUT LONG-TERM CURRENT USE OF INSULIN (HCC): ICD-10-CM

## 2022-07-13 DIAGNOSIS — I27.21 PULMONARY ARTERY HYPERTENSION (HCC): ICD-10-CM

## 2022-07-13 PROCEDURE — 99214 OFFICE O/P EST MOD 30 MIN: CPT | Performed by: INTERNAL MEDICINE

## 2022-07-13 PROCEDURE — 93000 ELECTROCARDIOGRAM COMPLETE: CPT | Performed by: INTERNAL MEDICINE

## 2022-07-13 NOTE — PROGRESS NOTES
Progress Note - Cardiology Office  [facility]    oCrie Pyle 64 y o  male MRN: [id number]  : 1945  Encounter: [id number]      Assessment:     1  Syncope, unspecified syncope type    2  Pulmonary artery hypertension ([facility] [address] )    3  Dyslipidemia    4  Cardiac murmur    5  CKD stage 3 due to type 2 diabetes mellitus (HCC)    6  Obesity (BMI 30-39 9)    7  Type 2 diabetes mellitus without complication, without long-term current use of insulin ([facility] [address] )    8  Tachycardia        Discussion Summary and Plan:  1  Syncope  No more episodes of syncope  His syncope was due to his severe pulmonary hypertension and pulmonary embolism at that time will check echo Doppler for LV function and PA pressure  2  Bilateral pulmonary embolism  patient was admitted with bilateral pulmonary embolism and RV strain  PA pressure was up to 64 mmHg  He is feeling clinically great no more shortness of breath no syncope  He also had acute DVT at that time he follows with Hematology early increased not to 50 mg daily  Swati Blend He is on Eliquis  He is found to have heterogeneous for factor V Leiden deficiency  He will continue Eliquis  Hematology think that he will need blood thinners for long time  Will check echo Doppler for PA pressure     3  History of DVT as above     4   Cardiac murmur  Patient is diagnosed to have cardiac murmur workup shows his mild aortic stenosis  He had a low normal LV systolic function by echo and by nuclear EF was noted to be lower most likely due to his tachycardia and PVC at that time  Repeat echo Doppler ordered     5  Dyslipidemia  Continue statin he is at pravastatin tolerating it very well  Continue statin cholesterol profile has improved     6  Diabetes mellitus      Blood sugar has been up and down management as per medical team     7  Elevated PSA with possible prostate cancer with possible urinary obstruction status post chronic Flores catheter management as per Urology  Now scheduled to have prostate surgery  There is no cardiac contraindication for the procedure      8  Tachycardia  Patient's heart rate now much better around 77 beats per minute he does have bifascicular block no symptoms of dizziness lightheadedness    9  Obesity with BMI around 33 with gait dysfunction  He is trying to watch his diet    10  Abnormal stress test   Patient has fixed defect but no ischemia  EF was noted to be low thought to be due to PACs and PVCs echo Doppler will be ordered  He has no symptoms of angina  Continue other Rx as before  All issues discussed with patient and patient's sister  Please call [phone] if any questions  Counseling :  A description of the counseling  Goals and Barriers  Patient's ability to self care: Yes  Medication side effect reviewed with patient in detail and all their questions answered to their satisfaction  HPI :     Chuck Fan is a 64y o  year old male who came for follow up  Patient  Was admitted to [facility] with recurrent syncope and was found to have pulmonary embolism and elevated pulmonary artery pressure  He has medical history significant for hypertension, hyperlipidemia, CKD stage 3, diabetes mellitus and mildly abnormal stress test   Later on he developed retention of his urine and has Flores catheter placed  He has cardiac workup in the form of echo and stress test and was found to have mild aortic stenosis, moderate pulmonary hypertension, and by echo EF was normal by stress test he has decreased EF he was noted to have inferior wall fixed defect no ischemia and he has frequent PACs and PVC at that time which may be showing his low ejection fraction  He feels great now he walks with the help of cane he has no more episodes of syncope dizziness lightheadedness and he is not requiring any oxygen therapy  He is still using Flores catheter he may need to follow-up with urology for that    His EKG reviewed he had a bifascicular block with heart rate 92 beats per minute and Q-wave in inferior lead noted  He never  he lives with his family he has good support system  His blood test from August 13, 2020 reviewed  01/17/2022  Above reviewed  Patient came for follow-up  He has medical history significant for history of pulmonary embolism with elevated pulmonary artery pressure, hypertension, hyperlipidemia, CKD stage 3, tachycardia, diabetes mellitus who came for follow-up  He had a cardiac murmur and was found to have aortic stenosis which was mild, nuclear stress test shows fixed inferior wall defect but no ischemia  He was noted to have frequent PACs and PVCs and started on low-dose metoprolol  History using his Flores catheter and he need to follow-up with urology  He had history of bifascicular block  He is compliant with his cholesterol medication as well as other diabetic medications  He saw Hematology Oncology and found to have anterior Demetri positive for factor V Leiden  He has repeat blood test done in January 2022  Glucose was noted to be high and his electrolytes were acceptable cholesterol profile is in improved and hemoglobin was 10 9  EKG shows bifascicular block heart rate 86 beats per minute  His nuclear stress test from July 2021 reviewed as well as echo  His EF by echo was around 50-55% with mild valvular disease  Nuclear shows no ischemia the EF was noted to be low most likely due to his frequent PACs and PVCs     07/13/2022  Above reviewed  Patient came for follow-up with his sister he is doing well  He had history of pulmonary embolism with elevated PA pressures, hypertension, hyperlipidemia, CKD stage 3, tachycardia, RBBB and abnormal stress test which shows fixed inferior wall defect but no ischemia  He was noted to have frequent PACs and PVCs and was started on low-dose metoprolol today his heart rate is 77 beats per minute    He says he is compliant with his medications and blood test done in May 2022 has been acceptable  Repeat blood test in June 2022 shows his cholesterol electrolytes and liver enzymes are stable  He does have history of bifascicular block which is not changed  His echo in 2021 shows EF 50-55% with mild valvular disease  No nausea no vomiting no other issues at this time  He is feeling well he has no complaints he walks with the help of cane no leg swelling no PND no orthopnea no other issues  Review of Systems   Constitutional: Negative for activity change, chills, diaphoresis, fever and unexpected weight change  HENT: Negative for congestion  Eyes: Negative for discharge and redness  Respiratory: Positive for shortness of breath  Negative for cough, chest tightness and wheezing  Chronic not changed   Cardiovascular: Negative  Negative for chest pain, palpitations and leg swelling  Gastrointestinal: Negative for abdominal pain, diarrhea and nausea  Endocrine: Negative  Genitourinary: Positive for difficulty urinating  Negative for decreased urine volume and urgency  Musculoskeletal: Positive for arthralgias, back pain and gait problem  Skin: Negative for rash and wound  Allergic/Immunologic: Negative  Neurological: Negative for dizziness, seizures, syncope, weakness, light-headedness and headaches  Hematological: Negative  Psychiatric/Behavioral: Negative for agitation and confusion  The patient is nervous/anxious          Historical Information   Past Medical History:   Diagnosis Date    Arthritis     BPH associated with nocturia     Diabetes mellitus ([address] )     Edema     lower legs    Hearing aid worn     bilateral    Hyperlipidemia     Hypertension     controlled    Tremor of both hands      Past Surgical History:   Procedure Laterality Date    CATARACT EXTRACTION      LA TRANSURETHRAL ELEC-SURG PROSTATECTOM N/A 1/20/2022    Procedure: CYSTOSCOPY, TRANSURETHRAL RESECTION OF PROSTATE (TURP); Surgeon: Pauline Gamez MD;  Location: [address] [place] [address];  Service: Urology    ME XCAPSL CTRC RMVL INSJ IO LENS PROSTH W/O ECP Right 8/6/2018    Procedure: EXTRACTION EXTRACAPSULAR CATARACT PHACO INTRAOCULAR LENS (IOL); Surgeon: Lalo Way MD;  Location: [facility] MAIN OR;  Service: Ophthalmology    ME XCAPSL CTRC RMVL INSJ IO LENS PROSTH W/O ECP Left 10/1/2018    Procedure: EXTRACTION EXTRACAPSULAR CATARACT PHACO INTRAOCULAR LENS (IOL);   Surgeon: Lalo Way MD;  Location: [facility] MAIN OR;  Service: Ophthalmology     Social History     Substance and Sexual Activity   Alcohol Use Never     Social History     Substance and Sexual Activity   Drug Use Never     Social History     Tobacco Use   Smoking Status Never Smoker   Smokeless Tobacco Never Used     Family History:   Family History   Problem Relation Age of Onset    Colon cancer Mother     Cancer Father         lung-smoker    Lung cancer Father     Early death Brother     Cancer Brother         "bone cancer"       Meds/Allergies     No Known Allergies    Current Outpatient Medications:     apixaban (Eliquis) 5 mg, Take 1 tablet (5 mg total) by mouth 2 (two) times a day, Disp: 180 tablet, Rfl: 1    Ascorbic Acid (VITAMIN C PO), Take by mouth in the morning, Disp: , Rfl:     calcium-vitamin D (OSCAL 500 + D) 500 mg-200 units per tablet, Take 1 tablet by mouth daily, Disp: , Rfl:     Continuous Blood Gluc Sensor (FreeStyle [place] 14 Day Sensor) MISC, CHANGE 1 SENSOR EVERY 14 DAYS, Disp: 2 each, Rfl: 0    Cyanocobalamin (VITAMIN B-12 CR PO), Take by mouth in the morning, Disp: , Rfl:     docusate sodium (COLACE) 100 mg capsule, Take 1 capsule (100 mg total) by mouth 2 (two) times a day (Patient taking differently: Take 100 mg by mouth in the morning), Disp: 10 capsule, Rfl: 0    metFORMIN (GLUCOPHAGE) 500 mg tablet, Take 2 tablets (1,000 mg total) by mouth 2 (two) times a day with meals, Disp: 360 tablet, Rfl: 3    metoprolol succinate (TOPROL-XL) 50 mg 24 hr tablet, Take 1 tablet (50 mg total) by mouth daily, Disp: 90 tablet, Rfl: 1    pravastatin (PRAVACHOL) 40 mg tablet, Take 1 tablet (40 mg total) by mouth every morning, Disp: 90 tablet, Rfl: 1    primidone (MYSOLINE) 50 mg tablet, Start with 1 tab at bedtime for 2 weeks and if tolerated then 1 in morning and 1 tab at night  (Patient taking differently: 2 (two) times a day), Disp: 60 tablet, Rfl: 4    tamsulosin (FLOMAX) 0 4 mg, Take 0 4 mg by mouth 2 (two) times a day, Disp: , Rfl:     bisacodyl (DULCOLAX) 10 mg suppository, Insert 1 suppository (10 mg total) into the rectum once as needed for constipation (If no bowel movement) for up to 1 dose (Patient not taking: No sig reported), Disp: 12 suppository, Rfl: 0    cholecalciferol (VITAMIN D3) 400 units tablet, Take 400 Units by mouth daily (Patient not taking: No sig reported), Disp: , Rfl:     folic acid (FOLVITE) 1 mg tablet, Take 1 tablet (1,000 mcg total) by mouth daily (Patient not taking: Reported on 7/13/2022), Disp: 90 tablet, Rfl: 1    polyethylene glycol (MIRALAX) 17 g packet, Take 17 g by mouth daily as needed (constipation) for up to 14 days, Disp: 14 each, Rfl: 0    senna (SENOKOT) 8 6 mg, Take 2 tablets (17 2 mg total) by mouth daily at bedtime (Patient not taking: Reported on 6/20/2022), Disp: 60 tablet, Rfl: 0    sitaGLIPtin (JANUVIA) 100 mg tablet, Take 1 tablet (100 mg total) by mouth daily (Patient not taking: Reported on 7/13/2022), Disp: 90 tablet, Rfl: 3    Vitals: Blood pressure 130/80, pulse 77, temperature (!) 97 °F (36 1 °C), height 5' 9" (1 753 m), weight 102 kg (225 lb), SpO2 93 %  ?  Body mass index is 33 23 kg/m²    Wt Readings from Last 3 Encounters:   07/13/22 102 kg (225 lb)   06/20/22 99 6 kg (219 lb 9 6 oz)   06/14/22 103 kg (226 lb 6 4 oz)     Vitals:    07/13/22 1319   Weight: 102 kg (225 lb)     BP Readings from Last 3 Encounters:   07/13/22 130/80   06/20/22 130/70   06/14/22 122/68 Physical Exam:  Physical Exam  Constitutional:       General: He is not in acute distress  Appearance: He is well-developed  He is not diaphoretic  Neck:      Thyroid: No thyromegaly  Vascular: No JVD  Trachea: No tracheal deviation  Cardiovascular:      Rate and Rhythm: Normal rate and regular rhythm  Heart sounds: S1 normal and S2 normal  Heart sounds not distant  Murmur heard  Systolic (ejection) murmur is present with a grade of 2/6  No friction rub  No gallop  No S3 or S4 sounds  Pulmonary:      Effort: Pulmonary effort is normal  No respiratory distress  Breath sounds: Normal breath sounds  No wheezing or rales  Chest:      Chest wall: No tenderness  Abdominal:      General: Bowel sounds are normal  There is no distension  Palpations: Abdomen is soft  Tenderness: There is no abdominal tenderness  Musculoskeletal:         General: No deformity  Cervical back: Neck supple  Skin:     General: Skin is warm and dry  Coloration: Skin is not pale  Findings: No rash  Neurological:      Mental Status: He is alert and oriented to person, place, and time  Psychiatric:         Behavior: Behavior normal          Judgment: Judgment normal            Diagnostic Studies Review Cardio:      Nuclear stress test   Nuclear stress test was abnormal with fixed inferior apical defect which is worse in the rest images and is most likely due to body attenuation artifact EF was around 40%  But no significant ischemia was noted  Echo Doppler  Echo Doppler shows EF 55 % mild LVH, mild aortic stenosis, mild MR, mild TR and PA pressure was 64 mmHg  EKG:  Twelve lead EKG 09/20/2021 shows sinus rhythm heart rate 92 beats per minute bifascicular block Q-wave in inferior lead noted cannot rule out old inferior wall infarct      Twelve lead EKG done on 01/17/2022 shows sinus rhythm with frequent PACs heart rate 86 beats per minute bifascicular block noted     Twelve lead EKG done 2022 shows sinus rhythm with frequent PACs heart rate 77 beats per minute  Cardiac testing:   Results for orders placed during the hospital encounter of 21    Echo complete with contrast if indicated    Narrative  [address]  [place] [address]  [phone]    Transthoracic Echocardiogram  2D, M-mode, Doppler, and Color Doppler    Study date:  2021    Patient: Savanah Durham  MR number: [id number]  Account number: [de-identified]  : 1945  Age: 66 years  Gender: Male  Status: Inpatient  Location: Bedside  Height: 69 in  Weight: 226 6 lb  BP: 122/ 74 mmHg    Indications: Pulmonary Embolism    Diagnoses: I74 9 - Embolism and thrombosis of unspecified artery    Sonographer:  Awa Higgins RDCS  Referring Physician:  Mu Wood PA-C  Group:  [facility]  Interpreting Physician:  DO ROSALINA Cotto    LEFT VENTRICLE:  Systolic function was normal by visual assessment  Ejection fraction was estimated in the range of 55 % to 60 %  There were no regional wall motion abnormalities  There was mild concentric hypertrophy  Doppler parameters were consistent with abnormal left ventricular relaxation (grade 1 diastolic dysfunction)  MITRAL VALVE:  There was mild regurgitation  AORTIC VALVE:  The valve was trileaflet  Leaflets exhibited normal thickness, mild calcification, and moderately reduced cuspal separation  There was mild stenosis  There was no regurgitation  Valve mean gradient was 11 mmHg  Aortic valve area was 1 7 cmï¾² by the continuity equation  TRICUSPID VALVE:  There was mild regurgitation  Pulmonary artery systolic pressure was moderately increased  HISTORY: PRIOR HISTORY: Diabetes Mellitus; Chronic Kidney Disease; Anemia; Dyslipidemia; Sepsis; Non MI Troponin Elevation; Hypertension    PROCEDURE: The procedure was performed at the bedside   This was a routine study  The transthoracic approach was used  The study included complete 2D imaging, M-mode, complete spectral Doppler, and color Doppler  The heart rate was 79 bpm,  at the start of the study  Intravenous contrast ( 0 4ml/min Definity in NSS) was administered  Intravenous contrast was administered to opacify the left ventricle  Echocardiographic views were limited due to poor acoustic window  availability, decreased penetration, and lung interference  This was a technically difficult study  LEFT VENTRICLE: Size was normal  Systolic function was normal by visual assessment  Ejection fraction was estimated in the range of 55 % to 60 %  There were no regional wall motion abnormalities  There was mild concentric hypertrophy  DOPPLER: Doppler parameters were consistent with abnormal left ventricular relaxation (grade 1 diastolic dysfunction)  RIGHT VENTRICLE: The size was normal  Systolic function was normal  DOPPLER: Systolic pressure was within the normal range  LEFT ATRIUM: Size was normal  No thrombus was identified  RIGHT ATRIUM: Size was normal     MITRAL VALVE: Valve structure was normal  There was normal leaflet separation  No echocardiographic evidence for prolapse  DOPPLER: The transmitral velocity was within the normal range  There was no evidence for stenosis  There was mild  regurgitation  AORTIC VALVE: The valve was trileaflet  Leaflets exhibited normal thickness, mild calcification, and moderately reduced cuspal separation  DOPPLER: Transaortic velocity was increased due to valvular stenosis  There was mild stenosis  There  was no regurgitation  TRICUSPID VALVE: The valve structure was normal  There was normal leaflet separation  DOPPLER: The transtricuspid velocity was within the normal range  There was mild regurgitation  Pulmonary artery systolic pressure was moderately  increased  Estimated peak PA pressure was 64 mmHg      PULMONIC VALVE: Leaflets exhibited normal thickness, no calcification, and normal cuspal separation  DOPPLER: The transpulmonic velocity was within the normal range  There was no regurgitation  PERICARDIUM: There was no thickening  There was no pericardial effusion  AORTA: The root exhibited normal size  PULMONARY ARTERY: The size was normal  The morphology appeared normal     MEASUREMENT TABLES    DOPPLER MEASUREMENTS  Aortic valve   (Reference normals)  Peak gradient   21 mmHg   (--)  Mean gradient   11 mmHg   (--)  Valve area, cont   1 7 cmï¾²   (--)    SYSTEM MEASUREMENT TABLES    2D  EF (Teich): 54 78 %  %FS: 28 23 %  Ao Diam: 3 71 cm  Ao asc: 4 23 cm  EDV(Teich): 87 43 ml  ESV(Teich): 39 54 ml  IVSd: 1 19 cm  LA Area: 13 05 cm2  LA Diam: 2 75 cm  LVEDV MOD A4C: 78 34 ml  LVEF MOD A4C: 80 3 %  LVESV MOD A4C: 15 43 ml  LVIDd: 4 39 cm  LVIDs: 3 15 cm  LVLd A4C: 7 01 cm  LVLs A4C: 5 23 cm  LVOT Diam: 1 97 cm  LVPWd: 1 23 cm  RA Area: 13 4 cm2  RVIDd: 3 82 cm  SV (Teich): 47 89 ml  SV MOD A4C: 62 91 ml    CW  AV Env  Ti: 298 13 ms  AV VTI: 45 96 cm  AV Vmax: 2 23 m/s  AV Vmean: 1 54 m/s  AV maxP 85 mmHg  AV meanPG: 10 84 mmHg  TR Vmax: 3 77 m/s  TR maxP 93 mmHg    MM  TAPSE: 2 2 cm    PW  MV E/A Ratio: 0 85  E' Sept: 0 06 m/s  E/E' Sept: 12 19  LVOT Env  Ti: 365 37 ms  LVOT VTI: 25 4 cm  LVOT Vmax: 1 2 m/s  LVOT Vmean: 0 7 m/s  LVOT maxP 76 mmHg  LVOT meanP 51 mmHg  MV A Melvin: 0 93 m/s  MV Dec [place]: 3 07 m/s2  MV DecT: 255 81 ms  MV E Melvin: 0 79 m/s  MV PHT: 74 19 ms  MVA By PHT: 2 97 cm2    [facility] Accredited Echocardiography Laboratory    Prepared and electronically signed by    Raudel Mcdermott DO  Signed 2021 10:21:25        Results for orders placed during the hospital encounter of 21    NM Myocardial Perfusion Spect (Pharmacological Induced Stress and/or Rest)    [address]  [place] [address] [phone]    Rest/Stress Gated SPECT Myocardial Perfusion Imaging After Regadenoson    Patient: Marlene Jewell  MR number: [id number]  Account number: [de-identified]  : 1945  Age: 66 years  Gender: Male  Status: Inpatient  Location: Stress lab  Height: 69 in  Weight: 227 lb  BP: 133/ 68 mmHg    Allergies: NO KNOWN ALLERGIES    Diagnosis: R06 02 - Shortness of breath, R74 8 - Abnormal levels of other serum enzymes    Primary Physician:  Krista Garcia DO  RN:  GABY Steele  Referring Physician:  BLAZE Saleh  Group:  [facility]  Report Prepared By[de-identified]  GABY Steele  Interpreting Physician:  Rick Richards MD    INDICATIONS: Evaluation for coronary artery disease  HISTORY: The patient is a 66year old  male  Chest pain status: no chest pain  Other symptoms: dyspnea  Coronary artery disease risk factors: dyslipidemia, hypertension, and diabetes mellitus  Cardiovascular history: peripheral  vascular occlusive disease  Medications:an anticoagulant, a lipid lowering agent and diabetic medications  PHYSICAL EXAM: Baseline physical exam screening: no wheezes audible  REST ECG: Normal sinus rhythm  The ECG showed right bundle branch block  PROCEDURE: The study was performed in the the Stress lab  A regadenoson infusion pharmacologic stress test was performed  Gated SPECT myocardial perfusion imaging was performed after stress and at rest  Systolic blood pressure was 133  mmHg, at the start of the study  Diastolic blood pressure was 68 mmHg, at the start of the study  The heart rate was 83 bpm, at the start of the study  IV double checked  Regadenoson protocol:  Time HR bpm SBP mmHg DBP mmHg Symptoms Rhythm/conduct  Baseline 09:36 83 133 68 none NSR, RBBB  Immediate 09:42 100 96 54 none same as above  1 min 09:43 99 106 59 none same as above  2 min 09:44 95 118 60 none same as above  3 min 09:45 98 118 56 none --  4 min 09:46 100 118 60 none same as above  No medications or fluids given      STRESS SUMMARY: Duration of pharmacologic stress was 3 min  Maximal heart rate during stress was 105 bpm  The heart rate response to stress was normal  There was normal resting blood pressure with an appropriate response to stress  The  rate-pressure product for the peak heart rate and blood pressure was 62039  There was no chest pain during stress  The stress test was terminated due to protocol completion  On 2l/min of O2 by nasal cannula  Pre oxygen saturation: 94 %  Peak oxygen saturation: 94 %  The stress ECG was negative for ischemia and normal  There were no stress arrhythmias or conduction abnormalities  Arrhythmia during stress: isolated premature ventricular beats  ISOTOPE ADMINISTRATION:  Resting isotope administration Stress isotope administration  Agent Tetrofosmin Tetrofosmin  Dose 11 mCi 33 mCi  Date 07/21/2021 07/21/2021    The radiopharmaceutical was injected at the peak effect of pharmacologic stress  MYOCARDIAL PERFUSION IMAGING:  The image quality was fair  Rotating projection images reveal mild diaphragmatic attenuation and mild patient motion  Left ventricular size was normal  The TID ratio was 1 25  The right ventricle was dilated  PERFUSION DEFECTS:  -  There was a moderate-sized, mildly severe, fixed myocardial perfusion defect of the apical apical and inferior wall  GATED SPECT:  The calculated left ventricular ejection fraction was 40 %  Left ventricular ejection fraction was mildly decreased by visual estimate  There was no diagnostic evidence for left ventricular regional abnormality  SUMMARY:  -  Stress results: There was no chest pain during stress  -  ECG conclusions: The stress ECG was negative for ischemia and normal   -  Perfusion imaging: There was a moderate-sized, mildly severe, fixed myocardial perfusion defect of the apical apical and inferior wall  -  Gated SPECT: The calculated left ventricular ejection fraction was 40 %   Left ventricular ejection fraction was mildly decreased by visual estimate  There was no diagnostic evidence for left ventricular regional abnormality   -  Impressions and recommendations: Abnormal study after pharmacologic vasodilation  There is a fixed inferior apical defect which is worse in rest images than stress images most likely secondary to body attenuation artifact  Cannot rule  out old nontransmural myocardial infarction  EF calculated around 40% appears to be mildly decreased  No significant ischemia is noted    IMPRESSIONS: Abnormal study after pharmacologic vasodilation  There is a fixed inferior apical defect which is worse in rest images than stress images most likely secondary to body attenuation artifact  Cannot rule out old nontransmural  myocardial infarction  EF calculated around 40% appears to be mildly decreased  No significant ischemia is noted Left ventricular systolic function was reduced, without distinct regional wall motion abnormalities      Prepared and signed by    Armando Beard MD  Signed 07/21/2021 16:58:46          Lab Review   Lab Results   Component Value Date    WBC 5 90 05/19/2022    HGB 10 5 (L) 05/19/2022    HCT 34 0 (L) 05/19/2022    MCV 88 05/19/2022    RDW 14 1 05/19/2022     05/19/2022     BMP:  Lab Results   Component Value Date    SODIUM 138 06/29/2022    K 4 7 06/29/2022    CL 99 (L) 06/29/2022    CO2 32 06/29/2022    BUN 20 06/29/2022    CREATININE 1 00 06/29/2022    GLUC 250 (H) 11/19/2021    GLUF 175 (H) 06/29/2022    CALCIUM 8 6 06/29/2022    CORRECTEDCA 9 2 06/29/2022    EGFR 71 06/29/2022     LFT:  Lab Results   Component Value Date    AST 14 06/29/2022    ALT 23 06/29/2022    ALKPHOS 66 06/29/2022    TP 6 7 06/29/2022    ALB 3 2 (L) 06/29/2022      No components found for: [facility] [facility]  Lab Results   Component Value Date    LYN6GZGIBBNT 2 495 06/29/2022     Lab Results   Component Value Date    HGBA1C 8 5 (H) 05/11/2022     Lipid Profile:     Lab Results   Component Value Date    TROPONINI 1 12 (H) 07/18/2021     Lab Results Component Value Date    NTBNP 2,051 (H) 07/18/2021      Recent Results (from the past 672 hour(s))   TSH, 3rd generation    Collection Time: 06/29/22  9:46 AM   Result Value Ref Range    TSH 3RD GENERATON 2 495 0 450 - 4 500 uIU/mL   T4, free    Collection Time: 06/29/22  9:46 AM   Result Value Ref Range    Free T4 0 83 0 76 - 1 46 ng/dL   Comprehensive metabolic panel    Collection Time: 06/29/22  9:46 AM   Result Value Ref Range    Sodium 138 136 - 145 mmol/L    Potassium 4 7 3 5 - 5 3 mmol/L    Chloride 99 (L) 100 - 108 mmol/L    CO2 32 21 - 32 mmol/L    ANION GAP 7 4 - 13 mmol/L    BUN 20 5 - 25 mg/dL    Creatinine 1 00 0 60 - 1 30 mg/dL    Glucose, Fasting 175 (H) 65 - 99 mg/dL    Calcium 8 6 8 3 - 10 1 mg/dL    Corrected Calcium 9 2 8 3 - 10 1 mg/dL    AST 14 5 - 45 U/L    ALT 23 12 - 78 U/L    Alkaline Phosphatase 66 46 - 116 U/L    Total Protein 6 7 6 4 - 8 2 g/dL    Albumin 3 2 (L) 3 5 - 5 0 g/dL    Total Bilirubin 0 20 0 20 - 1 00 mg/dL    eGFR 71 ml/min/1 73sq m           Dr Kayden Tineo MD [facility] [place] [facility]      "This note has been constructed using a voice recognition system  Therefore there may be syntax, spelling, and/or grammatical errors   Please call if you have any questions  "

## 2022-07-18 DIAGNOSIS — R00.0 TACHYCARDIA: ICD-10-CM

## 2022-07-18 RX ORDER — METOPROLOL SUCCINATE 50 MG/1
TABLET, EXTENDED RELEASE ORAL
Qty: 90 TABLET | Refills: 3 | Status: SHIPPED | OUTPATIENT
Start: 2022-07-18 | End: 2022-07-26

## 2022-07-19 ENCOUNTER — TELEPHONE (OUTPATIENT)
Dept: NEUROLOGY | Facility: CLINIC | Age: 77
End: 2022-07-19

## 2022-07-26 ENCOUNTER — OFFICE VISIT (OUTPATIENT)
Dept: NEUROLOGY | Facility: CLINIC | Age: 77
End: 2022-07-26
Payer: MEDICARE

## 2022-07-26 VITALS
BODY MASS INDEX: 33.47 KG/M2 | SYSTOLIC BLOOD PRESSURE: 132 MMHG | DIASTOLIC BLOOD PRESSURE: 60 MMHG | OXYGEN SATURATION: 91 % | HEART RATE: 83 BPM | WEIGHT: 226 LBS | HEIGHT: 69 IN | TEMPERATURE: 97 F

## 2022-07-26 DIAGNOSIS — G25.0 TREMOR, ESSENTIAL: Primary | ICD-10-CM

## 2022-07-26 PROCEDURE — 99215 OFFICE O/P EST HI 40 MIN: CPT | Performed by: PSYCHIATRY & NEUROLOGY

## 2022-07-26 RX ORDER — PROPRANOLOL HYDROCHLORIDE 80 MG/1
80 CAPSULE, EXTENDED RELEASE ORAL DAILY
Qty: 30 CAPSULE | Refills: 5 | Status: SHIPPED | OUTPATIENT
Start: 2022-07-26

## 2022-07-26 RX ORDER — PRIMIDONE 50 MG/1
100 TABLET ORAL EVERY 12 HOURS SCHEDULED
Qty: 120 TABLET | Refills: 6 | Status: SHIPPED | OUTPATIENT
Start: 2022-07-26

## 2022-07-26 NOTE — PROGRESS NOTES
Return NeuroOutpatient Note        Chioma Mack  [id number]  64 y o   1945       Tremors        History obtained from:  Patient     HPI/Subjective:    Chioma Mack is a 63 yo M with PMH of ET presents as f/u for ET  Per my previous history, he started noticing hand tremors 3-4 years ago  He was seen by Dr Maida Ya and was placed on primidone but since pandemic never went back for refill so he wasn't taking it for past 1-2 years  At initial visit, we had started him on primidone upto 50mg bid  Wife had reported them when he's doing something  When he thinks about it, his tremor is worse  Holding utensils, screw  can be difficult  His hand writing is poor  Heavier or lighter objects do not affect him  There is no resting tremor  After starting primidone, they haven't seen any difference as of yet  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  He has very bad knees but doesn't want replacement         Past Medical History:   Diagnosis Date    Arthritis     BPH associated with nocturia     Diabetes mellitus ([facility] [address] )     Edema     lower legs    Hearing aid worn     bilateral    Hyperlipidemia     Hypertension     controlled    Tremor of both hands      Social History     Socioeconomic History    Marital status: Single     Spouse name: Not on file    Number of children: Not on file    Years of education: Not on file    Highest education level: Not on file   Occupational History    Not on file   Tobacco Use    Smoking status: Never Smoker    Smokeless tobacco: Never Used   Vaping Use    Vaping Use: Never used   Substance and Sexual Activity    Alcohol use: Never    Drug use: Never    Sexual activity: Never   Other Topics Concern    Not on file   Social History Narrative    Not on file     Social Determinants of Health     Financial Resource Strain: Not on file   Food Insecurity: Not on file   Transportation Needs: Not on file   Physical Activity: Not on file Stress: Not on file   Social Connections: Not on file   Intimate Partner Violence: Not on file   Housing Stability: Not on file     Family History   Problem Relation Age of Onset    Colon cancer Mother     Cancer Father         lung-smoker    Lung cancer Father     Early death Brother     Cancer Brother         "bone cancer"     No Known Allergies  Current Outpatient Medications on File Prior to Visit   Medication Sig Dispense Refill    apixaban (Eliquis) 5 mg Take 1 tablet (5 mg total) by mouth 2 (two) times a day 180 tablet 1    Ascorbic Acid (VITAMIN C PO) Take by mouth in the morning      bisacodyl (DULCOLAX) 10 mg suppository Insert 1 suppository (10 mg total) into the rectum once as needed for constipation (If no bowel movement) for up to 1 dose 12 suppository 0    calcium-vitamin D (OSCAL 500 + D) 500 mg-200 units per tablet Take 1 tablet by mouth daily      cholecalciferol (VITAMIN D3) 400 units tablet Take 400 Units by mouth daily      Continuous Blood Gluc Sensor (FreeStyle [place] 14 Day Sensor) MISC CHANGE 1 SENSOR EVERY 14 DAYS 2 each 0    Cyanocobalamin (VITAMIN B-12 CR PO) Take by mouth in the morning      folic acid (FOLVITE) 1 mg tablet Take 1 tablet (1,000 mcg total) by mouth daily 90 tablet 1    metFORMIN (GLUCOPHAGE) 500 mg tablet Take 2 tablets (1,000 mg total) by mouth 2 (two) times a day with meals 360 tablet 3    metoprolol succinate (TOPROL-XL) 50 mg 24 hr tablet Take 1 tablet by mouth once daily 90 tablet 3    pravastatin (PRAVACHOL) 40 mg tablet Take 1 tablet (40 mg total) by mouth every morning 90 tablet 1    senna (SENOKOT) 8 6 mg Take 2 tablets (17 2 mg total) by mouth daily at bedtime 60 tablet 0    sitaGLIPtin (JANUVIA) 100 mg tablet Take 1 tablet (100 mg total) by mouth daily 90 tablet 3    tamsulosin (FLOMAX) 0 4 mg Take 0 4 mg by mouth 2 (two) times a day      [DISCONTINUED] primidone (MYSOLINE) 50 mg tablet Start with 1 tab at bedtime for 2 weeks and if tolerated then 1 in morning and 1 tab at night  (Patient taking differently: 2 (two) times a day) 60 tablet 4    docusate sodium (COLACE) 100 mg capsule Take 1 capsule (100 mg total) by mouth 2 (two) times a day (Patient not taking: Reported on 7/26/2022) 10 capsule 0    polyethylene glycol (MIRALAX) 17 g packet Take 17 g by mouth daily as needed (constipation) for up to 14 days (Patient not taking: Reported on 7/26/2022) 14 each 0     No current facility-administered medications on file prior to visit  Review of Systems   Refer to positive review of systems in HPI  Review of Systems    Constitutional- No fever  Eyes- No visual change  ENT- Hearing normal  CV- No chest pain  Resp- No Shortness of breath  GI- No diarrhea  - Bladder normal  MS- No Arthritis   Skin- No rash  Psych- No depression  Endo- No DM  Heme- No nodes    Vitals:    07/26/22 1020   BP: 132/60   BP Location: Right arm   Patient Position: Sitting   Cuff Size: Standard   Pulse: 83   Temp: (!) 97 °F (36 1 °C)   TempSrc: Tympanic   SpO2: 91%   Weight: 103 kg (226 lb)   Height: 5' 9" (1 753 m)       PHYSICAL EXAM:  Appearance: No Acute Distress  Ophthalmoscopic: Disc Flat, Normal fundus  Mental status:  Orientation: Awake, Alert, and Orientedx3  Memory: Registation 3/3 Recall 3/3  Attention: normal  Knowledge: good  Language: No aphasia  Speech: No dysarthria  Cranial Nerves:  2 No Visual Defect on Confrontation, Pupils round, equal, reactive to light  3,4,6 Extraocular Movements Intact, no nystagmus  5 Facial Sensation Intact  7 No facial asymmetry  8 Intact hearing  9,10 Palate symmetric, normal gag  11 Good shoulder shrug  12 Tongue Midline  Gait: Stable  Coordination: moderate ET in LUE, mild in RUE   No ataxia with finger to nose testing, and heel to shin  Sensory: Intact, Symmetric to pinprick, light touch, vibration, and joint position  Muscle Tone: Normal              Muscle exam:  Arm Right Left Leg Right Left   Deltoid 5/5 5/5 Iliopsoas 5/5 5/5   Biceps 5/5 5/5 Quads 5/5 5/5   Triceps 5/5 5/5 Hamstrings 5/5 5/5   Wrist Extension 5/5 5/5 Ankle Dorsi Flexion 5/5 5/5   Wrist Flexion 5/5 5/5 Ankle Plantar Flexion 5/5 5/5   Interossei 5/5 5/5 Ankle Eversion 5/5 5/5   APB 5/5 5/5 Ankle Inversion 5/5 5/5       Reflexes   RJ BJ TJ KJ AJ Plantars Kevin's   Right 2+ 2+ 2+ 2+ 2+ Downgoing Not present   Left 2+ 2+ 2+ 2+ 2+ Downgoing Not present     Personal review of  Labs:                    Diagnoses and all orders for this visit:      1  Tremor, essential  primidone (MYSOLINE) 50 mg tablet     Patient has not responded to primidone 100mg daily  Will optimize it to 100mg bid  I've reached out to his cardiologist to see if he would be ok with use of propranolol instead of metoprolol and if so, will make the switch                   Total time of encounter:  40 min  More than 50% of the time was used in counseling and/or coordination of care  Extent of counseling and/or coordination of care        Arely Weathers MD  [facility]  [address]  [address]  [phone]

## 2022-07-27 ENCOUNTER — TELEPHONE (OUTPATIENT)
Dept: NEUROLOGY | Facility: CLINIC | Age: 77
End: 2022-07-27

## 2022-07-27 NOTE — TELEPHONE ENCOUNTER
Called spoke to wife patient wife advised of Instructions      ----- Message from Iram Verdugo MD sent at 7/26/2022  4:48 PM EDT -----  Can you call this patient or his wife that we are making the switch to propranolol and it's sent to [facility]? He is to stop taking metoprolol once he starts the new medication

## 2022-07-28 DIAGNOSIS — E11.65 TYPE 2 DIABETES MELLITUS WITH HYPERGLYCEMIA, WITHOUT LONG-TERM CURRENT USE OF INSULIN (HCC): ICD-10-CM

## 2022-07-28 RX ORDER — SITAGLIPTIN 100 MG/1
TABLET, FILM COATED ORAL
Qty: 30 TABLET | Refills: 0 | Status: SHIPPED | OUTPATIENT
Start: 2022-07-28 | End: 2022-08-30 | Stop reason: SDUPTHER

## 2022-08-11 ENCOUNTER — TELEPHONE (OUTPATIENT)
Dept: OTHER | Facility: OTHER | Age: 77
End: 2022-08-11

## 2022-08-11 DIAGNOSIS — E11.65 TYPE 2 DIABETES MELLITUS WITH HYPERGLYCEMIA, WITHOUT LONG-TERM CURRENT USE OF INSULIN (HCC): ICD-10-CM

## 2022-08-11 RX ORDER — FLASH GLUCOSE SENSOR
KIT MISCELLANEOUS
Qty: 2 EACH | Refills: 0 | Status: SHIPPED | OUTPATIENT
Start: 2022-08-11 | End: 2022-08-29

## 2022-08-11 NOTE — TELEPHONE ENCOUNTER
Patient's sister called to verify a medication change that Dr Tillman made and would like a call back

## 2022-08-12 ENCOUNTER — HOSPITAL ENCOUNTER (OUTPATIENT)
Dept: NON INVASIVE DIAGNOSTICS | Facility: HOSPITAL | Age: 77
Discharge: HOME | End: 2022-08-12
Attending: INTERNAL MEDICINE
Payer: MEDICARE

## 2022-08-12 VITALS
HEART RATE: 78 BPM | SYSTOLIC BLOOD PRESSURE: 132 MMHG | WEIGHT: 226 LBS | BODY MASS INDEX: 33.47 KG/M2 | DIASTOLIC BLOOD PRESSURE: 60 MMHG | HEIGHT: 69 IN

## 2022-08-12 DIAGNOSIS — I27.21 PULMONARY ARTERY HYPERTENSION (HCC): ICD-10-CM

## 2022-08-12 DIAGNOSIS — R01.1 CARDIAC MURMUR: ICD-10-CM

## 2022-08-12 DIAGNOSIS — R00.0 TACHYCARDIA: ICD-10-CM

## 2022-08-12 LAB
AORTIC ROOT: 4.2 CM
AORTIC VALVE MEAN VELOCITY: 15.5 M/S
APICAL FOUR CHAMBER EJECTION FRACTION: 76 %
ASCENDING AORTA: 4.5 CM
AV AREA BY CONTINUOUS VTI: 1.8 CM2
AV AREA PEAK VELOCITY: 1.5 CM2
AV LVOT MEAN GRADIENT: 3 MMHG
AV LVOT PEAK GRADIENT: 6 MMHG
AV MEAN GRADIENT: 11 MMHG
AV PEAK GRADIENT: 19 MMHG
AV VALVE AREA: 1.83 CM2
AV VELOCITY RATIO: 0.55
DOP CALC AO PEAK VEL: 2.17 M/S
DOP CALC AO VTI: 44.92 CM
DOP CALC LVOT AREA: 3.14 CM2
DOP CALC LVOT DIAMETER: 2 CM
DOP CALC LVOT PEAK VEL VTI: 26.21 CM
DOP CALC LVOT PEAK VEL: 1.2 M/S
DOP CALC LVOT STROKE INDEX: 38.1 ML/M2
DOP CALC LVOT STROKE VOLUME: 82.3 CM3
E WAVE DECELERATION TIME: 274 MS
FRACTIONAL SHORTENING: 32 % (ref 28–44)
INTERVENTRICULAR SEPTUM IN DIASTOLE (PARASTERNAL SHORT AXIS VIEW): 1.2 CM
INTERVENTRICULAR SEPTUM: 1.2 CM (ref 0.6–1.1)
LAAS-AP2: 17.5 CM2
LAAS-AP4: 13.8 CM2
LEFT ATRIUM SIZE: 3.2 CM
LEFT INTERNAL DIMENSION IN SYSTOLE: 3.2 CM (ref 2.1–4)
LEFT VENTRICULAR INTERNAL DIMENSION IN DIASTOLE: 4.7 CM (ref 3.5–6)
LEFT VENTRICULAR POSTERIOR WALL IN END DIASTOLE: 1.2 CM
LEFT VENTRICULAR STROKE VOLUME: 63 ML
LVSV (TEICH): 63 ML
MV E'TISSUE VEL-SEP: 8 CM/S
MV PEAK A VEL: 1.07 M/S
MV PEAK E VEL: 89 CM/S
MV STENOSIS PRESSURE HALF TIME: 79 MS
MV VALVE AREA P 1/2 METHOD: 2.78 CM2
RIGHT ATRIUM AREA SYSTOLE A4C: 15.6 CM2
RIGHT VENTRICLE ID DIMENSION: 3.2 CM
SL CV LEFT ATRIUM LENGTH A2C: 5.7 CM
SL CV LV EF: 60
SL CV PED ECHO LEFT VENTRICLE DIASTOLIC VOLUME (MOD BIPLANE) 2D: 103 ML
SL CV PED ECHO LEFT VENTRICLE SYSTOLIC VOLUME (MOD BIPLANE) 2D: 39 ML
TRICUSPID ANNULAR PLANE SYSTOLIC EXCURSION: 2.2 CM

## 2022-08-12 PROCEDURE — 93306 TTE W/DOPPLER COMPLETE: CPT | Performed by: INTERNAL MEDICINE

## 2022-08-12 PROCEDURE — C8929 TTE W OR WO FOL WCON,DOPPLER: HCPCS

## 2022-08-12 RX ADMIN — PERFLUTREN 0.4 ML/MIN: 6.52 INJECTION, SUSPENSION INTRAVENOUS at 12:07

## 2022-08-12 NOTE — TELEPHONE ENCOUNTER
I spoke with patient  Dr Supriya Leonard dc metoprolol and initiated propanolol  Advised patient to check bp at home and reach out to us with anything abnormal    Also advised on warning signs/ symptoms

## 2022-08-15 ENCOUNTER — TELEPHONE (OUTPATIENT)
Dept: CARDIOLOGY CLINIC | Facility: CLINIC | Age: 77
End: 2022-08-15

## 2022-08-15 NOTE — TELEPHONE ENCOUNTER
----- Message from Kaveh Ortega MD sent at 8/12/2022  4:04 PM EDT -----  Patient's echo Doppler shows that his heart function is okay he does have mild-to-moderate aortic stenosis  He does have dilated aortic root and mildly dilated ascending aorta though CT scan done in 2021 does not mention it  He should keep his appointment

## 2022-08-27 DIAGNOSIS — E11.65 TYPE 2 DIABETES MELLITUS WITH HYPERGLYCEMIA, WITHOUT LONG-TERM CURRENT USE OF INSULIN (HCC): ICD-10-CM

## 2022-08-29 RX ORDER — FLASH GLUCOSE SENSOR
KIT MISCELLANEOUS
Qty: 2 EACH | Refills: 0 | Status: SHIPPED | OUTPATIENT
Start: 2022-08-29 | End: 2022-09-07 | Stop reason: SDUPTHER

## 2022-08-30 DIAGNOSIS — E11.65 TYPE 2 DIABETES MELLITUS WITH HYPERGLYCEMIA, WITHOUT LONG-TERM CURRENT USE OF INSULIN (HCC): ICD-10-CM

## 2022-09-07 DIAGNOSIS — E11.65 TYPE 2 DIABETES MELLITUS WITH HYPERGLYCEMIA, WITHOUT LONG-TERM CURRENT USE OF INSULIN (HCC): ICD-10-CM

## 2022-09-07 RX ORDER — FLASH GLUCOSE SENSOR
KIT MISCELLANEOUS
Qty: 6 EACH | Refills: 3 | Status: SHIPPED | OUTPATIENT
Start: 2022-09-07

## 2022-09-09 ENCOUNTER — RA CDI HCC (OUTPATIENT)
Dept: OTHER | Facility: HOSPITAL | Age: 77
End: 2022-09-09

## 2022-09-09 NOTE — PROGRESS NOTES
[address]  coding opportunities          Chart Reviewed number of suggestions sent to Provider: 1   [id number]    Patients Insurance     Medicare Insurance: [facility]

## 2022-09-14 ENCOUNTER — APPOINTMENT (OUTPATIENT)
Dept: LAB | Facility: HOSPITAL | Age: 77
End: 2022-09-14
Payer: MEDICARE

## 2022-09-14 DIAGNOSIS — D64.9 ANEMIA, UNSPECIFIED TYPE: ICD-10-CM

## 2022-09-14 DIAGNOSIS — N18.31 STAGE 3A CHRONIC KIDNEY DISEASE (HCC): ICD-10-CM

## 2022-09-14 DIAGNOSIS — D64.9 NORMOCYTIC ANEMIA: ICD-10-CM

## 2022-09-14 DIAGNOSIS — E66.9 CLASS 1 OBESITY WITH SERIOUS COMORBIDITY AND BODY MASS INDEX (BMI) OF 32.0 TO 32.9 IN ADULT, UNSPECIFIED OBESITY TYPE: ICD-10-CM

## 2022-09-14 DIAGNOSIS — D47.2 MGUS (MONOCLONAL GAMMOPATHY OF UNKNOWN SIGNIFICANCE): ICD-10-CM

## 2022-09-14 DIAGNOSIS — L29.9 ITCHING: ICD-10-CM

## 2022-09-14 DIAGNOSIS — E78.5 DYSLIPIDEMIA: ICD-10-CM

## 2022-09-14 DIAGNOSIS — I26.99 BILATERAL PULMONARY EMBOLISM (HCC): ICD-10-CM

## 2022-09-14 DIAGNOSIS — E11.65 TYPE 2 DIABETES MELLITUS WITH HYPERGLYCEMIA, WITHOUT LONG-TERM CURRENT USE OF INSULIN (HCC): ICD-10-CM

## 2022-09-14 DIAGNOSIS — C61 PROSTATE CANCER (HCC): ICD-10-CM

## 2022-09-14 DIAGNOSIS — R25.1 TREMOR: ICD-10-CM

## 2022-09-14 LAB
ALBUMIN SERPL BCP-MCNC: 3.5 G/DL (ref 3.5–5)
ALP SERPL-CCNC: 67 U/L (ref 46–116)
ALT SERPL W P-5'-P-CCNC: 20 U/L (ref 12–78)
ANION GAP SERPL CALCULATED.3IONS-SCNC: 7 MMOL/L (ref 4–13)
AST SERPL W P-5'-P-CCNC: 13 U/L (ref 5–45)
BASOPHILS # BLD AUTO: 0.02 THOUSANDS/ΜL (ref 0–0.1)
BASOPHILS NFR BLD AUTO: 0 % (ref 0–1)
BILIRUB SERPL-MCNC: 0.27 MG/DL (ref 0.2–1)
BUN SERPL-MCNC: 18 MG/DL (ref 5–25)
CALCIUM SERPL-MCNC: 8.9 MG/DL (ref 8.3–10.1)
CHLORIDE SERPL-SCNC: 101 MMOL/L (ref 96–108)
CHOLEST SERPL-MCNC: 186 MG/DL
CO2 SERPL-SCNC: 32 MMOL/L (ref 21–32)
CREAT SERPL-MCNC: 1.06 MG/DL (ref 0.6–1.3)
EOSINOPHIL # BLD AUTO: 0.04 THOUSAND/ΜL (ref 0–0.61)
EOSINOPHIL NFR BLD AUTO: 1 % (ref 0–6)
ERYTHROCYTE [DISTWIDTH] IN BLOOD BY AUTOMATED COUNT: 14.3 % (ref 11.6–15.1)
FERRITIN SERPL-MCNC: 34 NG/ML (ref 8–388)
GFR SERPL CREATININE-BSD FRML MDRD: 66 ML/MIN/1.73SQ M
GLUCOSE P FAST SERPL-MCNC: 197 MG/DL (ref 65–99)
HCT VFR BLD AUTO: 34.9 % (ref 36.5–49.3)
HDLC SERPL-MCNC: 39 MG/DL
HGB BLD-MCNC: 10.9 G/DL (ref 12–17)
IGA SERPL-MCNC: 134 MG/DL (ref 70–400)
IGG SERPL-MCNC: 848 MG/DL (ref 700–1600)
IGM SERPL-MCNC: 38 MG/DL (ref 40–230)
IMM GRANULOCYTES # BLD AUTO: 0.02 THOUSAND/UL (ref 0–0.2)
IMM GRANULOCYTES NFR BLD AUTO: 0 % (ref 0–2)
IRON SATN MFR SERPL: 26 % (ref 20–50)
IRON SERPL-MCNC: 76 UG/DL (ref 65–175)
LDLC SERPL CALC-MCNC: 110 MG/DL (ref 0–100)
LYMPHOCYTES # BLD AUTO: 1.67 THOUSANDS/ΜL (ref 0.6–4.47)
LYMPHOCYTES NFR BLD AUTO: 30 % (ref 14–44)
MCH RBC QN AUTO: 27.5 PG (ref 26.8–34.3)
MCHC RBC AUTO-ENTMCNC: 31.2 G/DL (ref 31.4–37.4)
MCV RBC AUTO: 88 FL (ref 82–98)
MONOCYTES # BLD AUTO: 0.37 THOUSAND/ΜL (ref 0.17–1.22)
MONOCYTES NFR BLD AUTO: 7 % (ref 4–12)
NEUTROPHILS # BLD AUTO: 3.38 THOUSANDS/ΜL (ref 1.85–7.62)
NEUTS SEG NFR BLD AUTO: 62 % (ref 43–75)
NONHDLC SERPL-MCNC: 147 MG/DL
NRBC BLD AUTO-RTO: 0 /100 WBCS
PLATELET # BLD AUTO: 191 THOUSANDS/UL (ref 149–390)
PMV BLD AUTO: 10.6 FL (ref 8.9–12.7)
POTASSIUM SERPL-SCNC: 4.9 MMOL/L (ref 3.5–5.3)
PROT SERPL-MCNC: 7 G/DL (ref 6.4–8.4)
PSA SERPL-MCNC: <0.1 NG/ML (ref 0–4)
RBC # BLD AUTO: 3.96 MILLION/UL (ref 3.88–5.62)
SODIUM SERPL-SCNC: 140 MMOL/L (ref 135–147)
TIBC SERPL-MCNC: 293 UG/DL (ref 250–450)
TRIGL SERPL-MCNC: 184 MG/DL
WBC # BLD AUTO: 5.5 THOUSAND/UL (ref 4.31–10.16)

## 2022-09-14 PROCEDURE — 85025 COMPLETE CBC W/AUTO DIFF WBC: CPT

## 2022-09-14 PROCEDURE — 80061 LIPID PANEL: CPT

## 2022-09-14 PROCEDURE — 83540 ASSAY OF IRON: CPT

## 2022-09-14 PROCEDURE — 84153 ASSAY OF PSA TOTAL: CPT

## 2022-09-14 PROCEDURE — 80053 COMPREHEN METABOLIC PANEL: CPT

## 2022-09-14 PROCEDURE — 84165 PROTEIN E-PHORESIS SERUM: CPT

## 2022-09-14 PROCEDURE — 36415 COLL VENOUS BLD VENIPUNCTURE: CPT

## 2022-09-14 PROCEDURE — 83550 IRON BINDING TEST: CPT

## 2022-09-14 PROCEDURE — 82784 ASSAY IGA/IGD/IGG/IGM EACH: CPT

## 2022-09-14 PROCEDURE — 82728 ASSAY OF FERRITIN: CPT

## 2022-09-14 PROCEDURE — 82232 ASSAY OF BETA-2 PROTEIN: CPT

## 2022-09-14 PROCEDURE — 83036 HEMOGLOBIN GLYCOSYLATED A1C: CPT

## 2022-09-14 PROCEDURE — 83521 IG LIGHT CHAINS FREE EACH: CPT

## 2022-09-15 LAB
EST. AVERAGE GLUCOSE BLD GHB EST-MCNC: 174 MG/DL
HBA1C MFR BLD: 7.7 %
KAPPA LC FREE SER-MCNC: 27 MG/L (ref 3.3–19.4)
KAPPA LC FREE/LAMBDA FREE SER: 0.34 {RATIO} (ref 0.26–1.65)
LAMBDA LC FREE SERPL-MCNC: 79.3 MG/L (ref 5.7–26.3)
LEFT EYE DIABETIC RETINOPATHY: POSITIVE
RIGHT EYE DIABETIC RETINOPATHY: NORMAL

## 2022-09-15 RX ORDER — FOLIC ACID 1 MG/1
TABLET ORAL
Qty: 90 TABLET | Refills: 0 | Status: SHIPPED | OUTPATIENT
Start: 2022-09-15

## 2022-09-16 ENCOUNTER — OFFICE VISIT (OUTPATIENT)
Dept: FAMILY MEDICINE CLINIC | Facility: CLINIC | Age: 77
End: 2022-09-16
Payer: MEDICARE

## 2022-09-16 VITALS
TEMPERATURE: 97.5 F | WEIGHT: 220.2 LBS | DIASTOLIC BLOOD PRESSURE: 70 MMHG | SYSTOLIC BLOOD PRESSURE: 134 MMHG | HEART RATE: 75 BPM | OXYGEN SATURATION: 98 % | HEIGHT: 69 IN | BODY MASS INDEX: 32.61 KG/M2 | RESPIRATION RATE: 17 BRPM

## 2022-09-16 DIAGNOSIS — Z23 IMMUNIZATION DUE: ICD-10-CM

## 2022-09-16 DIAGNOSIS — D68.51 FACTOR 5 LEIDEN MUTATION, HETEROZYGOUS (HCC): ICD-10-CM

## 2022-09-16 DIAGNOSIS — E78.5 DYSLIPIDEMIA: ICD-10-CM

## 2022-09-16 DIAGNOSIS — I26.99 BILATERAL PULMONARY EMBOLISM (HCC): ICD-10-CM

## 2022-09-16 DIAGNOSIS — Z00.00 MEDICARE ANNUAL WELLNESS VISIT, SUBSEQUENT: Primary | ICD-10-CM

## 2022-09-16 LAB — B2 MICROGLOB SERPL-MCNC: 2.2 MG/L (ref 0.6–2.4)

## 2022-09-16 PROCEDURE — G0439 PPPS, SUBSEQ VISIT: HCPCS | Performed by: FAMILY MEDICINE

## 2022-09-16 PROCEDURE — 90677 PCV20 VACCINE IM: CPT

## 2022-09-16 PROCEDURE — G0009 ADMIN PNEUMOCOCCAL VACCINE: HCPCS

## 2022-09-16 PROCEDURE — 99214 OFFICE O/P EST MOD 30 MIN: CPT | Performed by: FAMILY MEDICINE

## 2022-09-16 RX ORDER — PRAVASTATIN SODIUM 40 MG
40 TABLET ORAL EVERY MORNING
Qty: 90 TABLET | Refills: 1 | Status: SHIPPED | OUTPATIENT
Start: 2022-09-16

## 2022-09-16 NOTE — PROGRESS NOTES
Assessment/Plan:    No problem-specific Assessment & Plan notes found for this encounter  FVL stable, long term NOAC is plan  HLD stable on statin, diet reminded, LFTs q6m  bmi aware, encourage wt loss  Hx of b/l PE     Diagnoses and all orders for this visit:    Medicare annual wellness visit, subsequent    Dyslipidemia  -     Comprehensive metabolic panel; Future  -     pravastatin (PRAVACHOL) 40 mg tablet; Take 1 tablet (40 mg total) by mouth every morning    Immunization due  -     Pneumococcal Conjugate Vaccine 20-valent (Pcv20)    Factor 5 Leiden mutation, heterozygous (HCC)  -     apixaban (Eliquis) 5 mg; Take 1 tablet (5 mg total) by mouth 2 (two) times a day    Bilateral pulmonary embolism (HCC)  -     apixaban (Eliquis) 5 mg; Take 1 tablet (5 mg total) by mouth 2 (two) times a day        Return in about 6 months (around 3/16/2023) for Recheck  Subjective:      Patient ID: Kelli Ren is a 64 y o  male  Chief Complaint   Patient presents with    Follow-up     6 month f/u nm lpn    Medicare Wellness Visit       HPI  Lost 6#  Eating better  Been to eye doctor recently  Diabetic eye exams reminded    On Eliquis, no bleeding, fall risk aware    Takes pravachol, not on strict diet    No known pneumonia vaccine in past    The following portions of the patient's history were reviewed and updated as appropriate: allergies, current medications, past family history, past medical history, past social history, past surgical history and problem list     Review of Systems   Constitutional: Negative for chills and fever  Gastrointestinal: Negative for blood in stool           Current Outpatient Medications   Medication Sig Dispense Refill    apixaban (Eliquis) 5 mg Take 1 tablet (5 mg total) by mouth 2 (two) times a day 180 tablet 1    Ascorbic Acid (VITAMIN C PO) Take by mouth in the morning      bisacodyl (DULCOLAX) 10 mg suppository Insert 1 suppository (10 mg total) into the rectum once as needed for constipation (If no bowel movement) for up to 1 dose 12 suppository 0    calcium-vitamin D (OSCAL 500 + D) 500 mg-200 units per tablet Take 1 tablet by mouth daily      cholecalciferol (VITAMIN D3) 400 units tablet Take 400 Units by mouth daily      Continuous Blood Gluc Sensor (FreeStyle [place] 14 Day Sensor) MISC Change sensor every 14 days 6 each 3    Cyanocobalamin (VITAMIN B-12 CR PO) Take by mouth in the morning      docusate sodium (COLACE) 100 mg capsule Take 1 capsule (100 mg total) by mouth 2 (two) times a day (Patient taking differently: Take 100 mg by mouth as needed) 10 capsule 0    folic acid (FOLVITE) 1 mg tablet Take 1 tablet by mouth once daily 90 tablet 0    metFORMIN (GLUCOPHAGE) 500 mg tablet Take 2 tablets (1,000 mg total) by mouth 2 (two) times a day with meals 360 tablet 3    pravastatin (PRAVACHOL) 40 mg tablet Take 1 tablet (40 mg total) by mouth every morning 90 tablet 1    primidone (MYSOLINE) 50 mg tablet Take 2 tablets (100 mg total) by mouth every 12 (twelve) hours 120 tablet 6    propranolol (INDERAL LA) 80 mg 24 hr capsule Take 1 capsule (80 mg total) by mouth daily 30 capsule 5    senna (SENOKOT) 8 6 mg Take 2 tablets (17 2 mg total) by mouth daily at bedtime (Patient taking differently: Take 17 2 mg by mouth as needed) 60 tablet 0    sitaGLIPtin (Januvia) 100 mg tablet Take 1 tablet (100 mg total) by mouth daily 30 tablet 2    tamsulosin (FLOMAX) 0 4 mg Take 0 4 mg by mouth 2 (two) times a day      polyethylene glycol (MIRALAX) 17 g packet Take 17 g by mouth daily as needed (constipation) for up to 14 days (Patient not taking: No sig reported) 14 each 0     No current facility-administered medications for this visit  Objective:    /70   Pulse 75   Temp 97 5 °F (36 4 °C)   Resp 17   Ht 5' 9" (1 753 m)   Wt 99 9 kg (220 lb 3 2 oz)   SpO2 98%   BMI 32 52 kg/m²        Physical Exam  Vitals and nursing note reviewed     Constitutional: Appearance: He is well-developed  He is obese  He is not ill-appearing  HENT:      Head: Normocephalic  Eyes:      General: No scleral icterus  Conjunctiva/sclera: Conjunctivae normal    Cardiovascular:      Rate and Rhythm: Normal rate and regular rhythm  Pulmonary:      Effort: Pulmonary effort is normal  No respiratory distress  Breath sounds: No wheezing or rales  Abdominal:      Palpations: Abdomen is soft  Tenderness: There is no abdominal tenderness  Musculoskeletal:         General: No deformity  Cervical back: Neck supple  Skin:     General: Skin is warm and dry  Coloration: Skin is not pale  Neurological:      Mental Status: He is alert  Gait: Gait abnormal    Psychiatric:         Mood and Affect: Mood normal          Behavior: Behavior normal          Thought Content: Thought content normal        BMI Counseling: Body mass index is 32 52 kg/m²  The BMI is above normal  Nutrition recommendations include decreasing portion sizes and moderation in carbohydrate intake  Exercise recommendations include exercising 3-5 times per week  No pharmacotherapy was ordered  Rationale for BMI follow-up plan is due to patient being overweight or obese  Depression Screening and Follow-up Plan: Patient was screened for depression during today's encounter  They screened negative with a PHQ-2 score of 0               Francheska Benavides DO

## 2022-09-17 NOTE — PROGRESS NOTES
Assessment and Plan:     Problem List Items Addressed This Visit        Active Problems    Dyslipidemia    Relevant Medications    pravastatin (PRAVACHOL) 40 mg tablet    Other Relevant Orders    Comprehensive metabolic panel    Medicare annual wellness visit, subsequent - Primary    Bilateral pulmonary embolism (HCC)    Relevant Medications    apixaban (Eliquis) 5 mg    Factor 5 Leiden mutation, heterozygous (HCC)    Relevant Medications    apixaban (Eliquis) 5 mg    Immunization due    Relevant Orders    Pneumococcal Conjugate Vaccine 20-valent (Pcv20) (Completed)           Preventive health issues were discussed with patient, and age appropriate screening tests were ordered as noted in patient's After Visit Summary  Personalized health advice and appropriate referrals for health education or preventive services given if needed, as noted in patient's After Visit Summary  History of Present Illness:     Patient presents for a Medicare Wellness Visit    HPI   Patient Care Team:  Cuca Nunez DO as PCP - General (Family Medicine)  Philip Drake MD (Cardiology)     Review of Systems:     Review of Systems     Problem List:     Patient Active Problem List   Diagnosis    Type 2 diabetes mellitus, without long-term current use of insulin ([facility] )    CKD stage 3 due to type 2 diabetes mellitus ([facility] )    Anemia    Pulmonary artery hypertension ([facility] )    Dyslipidemia    Urinary retention    Acute cystitis with hematuria    Obesity (BMI 30-39  9)    Tremor    Hypoxia    Generalized weakness    Protein-calorie malnutrition (HCC)    Bilateral pulmonary embolism (HCC)    Respiratory failure with hypoxia (HCC)    Urinary tract infection without hematuria    Non MI troponin elevation    Syncope    Medicare annual wellness visit, subsequent    Prostate mass    Class 1 obesity with serious comorbidity and body mass index (BMI) of 32 0 to 32 9 in adult    Prostate cancer (HCC)    Factor 5 Leiden mutation, heterozygous ([facility] )    Itching    Stage 3a chronic kidney disease ([facility] )    Immunization due      Past Medical and Surgical History:     Past Medical History:   Diagnosis Date    Arthritis     BPH associated with nocturia     Diabetes mellitus ([facility] )     Edema     lower legs    Hearing aid worn     bilateral    Hyperlipidemia     Hypertension     controlled    Tremor of both hands      Past Surgical History:   Procedure Laterality Date    CATARACT EXTRACTION      DE TRANSURETHRAL ELEC-SURG PROSTATECTOM N/A 1/20/2022    Procedure: CYSTOSCOPY, TRANSURETHRAL RESECTION OF PROSTATE (TURP); Surgeon: Theodora Rollins MD;  Location: [address] [place] [address];  Service: Urology    DE XCAPSL CTRC RMVL INSJ IO LENS PROSTH W/O ECP Right 8/6/2018    Procedure: EXTRACTION EXTRACAPSULAR CATARACT PHACO INTRAOCULAR LENS (IOL); Surgeon: Jo Ann Blank MD;  Location: [facility] MAIN OR;  Service: Ophthalmology    DE XCAPSL CTRC RMVL INSJ IO LENS PROSTH W/O ECP Left 10/1/2018    Procedure: EXTRACTION EXTRACAPSULAR CATARACT PHACO INTRAOCULAR LENS (IOL);   Surgeon: Jo Ann Blank MD;  Location: [facility] MAIN OR;  Service: Ophthalmology      Family History:     Family History   Problem Relation Age of Onset    Colon cancer Mother     Cancer Father         lung-smoker    Lung cancer Father     Early death Brother     Cancer Brother         "bone cancer"      Social History:     Social History     Socioeconomic History    Marital status: Single     Spouse name: None    Number of children: None    Years of education: None    Highest education level: None   Occupational History    None   Tobacco Use    Smoking status: Never Smoker    Smokeless tobacco: Never Used   Vaping Use    Vaping Use: Never used   Substance and Sexual Activity    Alcohol use: Never    Drug use: Never    Sexual activity: Never   Other Topics Concern    None   Social History Narrative    None     Social Determinants of Health     Financial Resource Strain: Low Risk     Difficulty of Paying Living Expenses: Not hard at all   Food Insecurity: Not on file   Transportation Needs: No Transportation Needs    Lack of Transportation (Medical): No    Lack of Transportation (Non-Medical):  No   Physical Activity: Not on file   Stress: Not on file   Social Connections: Not on file   Intimate Partner Violence: Not on file   Housing Stability: Not on file      Medications and Allergies:     Current Outpatient Medications   Medication Sig Dispense Refill    apixaban (Eliquis) 5 mg Take 1 tablet (5 mg total) by mouth 2 (two) times a day 180 tablet 1    Ascorbic Acid (VITAMIN C PO) Take by mouth in the morning      bisacodyl (DULCOLAX) 10 mg suppository Insert 1 suppository (10 mg total) into the rectum once as needed for constipation (If no bowel movement) for up to 1 dose 12 suppository 0    calcium-vitamin D (OSCAL 500 + D) 500 mg-200 units per tablet Take 1 tablet by mouth daily      cholecalciferol (VITAMIN D3) 400 units tablet Take 400 Units by mouth daily      Continuous Blood Gluc Sensor (FreeStyle [place] 14 Day Sensor) MISC Change sensor every 14 days 6 each 3    Cyanocobalamin (VITAMIN B-12 CR PO) Take by mouth in the morning      docusate sodium (COLACE) 100 mg capsule Take 1 capsule (100 mg total) by mouth 2 (two) times a day (Patient taking differently: Take 100 mg by mouth as needed) 10 capsule 0    folic acid (FOLVITE) 1 mg tablet Take 1 tablet by mouth once daily 90 tablet 0    metFORMIN (GLUCOPHAGE) 500 mg tablet Take 2 tablets (1,000 mg total) by mouth 2 (two) times a day with meals 360 tablet 3    pravastatin (PRAVACHOL) 40 mg tablet Take 1 tablet (40 mg total) by mouth every morning 90 tablet 1    primidone (MYSOLINE) 50 mg tablet Take 2 tablets (100 mg total) by mouth every 12 (twelve) hours 120 tablet 6    propranolol (INDERAL LA) 80 mg 24 hr capsule Take 1 capsule (80 mg total) by mouth daily 30 capsule 5    senna (SENOKOT) 8 6 mg Take 2 tablets (17 2 mg total) by mouth daily at bedtime (Patient taking differently: Take 17 2 mg by mouth as needed) 60 tablet 0    sitaGLIPtin (Januvia) 100 mg tablet Take 1 tablet (100 mg total) by mouth daily 30 tablet 2    tamsulosin (FLOMAX) 0 4 mg Take 0 4 mg by mouth 2 (two) times a day      polyethylene glycol (MIRALAX) 17 g packet Take 17 g by mouth daily as needed (constipation) for up to 14 days (Patient not taking: No sig reported) 14 each 0     No current facility-administered medications for this visit  No Known Allergies   Immunizations:     Immunization History   Administered Date(s) Administered    COVID-19 MODERNA VACC 0 25 ML IM BOOSTER 11/19/2021    COVID-19 PFIZER VACCINE 0 3 ML IM 04/10/2021, 05/01/2021    Pneumococcal Conjugate Vaccine 20-valent (Pcv20), Polysace 09/16/2022      Health Maintenance:         Topic Date Due    Hepatitis C Screening  Never done         Topic Date Due    COVID-19 Vaccine (4 - Booster for Pfizer series) 03/19/2022    Influenza Vaccine (1) 09/01/2022      Medicare Screening Tests and Risk Assessments:     Harsh Richard is here for his Subsequent Wellness visit  Last Medicare Wellness visit information reviewed, patient interviewed and updates made to the record today  Health Risk Assessment:   Patient rates overall health as good  Patient feels that their physical health rating is same  Patient is satisfied with their life  Eyesight was rated as same  Hearing was rated as same  Patient feels that their emotional and mental health rating is same  Patients states they are never, rarely angry  Patient states they are sometimes unusually tired/fatigued  Pain experienced in the last 7 days has been none  Patient states that he has experienced no weight loss or gain in last 6 months  Depression Screening:   PHQ-2 Score: 0      Fall Risk Screening:    In the past year, patient has experienced: no history of falling in past year      Home Safety:  Patient has trouble with stairs inside or outside of their home  Patient has working smoke alarms and has no working carbon monoxide detector  Home safety hazards include: none  Nutrition:   Current diet is Regular  Medications:   Patient is currently taking over-the-counter supplements  OTC medications include: see medication list  Patient is able to manage medications  Activities of Daily Living (ADLs)/Instrumental Activities of Daily Living (IADLs):   Walk and transfer into and out of bed and chair?: Yes  Dress and groom yourself?: Yes    Bathe or shower yourself?: Yes    Feed yourself? Yes  Do your laundry/housekeeping?: Yes  Manage your money, pay your bills and track your expenses?: No  Make your own meals?: Yes    Do your own shopping?: No    Previous Hospitalizations:   Any hospitalizations or ED visits within the last 12 months?: No      Advance Care Planning:   Living will: Yes    Durable POA for healthcare: Yes    Advanced directive: Yes    End of Life Decisions reviewed with patient: No      Cognitive Screening:   Provider or family/friend/caregiver concerned regarding cognition?: No    PREVENTIVE SCREENINGS      Cardiovascular Screening:    General: Screening Current      Diabetes Screening:     General: Screening Not Indicated and History Diabetes      Colorectal Cancer Screening:     General: Screening Not Indicated      Prostate Cancer Screening:    General: History Prostate Cancer and Screening Not Indicated      Osteoporosis Screening:    General: Screening Not Indicated      Abdominal Aortic Aneurysm (AAA) Screening:        General: Screening Not Indicated      Lung Cancer Screening:     General: Screening Not Indicated      Hepatitis C Screening:    General: Screening Not Indicated    Screening, Brief Intervention, and Referral to Treatment (SBIRT)    Screening  Typical number of drinks in a day: 0  Typical number of drinks in a week: 0  Interpretation: Low risk drinking behavior      Single Item Drug Screening:  How often have you used an illegal drug (including marijuana) or a prescription medication for non-medical reasons in the past year? never    Single Item Drug Screen Score: 0  Interpretation: Negative screen for possible drug use disorder    Other Counseling Topics:   Car/seat belt/driving safety and regular weightbearing exercise       No exam data present     Physical Exam:     /70   Pulse 75   Temp 97 5 °F (36 4 °C)   Resp 17   Ht 5' 9" (1 753 m)   Wt 99 9 kg (220 lb 3 2 oz)   SpO2 98%   BMI 32 52 kg/m²     Physical Exam     Margot Rhodes DO

## 2022-09-17 NOTE — PATIENT INSTRUCTIONS
Medicare Preventive Visit Patient Instructions  Thank you for completing your Welcome to Medicare Visit or Medicare Annual Wellness Visit today  Your next wellness visit will be due in one year (9/17/2023)  The screening/preventive services that you may require over the next 5-10 years are detailed below  Some tests may not apply to you based off risk factors and/or age  Screening tests ordered at today's visit but not completed yet may show as past due  Also, please note that scanned in results may not display below  Preventive Screenings:  Service Recommendations Previous Testing/Comments   Colorectal Cancer Screening  · Colonoscopy    · Fecal Occult Blood Test (FOBT)/Fecal Immunochemical Test (FIT)  · Fecal DNA/Cologuard Test  · Flexible Sigmoidoscopy Age: 39-70 years old   Colonoscopy: every 10 years (May be performed more frequently if at higher risk)  OR  FOBT/FIT: every 1 year  OR  Cologuard: every 3 years  OR  Sigmoidoscopy: every 5 years  Screening may be recommended earlier than age 39 if at higher risk for colorectal cancer  Also, an individualized decision between you and your healthcare provider will decide whether screening between the ages of 74-80 would be appropriate   Colonoscopy: Not on file  FOBT/FIT: Not on file  Cologuard: Not on file  Sigmoidoscopy: Not on file    Screening Not Indicated     Prostate Cancer Screening Individualized decision between patient and health care provider in men between ages of 53-78   Medicare will cover every 12 months beginning on the day after your 50th birthday PSA: <0 1 ng/mL     History Prostate Cancer  Screening Not Indicated     Hepatitis C Screening Once for adults born between 1945 and 1965  More frequently in patients at high risk for Hepatitis C Hep C Antibody: Not on file    Screening Not Indicated   Diabetes Screening 1-2 times per year if you're at risk for diabetes or have pre-diabetes Fasting glucose: 197 mg/dL (9/14/2022)  A1C: 7 7 % (9/14/2022)  Screening Not Indicated  History Diabetes   Cholesterol Screening Once every 5 years if you don't have a lipid disorder  May order more often based on risk factors  Lipid panel: 09/14/2022  Screening Current      Other Preventive Screenings Covered by Medicare:  1  Abdominal Aortic Aneurysm (AAA) Screening: covered once if your at risk  You're considered to be at risk if you have a family history of AAA or a male between the age of 73-68 who smoking at least 100 cigarettes in your lifetime  2  Lung Cancer Screening: covers low dose CT scan once per year if you meet all of the following conditions: (1) Age 50-69; (2) No signs or symptoms of lung cancer; (3) Current smoker or have quit smoking within the last 15 years; (4) You have a tobacco smoking history of at least 20 pack years (packs per day x number of years you smoked); (5) You get a written order from a healthcare provider  3  Glaucoma Screening: covered annually if you're considered high risk: (1) You have diabetes OR (2) Family history of glaucoma OR (3)  aged 48 and older OR (3)  American aged 72 and older  3  Osteoporosis Screening: covered every 2 years if you meet one of the following conditions: (1) Have a vertebral abnormality; (2) On glucocorticoid therapy for more than 3 months; (3) Have primary hyperparathyroidism; (4) On osteoporosis medications and need to assess response to drug therapy  5  HIV Screening: covered annually if you're between the age of 12-76  Also covered annually if you are younger than 13 and older than 72 with risk factors for HIV infection  For pregnant patients, it is covered up to 3 times per pregnancy      Immunizations:  Immunization Recommendations   Influenza Vaccine Annual influenza vaccination during flu season is recommended for all persons aged >= 6 months who do not have contraindications   Pneumococcal Vaccine   * Pneumococcal conjugate vaccine = PCV13 (Prevnar 13), PCV15 (Vaxneuvance), PCV20 (Prevnar 20)  * Pneumococcal polysaccharide vaccine = PPSV23 (Pneumovax) Adults 2364 years old: 1-3 doses may be recommended based on certain risk factors  Adults 72 years old: 1-2 doses may be recommended based off what pneumonia vaccine you previously received   Hepatitis B Vaccine 3 dose series if at intermediate or high risk (ex: diabetes, end stage renal disease, liver disease)   Tetanus (Td) Vaccine - COST NOT COVERED BY MEDICARE PART B Following completion of primary series, a booster dose should be given every 10 years to maintain immunity against tetanus  Td may also be given as tetanus wound prophylaxis  Tdap Vaccine - COST NOT COVERED BY MEDICARE PART B Recommended at least once for all adults  For pregnant patients, recommended with each pregnancy  Shingles Vaccine (Shingrix) - COST NOT COVERED BY MEDICARE PART B  2 shot series recommended in those aged 48 and above     Health Maintenance Due:      Topic Date Due    Hepatitis C Screening  Never done     Immunizations Due:      Topic Date Due    COVID-19 Vaccine (4 - Booster for Pfizer series) 03/19/2022    Influenza Vaccine (1) 09/01/2022     Advance Directives   What are advance directives? Advance directives are legal documents that state your wishes and plans for medical care  These plans are made ahead of time in case you lose your ability to make decisions for yourself  Advance directives can apply to any medical decision, such as the treatments you want, and if you want to donate organs  What are the types of advance directives? There are many types of advance directives, and each state has rules about how to use them  You may choose a combination of any of the following:  · Living will: This is a written record of the treatment you want  You can also choose which treatments you do not want, which to limit, and which to stop at a certain time  This includes surgery, medicine, IV fluid, and tube feedings  · Durable power of  for healthcare [facility]): This is a written record that states who you want to make healthcare choices for you when you are unable to make them for yourself  This person, called a proxy, is usually a family member or a friend  You may choose more than 1 proxy  · Do not resuscitate (DNR) order:  A DNR order is used in case your heart stops beating or you stop breathing  It is a request not to have certain forms of treatment, such as CPR  A DNR order may be included in other types of advance directives  · Medical directive: This covers the care that you want if you are in a coma, near death, or unable to make decisions for yourself  You can list the treatments you want for each condition  Treatment may include pain medicine, surgery, blood transfusions, dialysis, IV or tube feedings, and a ventilator (breathing machine)  · Values history: This document has questions about your views, beliefs, and how you feel and think about life  This information can help others choose the care that you would choose  Why are advance directives important? An advance directive helps you control your care  Although spoken wishes may be used, it is better to have your wishes written down  Spoken wishes can be misunderstood, or not followed  Treatments may be given even if you do not want them  An advance directive may make it easier for your family to make difficult choices about your care  Weight Management   Why it is important to manage your weight:  Being overweight increases your risk of health conditions such as heart disease, high blood pressure, type 2 diabetes, and certain types of cancer  It can also increase your risk for osteoarthritis, sleep apnea, and other respiratory problems  Aim for a slow, steady weight loss  Even a small amount of weight loss can lower your risk of health problems    How to lose weight safely:  A safe and healthy way to lose weight is to eat fewer calories and get regular exercise  You can lose up about 1 pound a week by decreasing the number of calories you eat by 500 calories each day  Healthy meal plan for weight management:  A healthy meal plan includes a variety of foods, contains fewer calories, and helps you stay healthy  A healthy meal plan includes the following:  · Eat whole-grain foods more often  A healthy meal plan should contain fiber  Fiber is the part of grains, fruits, and vegetables that is not broken down by your body  Whole-grain foods are healthy and provide extra fiber in your diet  Some examples of whole-grain foods are whole-wheat breads and pastas, oatmeal, brown rice, and bulgur  · Eat a variety of vegetables every day  Include dark, leafy greens such as spinach, kale, rafael greens, and mustard greens  Eat yellow and orange vegetables such as carrots, sweet potatoes, and winter squash  · Eat a variety of fruits every day  Choose fresh or canned fruit (canned in its own juice or light syrup) instead of juice  Fruit juice has very little or no fiber  · Eat low-fat dairy foods  Drink fat-free (skim) milk or 1% milk  Eat fat-free yogurt and low-fat cottage cheese  Try low-fat cheeses such as mozzarella and other reduced-fat cheeses  · Choose meat and other protein foods that are low in fat  Choose beans or other legumes such as split peas or lentils  Choose fish, skinless poultry (chicken or turkey), or lean cuts of red meat (beef or pork)  Before you cook meat or poultry, cut off any visible fat  · Use less fat and oil  Try baking foods instead of frying them  Add less fat, such as margarine, sour cream, regular salad dressing and mayonnaise to foods  Eat fewer high-fat foods  Some examples of high-fat foods include french fries, doughnuts, ice cream, and cakes  · Eat fewer sweets  Limit foods and drinks that are high in sugar  This includes candy, cookies, regular soda, and sweetened drinks    Exercise:  Exercise at least 30 minutes per day on most days of the week  Some examples of exercise include walking, biking, dancing, and swimming  You can also fit in more physical activity by taking the stairs instead of the elevator or parking farther away from stores  Ask your healthcare provider about the best exercise plan for you  © Copyright [employer] 2018 Information is for End User's use only and may not be sold, redistributed or otherwise used for commercial purposes   All illustrations and images included in CareNotes® are the copyrighted property of [employer]  or [address] [place] [address] [email or web address]

## 2022-09-20 LAB
ALBUMIN SERPL ELPH-MCNC: 4.32 G/DL (ref 3.5–5)
ALBUMIN SERPL ELPH-MCNC: 64.5 % (ref 52–65)
ALPHA1 GLOB SERPL ELPH-MCNC: 0.27 G/DL (ref 0.1–0.4)
ALPHA1 GLOB SERPL ELPH-MCNC: 4 % (ref 2.5–5)
ALPHA2 GLOB SERPL ELPH-MCNC: 0.92 G/DL (ref 0.4–1.2)
ALPHA2 GLOB SERPL ELPH-MCNC: 13.8 % (ref 7–13)
BETA GLOB ABNORMAL SERPL ELPH-MCNC: 0.31 G/DL (ref 0.4–0.8)
BETA1 GLOB SERPL ELPH-MCNC: 4.7 % (ref 5–13)
BETA2 GLOB SERPL ELPH-MCNC: 4.1 % (ref 2–8)
BETA2+GAMMA GLOB SERPL ELPH-MCNC: 0.27 G/DL (ref 0.2–0.5)
GAMMA GLOB ABNORMAL SERPL ELPH-MCNC: 0.6 G/DL (ref 0.5–1.6)
GAMMA GLOB SERPL ELPH-MCNC: 8.9 % (ref 12–22)
IGG/ALB SER: 1.82 {RATIO} (ref 1.1–1.8)
PROT PATTERN SERPL ELPH-IMP: ABNORMAL
PROT SERPL-MCNC: 6.7 G/DL (ref 6.4–8.2)

## 2022-09-20 PROCEDURE — 84165 PROTEIN E-PHORESIS SERUM: CPT | Performed by: PATHOLOGY

## 2022-10-04 ENCOUNTER — OFFICE VISIT (OUTPATIENT)
Dept: HEMATOLOGY ONCOLOGY | Facility: MEDICAL CENTER | Age: 77
End: 2022-10-04
Payer: MEDICARE

## 2022-10-04 VITALS
TEMPERATURE: 98 F | HEART RATE: 78 BPM | DIASTOLIC BLOOD PRESSURE: 64 MMHG | HEIGHT: 69 IN | RESPIRATION RATE: 20 BRPM | SYSTOLIC BLOOD PRESSURE: 122 MMHG | BODY MASS INDEX: 32.58 KG/M2 | WEIGHT: 220 LBS

## 2022-10-04 DIAGNOSIS — D47.2 MGUS (MONOCLONAL GAMMOPATHY OF UNKNOWN SIGNIFICANCE): ICD-10-CM

## 2022-10-04 DIAGNOSIS — C61 PROSTATE CANCER (HCC): Primary | ICD-10-CM

## 2022-10-04 DIAGNOSIS — D64.9 NORMOCYTIC ANEMIA: ICD-10-CM

## 2022-10-04 DIAGNOSIS — D68.51 HETEROZYGOUS FACTOR V LEIDEN MUTATION (HCC): ICD-10-CM

## 2022-10-04 DIAGNOSIS — I26.99 BILATERAL PULMONARY EMBOLISM (HCC): ICD-10-CM

## 2022-10-04 PROCEDURE — 99214 OFFICE O/P EST MOD 30 MIN: CPT | Performed by: PHYSICIAN ASSISTANT

## 2022-10-04 NOTE — PROGRESS NOTES
[address] HEMATOLOGY ONCOLOGY Diane Singh  [address] [facility] [address]  Oncology Progress Note  Ventura Almendarez 1945, [id number]  10/4/2022    Assessment/Plan:   1  Prostate cancer ([facility] [address] )  PSA <0 1; last Eligard on 10/4/2022 through Dr Amelia Rodgers  Adenocarcinoma [place] patter 4 + 4, grade group 4, on several bx specimen, S/P TURP  Discovered via elevated PSA and confirmation on Bx  Work up included old fracture on bone scan and CTA Chest and CT AP, negative for systemic disease  Patient continues to do well on hormonal blockade  Patient sees urology every four months  We discussed elongating her follow-ups to every six months since the patient's PSA is stable  No additional complaints  We will follow up with the patient in six months  Patient and his sister understand to call the office with any questions or concerns  Regimen:  Eligard Via Urology Dr Amelia Rodgers  PSA screening Q 6 months      - PSA Total, Diagnostic; Future  - CBC and differential; Future  - Comprehensive metabolic panel; Future  - Iron Panel (Includes Ferritin, Iron Sat%, Iron, and TIBC); Future    2  Normocytic anemia  Stable at 10 9g/dL  Follow up with blood work in 6 months  - CBC and differential; Future  - Iron Panel (Includes Ferritin, Iron Sat%, Iron, and TIBC); Future    3  Bilateral pulmonary embolism ([facility] [address] )  4  Heterozygous factor V Leiden mutation ([facility] )  Continue on Anticoagulation - Elqius 5 mg     5  MGUS (monoclonal gammopathy of unknown significance)  Faint Band on IF 10/14/2021  Now resolved  ? Acute kidney dysfunction    Repeat lab work noted on 9/16/22 did not demonstrate any significant monoclonal gammopathy these within normal free light chain ratio  No additional follow-up is necessary  The patient is scheduled for follow-up in approximately 6 months with Dr Miranda Matos  Patient voiced agreement and understanding to the above     Patient knows to call the Hematology/Oncology office with any questions and concerns regarding the above  Goals and Barriers:    Current Goal:   Prolong Survival from Cancer  Barriers: None  Patient's Capacity to Self Care:  Patient able to self care  Elizabeth Rojas PA-C  Medical Oncology/Hematology  [address]  ______________________________________________________________________________________________________________    Subjective     Chief Complaint   Patient presents with    Follow-up     Prostate cancer        History of present illness/Cancer History: This is a 75-year-old male with past medical history of diabetes mellitus, hyperlipidemia, hypertension, BPH and edema who presented to the emergency room on 7/18/21 secondary to a syncopal episode      7/18/2021:  CTA findings included Multiple segmental and subsegmental filling defects suspicious for pulmonary emboli bilaterally +  Prominence of the right ventricle suggesting right heart strain  +  Nodular contour of the liver, may suggest a component of hepatic cirrhosis    +  Coronary atherosclerosis, and other findings as above      7/18/2021:  Lower extremity VAS:  RIGHT LOWER LIMB:  There is evidence of acute occlusive chronic deep vein thrombosis noted in one  of the gastrocnemius veins in the proximal calf  No evidence of superficial thrombophlebitis noted  Doppler evaluation shows a normal response to augmentation maneuvers  Popliteal, posterior tibial and anterior tibial arterial Doppler waveforms are  triphasic/biphasic      LEFT LOWER LIMB:  No evidence of acute or chronic deep vein thrombosis  No evidence of superficial thrombophlebitis noted  Doppler evaluation shows a normal response to augmentation maneuvers    Popliteal, posterior tibial and anterior tibial arterial Doppler waveforms are  triphasic/biphasic         Earlier this month this patient went to his primary doctor and to the urologist   Patient's PSA was elevated at 76   Patient had been recommended to follow-up with additional imaging tests to overall out metastatic spread   Patient was also found to be somewhat anemic   Workup did not demonstrate iron deficiency but did demonstrate anemia of chronic inflammation      Oncology work up during hospitalization:  7/19/2021  NM bone Scan : 1   Small focus of mild activity in a left mid lateral rib is nonspecific but possibly from old trauma   This may be reassessed on follow-up exam   2   Otherwise no scintigraphic evidence of osseous metastasis      7/19/2021: CT AP  Marked diffuse bladder wall thickening   Flores catheter present   Minimal fullness of the ureters left greater than right   Prostate appears within normal limits   Stable mild bibasilar atelectasis   Mild bilateral symmetric gynecomastia      Concomitant B12 deficiency was found patient was given IM supplementations      Prothrombin mutation = negative  Factor V Leiden =  Heterozygous       9/25/2021 MRI Prostate wo and w contrast  1  PI-RADSv2 1 Category 5 - Very high (clinically significant cancer is highly likely to be present)   Large extensive lesion measuring 4 3 x 3 4 x 3 6 cm nearly involving the entire prostate gland from base to apex, with predominant involvement of the entire left transitional zone, large portions of right transitional zone, left anterior peripheral zone and region of AFMS  2  Broad capsular bulge with approximately 3-4 mm extra prostatic extension at the prostate mid gland to apex anteriorly  3   No seminal vesicle invasion, pelvic lymphadenopathy, or pelvic osseous metastatic disease  4  Calculated prostate volume of 58 cc   PSAD= 1 18 ng/ml^2      Eligard started in late 2021 9/14/2022:  WBC = 5 5, hemoglobin 10 9, MCV 88, RDW 14 3, platelet count 750, differential WNL, EGFR 66 creatinine = 1 06, BUN 18, LFTs = WNL, calcium 8 9, albumin 3 5; SPEP did not demonstrate a monoclonal band, beta two microglobulin = 2 2, IgA = 848, IgA 134, IgM 38, K/L ratio = 0 34, lambda = 79, kappa 27, iron saturation = 26% TIBC 293, ferritin = 34, PSA = less than 0 1      Interval history:  Doing well  No specific complaints  Presents with one of 4 sisters     ECO - Asymptomatic    Review of Systems   Constitutional: Negative for activity change, appetite change, fatigue and fever  HENT: Negative for nosebleeds  Respiratory: Negative for cough, choking and shortness of breath  Cardiovascular: Positive for leg swelling (at baseline)  Negative for chest pain and palpitations  Gastrointestinal: Negative for abdominal distention, abdominal pain, anal bleeding, blood in stool, constipation, diarrhea, nausea and vomiting  Endocrine: Negative for cold intolerance  Genitourinary: Negative for hematuria  Musculoskeletal: Positive for gait problem  Negative for myalgias  Skin: Negative for color change, pallor and rash  Allergic/Immunologic: Negative for immunocompromised state  Neurological: Negative for headaches  Hematological: Negative for adenopathy  Does not bruise/bleed easily  All other systems reviewed and are negative        Current Outpatient Medications:     apixaban (Eliquis) 5 mg, Take 1 tablet (5 mg total) by mouth 2 (two) times a day, Disp: 180 tablet, Rfl: 1    Ascorbic Acid (VITAMIN C PO), Take by mouth in the morning, Disp: , Rfl:     bisacodyl (DULCOLAX) 10 mg suppository, Insert 1 suppository (10 mg total) into the rectum once as needed for constipation (If no bowel movement) for up to 1 dose, Disp: 12 suppository, Rfl: 0    calcium-vitamin D (OSCAL 500 + D) 500 mg-200 units per tablet, Take 1 tablet by mouth daily, Disp: , Rfl:     cholecalciferol (VITAMIN D3) 400 units tablet, Take 400 Units by mouth daily, Disp: , Rfl:     Continuous Blood Gluc Sensor (FreeStyle [place] 14 Day Sensor) MISC, Change sensor every 14 days, Disp: 6 each, Rfl: 3    Cyanocobalamin (VITAMIN B-12 CR PO), Take by mouth in the morning, Disp: , Rfl:     docusate sodium (COLACE) 100 mg capsule, Take 1 capsule (100 mg total) by mouth 2 (two) times a day (Patient taking differently: Take 100 mg by mouth as needed), Disp: 10 capsule, Rfl: 0    folic acid (FOLVITE) 1 mg tablet, Take 1 tablet by mouth once daily, Disp: 90 tablet, Rfl: 0    pravastatin (PRAVACHOL) 40 mg tablet, Take 1 tablet (40 mg total) by mouth every morning, Disp: 90 tablet, Rfl: 1    primidone (MYSOLINE) 50 mg tablet, Take 2 tablets (100 mg total) by mouth every 12 (twelve) hours, Disp: 120 tablet, Rfl: 6    propranolol (INDERAL LA) 80 mg 24 hr capsule, Take 1 capsule (80 mg total) by mouth daily, Disp: 30 capsule, Rfl: 5    sitaGLIPtin (Januvia) 100 mg tablet, Take 1 tablet (100 mg total) by mouth daily, Disp: 30 tablet, Rfl: 2    tamsulosin (FLOMAX) 0 4 mg, Take 0 4 mg by mouth 2 (two) times a day, Disp: , Rfl:     metFORMIN (GLUCOPHAGE) 500 mg tablet, Take 2 tablets (1,000 mg total) by mouth 2 (two) times a day with meals, Disp: 360 tablet, Rfl: 3    polyethylene glycol (MIRALAX) 17 g packet, Take 17 g by mouth daily as needed (constipation) for up to 14 days (Patient not taking: No sig reported), Disp: 14 each, Rfl: 0    senna (SENOKOT) 8 6 mg, Take 2 tablets (17 2 mg total) by mouth daily at bedtime (Patient taking differently: Take 17 2 mg by mouth as needed), Disp: 60 tablet, Rfl: 0  No Known Allergies    Advance Directive and Living Will:            Objective   /64 (BP Location: Right arm, Patient Position: Sitting, Cuff Size: Large)   Pulse 78   Temp 98 °F (36 7 °C)   Resp 20   Ht 5' 9" (1 753 m)   Wt 99 8 kg (220 lb)   BMI 32 49 kg/m²   Wt Readings from Last 6 Encounters:   10/04/22 99 8 kg (220 lb)   09/16/22 99 9 kg (220 lb 3 2 oz)   08/12/22 103 kg (226 lb)   07/26/22 103 kg (226 lb)   07/13/22 102 kg (225 lb)   06/20/22 99 6 kg (219 lb 9 6 oz)       Physical Exam  Constitutional:       General: He is not in acute distress       Appearance: He is well-developed  He is obese  HENT:      Head: Normocephalic and atraumatic  Right Ear: External ear normal       Left Ear: External ear normal       Nose: Nose normal    Eyes:      General: No scleral icterus  Conjunctiva/sclera: Conjunctivae normal    Cardiovascular:      Rate and Rhythm: Normal rate and regular rhythm  Heart sounds: No murmur heard  Pulmonary:      Effort: No respiratory distress  Abdominal:      General: There is no distension  Palpations: Abdomen is soft  Musculoskeletal:      Right lower leg: Edema present  Left lower leg: Edema present  Skin:     Findings: No rash (on exposed skin  )  Neurological:      Mental Status: He is alert and oriented to person, place, and time  Psychiatric:         Thought Content:  Thought content normal          Pertinent Laboratory Results and Imaging Review:  Lab Results   Component Value Date    WBC 5 50 09/14/2022    HGB 10 9 (L) 09/14/2022    HCT 34 9 (L) 09/14/2022    MCV 88 09/14/2022     09/14/2022     Lab Results   Component Value Date    SODIUM 140 09/14/2022    K 4 9 09/14/2022     09/14/2022    CO2 32 09/14/2022    AGAP 7 09/14/2022    BUN 18 09/14/2022    CREATININE 1 06 09/14/2022    GLUC 250 (H) 11/19/2021    GLUF 197 (H) 09/14/2022    CALCIUM 8 9 09/14/2022    AST 13 09/14/2022    ALT 20 09/14/2022    ALKPHOS 67 09/14/2022    TP 7 0 09/14/2022    TP 6 7 09/14/2022    TBILI 0 27 09/14/2022    EGFR 66 09/14/2022         The following historical data was reviewed:  Past Medical History:   Diagnosis Date    Arthritis     BPH associated with nocturia     Diabetes mellitus ([address] )     Edema     lower legs    Hearing aid worn     bilateral    Hyperlipidemia     Hypertension     controlled    Tremor of both hands      Past Surgical History:   Procedure Laterality Date    CATARACT EXTRACTION      RI TRANSURETHRAL ELEC-SURG PROSTATECTOM N/A 1/20/2022    Procedure: CYSTOSCOPY, TRANSURETHRAL RESECTION OF PROSTATE (TURP); Surgeon: Dennys Rodriguez MD;  Location: [address] [place] [address];  Service: Urology    SD XCAPSL CTRC RMVL INSJ IO LENS PROSTH W/O ECP Right 8/6/2018    Procedure: EXTRACTION EXTRACAPSULAR CATARACT PHACO INTRAOCULAR LENS (IOL); Surgeon: Blair Alcantara MD;  Location: [facility] MAIN OR;  Service: Ophthalmology    SD XCAPSL CTRC RMVL INSJ IO LENS PROSTH W/O ECP Left 10/1/2018    Procedure: EXTRACTION EXTRACAPSULAR CATARACT PHACO INTRAOCULAR LENS (IOL); Surgeon: Blair Alcantara MD;  Location: [facility] MAIN OR;  Service: Ophthalmology     Social History     Socioeconomic History    Marital status: Single     Spouse name: None    Number of children: None    Years of education: None    Highest education level: None   Occupational History    None   Tobacco Use    Smoking status: Never Smoker    Smokeless tobacco: Never Used   Vaping Use    Vaping Use: Never used   Substance and Sexual Activity    Alcohol use: Never    Drug use: Never    Sexual activity: Never   Other Topics Concern    None   Social History Narrative    None     Social Determinants of Health     Financial Resource Strain: Low Risk     Difficulty of Paying Living Expenses: Not hard at all   Food Insecurity: Not on file   Transportation Needs: No Transportation Needs    Lack of Transportation (Medical): No    Lack of Transportation (Non-Medical): No   Physical Activity: Not on file   Stress: Not on file   Social Connections: Not on file   Intimate Partner Violence: Not on file   Housing Stability: Not on file     Family History   Problem Relation Age of Onset    Colon cancer Mother     Cancer Father         lung-smoker    Lung cancer Father     Early death Brother     Cancer Brother         "bone cancer"       Please note: This report has been generated by a voice recognition software system  Therefore there may be syntax, spelling, and/or grammatical errors  Please call if you have any questions

## 2022-10-12 PROBLEM — Z00.00 MEDICARE ANNUAL WELLNESS VISIT, SUBSEQUENT: Status: RESOLVED | Noted: 2021-09-01 | Resolved: 2022-10-12

## 2022-10-12 PROBLEM — N30.01 ACUTE CYSTITIS WITH HEMATURIA: Status: RESOLVED | Noted: 2021-07-04 | Resolved: 2022-10-12

## 2022-10-12 PROBLEM — N39.0 URINARY TRACT INFECTION WITHOUT HEMATURIA: Status: RESOLVED | Noted: 2021-07-18 | Resolved: 2022-10-12

## 2022-11-14 ENCOUNTER — TELEPHONE (OUTPATIENT)
Dept: NEUROLOGY | Facility: CLINIC | Age: 77
End: 2022-11-14

## 2022-11-14 NOTE — TELEPHONE ENCOUNTER
Leonides, this is Vandana Sun calling for my brother Sammi Rodriguez,  41  He was given a prescription  primidone 50 milligrams to take 2 a day  And then when he was there last, I guess the doctor changed it to 2 times a day  I'm just calling to verify if that is correct  And if you could please give me a call back  Do not put a message in chart  I would like a phone call back [phone]  I am the one that distributes his pills  And I would like to know what I'm supposed to be doing  As I was not aware that it was changed the 2 tablets twice today  It is Monday, the   If you could give me a call back  Thank you   Bye

## 2022-11-14 NOTE — TELEPHONE ENCOUNTER
Called pt's sister Regi Lisa and confirmed that pt is to be taking Primidone 100 mg po twice daily, wich was increased in his last OV- 7/26/22,pt's sister verbalized understanding,stated that she just wanted to be sure

## 2022-11-22 DIAGNOSIS — E11.65 TYPE 2 DIABETES MELLITUS WITH HYPERGLYCEMIA, WITHOUT LONG-TERM CURRENT USE OF INSULIN (HCC): ICD-10-CM

## 2022-11-23 RX ORDER — SITAGLIPTIN 100 MG/1
TABLET, FILM COATED ORAL
Qty: 30 TABLET | Refills: 0 | Status: SHIPPED | OUTPATIENT
Start: 2022-11-23

## 2022-12-01 NOTE — ASSESSMENT & PLAN NOTE
Chronic in nature, at baseline with hemoglobin 9 8 on admission  Remains relatively stable, no evidence of overt bleeding  Recent iron study noted, iron sat 18%  Follow-up B12, folate  Continue to monitor on anticoagulation Detail Level: Zone

## 2022-12-08 DIAGNOSIS — D64.9 ANEMIA, UNSPECIFIED TYPE: ICD-10-CM

## 2022-12-08 RX ORDER — FOLIC ACID 1 MG/1
TABLET ORAL
Qty: 90 TABLET | Refills: 1 | Status: SHIPPED | OUTPATIENT
Start: 2022-12-08

## 2022-12-20 ENCOUNTER — OFFICE VISIT (OUTPATIENT)
Dept: ENDOCRINOLOGY | Facility: CLINIC | Age: 77
End: 2022-12-20

## 2022-12-20 VITALS
HEIGHT: 69 IN | WEIGHT: 223 LBS | DIASTOLIC BLOOD PRESSURE: 70 MMHG | BODY MASS INDEX: 33.03 KG/M2 | SYSTOLIC BLOOD PRESSURE: 120 MMHG | HEART RATE: 74 BPM

## 2022-12-20 DIAGNOSIS — E78.5 DYSLIPIDEMIA: ICD-10-CM

## 2022-12-20 DIAGNOSIS — E11.65 TYPE 2 DIABETES MELLITUS WITH HYPERGLYCEMIA, WITHOUT LONG-TERM CURRENT USE OF INSULIN (HCC): Primary | ICD-10-CM

## 2022-12-20 LAB — SL AMB POCT HEMOGLOBIN AIC: 7.6 (ref ?–6.5)

## 2022-12-20 PROCEDURE — 83036 HEMOGLOBIN GLYCOSYLATED A1C: CPT

## 2022-12-20 NOTE — PROGRESS NOTES
12/20/2022    Assessment/Plan      Diagnoses and all orders for this visit:    Type 2 diabetes mellitus with hyperglycemia, without long-term current use of insulin (HCC)  -     Hemoglobin A1C; Future  -     Comprehensive metabolic panel; Future  -     Lipid Panel with Direct LDL reflex; Future  -     CBC and differential; Future  -     Microalbumin / creatinine urine ratio; Future  -     TSH, 3rd generation; Future  -     metFORMIN (GLUCOPHAGE) 500 mg tablet; Take 2 tablets (1,000 mg total) by mouth 2 (two) times a day with meals  -     sitaGLIPtin (Januvia) 100 mg tablet; Take 1 tablet (100 mg total) by mouth daily    Dyslipidemia  -     Hemoglobin A1C; Future  -     Comprehensive metabolic panel; Future  -     Lipid Panel with Direct LDL reflex; Future  -     CBC and differential; Future  -     Microalbumin / creatinine urine ratio; Future  -     TSH, 3rd generation; Future        Assessment/Plan:  #1 type 2 diabetes: A1c has improved  We will continue current regimen for now  He acknowledges some improvements in diet that could be made such as with recent increase in fruit consumption  Otherwise given improvement in A1c continue current regimen and repeat labs prior to next point with me in 3-4 months  2   Hyperlipidemia: Continue statin  CC: Diabetes follow-up    History of Present Illness     HPI: Alison Rivera is a 76y o  year old male with type 2 diabetes who presents for a follow up appt  He is on oral agents at home and takes Januvia 100 mg daily, metformin 1000 mg twice daily  He denies any polyuria, polydipsia, nocturia and blurry vision  DM is complicated by CKD  Hypoglycemic episodes: No      The patient's last eye exam was over the summer per pt but we do not have this report  Reports diagnosis of retinopathy and injections were recommended but he had preferred to monitor without injections the patient's last foot exam was in March 2022      Blood Sugar/Glucometer/Pump/CGM review: Blood sugars checked about 3-4 times daily are typically in the mid 100s with occasional values above 200 with no consistent time is typically related to diet  He has a history of hyperlipidemia treated with pravastatin  Review of Systems   Constitutional: Negative for fatigue  HENT: Negative for trouble swallowing and voice change  Eyes: Negative for visual disturbance  Respiratory: Negative for shortness of breath  Cardiovascular: Negative for palpitations and leg swelling  Gastrointestinal: Negative for abdominal pain, nausea and vomiting  Endocrine: Negative for polydipsia and polyuria  Musculoskeletal: Negative for arthralgias and myalgias  Skin: Negative for rash  Neurological: Negative for dizziness, tremors and weakness  Hematological: Negative for adenopathy  Psychiatric/Behavioral: Negative for agitation and confusion  Historical Information   Past Medical History:   Diagnosis Date   • Arthritis    • BPH associated with nocturia    • Diabetes mellitus ([facility] [address] )    • Edema     lower legs   • Hearing aid worn     bilateral   • Hyperlipidemia    • Hypertension     controlled   • Tremor of both hands      Past Surgical History:   Procedure Laterality Date   • CATARACT EXTRACTION     • KY TRANSURETHRAL ELEC-SURG PROSTATECTOM N/A 1/20/2022    Procedure: CYSTOSCOPY, TRANSURETHRAL RESECTION OF PROSTATE (TURP); Surgeon: Isela Olvera MD;  Location: [address] [place] [address];  Service: Urology   • KY XCAPSL CTRC RMVL INSJ IO LENS PROSTH W/O ECP Right 8/6/2018    Procedure: EXTRACTION EXTRACAPSULAR CATARACT PHACO INTRAOCULAR LENS (IOL); Surgeon: Yary Armendariz MD;  Location: [facility] OR;  Service: Ophthalmology   • [place] XCAPSL CTRC RMVL INSJ IO LENS PROSTH W/O ECP Left 10/1/2018    Procedure: EXTRACTION EXTRACAPSULAR CATARACT PHACO INTRAOCULAR LENS (IOL);   Surgeon: Yary Armendariz MD;  Location: [facility] OR;  Service: Ophthalmology     Social History   Social History     Substance and Sexual Activity   Alcohol Use Never     Social History     Substance and Sexual Activity   Drug Use Never     Social History     Tobacco Use   Smoking Status Never   Smokeless Tobacco Never     Family History:   Family History   Problem Relation Age of Onset   • Colon cancer Mother    • Cancer Father         lung-smoker   • Lung cancer Father    • Early death Brother    • Cancer Brother         "bone cancer"       Meds/Allergies   Current Outpatient Medications   Medication Sig Dispense Refill   • apixaban (Eliquis) 5 mg Take 1 tablet (5 mg total) by mouth 2 (two) times a day 180 tablet 1   • Ascorbic Acid (VITAMIN C PO) Take by mouth in the morning     • bisacodyl (DULCOLAX) 10 mg suppository Insert 1 suppository (10 mg total) into the rectum once as needed for constipation (If no bowel movement) for up to 1 dose 12 suppository 0   • calcium-vitamin D (OSCAL 500 + D) 500 mg-200 units per tablet Take 1 tablet by mouth daily     • cholecalciferol (VITAMIN D3) 400 units tablet Take 400 Units by mouth daily     • Continuous Blood Gluc Sensor (FreeStyle [place] 14 Day Sensor) MISC Change sensor every 14 days 6 each 3   • Cyanocobalamin (VITAMIN B-12 CR PO) Take by mouth in the morning     • docusate sodium (COLACE) 100 mg capsule Take 1 capsule (100 mg total) by mouth 2 (two) times a day (Patient taking differently: Take 100 mg by mouth as needed) 10 capsule 0   • folic acid (FOLVITE) 1 mg tablet Take 1 tablet by mouth once daily 90 tablet 1   • metFORMIN (GLUCOPHAGE) 500 mg tablet Take 2 tablets (1,000 mg total) by mouth 2 (two) times a day with meals 360 tablet 5   • pravastatin (PRAVACHOL) 40 mg tablet Take 1 tablet (40 mg total) by mouth every morning 90 tablet 1   • primidone (MYSOLINE) 50 mg tablet Take 2 tablets (100 mg total) by mouth every 12 (twelve) hours 120 tablet 6   • propranolol (INDERAL LA) 80 mg 24 hr capsule Take 1 capsule (80 mg total) by mouth daily 30 capsule 5   • senna (SENOKOT) 8 6 mg Take 2 tablets (17 2 mg total) by mouth daily at bedtime (Patient taking differently: Take 17 2 mg by mouth as needed) 60 tablet 0   • sitaGLIPtin (Januvia) 100 mg tablet Take 1 tablet (100 mg total) by mouth daily 30 tablet 5   • tamsulosin (FLOMAX) 0 4 mg Take 0 4 mg by mouth 2 (two) times a day     • polyethylene glycol (MIRALAX) 17 g packet Take 17 g by mouth daily as needed (constipation) for up to 14 days (Patient not taking: No sig reported) 14 each 0     No current facility-administered medications for this visit  No Known Allergies    Objective   Vitals: Blood pressure 120/70, pulse 74, height 5' 9" (1 753 m), weight 101 kg (223 lb)  Invasive Devices     Drain  Duration           Urethral Catheter Three way 22 Fr  334 days                Physical Exam  Constitutional:       General: He is not in acute distress  Appearance: He is well-developed  He is not diaphoretic  HENT:      Head: Normocephalic and atraumatic  Eyes:      Conjunctiva/sclera: Conjunctivae normal       Pupils: Pupils are equal, round, and reactive to light  Neck:      Thyroid: No thyromegaly  Cardiovascular:      Rate and Rhythm: Normal rate and regular rhythm  Pulmonary:      Effort: Pulmonary effort is normal  No respiratory distress  Breath sounds: Normal breath sounds  Abdominal:      General: Bowel sounds are normal       Palpations: Abdomen is soft  Musculoskeletal:         General: Normal range of motion  Cervical back: Normal range of motion and neck supple  Skin:     General: Skin is warm and dry  Findings: No rash  Neurological:      Mental Status: He is alert and oriented to person, place, and time  Motor: No abnormal muscle tone  Psychiatric:         Behavior: Behavior normal          The history was obtained from the review of the chart and from the patient      Lab Results:    Most recent Alc is  Lab Results   Component Value Date    HGBA1C 7 6 (A) 12/20/2022           No components found for: HA1C  No components found for: GLU    Lab Results   Component Value Date    CREATININE 1 06 09/14/2022    CREATININE 1 00 06/29/2022    CREATININE 1 03 05/19/2022    BUN 18 09/14/2022    K 4 9 09/14/2022     09/14/2022    CO2 32 09/14/2022     eGFR   Date Value Ref Range Status   09/14/2022 66 ml/min/1 73sq m Final     No components found for: [facility]    Lab Results   Component Value Date    HDL 39 (L) 09/14/2022    TRIG 184 (H) 09/14/2022       Lab Results   Component Value Date    ALT 20 09/14/2022    AST 13 09/14/2022    ALKPHOS 67 09/14/2022       Lab Results   Component Value Date    FREET4 0 83 06/29/2022           Future Appointments   Date Time Provider Amelia Chelsea   1/26/2023 10:30 AM Meghan Galicia MD NEURO [facility]   2/6/2023 11:20 AM Kim Ventura MD CARD [facility]   4/17/2023 11:00 AM Lukas Phillip MD HEM ONC [facility]       Portions of the record may have been created with voice recognition software  Occasional wrong word or "sound a like" substitutions may have occurred due to the inherent limitations of voice recognition software  Read the chart carefully and recognize, using context, where substitutions have occurred

## 2023-01-26 ENCOUNTER — OFFICE VISIT (OUTPATIENT)
Dept: NEUROLOGY | Facility: CLINIC | Age: 78
End: 2023-01-26

## 2023-01-26 VITALS
HEIGHT: 69 IN | WEIGHT: 228 LBS | DIASTOLIC BLOOD PRESSURE: 77 MMHG | OXYGEN SATURATION: 92 % | BODY MASS INDEX: 33.77 KG/M2 | SYSTOLIC BLOOD PRESSURE: 175 MMHG | HEART RATE: 77 BPM | TEMPERATURE: 96.3 F

## 2023-01-26 DIAGNOSIS — G25.0 TREMOR, ESSENTIAL: Primary | ICD-10-CM

## 2023-01-26 RX ORDER — PROPRANOLOL HYDROCHLORIDE 120 MG/1
120 CAPSULE, EXTENDED RELEASE ORAL
Qty: 30 CAPSULE | Refills: 5 | Status: SHIPPED | OUTPATIENT
Start: 2023-01-26

## 2023-01-26 NOTE — PROGRESS NOTES
Return NeuroOutpatient Note        La Rey  [id number]  81 y o   1945       Tremor, essential        History obtained from:  Patient and sister     HPI/Subjective:    La Rey is a 75 yo M with PMH of DM II, tremors presents as f/u  Per my previous history, he started noticing hand tremors 3-4 years ago  He was seen by Dr Ever Weiss and was placed on primidone but since pandemic never went back for refill so he wasn't taking it for past 1-2 years  We had restarted it and he's now at 100mg bid and still it hasn't quite improved       When he thinks about it, his tremor is worse  Holding utensils, screw  can be difficult  His hand writing is poor  There is no resting tremor  He's also on propranolol 80mg daily          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  He has very bad knees but doesn't want replacement  Past Medical History:   Diagnosis Date   • Arthritis    • BPH associated with nocturia    • Diabetes mellitus ([facility] [address] )    • Edema     lower legs   • Hearing aid worn     bilateral   • Hyperlipidemia    • Hypertension     controlled   • Tremor of both hands      Social History     Socioeconomic History   • Marital status: Single     Spouse name: Not on file   • Number of children: Not on file   • Years of education: Not on file   • Highest education level: Not on file   Occupational History   • Not on file   Tobacco Use   • Smoking status: Never   • Smokeless tobacco: Never   Vaping Use   • Vaping Use: Never used   Substance and Sexual Activity   • Alcohol use: Never   • Drug use: Never   • Sexual activity: Never   Other Topics Concern   • Not on file   Social History Narrative   • Not on file     Social Determinants of Health     Financial Resource Strain: Low Risk    • Difficulty of Paying Living Expenses: Not hard at all   Food Insecurity: Not on file   Transportation Needs: No Transportation Needs   • Lack of Transportation (Medical):  No   • Lack of Transportation (Non-Medical):  No   Physical Activity: Not on file   Stress: Not on file   Social Connections: Not on file   Intimate Partner Violence: Not on file   Housing Stability: Not on file     Family History   Problem Relation Age of Onset   • Colon cancer Mother    • Cancer Father         lung-smoker   • Lung cancer Father    • Early death Brother    • Cancer Brother         "bone cancer"     No Known Allergies  Current Outpatient Medications on File Prior to Visit   Medication Sig Dispense Refill   • apixaban (Eliquis) 5 mg Take 1 tablet (5 mg total) by mouth 2 (two) times a day 180 tablet 1   • Ascorbic Acid (VITAMIN C PO) Take by mouth in the morning     • bisacodyl (DULCOLAX) 10 mg suppository Insert 1 suppository (10 mg total) into the rectum once as needed for constipation (If no bowel movement) for up to 1 dose 12 suppository 0   • calcium-vitamin D (OSCAL 500 + D) 500 mg-200 units per tablet Take 1 tablet by mouth daily     • cholecalciferol (VITAMIN D3) 400 units tablet Take 400 Units by mouth daily     • Continuous Blood Gluc Sensor (FreeStyle [place] 14 Day Sensor) MISC Change sensor every 14 days 6 each 3   • Cyanocobalamin (VITAMIN B-12 CR PO) Take by mouth in the morning     • docusate sodium (COLACE) 100 mg capsule Take 1 capsule (100 mg total) by mouth 2 (two) times a day (Patient taking differently: Take 100 mg by mouth as needed) 10 capsule 0   • folic acid (FOLVITE) 1 mg tablet Take 1 tablet by mouth once daily 90 tablet 1   • metFORMIN (GLUCOPHAGE) 500 mg tablet Take 2 tablets (1,000 mg total) by mouth 2 (two) times a day with meals 360 tablet 5   • pravastatin (PRAVACHOL) 40 mg tablet Take 1 tablet (40 mg total) by mouth every morning 90 tablet 1   • primidone (MYSOLINE) 50 mg tablet Take 2 tablets (100 mg total) by mouth every 12 (twelve) hours 120 tablet 6   • senna (SENOKOT) 8 6 mg Take 2 tablets (17 2 mg total) by mouth daily at bedtime (Patient taking differently: Take 17 2 mg by mouth as needed) 60 tablet 0   • sitaGLIPtin (Januvia) 100 mg tablet Take 1 tablet (100 mg total) by mouth daily 30 tablet 5   • tamsulosin (FLOMAX) 0 4 mg Take 0 4 mg by mouth 2 (two) times a day     • [DISCONTINUED] propranolol (INDERAL LA) 80 mg 24 hr capsule Take 1 capsule (80 mg total) by mouth daily 30 capsule 5   • polyethylene glycol (MIRALAX) 17 g packet Take 17 g by mouth daily as needed (constipation) for up to 14 days 14 each 0   • [DISCONTINUED] metoprolol succinate (TOPROL-XL) 50 mg 24 hr tablet Take 1 tablet by mouth once daily 90 tablet 3     No current facility-administered medications on file prior to visit  Review of Systems   Refer to positive review of systems in HPI  Review of Systems    Constitutional- No fever  Eyes- No visual change  ENT- Hearing normal  CV- No chest pain  Resp- No Shortness of breath  GI- No diarrhea  - Bladder normal  MS- No Arthritis   Skin- No rash  Psych- No depression  Endo- No DM  Heme- No nodes    Vitals:    01/26/23 1031   BP: (!) 175/77   BP Location: Left arm   Patient Position: Sitting   Cuff Size: Standard   Pulse: 77   Temp: (!) 96 3 °F (35 7 °C)   TempSrc: Tympanic   SpO2: 92%   Weight: 103 kg (228 lb)   Height: 5' 9" (1 753 m)       PHYSICAL EXAM:  Appearance: No Acute Distress  Ophthalmoscopic: Disc Flat, Normal fundus  Mental status:  Orientation: Awake, Alert, and Orientedx3  Memory: Registation 3/3 Recall 3/3  Attention: normal  Knowledge: good  Language: No aphasia  Speech: No dysarthria  Cranial Nerves:  2 No Visual Defect on Confrontation, Pupils round, equal, reactive to light  3,4,6 Extraocular Movements Intact, no nystagmus  5 Facial Sensation Intact  7 No facial asymmetry  8 Intact hearing  9,10 Palate symmetric, normal gag  11 Good shoulder shrug  12 Tongue Midline  Gait: Stable, uses cane   Coordination: left hand > right hand ET   No ataxia with finger to nose testing, and heel to shin  Sensory: Intact, Symmetric to pinprick, light touch, vibration, and joint position  Muscle Tone: Normal              Muscle exam:  Arm Right Left Leg Right Left   Deltoid 5/5 5/5 Iliopsoas 5/5 5/5   Biceps 5/5 5/5 Quads 5/5 5/5   Triceps 5/5 5/5 Hamstrings 5/5 5/5   Wrist Extension 5/5 5/5 Ankle Dorsi Flexion 5/5 5/5   Wrist Flexion 5/5 5/5 Ankle Plantar Flexion 5/5 5/5   Interossei 5/5 5/5 Ankle Eversion 5/5 5/5   APB 5/5 5/5 Ankle Inversion 5/5 5/5       Reflexes   RJ BJ TJ KJ AJ Plantars Kevin's   Right 2+ 2+ 2+ 2+  Downgoing Not present   Left 2+ 2+ 2+ 2+  Downgoing Not present     Personal review of  Labs:                    Diagnoses and all orders for this visit:      1  Tremor, essential  propranolol (INDERAL LA) 120 mg 24 hr capsule          He is still suffering from significant LUE tremor  Fortunately, he's right handed  We have not seen any improvement with increase in primidone  It has been progressing as he's getting older  Will optimize propranolol to 120mg daily  In near future when he calls, will consider lowering primidone to 100mg nightly only as it hasn't been much effective                 Total time of encounter:  30 min  More than 50% of the time was used in counseling and/or coordination of care  Extent of counseling and/or coordination of care        Otto Carballo MD  [facility]  [address]  [address]  [phone]

## 2023-02-06 ENCOUNTER — OFFICE VISIT (OUTPATIENT)
Dept: CARDIOLOGY CLINIC | Facility: CLINIC | Age: 78
End: 2023-02-06

## 2023-02-06 VITALS
SYSTOLIC BLOOD PRESSURE: 124 MMHG | HEIGHT: 69 IN | OXYGEN SATURATION: 99 % | WEIGHT: 223 LBS | BODY MASS INDEX: 33.03 KG/M2 | DIASTOLIC BLOOD PRESSURE: 70 MMHG | HEART RATE: 70 BPM

## 2023-02-06 DIAGNOSIS — N18.30 CKD STAGE 3 DUE TO TYPE 2 DIABETES MELLITUS (HCC): ICD-10-CM

## 2023-02-06 DIAGNOSIS — R01.1 CARDIAC MURMUR: ICD-10-CM

## 2023-02-06 DIAGNOSIS — E11.9 TYPE 2 DIABETES MELLITUS WITHOUT COMPLICATION, WITHOUT LONG-TERM CURRENT USE OF INSULIN (HCC): ICD-10-CM

## 2023-02-06 DIAGNOSIS — R55 SYNCOPE, UNSPECIFIED SYNCOPE TYPE: ICD-10-CM

## 2023-02-06 DIAGNOSIS — E66.9 OBESITY (BMI 30-39.9): ICD-10-CM

## 2023-02-06 DIAGNOSIS — E11.22 CKD STAGE 3 DUE TO TYPE 2 DIABETES MELLITUS (HCC): ICD-10-CM

## 2023-02-06 DIAGNOSIS — R00.0 TACHYCARDIA: ICD-10-CM

## 2023-02-06 DIAGNOSIS — I27.21 PULMONARY ARTERY HYPERTENSION (HCC): ICD-10-CM

## 2023-02-06 DIAGNOSIS — E78.5 DYSLIPIDEMIA: ICD-10-CM

## 2023-02-06 DIAGNOSIS — I26.99 BILATERAL PULMONARY EMBOLISM (HCC): ICD-10-CM

## 2023-02-06 RX ORDER — ROSUVASTATIN CALCIUM 10 MG/1
10 TABLET, COATED ORAL DAILY
Qty: 90 TABLET | Refills: 1 | Status: SHIPPED | OUTPATIENT
Start: 2023-02-06

## 2023-02-06 NOTE — PROGRESS NOTES
Progress Note - Cardiology Office  [facility]    Clif Helms 76 y o  male MRN: [id number]  : 1945  Encounter: [id number]      Assessment:     1  Syncope, unspecified syncope type    2  Tachycardia    3  Pulmonary artery hypertension (HCC)    4  Cardiac murmur    5  CKD stage 3 due to type 2 diabetes mellitus (HCC)    6  Obesity (BMI 30-39 9)    7  Dyslipidemia    8  Type 2 diabetes mellitus without complication, without long-term current use of insulin ([facility] [address] )    9  Bilateral pulmonary embolism ([facility] [address] )        Discussion Summary and Plan:  1  Syncope  No more episodes of syncope  His syncope was due to his severe pulmonary hypertension and pulmonary embolism at that time  Repeat echo Doppler in 2022 shows patient's EF is now 60% and there is no evidence of any elevated pulmonary artery pressure  Which has significantly improved  2  Bilateral pulmonary embolism  Patient had history of bilateral pulmonary embolism with RV strain  At that time PA pressure was high  He also had acute DVT at the time  He will follow-up with hematology and they will do the blood test to see if we need long-term antithrombotic therapy  He was found to have  He needs factor B Leiden deficiency for now he will continue Eliquis  Hematology thinks that he will need blood thinner for long time  Repeat echo shows that his PA pressures have normalized    3  History of DVT as above     4   Cardiac murmur  Patient is diagnosed to have cardiac murmur workup shows his mild aortic stenosis  He had a low normal LV systolic function by echo and by nuclear EF was noted to be lower most likely due to his tachycardia and PVC at that time  Repeat echo Doppler shows in 2022 that his valve is still mildly stenosed with     5  Dyslipidemia  Need to be on statin LDL still not at goal   Will discontinue pravastatin and start him on Crestor 10 mg        6  Diabetes mellitus      Blood sugar has been up and down management as per medical team     7  Elevated PSA with possible prostate cancer with possible urinary obstruction status post chronic Flores catheter management as per Urology  Now scheduled to have prostate surgery  There is no cardiac contraindication for the procedure      8  Tachycardia  It has improved    9  Obesity with BMI around 33 with gait dysfunction  He is trying to watch his diet    10  Abnormal stress test   Patient has fixed defect but no ischemia  EF was noted to be low thought to be due to PACs and PVCs echo Doppler will be ordered  He has no symptoms of angina  Current Rx  Continue other Rx as before  All issues discussed with patient and patient's sister  Please call [phone] if any questions  Counseling :  A description of the counseling  Goals and Barriers  Patient's ability to self care: Yes  Medication side effect reviewed with patient in detail and all their questions answered to their satisfaction  HPI :     Reece Espinosa is a 76y o  year old male who came for follow up  Patient  Was admitted to [facility] with recurrent syncope and was found to have pulmonary embolism and elevated pulmonary artery pressure  He has medical history significant for hypertension, hyperlipidemia, CKD stage 3, diabetes mellitus and mildly abnormal stress test   Later on he developed retention of his urine and has Flores catheter placed  He has cardiac workup in the form of echo and stress test and was found to have mild aortic stenosis, moderate pulmonary hypertension, and by echo EF was normal by stress test he has decreased EF he was noted to have inferior wall fixed defect no ischemia and he has frequent PACs and PVC at that time which may be showing his low ejection fraction  He feels great now he walks with the help of cane he has no more episodes of syncope dizziness lightheadedness and he is not requiring any oxygen therapy    He is still using Flores catheter he may need to follow-up with urology for that  His EKG reviewed he had a bifascicular block with heart rate 92 beats per minute and Q-wave in inferior lead noted  He never  he lives with his family he has good support system  His blood test from August 13, 2020 reviewed  01/17/2022  Above reviewed  Patient came for follow-up  He has medical history significant for history of pulmonary embolism with elevated pulmonary artery pressure, hypertension, hyperlipidemia, CKD stage 3, tachycardia, diabetes mellitus who came for follow-up  He had a cardiac murmur and was found to have aortic stenosis which was mild, nuclear stress test shows fixed inferior wall defect but no ischemia  He was noted to have frequent PACs and PVCs and started on low-dose metoprolol  History using his Flores catheter and he need to follow-up with urology  He had history of bifascicular block  He is compliant with his cholesterol medication as well as other diabetic medications  He saw Hematology Oncology and found to have anterior Demetri positive for factor V Leiden  He has repeat blood test done in January 2022  Glucose was noted to be high and his electrolytes were acceptable cholesterol profile is in improved and hemoglobin was 10 9  EKG shows bifascicular block heart rate 86 beats per minute  His nuclear stress test from July 2021 reviewed as well as echo  His EF by echo was around 50-55% with mild valvular disease  Nuclear shows no ischemia the EF was noted to be low most likely due to his frequent PACs and PVCs     07/13/2022  Above reviewed  Patient came for follow-up with his sister he is doing well  He had history of pulmonary embolism with elevated PA pressures, hypertension, hyperlipidemia, CKD stage 3, tachycardia, RBBB and abnormal stress test which shows fixed inferior wall defect but no ischemia    He was noted to have frequent PACs and PVCs and was started on low-dose metoprolol today his heart rate is 77 beats per minute  He says he is compliant with his medications and blood test done in May 2022 has been acceptable  Repeat blood test in June 2022 shows his cholesterol electrolytes and liver enzymes are stable  He does have history of bifascicular block which is not changed  His echo in 2021 shows EF 50-55% with mild valvular disease  No nausea no vomiting no other issues at this time  He is feeling well he has no complaints he walks with the help of cane no leg swelling no PND no orthopnea no other issues  2/6/2023  Lab reviewed  Patient came for follow-up with his sister  He is doing well history of pulmonary embolism with elevated PA pressure, hypertension, hyperlipidemia, CKD stage III, tachycardia, RBBB, abnormal stress test with fixed inferior wall defect but no ischemia who came for follow-up  Today EKG was heart rate 70 bpm his heart rate have significantly improved  He is feeling better he has no new complaints  He is compliant with his medications  And his med list was reviewed  Currently he is on propanolol 120 mg daily  He has blood test done in September 2022 which has been acceptable  No nausea no vomiting no other issues  His EKG shows bifascicular block with heart rate 70 bpm   He has a repeat echo Doppler done in August 2022 which shows his heart function is acceptable and his PA pressures have significantly improved mild aortic stenosis noted, his sugar is still little up-and-down  He is still on Eliquis he is scheduled to see hematologist and will have the blood test     Review of Systems   Constitutional: Negative for activity change, chills, diaphoresis, fever and unexpected weight change  Has gained some weight   HENT: Negative for congestion  Eyes: Negative for discharge and redness  Respiratory: Positive for shortness of breath  Negative for cough, chest tightness and wheezing  Chronic not changed  Cardiovascular: Negative  Negative for chest pain, palpitations and leg swelling  Gastrointestinal: Negative for abdominal pain, diarrhea and nausea  Endocrine: Negative  Genitourinary: Negative for decreased urine volume and urgency  Musculoskeletal: Positive for arthralgias, back pain and gait problem  Skin: Negative for rash and wound  Allergic/Immunologic: Negative  Neurological: Negative for dizziness, seizures, syncope, weakness, light-headedness and headaches  Hematological: Negative  Psychiatric/Behavioral: Negative for agitation and confusion  The patient is nervous/anxious  Historical Information   Past Medical History:   Diagnosis Date   • Arthritis    • BPH associated with nocturia    • Diabetes mellitus ([address] )    • Edema     lower legs   • Hearing aid worn     bilateral   • Hyperlipidemia    • Hypertension     controlled   • Tremor of both hands      Past Surgical History:   Procedure Laterality Date   • CATARACT EXTRACTION     • WI TRURL ELECTROSURG RESCJ PROSTATE BLEED COMPLETE N/A 1/20/2022    Procedure: CYSTOSCOPY, TRANSURETHRAL RESECTION OF PROSTATE (TURP); Surgeon: Aj Carballo MD;  Location: [address] [place] [address];  Service: Urology   • WI XCAPSL CTRC RMVL INSJ IO LENS PROSTH W/O ECP Right 8/6/2018    Procedure: EXTRACTION EXTRACAPSULAR CATARACT PHACO INTRAOCULAR LENS (IOL); Surgeon: Vikas Joya MD;  Location: [facility] OR;  Service: Ophthalmology   • WI XCAPSL CTRC RMVL INSJ IO LENS PROSTH W/O ECP Left 10/1/2018    Procedure: EXTRACTION EXTRACAPSULAR CATARACT PHACO INTRAOCULAR LENS (IOL);   Surgeon: Vikas Joya MD;  Location: [facility] OR;  Service: Ophthalmology     Social History     Substance and Sexual Activity   Alcohol Use Never     Social History     Substance and Sexual Activity   Drug Use Never     Social History     Tobacco Use   Smoking Status Never   Smokeless Tobacco Never     Family History:   Family History   Problem Relation Age of Onset   • Colon cancer Mother    • Cancer Father         lung-smoker   • Lung cancer Father    • Early death Brother    • Cancer Brother         "bone cancer"       Meds/Allergies     No Known Allergies    Current Outpatient Medications:   •  apixaban (Eliquis) 5 mg, Take 1 tablet (5 mg total) by mouth 2 (two) times a day, Disp: 180 tablet, Rfl: 1  •  Ascorbic Acid (VITAMIN C PO), Take by mouth in the morning, Disp: , Rfl:   •  bisacodyl (DULCOLAX) 10 mg suppository, Insert 1 suppository (10 mg total) into the rectum once as needed for constipation (If no bowel movement) for up to 1 dose, Disp: 12 suppository, Rfl: 0  •  calcium-vitamin D (OSCAL 500 + D) 500 mg-200 units per tablet, Take 1 tablet by mouth daily, Disp: , Rfl:   •  cholecalciferol (VITAMIN D3) 400 units tablet, Take 400 Units by mouth daily, Disp: , Rfl:   •  Continuous Blood Gluc Sensor (VayableStyle [place] 14 Day Sensor) MISC, Change sensor every 14 days, Disp: 6 each, Rfl: 3  •  Cyanocobalamin (VITAMIN B-12 CR PO), Take by mouth in the morning, Disp: , Rfl:   •  docusate sodium (COLACE) 100 mg capsule, Take 1 capsule (100 mg total) by mouth 2 (two) times a day (Patient taking differently: Take 100 mg by mouth as needed), Disp: 10 capsule, Rfl: 0  •  folic acid (FOLVITE) 1 mg tablet, Take 1 tablet by mouth once daily, Disp: 90 tablet, Rfl: 1  •  metFORMIN (GLUCOPHAGE) 500 mg tablet, Take 2 tablets (1,000 mg total) by mouth 2 (two) times a day with meals, Disp: 360 tablet, Rfl: 5  •  primidone (MYSOLINE) 50 mg tablet, Take 2 tablets (100 mg total) by mouth every 12 (twelve) hours, Disp: 120 tablet, Rfl: 6  •  propranolol (INDERAL LA) 120 mg 24 hr capsule, Take 1 capsule (120 mg total) by mouth daily after breakfast, Disp: 30 capsule, Rfl: 5  •  rosuvastatin (CRESTOR) 10 MG tablet, Take 1 tablet (10 mg total) by mouth daily, Disp: 90 tablet, Rfl: 1  •  sitaGLIPtin (Januvia) 100 mg tablet, Take 1 tablet (100 mg total) by mouth daily, Disp: 30 tablet, Rfl: 5  •  tamsulosin (FLOMAX) 0 4 mg, Take 0 4 mg by mouth 2 (two) times a day, Disp: , Rfl:   •  polyethylene glycol (MIRALAX) 17 g packet, Take 17 g by mouth daily as needed (constipation) for up to 14 days, Disp: 14 each, Rfl: 0  •  senna (SENOKOT) 8 6 mg, Take 2 tablets (17 2 mg total) by mouth daily at bedtime (Patient taking differently: Take 17 2 mg by mouth as needed), Disp: 60 tablet, Rfl: 0    Vitals: Blood pressure 124/70, pulse 70, height 5' 9" (1 753 m), weight 101 kg (223 lb), SpO2 99 %  ?  Body mass index is 32 93 kg/m²  Wt Readings from Last 3 Encounters:   02/06/23 101 kg (223 lb)   01/26/23 103 kg (228 lb)   12/20/22 101 kg (223 lb)     Vitals:    02/06/23 1120   Weight: 101 kg (223 lb)     BP Readings from Last 3 Encounters:   02/06/23 124/70   01/26/23 (!) 175/77   12/20/22 120/70       Physical Exam:  Physical Exam  Constitutional:       General: He is not in acute distress  Appearance: He is well-developed  He is not diaphoretic  Neck:      Thyroid: No thyromegaly  Vascular: No JVD  Trachea: No tracheal deviation  Cardiovascular:      Rate and Rhythm: Normal rate and regular rhythm  Heart sounds: S1 normal and S2 normal  Heart sounds not distant  Murmur heard  Systolic (ejection) murmur is present with a grade of 2/6  No friction rub  No gallop  No S3 or S4 sounds  Pulmonary:      Effort: Pulmonary effort is normal  No respiratory distress  Breath sounds: Normal breath sounds  No wheezing or rales  Chest:      Chest wall: No tenderness  Abdominal:      General: Bowel sounds are normal  There is no distension  Palpations: Abdomen is soft  Tenderness: There is no abdominal tenderness  Musculoskeletal:         General: No deformity  Cervical back: Neck supple  Skin:     General: Skin is warm and dry  Coloration: Skin is not pale  Findings: No rash  Neurological:      Mental Status: He is alert and oriented to person, place, and time  Psychiatric:         Behavior: Behavior normal          Judgment: Judgment normal            Diagnostic Studies Review Cardio:      Nuclear stress test   Nuclear stress test was abnormal with fixed inferior apical defect which is worse in the rest images and is most likely due to body attenuation artifact EF was around 40%  But no significant ischemia was noted  Echo Doppler  Echo Doppler shows EF 55 % mild LVH, mild aortic stenosis, mild MR, mild TR and PA pressure was 64 mmHg  Repeat echo Doppler done in 2022 shows patient has EF 60%, mild aortic stenosis with mean gradient 11 mmHg, aortic root is mildly dilated  Sheltonrománne Maryyesi PA pressure was not measured but no indirect evidence of right-sided elevated pressures  EKG:  Twelve lead EKG 2021 shows sinus rhythm heart rate 92 beats per minute bifascicular block Q-wave in inferior lead noted cannot rule out old inferior wall infarct  Twelve lead EKG done on 2022 shows sinus rhythm with frequent PACs heart rate 86 beats per minute bifascicular block noted  Twelve lead EKG done 2022 shows sinus rhythm with frequent PACs heart rate 77 beats per minute      Twelve-lead EKG 2023 shows normal sinus rhythm with bifascicular block heart rate 70 bpm no change from previously  Cardiac testing:   Results for orders placed during the hospital encounter of 21    Echo complete with contrast if indicated    Narrative  [address]  [address] [facility] [phone]    Transthoracic Echocardiogram  2D, M-mode, Doppler, and Color Doppler    Study date:  2021    Patient: Homer Reis  MR number: [id number]  Account number: [de-identified]  : 1945  Age: 66 years  Gender: Male  Status: Inpatient  Location: Bedside  Height: 69 in  Weight: 226 6 lb  BP: 122/ 74 mmHg    Indications: Pulmonary Embolism    Diagnoses: I74 9 - Embolism and thrombosis of unspecified artery    Sonographer: Norma Holt ANA  Referring Physician:  Saul Cole PA-C  Group:  [facility]  Interpreting Physician:  DO ROSALINA Faulkner    LEFT VENTRICLE:  Systolic function was normal by visual assessment  Ejection fraction was estimated in the range of 55 % to 60 %  There were no regional wall motion abnormalities  There was mild concentric hypertrophy  Doppler parameters were consistent with abnormal left ventricular relaxation (grade 1 diastolic dysfunction)  MITRAL VALVE:  There was mild regurgitation  AORTIC VALVE:  The valve was trileaflet  Leaflets exhibited normal thickness, mild calcification, and moderately reduced cuspal separation  There was mild stenosis  There was no regurgitation  Valve mean gradient was 11 mmHg  Aortic valve area was 1 7 cmï¾² by the continuity equation  TRICUSPID VALVE:  There was mild regurgitation  Pulmonary artery systolic pressure was moderately increased  HISTORY: PRIOR HISTORY: Diabetes Mellitus; Chronic Kidney Disease; Anemia; Dyslipidemia; Sepsis; Non MI Troponin Elevation; Hypertension    PROCEDURE: The procedure was performed at the bedside  This was a routine study  The transthoracic approach was used  The study included complete 2D imaging, M-mode, complete spectral Doppler, and color Doppler  The heart rate was 79 bpm,  at the start of the study  Intravenous contrast ( 0 4ml/min Definity in NSS) was administered  Intravenous contrast was administered to opacify the left ventricle  Echocardiographic views were limited due to poor acoustic window  availability, decreased penetration, and lung interference  This was a technically difficult study  LEFT VENTRICLE: Size was normal  Systolic function was normal by visual assessment  Ejection fraction was estimated in the range of 55 % to 60 %  There were no regional wall motion abnormalities  There was mild concentric hypertrophy    DOPPLER: Doppler parameters were consistent with abnormal left ventricular relaxation (grade 1 diastolic dysfunction)  RIGHT VENTRICLE: The size was normal  Systolic function was normal  DOPPLER: Systolic pressure was within the normal range  LEFT ATRIUM: Size was normal  No thrombus was identified  RIGHT ATRIUM: Size was normal     MITRAL VALVE: Valve structure was normal  There was normal leaflet separation  No echocardiographic evidence for prolapse  DOPPLER: The transmitral velocity was within the normal range  There was no evidence for stenosis  There was mild  regurgitation  AORTIC VALVE: The valve was trileaflet  Leaflets exhibited normal thickness, mild calcification, and moderately reduced cuspal separation  DOPPLER: Transaortic velocity was increased due to valvular stenosis  There was mild stenosis  There  was no regurgitation  TRICUSPID VALVE: The valve structure was normal  There was normal leaflet separation  DOPPLER: The transtricuspid velocity was within the normal range  There was mild regurgitation  Pulmonary artery systolic pressure was moderately  increased  Estimated peak PA pressure was 64 mmHg  PULMONIC VALVE: Leaflets exhibited normal thickness, no calcification, and normal cuspal separation  DOPPLER: The transpulmonic velocity was within the normal range  There was no regurgitation  PERICARDIUM: There was no thickening  There was no pericardial effusion  AORTA: The root exhibited normal size      PULMONARY ARTERY: The size was normal  The morphology appeared normal     MEASUREMENT TABLES    DOPPLER MEASUREMENTS  Aortic valve   (Reference normals)  Peak gradient   21 mmHg   (--)  Mean gradient   11 mmHg   (--)  Valve area, cont   1 7 cmï¾²   (--)    SYSTEM MEASUREMENT TABLES    2D  EF (Teich): 54 78 %  %FS: 28 23 %  Ao Diam: 3 71 cm  Ao asc: 4 23 cm  EDV(Teich): 87 43 ml  ESV(Teich): 39 54 ml  IVSd: 1 19 cm  LA Area: 13 05 cm2  LA Diam: 2 75 cm  LVEDV MOD A4C: 78 34 ml  LVEF MOD A4C: 80 3 %  LVESV MOD A4C: 15 43 ml  LVIDd: 4 39 cm  LVIDs: 3 15 cm  LVLd A4C: 7 01 cm  LVLs A4C: 5 23 cm  LVOT Diam: 1 97 cm  LVPWd: 1 23 cm  RA Area: 13 4 cm2  RVIDd: 3 82 cm  SV (Teich): 47 89 ml  SV MOD A4C: 62 91 ml    CW  AV Env  Ti: 298 13 ms  AV VTI: 45 96 cm  AV Vmax: 2 23 m/s  AV Vmean: 1 54 m/s  AV maxP 85 mmHg  AV meanPG: 10 84 mmHg  TR Vmax: 3 77 m/s  TR maxP 93 mmHg    MM  TAPSE: 2 2 cm    PW  MV E/A Ratio: 0 85  E' Sept: 0 06 m/s  E/E' Sept: 12 19  LVOT Env  Ti: 365 37 ms  LVOT VTI: 25 4 cm  LVOT Vmax: 1 2 m/s  LVOT Vmean: 0 7 m/s  LVOT maxP 76 mmHg  LVOT meanP 51 mmHg  MV A Melvin: 0 93 m/s  MV Dec [place]: 3 07 m/s2  MV DecT: 255 81 ms  MV E Melvin: 0 79 m/s  MV PHT: 74 19 ms  MVA By PHT: 2 97 cm2    [facility]    Prepared and electronically signed by    Chey Dorantes DO  Signed 2021 10:21:25        Results for orders placed during the hospital encounter of 21    NM Myocardial Perfusion Spect (Pharmacological Induced Stress and/or Rest)    [facility]  [facility]  [address] [address]  [phone]    Rest/Stress Gated SPECT Myocardial Perfusion Imaging After Regadenoson    Patient: Thelma Francis  MR number: [id number]  Account number: [de-identified]  : 1945  Age: 66 years  Gender: Male  Status: Inpatient  Location: Stress lab  Height: 69 in  Weight: 227 lb  BP: 133/ 68 mmHg    Allergies: NO KNOWN ALLERGIES    Diagnosis: R06 02 - Shortness of breath, R74 8 - Abnormal levels of other serum enzymes    Primary Physician:  Jo Garcia DO  RN:  GABY Sosa  Referring Physician:  BLAZE Leyva  Group:  [facility]ad[facility] Osborne  Report Prepared By[de-identified]  Levander Mix, BSN  Interpreting Physician:  Aranza Ocampo MD    INDICATIONS: Evaluation for coronary artery disease  HISTORY: The patient is a 66year old  male  Chest pain status: no chest pain  Other symptoms: dyspnea   Coronary artery disease risk factors: dyslipidemia, hypertension, and diabetes mellitus  Cardiovascular history: peripheral  vascular occlusive disease  Medications:an anticoagulant, a lipid lowering agent and diabetic medications  PHYSICAL EXAM: Baseline physical exam screening: no wheezes audible  REST ECG: Normal sinus rhythm  The ECG showed right bundle branch block  PROCEDURE: The study was performed in the the Stress lab  A regadenoson infusion pharmacologic stress test was performed  Gated SPECT myocardial perfusion imaging was performed after stress and at rest  Systolic blood pressure was 133  mmHg, at the start of the study  Diastolic blood pressure was 68 mmHg, at the start of the study  The heart rate was 83 bpm, at the start of the study  IV double checked  Regadenoson protocol:  Time HR bpm SBP mmHg DBP mmHg Symptoms Rhythm/conduct  Baseline 09:36 83 133 68 none NSR, RBBB  Immediate 09:42 100 96 54 none same as above  1 min 09:43 99 106 59 none same as above  2 min 09:44 95 118 60 none same as above  3 min 09:45 98 118 56 none --  4 min 09:46 100 118 60 none same as above  No medications or fluids given  STRESS SUMMARY: Duration of pharmacologic stress was 3 min  Maximal heart rate during stress was 105 bpm  The heart rate response to stress was normal  There was normal resting blood pressure with an appropriate response to stress  The  rate-pressure product for the peak heart rate and blood pressure was 97431  There was no chest pain during stress  The stress test was terminated due to protocol completion  On 2l/min of O2 by nasal cannula  Pre oxygen saturation: 94 %  Peak oxygen saturation: 94 %  The stress ECG was negative for ischemia and normal  There were no stress arrhythmias or conduction abnormalities  Arrhythmia during stress: isolated premature ventricular beats      ISOTOPE ADMINISTRATION:  Resting isotope administration Stress isotope administration  Agent Tetrofosmin Tetrofosmin  Dose 11 mCi 33 mCi  Date 07/21/2021 07/21/2021    The radiopharmaceutical was injected at the peak effect of pharmacologic stress  MYOCARDIAL PERFUSION IMAGING:  The image quality was fair  Rotating projection images reveal mild diaphragmatic attenuation and mild patient motion  Left ventricular size was normal  The TID ratio was 1 25  The right ventricle was dilated  PERFUSION DEFECTS:  -  There was a moderate-sized, mildly severe, fixed myocardial perfusion defect of the apical apical and inferior wall  GATED SPECT:  The calculated left ventricular ejection fraction was 40 %  Left ventricular ejection fraction was mildly decreased by visual estimate  There was no diagnostic evidence for left ventricular regional abnormality  SUMMARY:  -  Stress results: There was no chest pain during stress  -  ECG conclusions: The stress ECG was negative for ischemia and normal   -  Perfusion imaging: There was a moderate-sized, mildly severe, fixed myocardial perfusion defect of the apical apical and inferior wall  -  Gated SPECT: The calculated left ventricular ejection fraction was 40 %  Left ventricular ejection fraction was mildly decreased by visual estimate  There was no diagnostic evidence for left ventricular regional abnormality   -  Impressions and recommendations: Abnormal study after pharmacologic vasodilation  There is a fixed inferior apical defect which is worse in rest images than stress images most likely secondary to body attenuation artifact  Cannot rule  out old nontransmural myocardial infarction  EF calculated around 40% appears to be mildly decreased  No significant ischemia is noted    IMPRESSIONS: Abnormal study after pharmacologic vasodilation  There is a fixed inferior apical defect which is worse in rest images than stress images most likely secondary to body attenuation artifact  Cannot rule out old nontransmural  myocardial infarction  EF calculated around 40% appears to be mildly decreased   No significant ischemia is noted Left ventricular systolic function was reduced, without distinct regional wall motion abnormalities  Prepared and signed by    Reginald Nur MD  Signed 07/21/2021 16:58:46          Lab Review   Lab Results   Component Value Date    WBC 5 50 09/14/2022    HGB 10 9 (L) 09/14/2022    HCT 34 9 (L) 09/14/2022    MCV 88 09/14/2022    RDW 14 3 09/14/2022     09/14/2022     BMP:  Lab Results   Component Value Date    SODIUM 140 09/14/2022    K 4 9 09/14/2022     09/14/2022    CO2 32 09/14/2022    BUN 18 09/14/2022    CREATININE 1 06 09/14/2022    GLUC 250 (H) 11/19/2021    GLUF 197 (H) 09/14/2022    CALCIUM 8 9 09/14/2022    CORRECTEDCA 9 2 06/29/2022    EGFR 66 09/14/2022     LFT:  Lab Results   Component Value Date    AST 13 09/14/2022    ALT 20 09/14/2022    ALKPHOS 67 09/14/2022    TP 7 0 09/14/2022    TP 6 7 09/14/2022    ALB 3 5 09/14/2022      No components found for: [employer] [facility]  Lab Results   Component Value Date    JNY3WRYZAWCP 2 495 06/29/2022     Lab Results   Component Value Date    HGBA1C 7 6 (A) 12/20/2022     Lipid Profile:     Lab Results   Component Value Date    TROPONINI 1 12 (H) 07/18/2021     Lab Results   Component Value Date    NTBNP 2,051 (H) 07/18/2021      No results found for this or any previous visit (from the past 672 hour(s))  Dr Reginald Nur MD [address]      "This note has been constructed using a voice recognition system  Therefore there may be syntax, spelling, and/or grammatical errors   Please call if you have any questions  "

## 2023-02-16 ENCOUNTER — APPOINTMENT (OUTPATIENT)
Dept: LAB | Facility: HOSPITAL | Age: 78
End: 2023-02-16

## 2023-02-16 DIAGNOSIS — C61 MALIGNANT NEOPLASM OF PROSTATE (HCC): ICD-10-CM

## 2023-02-16 DIAGNOSIS — E11.9 TYPE 2 DIABETES MELLITUS WITHOUT COMPLICATION, WITHOUT LONG-TERM CURRENT USE OF INSULIN (HCC): ICD-10-CM

## 2023-02-16 DIAGNOSIS — E66.9 OBESITY (BMI 30-39.9): ICD-10-CM

## 2023-02-16 DIAGNOSIS — R01.1 CARDIAC MURMUR: ICD-10-CM

## 2023-02-16 DIAGNOSIS — I27.21 PULMONARY ARTERY HYPERTENSION (HCC): ICD-10-CM

## 2023-02-16 DIAGNOSIS — E78.5 DYSLIPIDEMIA: ICD-10-CM

## 2023-02-16 DIAGNOSIS — I26.99 BILATERAL PULMONARY EMBOLISM (HCC): ICD-10-CM

## 2023-02-16 DIAGNOSIS — R55 SYNCOPE, UNSPECIFIED SYNCOPE TYPE: ICD-10-CM

## 2023-02-16 DIAGNOSIS — E11.22 CKD STAGE 3 DUE TO TYPE 2 DIABETES MELLITUS (HCC): ICD-10-CM

## 2023-02-16 DIAGNOSIS — N18.30 CKD STAGE 3 DUE TO TYPE 2 DIABETES MELLITUS (HCC): ICD-10-CM

## 2023-02-16 DIAGNOSIS — R00.0 TACHYCARDIA: ICD-10-CM

## 2023-02-16 PROBLEM — E11.319 DIABETIC RETINOPATHY OF LEFT EYE ASSOCIATED WITH TYPE 2 DIABETES MELLITUS (HCC): Status: ACTIVE | Noted: 2023-02-16

## 2023-02-16 LAB — PSA SERPL-MCNC: <0.1 NG/ML (ref 0–4)

## 2023-03-16 ENCOUNTER — APPOINTMENT (OUTPATIENT)
Dept: LAB | Facility: HOSPITAL | Age: 78
End: 2023-03-16
Attending: INTERNAL MEDICINE

## 2023-03-16 DIAGNOSIS — D64.9 NORMOCYTIC ANEMIA: ICD-10-CM

## 2023-03-16 DIAGNOSIS — E11.65 TYPE 2 DIABETES MELLITUS WITH HYPERGLYCEMIA, WITHOUT LONG-TERM CURRENT USE OF INSULIN (HCC): ICD-10-CM

## 2023-03-16 DIAGNOSIS — E78.5 DYSLIPIDEMIA: ICD-10-CM

## 2023-03-16 DIAGNOSIS — C61 PROSTATE CANCER (HCC): ICD-10-CM

## 2023-03-16 LAB
ALBUMIN SERPL BCP-MCNC: 3.7 G/DL (ref 3.5–5)
ALP SERPL-CCNC: 50 U/L (ref 34–104)
ALT SERPL W P-5'-P-CCNC: 12 U/L (ref 7–52)
ANION GAP SERPL CALCULATED.3IONS-SCNC: 6 MMOL/L (ref 4–13)
AST SERPL W P-5'-P-CCNC: 9 U/L (ref 13–39)
BASOPHILS # BLD AUTO: 0.02 THOUSANDS/ÂΜL (ref 0–0.1)
BASOPHILS NFR BLD AUTO: 0 % (ref 0–1)
BILIRUB DIRECT SERPL-MCNC: 0.07 MG/DL (ref 0–0.2)
BILIRUB SERPL-MCNC: 0.27 MG/DL (ref 0.2–1)
BUN SERPL-MCNC: 17 MG/DL (ref 5–25)
CALCIUM SERPL-MCNC: 8.6 MG/DL (ref 8.4–10.2)
CHLORIDE SERPL-SCNC: 101 MMOL/L (ref 96–108)
CHOLEST SERPL-MCNC: 136 MG/DL
CO2 SERPL-SCNC: 30 MMOL/L (ref 21–32)
CREAT SERPL-MCNC: 0.9 MG/DL (ref 0.6–1.3)
CREAT UR-MCNC: 58.9 MG/DL
EOSINOPHIL # BLD AUTO: 0.08 THOUSAND/ÂΜL (ref 0–0.61)
EOSINOPHIL NFR BLD AUTO: 1 % (ref 0–6)
ERYTHROCYTE [DISTWIDTH] IN BLOOD BY AUTOMATED COUNT: 13.5 % (ref 11.6–15.1)
EST. AVERAGE GLUCOSE BLD GHB EST-MCNC: 174 MG/DL
FERRITIN SERPL-MCNC: 37 NG/ML (ref 8–388)
GFR SERPL CREATININE-BSD FRML MDRD: 80 ML/MIN/1.73SQ M
GLUCOSE P FAST SERPL-MCNC: 202 MG/DL (ref 65–99)
HBA1C MFR BLD: 7.7 %
HCT VFR BLD AUTO: 33.8 % (ref 36.5–49.3)
HDLC SERPL-MCNC: 35 MG/DL
HGB BLD-MCNC: 10.5 G/DL (ref 12–17)
IMM GRANULOCYTES # BLD AUTO: 0.02 THOUSAND/UL (ref 0–0.2)
IMM GRANULOCYTES NFR BLD AUTO: 0 % (ref 0–2)
IRON SATN MFR SERPL: 35 % (ref 20–50)
IRON SERPL-MCNC: 103 UG/DL (ref 65–175)
LDLC SERPL CALC-MCNC: 56 MG/DL (ref 0–100)
LYMPHOCYTES # BLD AUTO: 1.77 THOUSANDS/ÂΜL (ref 0.6–4.47)
LYMPHOCYTES NFR BLD AUTO: 29 % (ref 14–44)
MCH RBC QN AUTO: 27.7 PG (ref 26.8–34.3)
MCHC RBC AUTO-ENTMCNC: 31.1 G/DL (ref 31.4–37.4)
MCV RBC AUTO: 89 FL (ref 82–98)
MICROALBUMIN UR-MCNC: 33.8 MG/L (ref 0–20)
MICROALBUMIN/CREAT 24H UR: 57 MG/G CREATININE (ref 0–30)
MONOCYTES # BLD AUTO: 0.42 THOUSAND/ÂΜL (ref 0.17–1.22)
MONOCYTES NFR BLD AUTO: 7 % (ref 4–12)
NEUTROPHILS # BLD AUTO: 3.87 THOUSANDS/ÂΜL (ref 1.85–7.62)
NEUTS SEG NFR BLD AUTO: 63 % (ref 43–75)
NRBC BLD AUTO-RTO: 0 /100 WBCS
PLATELET # BLD AUTO: 187 THOUSANDS/UL (ref 149–390)
PMV BLD AUTO: 10.7 FL (ref 8.9–12.7)
POTASSIUM SERPL-SCNC: 4.6 MMOL/L (ref 3.5–5.3)
PROT SERPL-MCNC: 6 G/DL (ref 6.4–8.4)
PSA SERPL-MCNC: <0.1 NG/ML (ref 0–4)
RBC # BLD AUTO: 3.79 MILLION/UL (ref 3.88–5.62)
SODIUM SERPL-SCNC: 137 MMOL/L (ref 135–147)
TIBC SERPL-MCNC: 291 UG/DL (ref 250–450)
TRIGL SERPL-MCNC: 224 MG/DL
TSH SERPL DL<=0.05 MIU/L-ACNC: 3.24 UIU/ML (ref 0.45–4.5)
WBC # BLD AUTO: 6.18 THOUSAND/UL (ref 4.31–10.16)

## 2023-03-20 DIAGNOSIS — I26.99 BILATERAL PULMONARY EMBOLISM (HCC): ICD-10-CM

## 2023-03-20 DIAGNOSIS — D68.51 FACTOR 5 LEIDEN MUTATION, HETEROZYGOUS (HCC): ICD-10-CM

## 2023-03-20 RX ORDER — APIXABAN 5 MG/1
TABLET, FILM COATED ORAL
Qty: 180 TABLET | Refills: 0 | Status: SHIPPED | OUTPATIENT
Start: 2023-03-20

## 2023-03-23 PROBLEM — E11.22 TYPE 2 DIABETES MELLITUS WITH STAGE 2 CHRONIC KIDNEY DISEASE, WITHOUT LONG-TERM CURRENT USE OF INSULIN (HCC): Status: ACTIVE | Noted: 2021-06-30

## 2023-03-23 PROBLEM — J96.91 RESPIRATORY FAILURE WITH HYPOXIA (HCC): Status: RESOLVED | Noted: 2021-07-18 | Resolved: 2023-03-23

## 2023-03-23 PROBLEM — R55 SYNCOPE: Status: RESOLVED | Noted: 2021-07-18 | Resolved: 2023-03-23

## 2023-03-23 PROBLEM — R77.8 ELEVATED TROPONIN: Status: RESOLVED | Noted: 2021-07-18 | Resolved: 2023-03-23

## 2023-03-23 PROBLEM — E11.22 CKD STAGE 3 DUE TO TYPE 2 DIABETES MELLITUS (HCC): Status: RESOLVED | Noted: 2021-06-30 | Resolved: 2023-03-23

## 2023-03-23 PROBLEM — E11.22 CKD STAGE 2 DUE TO TYPE 2 DIABETES MELLITUS (HCC): Status: ACTIVE | Noted: 2022-06-20

## 2023-03-23 PROBLEM — N18.2 TYPE 2 DIABETES MELLITUS WITH STAGE 2 CHRONIC KIDNEY DISEASE, WITHOUT LONG-TERM CURRENT USE OF INSULIN (HCC): Status: ACTIVE | Noted: 2021-06-30

## 2023-03-23 PROBLEM — N18.2 CKD STAGE 2 DUE TO TYPE 2 DIABETES MELLITUS (HCC): Status: ACTIVE | Noted: 2022-06-20

## 2023-03-23 PROBLEM — N18.30 CKD STAGE 3 DUE TO TYPE 2 DIABETES MELLITUS (HCC): Status: RESOLVED | Noted: 2021-06-30 | Resolved: 2023-03-23

## 2023-03-23 PROBLEM — R79.89 ELEVATED TROPONIN: Status: RESOLVED | Noted: 2021-07-18 | Resolved: 2023-03-23

## 2023-03-23 PROBLEM — N42.89 PROSTATE MASS: Status: RESOLVED | Noted: 2021-10-16 | Resolved: 2023-03-23

## 2023-03-24 ENCOUNTER — OFFICE VISIT (OUTPATIENT)
Dept: FAMILY MEDICINE CLINIC | Facility: CLINIC | Age: 78
End: 2023-03-24

## 2023-03-24 VITALS
SYSTOLIC BLOOD PRESSURE: 122 MMHG | WEIGHT: 220 LBS | RESPIRATION RATE: 20 BRPM | BODY MASS INDEX: 32.49 KG/M2 | DIASTOLIC BLOOD PRESSURE: 60 MMHG | TEMPERATURE: 97.2 F | HEART RATE: 80 BPM

## 2023-03-24 DIAGNOSIS — N18.2 TYPE 2 DIABETES MELLITUS WITH STAGE 2 CHRONIC KIDNEY DISEASE, WITHOUT LONG-TERM CURRENT USE OF INSULIN (HCC): ICD-10-CM

## 2023-03-24 DIAGNOSIS — E11.22 TYPE 2 DIABETES MELLITUS WITH STAGE 2 CHRONIC KIDNEY DISEASE, WITHOUT LONG-TERM CURRENT USE OF INSULIN (HCC): ICD-10-CM

## 2023-03-24 DIAGNOSIS — I26.99 BILATERAL PULMONARY EMBOLISM (HCC): ICD-10-CM

## 2023-03-24 DIAGNOSIS — D68.51 FACTOR 5 LEIDEN MUTATION, HETEROZYGOUS (HCC): Primary | ICD-10-CM

## 2023-03-24 DIAGNOSIS — E11.22 CKD STAGE 2 DUE TO TYPE 2 DIABETES MELLITUS (HCC): ICD-10-CM

## 2023-03-24 DIAGNOSIS — C61 PROSTATE CANCER (HCC): ICD-10-CM

## 2023-03-24 DIAGNOSIS — N18.2 CKD STAGE 2 DUE TO TYPE 2 DIABETES MELLITUS (HCC): ICD-10-CM

## 2023-03-24 PROBLEM — E66.811 CLASS 1 OBESITY WITH SERIOUS COMORBIDITY AND BODY MASS INDEX (BMI) OF 32.0 TO 32.9 IN ADULT: Status: RESOLVED | Noted: 2021-10-16 | Resolved: 2023-03-24

## 2023-03-24 PROBLEM — E66.9 CLASS 1 OBESITY WITH SERIOUS COMORBIDITY AND BODY MASS INDEX (BMI) OF 32.0 TO 32.9 IN ADULT: Status: RESOLVED | Noted: 2021-10-16 | Resolved: 2023-03-24

## 2023-03-24 NOTE — PROGRESS NOTES
Assessment/Plan:    No problem-specific Assessment & Plan notes found for this encounter  FVL with hx of PE  Lifelong AC aware  Watch for bleeding advised    DM2  Continue meds  a1c not at goal, 7 7  Reminded to follow diabetic diet  Could try to alter metformin dosing to see if diarrhea responds  F/u endocrinology    HLD stable on statin  LDL 56    ckd 2 stable  Stay hydrated    psa wnl     Diagnoses and all orders for this visit:    Factor 5 Leiden mutation, heterozygous ([facility] )    Bilateral pulmonary embolism ([facility] )    CKD stage 2 due to type 2 diabetes mellitus ([facility] )    Type 2 diabetes mellitus with stage 2 chronic kidney disease, without long-term current use of insulin ([facility] )    Pro[facility] )        Return in about 6 months (around 9/24/2023)  Subjective:      Patient ID: Zhang Mendoza is a 76 y o  male  Chief Complaint   Patient presents with   • Medication Management     Sas/cma       HPI  Diarrhea usually before lunch  Metformin 500mg 2 po bid  No bleeding on eliquis  Drinks water  Eats carbs    The following portions of the patient's history were reviewed and updated as appropriate: allergies, current medications, past family history, past medical history, past social history, past surgical history and problem list     Review of Systems   Constitutional: Negative for chills and fever           Current Outpatient Medications   Medication Sig Dispense Refill   • Ascorbic Acid (VITAMIN C PO) Take by mouth in the morning     • calcium-vitamin D (OSCAL 500 + D) 500 mg-200 units per tablet Take 1 tablet by mouth daily     • Continuous Blood Gluc Sensor (FreeStyle [place] 14 Day Sensor) MISC Change sensor every 14 days 6 each 3   • Cyanocobalamin (VITAMIN B-12 CR PO) Take by mouth in the morning     • docusate sodium (COLACE) 100 mg capsule Take 1 capsule (100 mg total) by mouth 2 (two) times a day (Patient taking differently: Take 100 mg by mouth as needed) 10 capsule 0   • Eliquis 5 MG Take 1 tablet by mouth twice daily 224 tablet 0   • folic acid (FOLVITE) 1 mg tablet Take 1 tablet by mouth once daily 90 tablet 1   • metFORMIN (GLUCOPHAGE) 500 mg tablet Take 2 tablets (1,000 mg total) by mouth 2 (two) times a day with meals 360 tablet 5   • polyethylene glycol (MIRALAX) 17 g packet Take 17 g by mouth daily as needed (constipation) for up to 14 days 14 each 0   • primidone (MYSOLINE) 50 mg tablet Take 2 tablets (100 mg total) by mouth every 12 (twelve) hours 120 tablet 6   • propranolol (INDERAL LA) 120 mg 24 hr capsule Take 1 capsule (120 mg total) by mouth daily after breakfast 30 capsule 5   • rosuvastatin (CRESTOR) 10 MG tablet Take 1 tablet (10 mg total) by mouth daily 90 tablet 1   • senna (SENOKOT) 8 6 mg Take 2 tablets (17 2 mg total) by mouth daily at bedtime (Patient taking differently: Take 17 2 mg by mouth as needed) 60 tablet 0   • sitaGLIPtin (Januvia) 100 mg tablet Take 1 tablet (100 mg total) by mouth daily 30 tablet 5   • tamsulosin (FLOMAX) 0 4 mg Take 0 4 mg by mouth 2 (two) times a day       No current facility-administered medications for this visit  Objective:    /60   Pulse 80   Temp (!) 97 2 °F (36 2 °C)   Resp 20   Wt 99 8 kg (220 lb)   BMI 32 49 kg/m²        Physical Exam  Constitutional:       Appearance: He is obese  He is not ill-appearing  HENT:      Right Ear: Tympanic membrane normal       Left Ear: Tympanic membrane normal    Eyes:      General: No scleral icterus  Neck:      Vascular: Carotid bruit present  Cardiovascular:      Pulses: no weak pulses          Dorsalis pedis pulses are 2+ on the right side and 2+ on the left side  Heart sounds: No murmur heard  Pulmonary:      Breath sounds: No wheezing or rales  Skin:     Findings: No erythema  Neurological:      Gait: Gait abnormal       Comments: Uses wheelchair   Psychiatric:         Mood and Affect: Mood normal          Diabetic Foot Exam    Patient's shoes and socks removed      Right Foot/Ankle   Right Foot Inspection  Skin Exam: skin intact  No abnormal color  Sensory   Monofilament testing: intact    Vascular  The right DP pulse is 2+  Left Foot/Ankle  Left Foot Inspection  Skin Exam: skin intact  Normal color  Sensory   Monofilament testing: intact    Vascular  The left DP pulse is 2+       Assign Risk Category  No deformity present  No loss of protective sensation  No weak pulses  Risk: 0         Francheska Benavides DO

## 2023-03-31 DIAGNOSIS — G25.0 TREMOR, ESSENTIAL: ICD-10-CM

## 2023-03-31 RX ORDER — PRIMIDONE 50 MG/1
TABLET ORAL
Qty: 120 TABLET | Refills: 11 | Status: SHIPPED | OUTPATIENT
Start: 2023-03-31

## 2023-03-31 NOTE — TELEPHONE ENCOUNTER
Received vm-For Manjit van, Date of birth is [birth year], prescription is primidone 50 milligram tablet  Take 2 tablets by mouth, every 12 hours  [id number] [facility]  pharmacy is [address] [place] [address], [phone]  Please give me a call back  I need this for Monday  I forgot to give a call  Thank you  This is his sister Brea Hodgson, [phone]  Thank you   Bye   ------------------------------------------  Called pt sister and advised vm was received and will send refill request to dr    Refill entered, please sign off

## 2023-04-04 ENCOUNTER — OFFICE VISIT (OUTPATIENT)
Dept: ENDOCRINOLOGY | Facility: CLINIC | Age: 78
End: 2023-04-04

## 2023-04-04 VITALS
SYSTOLIC BLOOD PRESSURE: 130 MMHG | HEART RATE: 73 BPM | WEIGHT: 224 LBS | DIASTOLIC BLOOD PRESSURE: 80 MMHG | HEIGHT: 69 IN | BODY MASS INDEX: 33.18 KG/M2

## 2023-04-04 DIAGNOSIS — E11.65 TYPE 2 DIABETES MELLITUS WITH HYPERGLYCEMIA, WITHOUT LONG-TERM CURRENT USE OF INSULIN (HCC): Primary | ICD-10-CM

## 2023-04-04 DIAGNOSIS — E78.5 DYSLIPIDEMIA: ICD-10-CM

## 2023-04-04 RX ORDER — METFORMIN HYDROCHLORIDE 500 MG/1
TABLET, EXTENDED RELEASE ORAL
Qty: 360 TABLET | Refills: 2 | Status: SHIPPED | OUTPATIENT
Start: 2023-04-04

## 2023-04-04 NOTE — PROGRESS NOTES
4/4/2023    Assessment/Plan      Diagnoses and all orders for this visit:    Type 2 diabetes mellitus with hyperglycemia, without long-term current use of insulin (HCC)  -     Hemoglobin A1C; Future  -     Comprehensive metabolic panel; Future  -     Lipid Panel with Direct LDL reflex; Future  -     CBC and differential; Future  -     Microalbumin / creatinine urine ratio; Future  -     TSH, 3rd generation; Future  -     metFORMIN (GLUCOPHAGE-XR) 500 mg 24 hr tablet; 2 tabs bid    Dyslipidemia  -     Lipid Panel with Direct LDL reflex; Future        Assessment/Plan:  1  Type 2 diabetes: A1c is stable  He does have evidence of microalbuminuria  Also per eye doctor note he has worsening of retinopathy  Encouraged dietary changes to improve glycemic control and A1c  I think an SGLT2 inhibitor would be a good addition to his regimen and we reviewed the mechanism of action and side effects  He would like to look into coverage first and I wrote down the names of them and asked that they let us know regarding coverage  He requested switch to XR metformin to see if this helps with loose stools  Follow up in 3-4 months with labs just prior  2  HLD: Continue statin  CC: Diabetes follow-up    History of Present Illness     HPI: Andrea Fu is a 76y o  year old male with type 2 diabetes who presents for a follow up appointment  He is on oral agents at home and takes Januvia 100 mg daily, metformin 1000 mg twice daily  He denies any polyuria, polydipsia, nocturia and blurry vision  Diabetes is complicated by CKD per records but recent GFR and Cr stable  In the past had hypoglycemia requiring hospitalization on glimepiride  Prandin, DPP 4 inhibitors, SGLT2 inhibitors were considered  Since last visit patient acknowledges having more fruit periodically causing hyperglycemia  Hypoglycemic episodes: No      The patient's last eye exam was in recently with report showing NPDR    The patient's last foot exam was in March 2023 with pcp  Blood Sugar/Glucometer/Pump/CGM review: Freestyle aleean download from 2/19/2023 through 3//23 shows CGM is active 76% of the time  Average glucose 164  Glucose management indicator 7 2%  Coefficient of variation 17%  73% of values within range  0% low  26% high  Was frequent hyperglycemia occurs postprandially  Or recent glucose logs were reviewed showing frequent blood sugars above 200 typically postprandially without consistent pattern  Hyperlipidemia: Pravastatin  Review of Systems   Constitutional: Negative for fatigue  HENT: Negative for trouble swallowing and voice change  Eyes: Negative for visual disturbance  Respiratory: Negative for shortness of breath  Cardiovascular: Negative for palpitations and leg swelling  Gastrointestinal: Negative for abdominal pain, nausea and vomiting  Endocrine: Negative for polydipsia and polyuria  Musculoskeletal: Negative for arthralgias and myalgias  Skin: Negative for rash  Neurological: Negative for dizziness, tremors and weakness  Hematological: Negative for adenopathy  Psychiatric/Behavioral: Negative for agitation and confusion  Historical Information   Past Medical History:   Diagnosis Date   • Arthritis    • BPH associated with nocturia    • Diabetes mellitus ([facility] )    • Edema     lower legs   • Hearing aid worn     bilateral   • Hyperlipidemia    • Hypertension     controlled   • Tremor of both hands      Past Surgical History:   Procedure Laterality Date   • CATARACT EXTRACTION     • AR TRURL ELECTROSURG RESCJ PROSTATE BLEED COMPLETE N/A 1/20/2022    Procedure: CYSTOSCOPY, TRANSURETHRAL RESECTION OF PROSTATE (TURP); Surgeon: Kristel Hines MD;  Location: [address] [place] [address];  Service: Urology   • AR XCAPSL CTRC RMVL INSJ IO LENS PROSTH W/O ECP Right 8/6/2018    Procedure: EXTRACTION EXTRACAPSULAR CATARACT PHACO INTRAOCULAR LENS (IOL);   Surgeon: Rosalba Ruiz MD;  Location: [facility] MAIN OR; "Service: Ophthalmology   • MA XCAPSL CTRC RMVL INSJ IO LENS PROSTH W/O ECP Left 10/1/2018    Procedure: EXTRACTION EXTRACAPSULAR CATARACT PHACO INTRAOCULAR LENS (IOL);   Surgeon: Keyla Rosa MD;  Location: [facility] MAIN OR;  Service: Ophthalmology     Social History   Social History     Substance and Sexual Activity   Alcohol Use Never     Social History     Substance and Sexual Activity   Drug Use Never     Social History     Tobacco Use   Smoking Status Never   Smokeless Tobacco Never     Family History:   Family History   Problem Relation Age of Onset   • Colon cancer Mother    • Cancer Father         lung-smoker   • Lung cancer Father    • Early death Brother    • Cancer Brother         \"bone cancer\"       Meds/Allergies   Current Outpatient Medications   Medication Sig Dispense Refill   • Ascorbic Acid (VITAMIN C PO) Take by mouth in the morning     • calcium-vitamin D (OSCAL 500 + D) 500 mg-200 units per tablet Take 1 tablet by mouth daily     • Continuous Blood Gluc Sensor (C-VibesStyle [place] 14 Day Sensor) MISC Change sensor every 14 days 6 each 3   • Cyanocobalamin (VITAMIN B-12 CR PO) Take by mouth in the morning     • docusate sodium (COLACE) 100 mg capsule Take 1 capsule (100 mg total) by mouth 2 (two) times a day (Patient taking differently: Take 100 mg by mouth as needed) 10 capsule 0   • Eliquis 5 MG Take 1 tablet by mouth twice daily 739 tablet 0   • folic acid (FOLVITE) 1 mg tablet Take 1 tablet by mouth once daily 90 tablet 1   • metFORMIN (GLUCOPHAGE-XR) 500 mg 24 hr tablet 2 tabs bid 360 tablet 2   • polyethylene glycol (MIRALAX) 17 g packet Take 17 g by mouth daily as needed (constipation) for up to 14 days 14 each 0   • primidone (MYSOLINE) 50 mg tablet TAKE 2 TABLETS BY MOUTH EVERY 12 HOURS 120 tablet 11   • propranolol (INDERAL LA) 120 mg 24 hr capsule Take 1 capsule (120 mg total) by mouth daily after breakfast 30 capsule 5   • rosuvastatin (CRESTOR) 10 MG tablet Take 1 tablet (10 mg total) by " "mouth daily 90 tablet 1   • senna (SENOKOT) 8 6 mg Take 2 tablets (17 2 mg total) by mouth daily at bedtime (Patient taking differently: Take 17 2 mg by mouth as needed) 60 tablet 0   • sitaGLIPtin (Januvia) 100 mg tablet Take 1 tablet (100 mg total) by mouth daily 30 tablet 5   • tamsulosin (FLOMAX) 0 4 mg Take 0 4 mg by mouth 2 (two) times a day       No current facility-administered medications for this visit  No Known Allergies    Objective   Vitals: Blood pressure 130/80, pulse 73, height 5' 9\" (1 753 m), weight 102 kg (224 lb)  Invasive Devices     Drain  Duration           Urethral Catheter Three way 22 Fr  439 days                Physical Exam  Vitals reviewed  Constitutional:       General: He is not in acute distress  Appearance: He is well-developed  He is not diaphoretic  HENT:      Head: Normocephalic and atraumatic  Eyes:      Conjunctiva/sclera: Conjunctivae normal       Pupils: Pupils are equal, round, and reactive to light  Neck:      Thyroid: No thyromegaly  Cardiovascular:      Rate and Rhythm: Normal rate and regular rhythm  Pulmonary:      Effort: Pulmonary effort is normal  No respiratory distress  Breath sounds: Normal breath sounds  Abdominal:      General: Bowel sounds are normal       Palpations: Abdomen is soft  Musculoskeletal:         General: Normal range of motion  Cervical back: Normal range of motion and neck supple  Skin:     General: Skin is warm and dry  Findings: No rash  Neurological:      Mental Status: He is alert and oriented to person, place, and time  Motor: No abnormal muscle tone  Psychiatric:         Behavior: Behavior normal          The history was obtained from the review of the chart and from the patient      Lab Results:    Most recent Alc is  Lab Results   Component Value Date    HGBA1C 7 7 (H) 03/16/2023           No components found for: HA1C  No components found for: GLU    Lab Results   Component Value Date    " "CREATININE 0 90 03/16/2023    CREATININE 1 06 09/14/2022    CREATININE 1 00 06/29/2022    BUN 17 03/16/2023    K 4 6 03/16/2023     03/16/2023    CO2 30 03/16/2023     eGFR   Date Value Ref Range Status   03/16/2023 80 ml/min/1 73sq m Final     No components found for: [facility]    Lab Results   Component Value Date    HDL 35 (L) 03/16/2023    TRIG 224 (H) 03/16/2023       Lab Results   Component Value Date    ALT 12 03/16/2023    AST 9 (L) 03/16/2023    ALKPHOS 50 03/16/2023       Lab Results   Component Value Date    FREET4 0 83 06/29/2022             Future Appointments   Date Time Provider Amelia Tran   4/17/2023 11:00 AM Stephanie Atkins MD HEM ONC [facility]   6/27/2023 11:00 AM Bonnie Gibbs MD NEURO [facility]   7/17/2023 11:40 AM Saturnino Gonzáles MD [facility]   9/25/2023 11:15 AM Cali Negro DO [facility]       Portions of the record may have been created with voice recognition software  Occasional wrong word or \"sound a like\" substitutions may have occurred due to the inherent limitations of voice recognition software  Read the chart carefully and recognize, using context, where substitutions have occurred      "

## 2023-06-11 DIAGNOSIS — D64.9 ANEMIA, UNSPECIFIED TYPE: ICD-10-CM

## 2023-06-12 ENCOUNTER — TELEPHONE (OUTPATIENT)
Dept: NEUROLOGY | Facility: CLINIC | Age: 78
End: 2023-06-12

## 2023-06-12 DIAGNOSIS — D64.9 ANEMIA, UNSPECIFIED TYPE: ICD-10-CM

## 2023-06-12 RX ORDER — FOLIC ACID 1 MG/1
1000 TABLET ORAL DAILY
Qty: 90 TABLET | Refills: 0 | OUTPATIENT
Start: 2023-06-12

## 2023-06-12 RX ORDER — FOLIC ACID 1 MG/1
TABLET ORAL
Qty: 90 TABLET | Refills: 0 | Status: SHIPPED | OUTPATIENT
Start: 2023-06-12

## 2023-06-20 DIAGNOSIS — E11.65 TYPE 2 DIABETES MELLITUS WITH HYPERGLYCEMIA, WITHOUT LONG-TERM CURRENT USE OF INSULIN (HCC): ICD-10-CM

## 2023-06-20 RX ORDER — SITAGLIPTIN 100 MG/1
TABLET, FILM COATED ORAL
Qty: 30 TABLET | Refills: 0 | Status: SHIPPED | OUTPATIENT
Start: 2023-06-20

## 2023-06-21 DIAGNOSIS — I26.99 BILATERAL PULMONARY EMBOLISM (HCC): ICD-10-CM

## 2023-06-21 DIAGNOSIS — D68.51 FACTOR 5 LEIDEN MUTATION, HETEROZYGOUS (HCC): ICD-10-CM

## 2023-06-22 RX ORDER — APIXABAN 5 MG/1
TABLET, FILM COATED ORAL
Qty: 180 TABLET | Refills: 1 | Status: SHIPPED | OUTPATIENT
Start: 2023-06-22

## 2023-06-27 ENCOUNTER — OFFICE VISIT (OUTPATIENT)
Dept: NEUROLOGY | Facility: CLINIC | Age: 78
End: 2023-06-27
Payer: MEDICARE

## 2023-06-27 VITALS
TEMPERATURE: 97 F | HEART RATE: 72 BPM | SYSTOLIC BLOOD PRESSURE: 136 MMHG | BODY MASS INDEX: 32.73 KG/M2 | WEIGHT: 221 LBS | OXYGEN SATURATION: 96 % | DIASTOLIC BLOOD PRESSURE: 73 MMHG | HEIGHT: 69 IN

## 2023-06-27 DIAGNOSIS — G25.0 TREMOR, ESSENTIAL: Primary | ICD-10-CM

## 2023-06-27 PROCEDURE — 99214 OFFICE O/P EST MOD 30 MIN: CPT | Performed by: PSYCHIATRY & NEUROLOGY

## 2023-07-01 DIAGNOSIS — G25.0 TREMOR, ESSENTIAL: ICD-10-CM

## 2023-07-03 RX ORDER — PROPRANOLOL HYDROCHLORIDE 120 MG/1
CAPSULE, EXTENDED RELEASE ORAL
Qty: 30 CAPSULE | Refills: 0 | Status: SHIPPED | OUTPATIENT
Start: 2023-07-03

## 2023-07-10 DIAGNOSIS — E11.65 TYPE 2 DIABETES MELLITUS WITH HYPERGLYCEMIA, WITHOUT LONG-TERM CURRENT USE OF INSULIN (HCC): ICD-10-CM

## 2023-07-11 ENCOUNTER — APPOINTMENT (OUTPATIENT)
Dept: LAB | Facility: HOSPITAL | Age: 78
End: 2023-07-11
Payer: MEDICARE

## 2023-07-11 DIAGNOSIS — E11.65 TYPE 2 DIABETES MELLITUS WITH HYPERGLYCEMIA, WITHOUT LONG-TERM CURRENT USE OF INSULIN (HCC): ICD-10-CM

## 2023-07-11 DIAGNOSIS — C61 PROSTATE CANCER (HCC): ICD-10-CM

## 2023-07-11 DIAGNOSIS — E78.5 DYSLIPIDEMIA: ICD-10-CM

## 2023-07-11 LAB
ALBUMIN SERPL BCP-MCNC: 3.8 G/DL (ref 3.5–5)
ALP SERPL-CCNC: 62 U/L (ref 34–104)
ALT SERPL W P-5'-P-CCNC: 13 U/L (ref 7–52)
ANION GAP SERPL CALCULATED.3IONS-SCNC: 7 MMOL/L
AST SERPL W P-5'-P-CCNC: 11 U/L (ref 13–39)
BASOPHILS # BLD AUTO: 0.02 THOUSANDS/ÂΜL (ref 0–0.1)
BASOPHILS NFR BLD AUTO: 0 % (ref 0–1)
BILIRUB SERPL-MCNC: 0.17 MG/DL (ref 0.2–1)
BUN SERPL-MCNC: 20 MG/DL (ref 5–25)
CALCIUM SERPL-MCNC: 8.7 MG/DL (ref 8.4–10.2)
CHLORIDE SERPL-SCNC: 99 MMOL/L (ref 96–108)
CHOLEST SERPL-MCNC: 152 MG/DL
CO2 SERPL-SCNC: 31 MMOL/L (ref 21–32)
CREAT SERPL-MCNC: 0.94 MG/DL (ref 0.6–1.3)
EOSINOPHIL # BLD AUTO: 0.04 THOUSAND/ÂΜL (ref 0–0.61)
EOSINOPHIL NFR BLD AUTO: 1 % (ref 0–6)
ERYTHROCYTE [DISTWIDTH] IN BLOOD BY AUTOMATED COUNT: 13.7 % (ref 11.6–15.1)
GFR SERPL CREATININE-BSD FRML MDRD: 76 ML/MIN/1.73SQ M
GLUCOSE P FAST SERPL-MCNC: 180 MG/DL (ref 65–99)
HCT VFR BLD AUTO: 33.7 % (ref 36.5–49.3)
HDLC SERPL-MCNC: 33 MG/DL
HGB BLD-MCNC: 10.8 G/DL (ref 12–17)
IMM GRANULOCYTES # BLD AUTO: 0.02 THOUSAND/UL (ref 0–0.2)
IMM GRANULOCYTES NFR BLD AUTO: 0 % (ref 0–2)
LDLC SERPL CALC-MCNC: 62 MG/DL (ref 0–100)
LYMPHOCYTES # BLD AUTO: 1.82 THOUSANDS/ÂΜL (ref 0.6–4.47)
LYMPHOCYTES NFR BLD AUTO: 31 % (ref 14–44)
MCH RBC QN AUTO: 28.6 PG (ref 26.8–34.3)
MCHC RBC AUTO-ENTMCNC: 32 G/DL (ref 31.4–37.4)
MCV RBC AUTO: 89 FL (ref 82–98)
MONOCYTES # BLD AUTO: 0.46 THOUSAND/ÂΜL (ref 0.17–1.22)
MONOCYTES NFR BLD AUTO: 8 % (ref 4–12)
NEUTROPHILS # BLD AUTO: 3.52 THOUSANDS/ÂΜL (ref 1.85–7.62)
NEUTS SEG NFR BLD AUTO: 60 % (ref 43–75)
NRBC BLD AUTO-RTO: 0 /100 WBCS
PLATELET # BLD AUTO: 171 THOUSANDS/UL (ref 149–390)
PMV BLD AUTO: 11.2 FL (ref 8.9–12.7)
POTASSIUM SERPL-SCNC: 4.4 MMOL/L (ref 3.5–5.3)
PROT SERPL-MCNC: 6.4 G/DL (ref 6.4–8.4)
PSA SERPL-MCNC: 0.01 NG/ML (ref 0–4)
RBC # BLD AUTO: 3.78 MILLION/UL (ref 3.88–5.62)
SODIUM SERPL-SCNC: 137 MMOL/L (ref 135–147)
TRIGL SERPL-MCNC: 287 MG/DL
TSH SERPL DL<=0.05 MIU/L-ACNC: 3.26 UIU/ML (ref 0.45–4.5)
WBC # BLD AUTO: 5.88 THOUSAND/UL (ref 4.31–10.16)

## 2023-07-11 PROCEDURE — G0103 PSA SCREENING: HCPCS

## 2023-07-11 PROCEDURE — 36415 COLL VENOUS BLD VENIPUNCTURE: CPT

## 2023-07-11 PROCEDURE — 83036 HEMOGLOBIN GLYCOSYLATED A1C: CPT

## 2023-07-11 PROCEDURE — 84153 ASSAY OF PSA TOTAL: CPT

## 2023-07-11 PROCEDURE — 84443 ASSAY THYROID STIM HORMONE: CPT

## 2023-07-11 PROCEDURE — 80061 LIPID PANEL: CPT

## 2023-07-11 PROCEDURE — 80053 COMPREHEN METABOLIC PANEL: CPT

## 2023-07-11 PROCEDURE — 85025 COMPLETE CBC W/AUTO DIFF WBC: CPT

## 2023-07-12 LAB
EST. AVERAGE GLUCOSE BLD GHB EST-MCNC: 186 MG/DL
HBA1C MFR BLD: 8.1 %

## 2023-07-17 ENCOUNTER — OFFICE VISIT (OUTPATIENT)
Dept: ENDOCRINOLOGY | Facility: CLINIC | Age: 78
End: 2023-07-17
Payer: MEDICARE

## 2023-07-17 VITALS
HEIGHT: 69 IN | BODY MASS INDEX: 32.73 KG/M2 | SYSTOLIC BLOOD PRESSURE: 148 MMHG | OXYGEN SATURATION: 92 % | DIASTOLIC BLOOD PRESSURE: 78 MMHG | HEART RATE: 65 BPM | WEIGHT: 221 LBS

## 2023-07-17 DIAGNOSIS — E78.5 DYSLIPIDEMIA: ICD-10-CM

## 2023-07-17 DIAGNOSIS — N18.31 STAGE 3A CHRONIC KIDNEY DISEASE (HCC): ICD-10-CM

## 2023-07-17 DIAGNOSIS — E66.9 CLASS 1 OBESITY WITH SERIOUS COMORBIDITY AND BODY MASS INDEX (BMI) OF 32.0 TO 32.9 IN ADULT, UNSPECIFIED OBESITY TYPE: ICD-10-CM

## 2023-07-17 DIAGNOSIS — E11.65 TYPE 2 DIABETES MELLITUS WITH HYPERGLYCEMIA, WITHOUT LONG-TERM CURRENT USE OF INSULIN (HCC): Primary | ICD-10-CM

## 2023-07-17 DIAGNOSIS — H35.00 RETINOPATHY: ICD-10-CM

## 2023-07-17 PROCEDURE — 95251 CONT GLUC MNTR ANALYSIS I&R: CPT | Performed by: INTERNAL MEDICINE

## 2023-07-17 PROCEDURE — 99214 OFFICE O/P EST MOD 30 MIN: CPT | Performed by: INTERNAL MEDICINE

## 2023-07-17 NOTE — PROGRESS NOTES
Romy Fry 76 y.o. male MRN: [id number]    Encounter: [id number]      Assessment/Plan     1. Type 2 diabetes mellitus not on long term insulin therapy   2. CKD  3. Diabetic retinopathy  4. Obesity, BMI 32  Poorly controlled with last A1c 8.1% which has trended up  Patient is happy with majority of his blood sugars being less than 200 mg/dL, does not want to make any changes or introduce any new medications  He does not want to change his lifestyle  Emphasized importance of good glycemic control    Recommend the following at this time  Continue current regimen  May follow-up with primary care    4. Hyperlipidemia  - continue statin therapy    5. Hypertension  Blood pressure at goal     CC: Diabetes    History of Present Illness     HPI:  Romy Fry is a 76 y.o. male presents for a follow-up visit regarding diabetes management. Last seen in 4/2023  Last Eye exam: 2/2023-severe nonproliferative diabetic retinopathy with macular edema. Patient has refused Anti VEGF injections per notes     Current regimen:   Januvia 100 mg orally daily  Metformin XR 1 mg orally twice a day    Statin: crestor   ACE-I/ARB:  --     Has stiffness in the back and knees, likely OA related   No exercise, walking    Has stopped soda intake, has water   Still high carbs in diet; protein shake for breakfast   Vision stable   Less diarrhea with the XR form of metformin     Home glucose monitoring:  CGM interpretation aleena   Time percentage CGM worn 65 %   Time in range 48% (goal >65-70%)  High  47%  Very high 5%  Low 0%  Very low 0%    SD 20.9 (goal <50)     Average glucose 182 mg/dL  GMI 7.7 %   Symptoms of hypoglycemia :  No lows     All other systems were reviewed and are negative.     Review of Systems      Historical Information   Past Medical History:   Diagnosis Date   • Arthritis    • BPH associated with nocturia    • Diabetes mellitus ([address])    • Edema     lower legs   • Hearing aid worn     bilateral   • Hyperlipidemia    • Hypertension     controlled   • Tremor of both hands      Past Surgical History:   Procedure Laterality Date   • CATARACT EXTRACTION     • IL TRURL ELECTROSURG RESCJ PROSTATE BLEED COMPLETE N/A 1/20/2022    Procedure: CYSTOSCOPY, TRANSURETHRAL RESECTION OF PROSTATE (TURP); Surgeon: Kelli South MD;  Location: [facility];  Service: Urology   • IL XCAPSL CTRC RMVL INSJ IO LENS PROSTH W/O ECP Right 8/6/2018    Procedure: EXTRACTION EXTRACAPSULAR CATARACT PHACO INTRAOCULAR LENS (IOL); Surgeon: Alfa Caal MD;  Location: [facility] MAIN OR;  Service: Ophthalmology   • IL XCAPSL CTRC RMVL INSJ IO LENS PROSTH W/O ECP Left 10/1/2018    Procedure: EXTRACTION EXTRACAPSULAR CATARACT PHACO INTRAOCULAR LENS (IOL);   Surgeon: Alfa Caal MD;  Location: [facility] OR;  Service: Ophthalmology     Social History   Social History     Substance and Sexual Activity   Alcohol Use Never     Social History     Substance and Sexual Activity   Drug Use Never     Social History     Tobacco Use   Smoking Status Never   Smokeless Tobacco Never     Family History:   Family History   Problem Relation Age of Onset   • Colon cancer Mother    • Cancer Father         lung-smoker   • Lung cancer Father    • Early death Brother    • Cancer Brother         "bone cancer"       Meds/Allergies   Current Outpatient Medications   Medication Sig Dispense Refill   • Ascorbic Acid (VITAMIN C PO) Take by mouth in the morning     • calcium-vitamin D (OSCAL 500 + D) 500 mg-200 units per tablet Take 1 tablet by mouth daily     • Continuous Blood Gluc Sensor (FreeStyle [place] 14 Day Sensor) MISC Change sensor every 14 days 6 each 3   • Cyanocobalamin (VITAMIN B-12 CR PO) Take by mouth in the morning     • docusate sodium (COLACE) 100 mg capsule Take 1 capsule (100 mg total) by mouth 2 (two) times a day 10 capsule 0   • Eliquis 5 MG Take 1 tablet by mouth twice daily 477 tablet 1   • folic acid (FOLVITE) 1 mg tablet Take 1 tablet by mouth once daily 90 tablet 0   • metFORMIN (GLUCOPHAGE-XR) 500 mg 24 hr tablet 2 tabs bid 360 tablet 2   • polyethylene glycol (MIRALAX) 17 g packet Take 17 g by mouth daily as needed (constipation) for up to 14 days 14 each 0   • primidone (MYSOLINE) 50 mg tablet TAKE 2 TABLETS BY MOUTH EVERY 12 HOURS 120 tablet 11   • propranolol (INDERAL LA) 120 mg 24 hr capsule TAKE 1 CAPSULE BY MOUTH ONCE DAILY AFTER BREAKFAST 30 capsule 0   • rosuvastatin (CRESTOR) 10 MG tablet Take 1 tablet (10 mg total) by mouth daily 90 tablet 1   • senna (SENOKOT) 8.6 mg Take 2 tablets (17.2 mg total) by mouth daily at bedtime 60 tablet 0   • sitaGLIPtin (Januvia) 100 mg tablet Take 1 tablet (100 mg total) by mouth daily 30 tablet 5   • tamsulosin (FLOMAX) 0.4 mg Take 0.4 mg by mouth 2 (two) times a day       No current facility-administered medications for this visit. No Known Allergies    Objective   Vitals: Blood pressure 148/78, pulse 65, height 5' 9" (1.753 m), weight 100 kg (221 lb), SpO2 92 %. Physical Exam  Constitutional:       General: He is not in acute distress. Appearance: He is well-developed. He is not diaphoretic. HENT:      Head: Normocephalic and atraumatic. Eyes:      Conjunctiva/sclera: Conjunctivae normal.      Pupils: Pupils are equal, round, and reactive to light. Cardiovascular:      Rate and Rhythm: Normal rate and regular rhythm. Heart sounds: Normal heart sounds. No murmur heard. Pulmonary:      Effort: Pulmonary effort is normal. No respiratory distress. Breath sounds: Normal breath sounds. No wheezing. Abdominal:      General: There is no distension. Palpations: Abdomen is soft. Tenderness: There is no abdominal tenderness. There is no guarding. Musculoskeletal:      Cervical back: Normal range of motion and neck supple. Right lower leg: Edema present. Left lower leg: Edema present. Skin:     General: Skin is warm and dry. Findings: No erythema or rash. Neurological:      Mental Status: He is alert and oriented to person, place, and time. Psychiatric:         Behavior: Behavior normal.         Thought Content: Thought content normal.         The history was obtained from the review of the chart, patient.     Lab Results:   Lab Results   Component Value Date/Time    Hemoglobin A1C 8.1 (H) 07/11/2023 09:23 AM    Hemoglobin A1C 7.7 (H) 03/16/2023 09:40 AM    Hemoglobin A1C 7.6 (A) 12/20/2022 11:19 AM    Hemoglobin A1C 7.7 (H) 09/14/2022 09:41 AM    WBC 5.88 07/11/2023 09:23 AM    WBC 6.18 03/16/2023 09:40 AM    WBC 5.50 09/14/2022 09:41 AM    Hemoglobin 10.8 (L) 07/11/2023 09:23 AM    Hemoglobin 10.5 (L) 03/16/2023 09:40 AM    Hemoglobin 10.9 (L) 09/14/2022 09:41 AM    Hematocrit 33.7 (L) 07/11/2023 09:23 AM    Hematocrit 33.8 (L) 03/16/2023 09:40 AM    Hematocrit 34.9 (L) 09/14/2022 09:41 AM    MCV 89 07/11/2023 09:23 AM    MCV 89 03/16/2023 09:40 AM    MCV 88 09/14/2022 09:41 AM    Platelets 884 14/41/7917 09:23 AM    Platelets 664 02/50/4636 09:40 AM    Platelets 277 50/42/8319 09:41 AM    BUN 20 07/11/2023 09:23 AM    BUN 17 03/16/2023 09:40 AM    BUN 18 09/14/2022 09:41 AM    Potassium 4.4 07/11/2023 09:23 AM    Potassium 4.6 03/16/2023 09:40 AM    Potassium 4.9 09/14/2022 09:41 AM    Chloride 99 07/11/2023 09:23 AM    Chloride 101 03/16/2023 09:40 AM    Chloride 101 09/14/2022 09:41 AM    CO2 31 07/11/2023 09:23 AM    CO2 30 03/16/2023 09:40 AM    CO2 32 09/14/2022 09:41 AM    Creatinine 0.94 07/11/2023 09:23 AM    Creatinine 0.90 03/16/2023 09:40 AM    Creatinine 1.06 09/14/2022 09:41 AM    AST 11 (L) 07/11/2023 09:23 AM    AST 9 (L) 03/16/2023 09:40 AM    AST 13 09/14/2022 09:41 AM    ALT 13 07/11/2023 09:23 AM    ALT 12 03/16/2023 09:40 AM    ALT 20 09/14/2022 09:41 AM    Total Protein 6.4 07/11/2023 09:23 AM    Total Protein 6.0 (L) 03/16/2023 09:40 AM    Total Protein 7.0 09/14/2022 09:41 AM    Total Protein 6.7 09/14/2022 09:41 AM    Albumin 3.8 07/11/2023 09:23 AM    Albumin 3.7 03/16/2023 09:40 AM    Albumin 3.5 09/14/2022 09:41 AM    HDL, Direct 33 (L) 07/11/2023 09:23 AM    HDL, Direct 35 (L) 03/16/2023 09:40 AM    HDL, Direct 39 (L) 09/14/2022 09:41 AM    Triglycerides 287 (H) 07/11/2023 09:23 AM    Triglycerides 224 (H) 03/16/2023 09:40 AM    Triglycerides 184 (H) 09/14/2022 09:41 AM         Imaging Studies: I have personally reviewed pertinent reports. Portions of the record may have been created with voice recognition software. Occasional wrong word or "sound a like" substitutions may have occurred due to the inherent limitations of voice recognition software. Read the chart carefully and recognize, using context, where substitutions have occurred.

## 2023-07-17 NOTE — PATIENT INSTRUCTIONS
Continue current regimen   Strongly recommend consistent carb diet, trying to include more physical activity into your daily routine  Ideally I would recommend addition of another medicine if blood sugars do not improve with lifestyle modifications  Follow-up in 3 months/follow-up with primary care

## 2023-07-22 DIAGNOSIS — E78.5 DYSLIPIDEMIA: ICD-10-CM

## 2023-07-24 RX ORDER — ROSUVASTATIN CALCIUM 10 MG/1
10 TABLET, COATED ORAL DAILY
Qty: 90 TABLET | Refills: 0 | Status: SHIPPED | OUTPATIENT
Start: 2023-07-24

## 2023-07-28 DIAGNOSIS — G25.0 TREMOR, ESSENTIAL: ICD-10-CM

## 2023-07-28 RX ORDER — PROPRANOLOL HYDROCHLORIDE 120 MG/1
CAPSULE, EXTENDED RELEASE ORAL
Qty: 30 CAPSULE | Refills: 0 | Status: SHIPPED | OUTPATIENT
Start: 2023-07-28

## 2023-08-24 ENCOUNTER — OFFICE VISIT (OUTPATIENT)
Dept: CARDIOLOGY CLINIC | Facility: CLINIC | Age: 78
End: 2023-08-24
Payer: MEDICARE

## 2023-08-24 VITALS
HEIGHT: 69 IN | HEART RATE: 70 BPM | BODY MASS INDEX: 32.73 KG/M2 | WEIGHT: 221 LBS | DIASTOLIC BLOOD PRESSURE: 80 MMHG | OXYGEN SATURATION: 96 % | SYSTOLIC BLOOD PRESSURE: 136 MMHG

## 2023-08-24 DIAGNOSIS — R01.1 CARDIAC MURMUR: ICD-10-CM

## 2023-08-24 DIAGNOSIS — E66.9 OBESITY (BMI 30-39.9): ICD-10-CM

## 2023-08-24 DIAGNOSIS — R55 SYNCOPE, UNSPECIFIED SYNCOPE TYPE: Primary | ICD-10-CM

## 2023-08-24 DIAGNOSIS — E11.9 TYPE 2 DIABETES MELLITUS WITHOUT COMPLICATION, WITHOUT LONG-TERM CURRENT USE OF INSULIN (HCC): ICD-10-CM

## 2023-08-24 DIAGNOSIS — E11.22 CKD STAGE 3 DUE TO TYPE 2 DIABETES MELLITUS (HCC): ICD-10-CM

## 2023-08-24 DIAGNOSIS — I27.21 PULMONARY ARTERY HYPERTENSION (HCC): ICD-10-CM

## 2023-08-24 DIAGNOSIS — N18.30 CKD STAGE 3 DUE TO TYPE 2 DIABETES MELLITUS (HCC): ICD-10-CM

## 2023-08-24 DIAGNOSIS — R00.0 TACHYCARDIA: ICD-10-CM

## 2023-08-24 DIAGNOSIS — E78.5 DYSLIPIDEMIA: ICD-10-CM

## 2023-08-24 PROCEDURE — 93000 ELECTROCARDIOGRAM COMPLETE: CPT | Performed by: INTERNAL MEDICINE

## 2023-08-24 PROCEDURE — 99214 OFFICE O/P EST MOD 30 MIN: CPT | Performed by: INTERNAL MEDICINE

## 2023-08-24 NOTE — PROGRESS NOTES
Progress Note - Cardiology Office  [facility]    Monserrat Desai 76 y.o. male MRN: [id number]  : 1945  Encounter: [id number]      Assessment:     1. Syncope, unspecified syncope type    2. Tachycardia    3. Cardiac murmur    4. Dyslipidemia    5. Pulmonary artery hypertension (HCC)    6. Obesity (BMI 30-39.9)    7. CKD stage 3 due to type 2 diabetes mellitus ([address])    8. Type 2 diabetes mellitus without complication, without long-term current use of insulin ([address])        Discussion Summary and Plan:  1. Syncope. No more episodes of syncope. His syncope was due to his severe pulmonary hypertension and pulmonary embolism at that time. Repeat echo Doppler in 2022 shows patient's EF is now 60% and there is no evidence of any elevated pulmonary artery pressure. His PA pressures have improved he is feeling well    2. Bilateral pulmonary embolism. Patient had history of bilateral pulmonary embolism with RV strain. At that time PA pressure was high. He also had acute DVT at the time. He will follow-up with hematology and they will do the blood test to see if we need long-term antithrombotic therapy. He was found to have. He needs factor B Leiden deficiency for now he will continue Eliquis. Hematology thinks that he will need blood thinner for long time. Repeat echo shows that his PA pressures have normalized. He is scheduled to follow-up with hematology    3. History of DVT as above     4.  Cardiac murmur. Patient is diagnosed to have cardiac murmur workup shows his mild aortic stenosis. He had a low normal LV systolic function by echo and by nuclear EF was noted to be lower most likely due to his tachycardia and PVC at that time. Repeat echo Doppler shows in 2022 that his valve is still mildly stenosed with out any significant flow restriction will continue to monitor     5. Dyslipidemia. Continue statin cholesterol profile has improved.   He is will be getting his repeat blood test     6. Diabetes mellitus. Blood sugar has been up and down management as per medical team     7. Elevated PSA with possible prostate cancer with possible urinary obstruction status post chronic Flores catheter management as per Urology. Now scheduled to have prostate surgery. There is no cardiac contraindication for the procedure. Alexa with patient.     8. Tachycardia. It has improved. Current heart rate 70 bpm.    9. Obesity with BMI around 33 with gait dysfunction. He is trying to watch his diet    10. Abnormal stress test.  Patient has fixed defect due to body attenuation artifact no change in symptoms we will continue to monitor. We will follow-up in 6      Continue other Rx as before. All issues discussed with patient and patient's sister. Please call [phone] if any questions. Counseling :  A description of the counseling. Goals and Barriers. Patient's ability to self care: Yes  Medication side effect reviewed with patient in detail and all their questions answered to their satisfaction. HPI :     Vidal Vaz is a 76y.o. year old male who came for follow up. Patient  Was admitted to [facility] with recurrent syncope and was found to have pulmonary embolism and elevated pulmonary artery pressure. He has medical history significant for hypertension, hyperlipidemia, CKD stage 3, diabetes mellitus and mildly abnormal stress test.  Later on he developed retention of his urine and has Flores catheter placed. He has cardiac workup in the form of echo and stress test and was found to have mild aortic stenosis, moderate pulmonary hypertension, and by echo EF was normal by stress test he has decreased EF he was noted to have inferior wall fixed defect no ischemia and he has frequent PACs and PVC at that time which may be showing his low ejection fraction.   He feels great now he walks with the help of cane he has no more episodes of syncope dizziness lightheadedness and he is not requiring any oxygen therapy. He is still using Flores catheter he may need to follow-up with urology for that. His EKG reviewed he had a bifascicular block with heart rate 92 beats per minute and Q-wave in inferior lead noted. He never  he lives with his family he has good support system. His blood test from August 13, 2020 reviewed. 01/17/2022. Above reviewed. Patient came for follow-up. He has medical history significant for history of pulmonary embolism with elevated pulmonary artery pressure, hypertension, hyperlipidemia, CKD stage 3, tachycardia, diabetes mellitus who came for follow-up. He had a cardiac murmur and was found to have aortic stenosis which was mild, nuclear stress test shows fixed inferior wall defect but no ischemia. He was noted to have frequent PACs and PVCs and started on low-dose metoprolol. History using his Flores catheter and he need to follow-up with urology. He had history of bifascicular block. He is compliant with his cholesterol medication as well as other diabetic medications. He saw Hematology Oncology and found to have anterior Demetri positive for factor V Leiden. He has repeat blood test done in January 2022. Glucose was noted to be high and his electrolytes were acceptable cholesterol profile is in improved and hemoglobin was 10.9. EKG shows bifascicular block heart rate 86 beats per minute. His nuclear stress test from July 2021 reviewed as well as echo. His EF by echo was around 50-55% with mild valvular disease. Nuclear shows no ischemia the EF was noted to be low most likely due to his frequent PACs and PVCs.    07/13/2022. Above reviewed. Patient came for follow-up with his sister he is doing well.   He had history of pulmonary embolism with elevated PA pressures, hypertension, hyperlipidemia, CKD stage 3, tachycardia, RBBB and abnormal stress test which shows fixed inferior wall defect but no ischemia. He was noted to have frequent PACs and PVCs and was started on low-dose metoprolol today his heart rate is 77 beats per minute. He says he is compliant with his medications and blood test done in May 2022 has been acceptable. Repeat blood test in June 2022 shows his cholesterol electrolytes and liver enzymes are stable. He does have history of bifascicular block which is not changed. His echo in 2021 shows EF 50-55% with mild valvular disease. No nausea no vomiting no other issues at this time. He is feeling well he has no complaints he walks with the help of cane no leg swelling no PND no orthopnea no other issues. 2/6/2023. Lab reviewed. Patient came for follow-up with his sister. He is doing well history of pulmonary embolism with elevated PA pressure, hypertension, hyperlipidemia, CKD stage III, tachycardia, RBBB, abnormal stress test with fixed inferior wall defect but no ischemia who came for follow-up. Today EKG was heart rate 70 bpm his heart rate have significantly improved. He is feeling better he has no new complaints. He is compliant with his medications. And his med list was reviewed. Currently he is on propanolol 120 mg daily. He has blood test done in September 2022 which has been acceptable. No nausea no vomiting no other issues. His EKG shows bifascicular block with heart rate 70 bpm.  He has a repeat echo Doppler done in August 2022 which shows his heart function is acceptable and his PA pressures have significantly improved mild aortic stenosis noted, his sugar is still little up-and-down. He is still on Eliquis he is scheduled to see hematologist and will have the blood test.    8/24/2023. Above reviewed. Patient came for follow-up with his sister. He is doing well. History of pulmonary embolism with elevated PA P, hypertension, dyslipidemia, heart murmur, CKD stage III, RBBB who came for follow-up.   His nuclear stress test shows no ischemia but fixed defect. EKG today shows heart rate 70 bpm.  His med list reviewed with him. He has no new symptoms. He is pretty compliant with his medications. His blood test done on July 2023 shows patient electrolytes stable. Triglycerides are elevated he is diabetic. He follows with hematology and he was told he may be on his blood thinners all his life. He is doing well from cardiac point of view. His echo Doppler in August 2022 shows his EF is 60%, he has mild aortic stenosis and his pulmonary hypertension have significantly improved. Review of Systems   Constitutional: Negative for activity change, chills, diaphoresis, fever and unexpected weight change. HENT: Negative for congestion. Eyes: Negative for discharge and redness. Respiratory: Positive for shortness of breath. Negative for cough, chest tightness and wheezing. Chronic exertional not changed. Cardiovascular: Negative. Negative for chest pain, palpitations and leg swelling. Gastrointestinal: Negative for abdominal pain, diarrhea and nausea. Endocrine: Negative. Genitourinary: Negative for decreased urine volume and urgency. Musculoskeletal: Positive for arthralgias, back pain and gait problem. Skin: Negative for rash and wound. Allergic/Immunologic: Negative. Neurological: Negative for dizziness, seizures, syncope, weakness, light-headedness and headaches. Hematological: Negative. Psychiatric/Behavioral: Negative for agitation and confusion. The patient is nervous/anxious.         Historical Information   Past Medical History:   Diagnosis Date   • Arthritis    • BPH associated with nocturia    • Diabetes mellitus ([address])    • Edema     lower legs   • Hearing aid worn     bilateral   • Hyperlipidemia    • Hypertension     controlled   • Tremor of both hands      Past Surgical History:   Procedure Laterality Date   • CATARACT EXTRACTION     • PA TRURL ELECTROSURG RESCJ PROSTATE BLEED COMPLETE N/A 1/20/2022    Procedure: CYSTOSCOPY, TRANSURETHRAL RESECTION OF PROSTATE (TURP); Surgeon: Burgess Owen MD;  Location: [facility];  Service: Urology   • MN XCAPSL CTRC RMVL INSJ IO LENS PROSTH W/O ECP Right 8/6/2018    Procedure: EXTRACTION EXTRACAPSULAR CATARACT PHACO INTRAOCULAR LENS (IOL); Surgeon: Steve Diaz MD;  Location: [facility] MAIN OR;  Service: Ophthalmology   • MN XCAPSL CTRC RMVL INSJ IO LENS PROSTH W/O ECP Left 10/1/2018    Procedure: EXTRACTION EXTRACAPSULAR CATARACT PHACO INTRAOCULAR LENS (IOL);   Surgeon: Steve Diaz MD;  Location: [facility] MAIN OR;  Service: Ophthalmology     Social History     Substance and Sexual Activity   Alcohol Use Never     Social History     Substance and Sexual Activity   Drug Use Never     Social History     Tobacco Use   Smoking Status Never   Smokeless Tobacco Never     Family History:   Family History   Problem Relation Age of Onset   • Colon cancer Mother    • Cancer Father         lung-smoker   • Lung cancer Father    • Early death Brother    • Cancer Brother         "bone cancer"       Meds/Allergies     No Known Allergies    Current Outpatient Medications:   •  Ascorbic Acid (VITAMIN C PO), Take by mouth in the morning, Disp: , Rfl:   •  calcium-vitamin D (OSCAL 500 + D) 500 mg-200 units per tablet, Take 1 tablet by mouth daily, Disp: , Rfl:   •  Continuous Blood Gluc Sensor (AdTribyle [place] 14 Day Sensor) MISC, Change sensor every 14 days, Disp: 6 each, Rfl: 3  •  Cyanocobalamin (VITAMIN B-12 CR PO), Take by mouth in the morning, Disp: , Rfl:   •  Eliquis 5 MG, Take 1 tablet by mouth twice daily, Disp: 180 tablet, Rfl: 1  •  folic acid (FOLVITE) 1 mg tablet, Take 1 tablet by mouth once daily, Disp: 90 tablet, Rfl: 0  •  metFORMIN (GLUCOPHAGE-XR) 500 mg 24 hr tablet, 2 tabs bid, Disp: 360 tablet, Rfl: 2  •  primidone (MYSOLINE) 50 mg tablet, TAKE 2 TABLETS BY MOUTH EVERY 12 HOURS, Disp: 120 tablet, Rfl: 11  •  rosuvastatin (CRESTOR) 10 MG tablet, Take 1 tablet by mouth once daily, Disp: 90 tablet, Rfl: 0  •  sitaGLIPtin (Januvia) 100 mg tablet, Take 1 tablet (100 mg total) by mouth daily, Disp: 30 tablet, Rfl: 5  •  tamsulosin (FLOMAX) 0.4 mg, Take 0.4 mg by mouth 2 (two) times a day, Disp: , Rfl:   •  docusate sodium (COLACE) 100 mg capsule, Take 1 capsule (100 mg total) by mouth 2 (two) times a day (Patient not taking: Reported on 8/24/2023), Disp: 10 capsule, Rfl: 0  •  polyethylene glycol (MIRALAX) 17 g packet, Take 17 g by mouth daily as needed (constipation) for up to 14 days, Disp: 14 each, Rfl: 0  •  propranolol (INDERAL LA) 120 mg 24 hr capsule, TAKE 1 CAPSULE BY MOUTH ONCE DAILY AFTER BREAKFAST (Patient not taking: Reported on 8/24/2023), Disp: 30 capsule, Rfl: 0  •  rosuvastatin (CRESTOR) 10 MG tablet, Take 1 tablet by mouth once daily, Disp: 90 tablet, Rfl: 0  •  senna (SENOKOT) 8.6 mg, Take 2 tablets (17.2 mg total) by mouth daily at bedtime, Disp: 60 tablet, Rfl: 0  •  sitaGLIPtin (Januvia) 100 mg tablet, Take 1 tablet (100 mg total) by mouth daily, Disp: 30 tablet, Rfl: 5    Vitals: Blood pressure 136/80, pulse 70, height 5' 9" (1.753 m), weight 100 kg (221 lb), SpO2 96 %. ?  Body mass index is 32.64 kg/m². Wt Readings from Last 3 Encounters:   08/24/23 100 kg (221 lb)   07/17/23 100 kg (221 lb)   06/27/23 100 kg (221 lb)     Vitals:    08/24/23 1133   Weight: 100 kg (221 lb)     BP Readings from Last 3 Encounters:   08/24/23 136/80   07/17/23 148/78   06/27/23 136/73       Physical Exam:  Physical Exam  Constitutional:       General: He is not in acute distress. Appearance: He is well-developed. He is not diaphoretic. Neck:      Thyroid: No thyromegaly. Vascular: No JVD. Trachea: No tracheal deviation. Cardiovascular:      Rate and Rhythm: Normal rate and regular rhythm. Heart sounds: S1 normal and S2 normal. Heart sounds not distant. Murmur heard. Systolic (ejection) murmur is present with a grade of 2/6. No friction rub. No gallop.  No S3 or S4 sounds. Pulmonary:      Effort: Pulmonary effort is normal. No respiratory distress. Breath sounds: Normal breath sounds. No wheezing or rales. Chest:      Chest wall: No tenderness. Abdominal:      General: Bowel sounds are normal. There is no distension. Palpations: Abdomen is soft. Tenderness: There is no abdominal tenderness. Musculoskeletal:         General: No deformity. Cervical back: Neck supple. Skin:     General: Skin is warm and dry. Coloration: Skin is not pale. Findings: No rash. Neurological:      Mental Status: He is alert and oriented to person, place, and time. Psychiatric:         Behavior: Behavior normal.         Judgment: Judgment normal.           Diagnostic Studies Review Cardio:      Nuclear stress test.  Nuclear stress test was abnormal with fixed inferior apical defect which is worse in the rest images and is most likely due to body attenuation artifact EF was around 40%. But no significant ischemia was noted. Echo Doppler. Echo Doppler shows EF 55 % mild LVH, mild aortic stenosis, mild MR, mild TR and PA pressure was 64 mmHg. Repeat echo Doppler done in August 2022 shows patient has EF 60%, mild aortic stenosis with mean gradient 11 mmHg, aortic root is mildly dilated. Diana Girt PA pressure was not measured but no indirect evidence of right-sided elevated pressures. EKG:  Twelve lead EKG 09/20/2021 shows sinus rhythm heart rate 92 beats per minute bifascicular block Q-wave in inferior lead noted cannot rule out old inferior wall infarct. Twelve lead EKG done on 01/17/2022 shows sinus rhythm with frequent PACs heart rate 86 beats per minute bifascicular block noted. Twelve lead EKG done 07/13/2022 shows sinus rhythm with frequent PACs heart rate 77 beats per minute.     Twelve-lead EKG 2/6/2023 shows normal sinus rhythm with bifascicular block heart rate 70 bpm no change from previously    Twelve-lead EKG done on 8/24/2020 shows normal sinus some bifascicular block with RBBB left risk block heart rate 70 bpm not much change from previous EKG        Cardiac testing:   Results for orders placed during the hospital encounter of 21    Echo complete with contrast if indicated    Narrative  [address] [address] [address] [facility] [address] [place] [address]  [phone]    Transthoracic Echocardiogram  2D, M-mode, Doppler, and Color Doppler    Study date:  2021    Patient: Hannah Vines  MR number: [id number]  Account number: [de-identified]  : 1945  Age: 66 years  Gender: Male  Status: Inpatient  Location: Bedside  Height: 69 in  Weight: 226.6 lb  BP: 122/ 74 mmHg    Indications: Pulmonary Embolism    Diagnoses: I74.9 - Embolism and thrombosis of unspecified artery    Sonographer:  Umair Torre RDCS  Referring Physician:  Aundrea Boss PA-C  Group:  [facility]  Interpreting Physician:  Abel López DO    SUMMARY    LEFT VENTRICLE:  Systolic function was normal by visual assessment. Ejection fraction was estimated in the range of 55 % to 60 %. There were no regional wall motion abnormalities. There was mild concentric hypertrophy. Doppler parameters were consistent with abnormal left ventricular relaxation (grade 1 diastolic dysfunction). MITRAL VALVE:  There was mild regurgitation. AORTIC VALVE:  The valve was trileaflet. Leaflets exhibited normal thickness, mild calcification, and moderately reduced cuspal separation. There was mild stenosis. There was no regurgitation. Valve mean gradient was 11 mmHg. Aortic valve area was 1.7 cmï¾² by the continuity equation. TRICUSPID VALVE:  There was mild regurgitation. Pulmonary artery systolic pressure was moderately increased. HISTORY: PRIOR HISTORY: Diabetes Mellitus; Chronic Kidney Disease; Anemia; Dyslipidemia; Sepsis; Non MI Troponin Elevation; Hypertension    PROCEDURE: The procedure was performed at the bedside.  This was a routine study. The transthoracic approach was used. The study included complete 2D imaging, M-mode, complete spectral Doppler, and color Doppler. The heart rate was 79 bpm,  at the start of the study. Intravenous contrast ( 0.4ml/min Definity in NSS) was administered. Intravenous contrast was administered to opacify the left ventricle. Echocardiographic views were limited due to poor acoustic window  availability, decreased penetration, and lung interference. This was a technically difficult study. LEFT VENTRICLE: Size was normal. Systolic function was normal by visual assessment. Ejection fraction was estimated in the range of 55 % to 60 %. There were no regional wall motion abnormalities. There was mild concentric hypertrophy. DOPPLER: Doppler parameters were consistent with abnormal left ventricular relaxation (grade 1 diastolic dysfunction). RIGHT VENTRICLE: The size was normal. Systolic function was normal. DOPPLER: Systolic pressure was within the normal range. LEFT ATRIUM: Size was normal. No thrombus was identified. RIGHT ATRIUM: Size was normal.    MITRAL VALVE: Valve structure was normal. There was normal leaflet separation. No echocardiographic evidence for prolapse. DOPPLER: The transmitral velocity was within the normal range. There was no evidence for stenosis. There was mild  regurgitation. AORTIC VALVE: The valve was trileaflet. Leaflets exhibited normal thickness, mild calcification, and moderately reduced cuspal separation. DOPPLER: Transaortic velocity was increased due to valvular stenosis. There was mild stenosis. There  was no regurgitation. TRICUSPID VALVE: The valve structure was normal. There was normal leaflet separation. DOPPLER: The transtricuspid velocity was within the normal range. There was mild regurgitation. Pulmonary artery systolic pressure was moderately  increased. Estimated peak PA pressure was 64 mmHg.     PULMONIC VALVE: Leaflets exhibited normal thickness, no calcification, and normal cuspal separation. DOPPLER: The transpulmonic velocity was within the normal range. There was no regurgitation. PERICARDIUM: There was no thickening. There was no pericardial effusion. AORTA: The root exhibited normal size. PULMONARY ARTERY: The size was normal. The morphology appeared normal.    MEASUREMENT TABLES    DOPPLER MEASUREMENTS  Aortic valve   (Reference normals)  Peak gradient   21 mmHg   (--)  Mean gradient   11 mmHg   (--)  Valve area, cont   1.7 cmï¾²   (--)    SYSTEM MEASUREMENT TABLES    2D  EF (Teich): 54.78 %  %FS: 28.23 %  Ao Diam: 3.71 cm  Ao asc: 4.23 cm  EDV(Teich): 87.43 ml  ESV(Teich): 39.54 ml  IVSd: 1.19 cm  LA Area: 13.05 cm2  LA Diam: 2.75 cm  LVEDV MOD A4C: 78.34 ml  LVEF MOD A4C: 80.3 %  LVESV MOD A4C: 15.43 ml  LVIDd: 4.39 cm  LVIDs: 3.15 cm  LVLd A4C: 7.01 cm  LVLs A4C: 5.23 cm  LVOT Diam: 1.97 cm  LVPWd: 1.23 cm  RA Area: 13.4 cm2  RVIDd: 3.82 cm  SV (Teich): 47.89 ml  SV MOD A4C: 62.91 ml    CW  AV Env. Ti: 298.13 ms  AV VTI: 45.96 cm  AV Vmax: 2.23 m/s  AV Vmean: 1.54 m/s  AV maxP.85 mmHg  AV meanPG: 10.84 mmHg  TR Vmax: 3.77 m/s  TR maxP.93 mmHg    MM  TAPSE: 2.2 cm    PW  MV E/A Ratio: 0.85  E' Sept: 0.06 m/s  E/E' Sept: 12.19  LVOT Env. Ti: 365.37 ms  LVOT VTI: 25.4 cm  LVOT Vmax: 1.2 m/s  LVOT Vmean: 0.7 m/s  LVOT maxP.76 mmHg  LVOT meanP.51 mmHg  MV A Melvin: 0.93 m/s  MV Dec [place]: 3.07 m/s2  MV DecT: 255.81 ms  MV E Melvin: 0.79 m/s  MV PHT: 74.19 ms  MVA By PHT: 2.97 cm2    [employer] Accredited Echocardiography Laboratory    Prepared and electronically signed by    Steve Worthington DO  Signed 2021 10:21:25        Results for orders placed during the hospital encounter of 21    NM Myocardial Perfusion Spect (Pharmacological Induced Stress and/or Rest)    [address]  [place] [address] [address]  [phone]    Rest/Stress Gated SPECT Myocardial Perfusion Imaging After Regadenoson    Patient: Kayla Joel  MR number: [id number]  Account number: [de-identified]  : 1945  Age: 66 years  Gender: Male  Status: Inpatient  Location: Stress lab  Height: 69 in  Weight: 227 lb  BP: 133/ 68 mmHg    Allergies: NO KNOWN ALLERGIES    Diagnosis: R06.02 - Shortness of breath, R74.8 - Abnormal levels of other serum enzymes    Primary Physician:  Lindsay Arceo DO  RN:  GABY Garcia  Referring Physician:  BLAZE Rhodes  Group:  [facility]  Report Prepared By[de-identified]  GABY Garcia  Interpreting Physician:  Sumit Mccarthy MD    INDICATIONS: Evaluation for coronary artery disease. HISTORY: The patient is a 66year old  male. Chest pain status: no chest pain. Other symptoms: dyspnea. Coronary artery disease risk factors: dyslipidemia, hypertension, and diabetes mellitus. Cardiovascular history: peripheral  vascular occlusive disease. Medications:an anticoagulant, a lipid lowering agent and diabetic medications. PHYSICAL EXAM: Baseline physical exam screening: no wheezes audible. REST ECG: Normal sinus rhythm. The ECG showed right bundle branch block. PROCEDURE: The study was performed in the the Stress lab. A regadenoson infusion pharmacologic stress test was performed. Gated SPECT myocardial perfusion imaging was performed after stress and at rest. Systolic blood pressure was 133  mmHg, at the start of the study. Diastolic blood pressure was 68 mmHg, at the start of the study. The heart rate was 83 bpm, at the start of the study. IV double checked. Regadenoson protocol:  Time HR bpm SBP mmHg DBP mmHg Symptoms Rhythm/conduct  Baseline 09:36 83 133 68 none NSR, RBBB  Immediate 09:42 100 96 54 none same as above  1 min 09:43 99 106 59 none same as above  2 min 09:44 95 118 60 none same as above  3 min 09:45 98 118 56 none --  4 min 09:46 100 118 60 none same as above  No medications or fluids given.     STRESS SUMMARY: Duration of pharmacologic stress was 3 min. Maximal heart rate during stress was 105 bpm. The heart rate response to stress was normal. There was normal resting blood pressure with an appropriate response to stress. The  rate-pressure product for the peak heart rate and blood pressure was 59962. There was no chest pain during stress. The stress test was terminated due to protocol completion. On 2l/min of O2 by nasal cannula  Pre oxygen saturation: 94 %. Peak oxygen saturation: 94 %. The stress ECG was negative for ischemia and normal. There were no stress arrhythmias or conduction abnormalities. Arrhythmia during stress: isolated premature ventricular beats. ISOTOPE ADMINISTRATION:  Resting isotope administration Stress isotope administration  Agent Tetrofosmin Tetrofosmin  Dose 11 mCi 33 mCi  Date 07/21/2021 07/21/2021    The radiopharmaceutical was injected at the peak effect of pharmacologic stress. MYOCARDIAL PERFUSION IMAGING:  The image quality was fair. Rotating projection images reveal mild diaphragmatic attenuation and mild patient motion. Left ventricular size was normal. The TID ratio was 1.25. The right ventricle was dilated. PERFUSION DEFECTS:  -  There was a moderate-sized, mildly severe, fixed myocardial perfusion defect of the apical apical and inferior wall. GATED SPECT:  The calculated left ventricular ejection fraction was 40 %. Left ventricular ejection fraction was mildly decreased by visual estimate. There was no diagnostic evidence for left ventricular regional abnormality. SUMMARY:  -  Stress results: There was no chest pain during stress. -  ECG conclusions: The stress ECG was negative for ischemia and normal.  -  Perfusion imaging: There was a moderate-sized, mildly severe, fixed myocardial perfusion defect of the apical apical and inferior wall. -  Gated SPECT: The calculated left ventricular ejection fraction was 40 %.  Left ventricular ejection fraction was mildly decreased by visual estimate. There was no diagnostic evidence for left ventricular regional abnormality.  -  Impressions and recommendations: Abnormal study after pharmacologic vasodilation. There is a fixed inferior apical defect which is worse in rest images than stress images most likely secondary to body attenuation artifact. Cannot rule  out old nontransmural myocardial infarction. EF calculated around 40% appears to be mildly decreased. No significant ischemia is noted    IMPRESSIONS: Abnormal study after pharmacologic vasodilation. There is a fixed inferior apical defect which is worse in rest images than stress images most likely secondary to body attenuation artifact. Cannot rule out old nontransmural  myocardial infarction. EF calculated around 40% appears to be mildly decreased. No significant ischemia is noted Left ventricular systolic function was reduced, without distinct regional wall motion abnormalities.     Prepared and signed by    Guerita Villa MD  Signed 07/21/2021 16:58:46          Lab Review   Lab Results   Component Value Date    WBC 5.88 07/11/2023    HGB 10.8 (L) 07/11/2023    HCT 33.7 (L) 07/11/2023    MCV 89 07/11/2023    RDW 13.7 07/11/2023     07/11/2023     BMP:  Lab Results   Component Value Date    SODIUM 137 07/11/2023    K 4.4 07/11/2023    CL 99 07/11/2023    CO2 31 07/11/2023    BUN 20 07/11/2023    CREATININE 0.94 07/11/2023    GLUC 250 (H) 11/19/2021    GLUF 180 (H) 07/11/2023    CALCIUM 8.7 07/11/2023    CORRECTEDCA 9.2 06/29/2022    EGFR 76 07/11/2023     LFT:  Lab Results   Component Value Date    AST 11 (L) 07/11/2023    ALT 13 07/11/2023    ALKPHOS 62 07/11/2023    TP 6.4 07/11/2023    ALB 3.8 07/11/2023      No components found for: "TSH3"  Lab Results   Component Value Date    PVS5CBCGWFRC 3.260 07/11/2023     Lab Results   Component Value Date    HGBA1C 8.1 (H) 07/11/2023     Lipid Profile:     Lab Results   Component Value Date    TROPONINI 1.12 (H) 07/18/2021     Lab Results   Component Value Date    NTBNP 2,051 (H) 07/18/2021            Dr. Karen Kaiser MD [facility] [place]      "This note has been constructed using a voice recognition system. Therefore there may be syntax, spelling, and/or grammatical errors.  Please call if you have any questions. "

## 2023-08-31 DIAGNOSIS — D64.9 ANEMIA, UNSPECIFIED TYPE: ICD-10-CM

## 2023-08-31 RX ORDER — FOLIC ACID 1 MG/1
TABLET ORAL
Qty: 90 TABLET | Refills: 0 | Status: SHIPPED | OUTPATIENT
Start: 2023-08-31

## 2023-09-18 ENCOUNTER — RA CDI HCC (OUTPATIENT)
Dept: OTHER | Facility: HOSPITAL | Age: 78
End: 2023-09-18

## 2023-09-20 DIAGNOSIS — G25.0 TREMOR, ESSENTIAL: ICD-10-CM

## 2023-09-20 RX ORDER — PROPRANOLOL HYDROCHLORIDE 120 MG/1
120 CAPSULE, EXTENDED RELEASE ORAL DAILY
Qty: 30 CAPSULE | Refills: 11 | Status: SHIPPED | OUTPATIENT
Start: 2023-09-20

## 2023-09-20 NOTE — TELEPHONE ENCOUNTER
Received vm from 9/19 at 3:26pm-  taz van. Date of birth is [birth year]. Prescription is propranolol er 120 milligrams cap enzo,  Is to take one tablet by mouth once daily after breakfast, looking for a prescription refill. For [facility]. Phone number is [phone]. My phone number is his sister, which is Andreia Bingham, [phone]. I handle his prescription for him. So if you have any questions, you can give us a call back. Thank you.  Vladimir.  --------------------------------------------  Called pt's sister and confirmed vm was received    Refill entered, please sign off

## 2023-09-25 ENCOUNTER — OFFICE VISIT (OUTPATIENT)
Dept: FAMILY MEDICINE CLINIC | Facility: CLINIC | Age: 78
End: 2023-09-25
Payer: MEDICARE

## 2023-09-25 VITALS
HEART RATE: 71 BPM | WEIGHT: 219 LBS | RESPIRATION RATE: 18 BRPM | HEIGHT: 63 IN | SYSTOLIC BLOOD PRESSURE: 124 MMHG | BODY MASS INDEX: 38.8 KG/M2 | TEMPERATURE: 97.6 F | DIASTOLIC BLOOD PRESSURE: 60 MMHG

## 2023-09-25 DIAGNOSIS — E11.22 CKD STAGE 2 DUE TO TYPE 2 DIABETES MELLITUS: ICD-10-CM

## 2023-09-25 DIAGNOSIS — I26.99 BILATERAL PULMONARY EMBOLISM (HCC): ICD-10-CM

## 2023-09-25 DIAGNOSIS — D68.51 FACTOR 5 LEIDEN MUTATION, HETEROZYGOUS (HCC): ICD-10-CM

## 2023-09-25 DIAGNOSIS — N18.2 CKD STAGE 2 DUE TO TYPE 2 DIABETES MELLITUS: ICD-10-CM

## 2023-09-25 DIAGNOSIS — E11.65 TYPE 2 DIABETES MELLITUS WITH HYPERGLYCEMIA, WITHOUT LONG-TERM CURRENT USE OF INSULIN (HCC): ICD-10-CM

## 2023-09-25 DIAGNOSIS — Z00.00 MEDICARE ANNUAL WELLNESS VISIT, SUBSEQUENT: Primary | ICD-10-CM

## 2023-09-25 PROCEDURE — G0438 PPPS, INITIAL VISIT: HCPCS | Performed by: FAMILY MEDICINE

## 2023-09-25 PROCEDURE — 99214 OFFICE O/P EST MOD 30 MIN: CPT | Performed by: FAMILY MEDICINE

## 2023-09-25 NOTE — PROGRESS NOTES
Assessment/Plan:    No problem-specific Assessment & Plan notes found for this encounter. Continue NOAC for PE hx and FVL  Watch for bleeding    Fall risk and prevention discussed, earl with AC use    bmi aware  Wt loss suggested    HLD stable, last LDL 62    DM2 not controlled, last a1c 8.1  Endo f/u encouraged     Diagnoses and all orders for this visit:    Medicare annual wellness visit, subsequent    Factor 5 Leiden mutation, heterozygous ([address])  -     apixaban (Eliquis) 5 mg; Take 1 tablet (5 mg total) by mouth 2 (two) times a day    Bilateral pulmonary embolism (HCC)  -     apixaban (Eliquis) 5 mg; Take 1 tablet (5 mg total) by mouth 2 (two) times a day    CKD stage 2 due to type 2 diabetes mellitus ([address])    Type 2 diabetes mellitus with hyperglycemia, without long-term current use of insulin ([address])        Return in about 6 months (around 3/25/2024) for Recheck. Subjective:      Patient ID: Mahi Cross is a 76 y.o. male. Chief Complaint   Patient presents with   • Follow-up     Sas/cma   • Hyperlipidemia       HPI  No bleeding or bruising  Wt same  Not strict diet, just moderation  Sees Dr Jovanny Oleary for shots for prostate    The following portions of the patient's history were reviewed and updated as appropriate: allergies, current medications, past family history, past medical history, past social history, past surgical history and problem list.    Review of Systems   Constitutional: Negative for fever. Gastrointestinal: Negative for blood in stool. Genitourinary: Negative for hematuria.          Current Outpatient Medications   Medication Sig Dispense Refill   • apixaban (Eliquis) 5 mg Take 1 tablet (5 mg total) by mouth 2 (two) times a day 180 tablet 1   • Ascorbic Acid (VITAMIN C PO) Take by mouth in the morning     • calcium-vitamin D (OSCAL 500 + D) 500 mg-200 units per tablet Take 1 tablet by mouth daily     • Continuous Blood Gluc Sensor (FreeStyle [place] 14 Day Sensor) MISC Change sensor every 14 days 6 each 3   • Cyanocobalamin (VITAMIN B-12 CR PO) Take by mouth in the morning     • docusate sodium (COLACE) 100 mg capsule Take 1 capsule (100 mg total) by mouth 2 (two) times a day 10 capsule 0   • folic acid (FOLVITE) 1 mg tablet Take 1 tablet by mouth once daily 90 tablet 0   • metFORMIN (GLUCOPHAGE-XR) 500 mg 24 hr tablet 2 tabs bid 360 tablet 2   • primidone (MYSOLINE) 50 mg tablet TAKE 2 TABLETS BY MOUTH EVERY 12 HOURS 120 tablet 11   • propranolol (INDERAL LA) 120 mg 24 hr capsule Take 1 capsule (120 mg total) by mouth daily After breakfast 30 capsule 11   • rosuvastatin (CRESTOR) 10 MG tablet Take 1 tablet by mouth once daily 90 tablet 0   • sitaGLIPtin (Januvia) 100 mg tablet Take 1 tablet (100 mg total) by mouth daily 30 tablet 5   • polyethylene glycol (MIRALAX) 17 g packet Take 17 g by mouth daily as needed (constipation) for up to 14 days 14 each 0   • senna (SENOKOT) 8.6 mg Take 2 tablets (17.2 mg total) by mouth daily at bedtime 60 tablet 0   • tamsulosin (FLOMAX) 0.4 mg Take 0.4 mg by mouth 2 (two) times a day (Patient not taking: Reported on 9/25/2023)       No current facility-administered medications for this visit. Objective:    /60   Pulse 71   Temp 97.6 °F (36.4 °C)   Resp 18   Ht 5' 3" (1.6 m)   Wt 99.3 kg (219 lb)   BMI 38.79 kg/m²        Physical Exam  Vitals and nursing note reviewed. Constitutional:       General: He is not in acute distress. Appearance: He is well-developed. He is obese. He is not ill-appearing. HENT:      Head: Normocephalic. Eyes:      General: No scleral icterus. Conjunctiva/sclera: Conjunctivae normal.   Cardiovascular:      Rate and Rhythm: Normal rate and regular rhythm. Pulmonary:      Effort: Pulmonary effort is normal. No respiratory distress. Breath sounds: No wheezing. Abdominal:      Palpations: Abdomen is soft. Tenderness: There is no abdominal tenderness.    Musculoskeletal:         General: No deformity. Cervical back: Neck supple. Skin:     General: Skin is warm and dry. Coloration: Skin is not pale. Neurological:      Mental Status: He is alert. Gait: Gait abnormal.   Psychiatric:         Mood and Affect: Mood normal.         Behavior: Behavior normal.         Thought Content: Thought content normal.       BMI Counseling: Body mass index is 38.79 kg/m². The BMI is above normal. Nutrition recommendations include decreasing portion sizes and moderation in carbohydrate intake. Exercise recommendations include exercising 3-5 times per week. No pharmacotherapy was ordered. Rationale for BMI follow-up plan is due to patient being overweight or obese.               Faiza Prom, DO

## 2023-09-26 PROBLEM — Z00.00 MEDICARE ANNUAL WELLNESS VISIT, SUBSEQUENT: Status: ACTIVE | Noted: 2023-09-26

## 2023-09-26 PROBLEM — N18.31 STAGE 3A CHRONIC KIDNEY DISEASE (HCC): Status: RESOLVED | Noted: 2023-07-17 | Resolved: 2023-09-26

## 2023-09-26 NOTE — PROGRESS NOTES
Assessment and Plan:     Problem List Items Addressed This Visit        Active Problems    Bilateral pulmonary embolism (HCC)    Relevant Medications    apixaban (Eliquis) 5 mg    Factor 5 Leiden mutation, heterozygous (HCC)    Relevant Medications    apixaban (Eliquis) 5 mg    CKD stage 2 due to type 2 diabetes mellitus ([address])    Type 2 diabetes mellitus with hyperglycemia, without long-term current use of insulin ([address])    Medicare annual wellness visit, subsequent - Primary        Preventive health issues were discussed with patient, and age appropriate screening tests were ordered as noted in patient's After Visit Summary. Personalized health advice and appropriate referrals for health education or preventive services given if needed, as noted in patient's After Visit Summary. History of Present Illness:     Patient presents for a Medicare Wellness Visit    HPI   Patient Care Team:  Maury Arredondo DO as PCP - General (Family Medicine)  Sara Wu MD (Cardiology)     Review of Systems:     Review of Systems     Problem List:     Patient Active Problem List   Diagnosis   • Type 2 diabetes mellitus with hyperglycemia, without long-term current use of insulin ([address])   • Anemia   • Pulmonary artery hypertension ([address])   • Dyslipidemia   • Urinary retention   • Obesity (BMI 30-39. 9)   • Tremor   • Hypoxia   • Generalized weakness   • Protein-calorie malnutrition (HCC)   • Bilateral pulmonary embolism (HCC)   • Class 1 obesity with serious comorbidity and body mass index (BMI) of 32.0 to 32.9 in adult   • Prostate cancer ([address])   • Factor 5 Leiden mutation, heterozygous ([address])   • Itching   • CKD stage 2 due to type 2 diabetes mellitus ([address])   • Immunization due   • Diabetic retinopathy of left eye associated with type 2 diabetes mellitus ([address])   • Retinopathy   • Medicare annual wellness visit, subsequent      Past Medical and Surgical History:     Past Medical History:   Diagnosis Date   • Arthritis    • BPH associated with nocturia    • Diabetes mellitus ([address])    • Edema     lower legs   • Hearing aid worn     bilateral   • Hyperlipidemia    • Hypertension     controlled   • Tremor of both hands      Past Surgical History:   Procedure Laterality Date   • CATARACT EXTRACTION     • VA TRURL ELECTROSURG RESCJ PROSTATE BLEED COMPLETE N/A 1/20/2022    Procedure: CYSTOSCOPY, TRANSURETHRAL RESECTION OF PROSTATE (TURP); Surgeon: Morgan Chavis MD;  Location: [facility];  Service: Urology   • VA XCAPSL CTRC RMVL INSJ IO LENS PROSTH W/O ECP Right 8/6/2018    Procedure: EXTRACTION EXTRACAPSULAR CATARACT PHACO INTRAOCULAR LENS (IOL); Surgeon: Jose Luis Santa MD;  Location: [facility] MAIN OR;  Service: Ophthalmology   • VA XCAPSL CTRC RMVL INSJ IO LENS PROSTH W/O ECP Left 10/1/2018    Procedure: EXTRACTION EXTRACAPSULAR CATARACT PHACO INTRAOCULAR LENS (IOL);   Surgeon: Jose Luis Santa MD;  Location: [facility] MAIN OR;  Service: Ophthalmology      Family History:     Family History   Problem Relation Age of Onset   • Colon cancer Mother    • Cancer Father         lung-smoker   • Lung cancer Father    • Early death Brother    • Cancer Brother         "bone cancer"      Social History:     Social History     Socioeconomic History   • Marital status: Single     Spouse name: None   • Number of children: None   • Years of education: None   • Highest education level: None   Occupational History   • None   Tobacco Use   • Smoking status: Never   • Smokeless tobacco: Never   Vaping Use   • Vaping Use: Never used   Substance and Sexual Activity   • Alcohol use: Never   • Drug use: Never   • Sexual activity: Never   Other Topics Concern   • None   Social History Narrative   • None     Social Determinants of Health     Financial Resource Strain: Low Risk  (9/26/2023)    Overall Financial Resource Strain (CARDIA)    • Difficulty of Paying Living Expenses: Not hard at all   Food Insecurity: Not on file   Transportation Needs: No Transportation Needs (9/26/2023)    PRAPARE - Transportation    • Lack of Transportation (Medical): No    • Lack of Transportation (Non-Medical): No   Physical Activity: Not on file   Stress: Not on file   Social Connections: Not on file   Intimate Partner Violence: Not on file   Housing Stability: Not on file      Medications and Allergies:     Current Outpatient Medications   Medication Sig Dispense Refill   • apixaban (Eliquis) 5 mg Take 1 tablet (5 mg total) by mouth 2 (two) times a day 180 tablet 1   • Ascorbic Acid (VITAMIN C PO) Take by mouth in the morning     • calcium-vitamin D (OSCAL 500 + D) 500 mg-200 units per tablet Take 1 tablet by mouth daily     • Continuous Blood Gluc Sensor ([employer] [place] 14 Day Sensor) MISC Change sensor every 14 days 6 each 3   • Cyanocobalamin (VITAMIN B-12 CR PO) Take by mouth in the morning     • docusate sodium (COLACE) 100 mg capsule Take 1 capsule (100 mg total) by mouth 2 (two) times a day 10 capsule 0   • folic acid (FOLVITE) 1 mg tablet Take 1 tablet by mouth once daily 90 tablet 0   • metFORMIN (GLUCOPHAGE-XR) 500 mg 24 hr tablet 2 tabs bid 360 tablet 2   • primidone (MYSOLINE) 50 mg tablet TAKE 2 TABLETS BY MOUTH EVERY 12 HOURS 120 tablet 11   • propranolol (INDERAL LA) 120 mg 24 hr capsule Take 1 capsule (120 mg total) by mouth daily After breakfast 30 capsule 11   • rosuvastatin (CRESTOR) 10 MG tablet Take 1 tablet by mouth once daily 90 tablet 0   • sitaGLIPtin (Januvia) 100 mg tablet Take 1 tablet (100 mg total) by mouth daily 30 tablet 5   • polyethylene glycol (MIRALAX) 17 g packet Take 17 g by mouth daily as needed (constipation) for up to 14 days 14 each 0   • senna (SENOKOT) 8.6 mg Take 2 tablets (17.2 mg total) by mouth daily at bedtime 60 tablet 0   • tamsulosin (FLOMAX) 0.4 mg Take 0.4 mg by mouth 2 (two) times a day (Patient not taking: Reported on 9/25/2023)       No current facility-administered medications for this visit.      No Known Allergies   Immunizations: Immunization History   Administered Date(s) Administered   • COVID-19 MODERNA VACC 0.25 ML IM BOOSTER 11/19/2021   • COVID-19 MODERNA VACC 0.5 ML IM 11/19/2021   • COVID-19 PFIZER VACCINE 0.3 ML IM 04/10/2021, 05/01/2021, 04/27/2022   • Pneumococcal Conjugate Vaccine 20-valent (Pcv20), Polysace 09/16/2022      Health Maintenance:         Topic Date Due   • Hepatitis C Screening  Never done         Topic Date Due   • COVID-19 Vaccine (6 - Pfizer series) 06/22/2022   • Influenza Vaccine (1) 09/01/2023      Medicare Screening Tests and Risk Assessments:     Fernie Palomo is here for his Subsequent Wellness visit. Last Medicare Wellness visit information reviewed, patient interviewed and updates made to the record today. Health Risk Assessment:   Patient rates overall health as good. Patient feels that their physical health rating is same. Patient is satisfied with their life. Eyesight was rated as same. Hearing was rated as same. Patient feels that their emotional and mental health rating is same. Patients states they are never, rarely angry. Patient states they are sometimes unusually tired/fatigued. Pain experienced in the last 7 days has been none. Patient states that he has experienced no weight loss or gain in last 6 months. Fall Risk Screening: In the past year, patient has experienced: no history of falling in past year      Home Safety:  Patient has trouble with stairs inside or outside of their home. Patient has working smoke alarms and has working carbon monoxide detector. Home safety hazards include: loose rugs on the floor. Nutrition:   Current diet is Regular. Medications:   Patient is currently taking over-the-counter supplements. OTC medications include: see medication list. Patient is able to manage medications.      Activities of Daily Living (ADLs)/Instrumental Activities of Daily Living (IADLs):   Walk and transfer into and out of bed and chair?: Yes  Dress and groom yourself?: Yes Bathe or shower yourself?: Yes    Feed yourself? Yes  Do your laundry/housekeeping?: Yes  Manage your money, pay your bills and track your expenses?: Yes  Make your own meals?: Yes    Do your own shopping?: No    Previous Hospitalizations:   Any hospitalizations or ED visits within the last 12 months?: No      Advance Care Planning:   Living will: Yes    Durable POA for healthcare: No    Advanced directive: Yes    End of Life Decisions reviewed with patient: No      Cognitive Screening:   Provider or family/friend/caregiver concerned regarding cognition?: No    PREVENTIVE SCREENINGS      Cardiovascular Screening:    General: Screening Not Indicated and History Lipid Disorder      Diabetes Screening:     General: Screening Not Indicated and History Diabetes      Colorectal Cancer Screening:     General: Screening Current      Prostate Cancer Screening:    General: History Prostate Cancer and Screening Not Indicated      Osteoporosis Screening:    General: Screening Not Indicated and History Osteoporosis      Abdominal Aortic Aneurysm (AAA) Screening:    Risk factors include: tobacco use        General: Screening Not Indicated      Lung Cancer Screening:     General: Screening Not Indicated      Hepatitis C Screening:    General: Screening Current    Screening, Brief Intervention, and Referral to Treatment (SBIRT)    Screening  Typical number of drinks in a day: 0  Typical number of drinks in a week: 0  Interpretation: Low risk drinking behavior. Single Item Drug Screening:  How often have you used an illegal drug (including marijuana) or a prescription medication for non-medical reasons in the past year? never    Single Item Drug Screen Score: 0  Interpretation: Negative screen for possible drug use disorder    Other Counseling Topics:   Car/seat belt/driving safety and regular weightbearing exercise. No results found.      Physical Exam:     /60   Pulse 71   Temp 97.6 °F (36.4 °C)   Resp 18   Ht 5' 3" (1.6 m)   Wt 99.3 kg (219 lb)   BMI 38.79 kg/m²     Physical Exam     Nathan Willard DO

## 2023-09-26 NOTE — PATIENT INSTRUCTIONS
Medicare Preventive Visit Patient Instructions  Thank you for completing your Welcome to Medicare Visit or Medicare Annual Wellness Visit today. Your next wellness visit will be due in one year (9/26/2024). The screening/preventive services that you may require over the next 5-10 years are detailed below. Some tests may not apply to you based off risk factors and/or age. Screening tests ordered at today's visit but not completed yet may show as past due. Also, please note that scanned in results may not display below. Preventive Screenings:  Service Recommendations Previous Testing/Comments   Colorectal Cancer Screening  · Colonoscopy    · Fecal Occult Blood Test (FOBT)/Fecal Immunochemical Test (FIT)  · Fecal DNA/Cologuard Test  · Flexible Sigmoidoscopy Age: 43-73 years old   Colonoscopy: every 10 years (May be performed more frequently if at higher risk)  OR  FOBT/FIT: every 1 year  OR  Cologuard: every 3 years  OR  Sigmoidoscopy: every 5 years  Screening may be recommended earlier than age 39 if at higher risk for colorectal cancer. Also, an individualized decision between you and your healthcare provider will decide whether screening between the ages of 77-80 would be appropriate. Colonoscopy: Not on file  FOBT/FIT: Not on file  Cologuard: Not on file  Sigmoidoscopy: Not on file          Prostate Cancer Screening Individualized decision between patient and health care provider in men between ages of 53-66   Medicare will cover every 12 months beginning on the day after your 50th birthday PSA: 0.01 ng/mL           Hepatitis C Screening Once for adults born between 1945 and 1965  More frequently in patients at high risk for Hepatitis C Hep C Antibody: Not on file        Diabetes Screening 1-2 times per year if you're at risk for diabetes or have pre-diabetes Fasting glucose: 180 mg/dL (7/11/2023)  A1C: 8.1 % (7/11/2023)      Cholesterol Screening Once every 5 years if you don't have a lipid disorder.  May order more often based on risk factors. Lipid panel: 07/11/2023         Other Preventive Screenings Covered by Medicare:  1. Abdominal Aortic Aneurysm (AAA) Screening: covered once if your at risk. You're considered to be at risk if you have a family history of AAA or a male between the age of 70-76 who smoking at least 100 cigarettes in your lifetime. 2. Lung Cancer Screening: covers low dose CT scan once per year if you meet all of the following conditions: (1) Age 48-67; (2) No signs or symptoms of lung cancer; (3) Current smoker or have quit smoking within the last 15 years; (4) You have a tobacco smoking history of at least 20 pack years (packs per day x number of years you smoked); (5) You get a written order from a healthcare provider. 3. Glaucoma Screening: covered annually if you're considered high risk: (1) You have diabetes OR (2) Family history of glaucoma OR (3)  aged 48 and older OR (3)  American aged 72 and older  3. Osteoporosis Screening: covered every 2 years if you meet one of the following conditions: (1) Have a vertebral abnormality; (2) On glucocorticoid therapy for more than 3 months; (3) Have primary hyperparathyroidism; (4) On osteoporosis medications and need to assess response to drug therapy. 5. HIV Screening: covered annually if you're between the age of 14-79. Also covered annually if you are younger than 13 and older than 72 with risk factors for HIV infection. For pregnant patients, it is covered up to 3 times per pregnancy.     Immunizations:  Immunization Recommendations   Influenza Vaccine Annual influenza vaccination during flu season is recommended for all persons aged >= 6 months who do not have contraindications   Pneumococcal Vaccine   * Pneumococcal conjugate vaccine = PCV13 (Prevnar 13), PCV15 (Vaxneuvance), PCV20 (Prevnar 20)  * Pneumococcal polysaccharide vaccine = PPSV23 (Pneumovax) Adults 20-63 years old: 1-3 doses may be recommended based on certain risk factors  Adults 72 years old: 1-2 doses may be recommended based off what pneumonia vaccine you previously received   Hepatitis B Vaccine 3 dose series if at intermediate or high risk (ex: diabetes, end stage renal disease, liver disease)   Tetanus (Td) Vaccine - COST NOT COVERED BY MEDICARE PART B Following completion of primary series, a booster dose should be given every 10 years to maintain immunity against tetanus. Td may also be given as tetanus wound prophylaxis. Tdap Vaccine - COST NOT COVERED BY MEDICARE PART B Recommended at least once for all adults. For pregnant patients, recommended with each pregnancy. Shingles Vaccine (Shingrix) - COST NOT COVERED BY MEDICARE PART B  2 shot series recommended in those aged 48 and above     Health Maintenance Due:      Topic Date Due   • Hepatitis C Screening  Never done     Immunizations Due:      Topic Date Due   • COVID-19 Vaccine (6 - Pfizer series) 06/22/2022   • Influenza Vaccine (1) 09/01/2023     Advance Directives   What are advance directives? Advance directives are legal documents that state your wishes and plans for medical care. These plans are made ahead of time in case you lose your ability to make decisions for yourself. Advance directives can apply to any medical decision, such as the treatments you want, and if you want to donate organs. What are the types of advance directives? There are many types of advance directives, and each state has rules about how to use them. You may choose a combination of any of the following:  · Living will: This is a written record of the treatment you want. You can also choose which treatments you do not want, which to limit, and which to stop at a certain time. This includes surgery, medicine, IV fluid, and tube feedings. · Durable power of  for healthcare [facility]):   This is a written record that states who you want to make healthcare choices for you when you are unable to make them for yourself. This person, called a proxy, is usually a family member or a friend. You may choose more than 1 proxy. · Do not resuscitate (DNR) order:  A DNR order is used in case your heart stops beating or you stop breathing. It is a request not to have certain forms of treatment, such as CPR. A DNR order may be included in other types of advance directives. · Medical directive: This covers the care that you want if you are in a coma, near death, or unable to make decisions for yourself. You can list the treatments you want for each condition. Treatment may include pain medicine, surgery, blood transfusions, dialysis, IV or tube feedings, and a ventilator (breathing machine). · Values history: This document has questions about your views, beliefs, and how you feel and think about life. This information can help others choose the care that you would choose. Why are advance directives important? An advance directive helps you control your care. Although spoken wishes may be used, it is better to have your wishes written down. Spoken wishes can be misunderstood, or not followed. Treatments may be given even if you do not want them. An advance directive may make it easier for your family to make difficult choices about your care. Weight Management   Why it is important to manage your weight:  Being overweight increases your risk of health conditions such as heart disease, high blood pressure, type 2 diabetes, and certain types of cancer. It can also increase your risk for osteoarthritis, sleep apnea, and other respiratory problems. Aim for a slow, steady weight loss. Even a small amount of weight loss can lower your risk of health problems. How to lose weight safely:  A safe and healthy way to lose weight is to eat fewer calories and get regular exercise. You can lose up about 1 pound a week by decreasing the number of calories you eat by 500 calories each day.    Healthy meal plan for weight management:  A healthy meal plan includes a variety of foods, contains fewer calories, and helps you stay healthy. A healthy meal plan includes the following:  · Eat whole-grain foods more often. A healthy meal plan should contain fiber. Fiber is the part of grains, fruits, and vegetables that is not broken down by your body. Whole-grain foods are healthy and provide extra fiber in your diet. Some examples of whole-grain foods are whole-wheat breads and pastas, oatmeal, brown rice, and bulgur. · Eat a variety of vegetables every day. Include dark, leafy greens such as spinach, kale, rafael greens, and mustard greens. Eat yellow and orange vegetables such as carrots, sweet potatoes, and winter squash. · Eat a variety of fruits every day. Choose fresh or canned fruit (canned in its own juice or light syrup) instead of juice. Fruit juice has very little or no fiber. · Eat low-fat dairy foods. Drink fat-free (skim) milk or 1% milk. Eat fat-free yogurt and low-fat cottage cheese. Try low-fat cheeses such as mozzarella and other reduced-fat cheeses. · Choose meat and other protein foods that are low in fat. Choose beans or other legumes such as split peas or lentils. Choose fish, skinless poultry (chicken or turkey), or lean cuts of red meat (beef or pork). Before you cook meat or poultry, cut off any visible fat. · Use less fat and oil. Try baking foods instead of frying them. Add less fat, such as margarine, sour cream, regular salad dressing and mayonnaise to foods. Eat fewer high-fat foods. Some examples of high-fat foods include french fries, doughnuts, ice cream, and cakes. · Eat fewer sweets. Limit foods and drinks that are high in sugar. This includes candy, cookies, regular soda, and sweetened drinks. Exercise:  Exercise at least 30 minutes per day on most days of the week. Some examples of exercise include walking, biking, dancing, and swimming.  You can also fit in more physical activity by taking the stairs instead of the elevator or parking farther away from stores. Ask your healthcare provider about the best exercise plan for you. © Copyright [employer] 2018 Information is for End User's use only and may not be sold, redistributed or otherwise used for commercial purposes.  All illustrations and images included in CareNotes® are the copyrighted property of [employer].D.A.M., Inc. or [address] [place] [address]

## 2023-10-03 ENCOUNTER — TELEPHONE (OUTPATIENT)
Dept: HEMATOLOGY ONCOLOGY | Facility: CLINIC | Age: 78
End: 2023-10-03

## 2023-10-03 NOTE — TELEPHONE ENCOUNTER
Appointment Change  Cancel, Reschedule, Change to Virtual      Who are you speaking with? Patient   If it is not the patient, is the caller listed on the communication consent form? Yes   Which provider is the appointment scheduled with? Dr. Blackman Due   When was the original appointment scheduled? Please list date and time 10/18/23   At which location is the appointment scheduled to take place? [facility]   Was the appointment rescheduled? Was the appointment changed from an in person visit to a virtual visit? If so, please list the details of the change.  11/17/23   What is the reason for the appointment change? provider

## 2023-10-11 ENCOUNTER — TELEPHONE (OUTPATIENT)
Dept: HEMATOLOGY ONCOLOGY | Facility: CLINIC | Age: 78
End: 2023-10-11

## 2023-10-11 DIAGNOSIS — E11.65 TYPE 2 DIABETES MELLITUS WITH HYPERGLYCEMIA, WITHOUT LONG-TERM CURRENT USE OF INSULIN (HCC): ICD-10-CM

## 2023-10-11 RX ORDER — FLASH GLUCOSE SENSOR
KIT MISCELLANEOUS
Qty: 2 EACH | Refills: 0 | Status: SHIPPED | OUTPATIENT
Start: 2023-10-11

## 2023-10-11 NOTE — TELEPHONE ENCOUNTER
Appointment Change  Cancel, Reschedule, Change to Virtual      Who are you speaking with? Patient   If it is not the patient, is the caller listed on the communication consent form? N/A   Which provider is the appointment scheduled with? Dr. Rey Moses   When was the original appointment scheduled? Please list date and time 11/17/23 @ 10am   At which location is the appointment scheduled to take place? [facility]   Was the appointment rescheduled? Was the appointment changed from an in person visit to a virtual visit? If so, please list the details of the change. Yes 1/19/24 @ 10am   What is the reason for the appointment change? Dr. Rey Moses will be OOO       Was STAR transport scheduled? no   Does STAR transport need to be scheduled for the new visit (if applicable) no   Does the patient need an infusion appointment rescheduled? no   Does the patient have an upcoming infusion appointment scheduled? If so, when? no   Is the patient undergoing chemotherapy? no   For appointments cancelled with less than 24 hours:  Was the no-show policy reviewed?  yes

## 2023-10-12 DIAGNOSIS — E78.5 DYSLIPIDEMIA: ICD-10-CM

## 2023-10-13 RX ORDER — ROSUVASTATIN CALCIUM 10 MG/1
10 TABLET, COATED ORAL DAILY
Qty: 90 TABLET | Refills: 0 | Status: SHIPPED | OUTPATIENT
Start: 2023-10-13

## 2023-10-26 ENCOUNTER — APPOINTMENT (OUTPATIENT)
Dept: LAB | Facility: HOSPITAL | Age: 78
End: 2023-10-26
Payer: MEDICARE

## 2023-10-26 DIAGNOSIS — C61 PROSTATE CANCER (HCC): ICD-10-CM

## 2023-10-26 DIAGNOSIS — E11.65 TYPE 2 DIABETES MELLITUS WITH HYPERGLYCEMIA, WITHOUT LONG-TERM CURRENT USE OF INSULIN (HCC): ICD-10-CM

## 2023-10-26 DIAGNOSIS — E78.5 DYSLIPIDEMIA: ICD-10-CM

## 2023-10-26 LAB
ALBUMIN SERPL BCP-MCNC: 3.6 G/DL (ref 3.5–5)
ALP SERPL-CCNC: 57 U/L (ref 34–104)
ALT SERPL W P-5'-P-CCNC: 13 U/L (ref 7–52)
ANION GAP SERPL CALCULATED.3IONS-SCNC: 8 MMOL/L
AST SERPL W P-5'-P-CCNC: 10 U/L (ref 13–39)
BASOPHILS # BLD AUTO: 0.02 THOUSANDS/ÂΜL (ref 0–0.1)
BASOPHILS NFR BLD AUTO: 0 % (ref 0–1)
BILIRUB SERPL-MCNC: 0.27 MG/DL (ref 0.2–1)
BUN SERPL-MCNC: 18 MG/DL (ref 5–25)
CALCIUM SERPL-MCNC: 8.6 MG/DL (ref 8.4–10.2)
CHLORIDE SERPL-SCNC: 97 MMOL/L (ref 96–108)
CO2 SERPL-SCNC: 32 MMOL/L (ref 21–32)
CREAT SERPL-MCNC: 0.89 MG/DL (ref 0.6–1.3)
EOSINOPHIL # BLD AUTO: 0.09 THOUSAND/ÂΜL (ref 0–0.61)
EOSINOPHIL NFR BLD AUTO: 1 % (ref 0–6)
ERYTHROCYTE [DISTWIDTH] IN BLOOD BY AUTOMATED COUNT: 13.2 % (ref 11.6–15.1)
EST. AVERAGE GLUCOSE BLD GHB EST-MCNC: 212 MG/DL
GFR SERPL CREATININE-BSD FRML MDRD: 81 ML/MIN/1.73SQ M
GLUCOSE P FAST SERPL-MCNC: 229 MG/DL (ref 65–99)
HBA1C MFR BLD: 9 %
HCT VFR BLD AUTO: 33.9 % (ref 36.5–49.3)
HGB BLD-MCNC: 10.6 G/DL (ref 12–17)
IMM GRANULOCYTES # BLD AUTO: 0.02 THOUSAND/UL (ref 0–0.2)
IMM GRANULOCYTES NFR BLD AUTO: 0 % (ref 0–2)
LYMPHOCYTES # BLD AUTO: 1.75 THOUSANDS/ÂΜL (ref 0.6–4.47)
LYMPHOCYTES NFR BLD AUTO: 26 % (ref 14–44)
MCH RBC QN AUTO: 27.9 PG (ref 26.8–34.3)
MCHC RBC AUTO-ENTMCNC: 31.3 G/DL (ref 31.4–37.4)
MCV RBC AUTO: 89 FL (ref 82–98)
MONOCYTES # BLD AUTO: 0.49 THOUSAND/ÂΜL (ref 0.17–1.22)
MONOCYTES NFR BLD AUTO: 7 % (ref 4–12)
NEUTROPHILS # BLD AUTO: 4.5 THOUSANDS/ÂΜL (ref 1.85–7.62)
NEUTS SEG NFR BLD AUTO: 66 % (ref 43–75)
NRBC BLD AUTO-RTO: 0 /100 WBCS
PLATELET # BLD AUTO: 180 THOUSANDS/UL (ref 149–390)
PMV BLD AUTO: 10.6 FL (ref 8.9–12.7)
POTASSIUM SERPL-SCNC: 4.9 MMOL/L (ref 3.5–5.3)
PROT SERPL-MCNC: 6.2 G/DL (ref 6.4–8.4)
PSA SERPL-MCNC: 0.06 NG/ML (ref 0–4)
RBC # BLD AUTO: 3.8 MILLION/UL (ref 3.88–5.62)
SODIUM SERPL-SCNC: 137 MMOL/L (ref 135–147)
TESTOST SERPL-MSCNC: <10 NG/DL
WBC # BLD AUTO: 6.87 THOUSAND/UL (ref 4.31–10.16)

## 2023-10-26 PROCEDURE — 84403 ASSAY OF TOTAL TESTOSTERONE: CPT

## 2023-10-26 PROCEDURE — 85025 COMPLETE CBC W/AUTO DIFF WBC: CPT

## 2023-10-26 PROCEDURE — 80053 COMPREHEN METABOLIC PANEL: CPT

## 2023-10-26 PROCEDURE — 84153 ASSAY OF PSA TOTAL: CPT

## 2023-10-26 PROCEDURE — 36415 COLL VENOUS BLD VENIPUNCTURE: CPT

## 2023-10-26 PROCEDURE — 83036 HEMOGLOBIN GLYCOSYLATED A1C: CPT

## 2023-11-01 ENCOUNTER — OFFICE VISIT (OUTPATIENT)
Dept: ENDOCRINOLOGY | Facility: CLINIC | Age: 78
End: 2023-11-01
Payer: MEDICARE

## 2023-11-01 VITALS
HEIGHT: 63 IN | OXYGEN SATURATION: 98 % | SYSTOLIC BLOOD PRESSURE: 140 MMHG | BODY MASS INDEX: 38.98 KG/M2 | WEIGHT: 220 LBS | DIASTOLIC BLOOD PRESSURE: 90 MMHG | HEART RATE: 69 BPM

## 2023-11-01 DIAGNOSIS — N18.31 STAGE 3A CHRONIC KIDNEY DISEASE (HCC): ICD-10-CM

## 2023-11-01 DIAGNOSIS — E11.65 TYPE 2 DIABETES MELLITUS WITH HYPERGLYCEMIA, WITHOUT LONG-TERM CURRENT USE OF INSULIN (HCC): Primary | ICD-10-CM

## 2023-11-01 DIAGNOSIS — E78.5 DYSLIPIDEMIA: ICD-10-CM

## 2023-11-01 DIAGNOSIS — H35.00 RETINOPATHY: ICD-10-CM

## 2023-11-01 DIAGNOSIS — E66.01 CLASS 2 SEVERE OBESITY WITH SERIOUS COMORBIDITY AND BODY MASS INDEX (BMI) OF 38.0 TO 38.9 IN ADULT, UNSPECIFIED OBESITY TYPE: ICD-10-CM

## 2023-11-01 PROCEDURE — 99214 OFFICE O/P EST MOD 30 MIN: CPT | Performed by: INTERNAL MEDICINE

## 2023-11-01 PROCEDURE — 95251 CONT GLUC MNTR ANALYSIS I&R: CPT | Performed by: INTERNAL MEDICINE

## 2023-11-01 NOTE — PATIENT INSTRUCTIONS
Jardiance 10 mg orally daily  Continue rest of current regimen  Please try to eat a lower carbohydrates with your meals specially with breakfast since that is when your blood sugars are the highest  Please check blood sugars more often, try to scan at least once before each meal and at bedtime, once in 8 hours

## 2023-11-01 NOTE — PROGRESS NOTES
Jennie Way 76 y.o. male MRN: [id number]    Encounter: [id number]      Assessment/Plan     Type 2 diabetes mellitus on long-term insulin therapy  CKD  Retinopathy  Obesity, BMI 38  Last A1c 9% which has trended up, poorly controlled   GMI 8.3%  Post prandial hyperglycemia was prominent after breakfast    Recommend the following at this time  Start Jardiance 10 mg orally daily  Continue rest of current regimen  Recommend lower carbohydrates with meals specially with breakfast  check blood sugars more often, try to scan at least once before each meal and at bedtime, once in 8 hours  Follow up with ophthalmology     5. Hyperlipidemia  - continue statin therapy    6. Hypertension  Blood pressure at goal   - continue current medications       CC: Diabetes    History of Present Illness     HPI:  Jennie Way is a 76 y.o. male presents for a follow-up visit regarding diabetes management. 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; is due for a follow up      Current regimen:   Januvia 100 mg orally daily  Metformin XR 1 mg orally twice a day    Statin:  crestor   ACE-I/ARB: --    No change in diet ; No soda/ ice-cream   No exercise, uses a cane for ambulation, tries to walk a little     B:  3 eggs, ensure   L: variable, lettuce, lunch meat, cheese , no bread   D:  mashed potatoes, ham   Snacks:  pretzels, occasional candy, potato chips     Home glucose monitoring:  CGM interpretation  [place]   Time percentage CGM worn 71 %   Time in range 38% (goal >65-70%)  High 44%  Very high 18%  Low 0%  Very low 0%     CV  25.4 % (goal <33%)     Average glucose 210 mg/dL  GMI 8.3 %   Symptoms of hypoglycemia :  no lows     All other systems were reviewed and are negative.     Review of Systems      Historical Information   Past Medical History:   Diagnosis Date    Arthritis     BPH associated with nocturia Diabetes mellitus ([address])     Edema     lower legs    Hearing aid worn     bilateral    Hyperlipidemia     Hypertension     controlled    Tremor of both hands      Past Surgical History:   Procedure Laterality Date    CATARACT EXTRACTION      IN TRURL ELECTROSURG RESCJ PROSTATE BLEED COMPLETE N/A 1/20/2022    Procedure: CYSTOSCOPY, TRANSURETHRAL RESECTION OF PROSTATE (TURP); Surgeon: Yanely Ibrahim MD;  Location: [facility];  Service: Urology    IN XCAPSL CTRC RMVL INSJ IO LENS PROSTH W/O ECP Right 8/6/2018    Procedure: EXTRACTION EXTRACAPSULAR CATARACT PHACO INTRAOCULAR LENS (IOL); Surgeon: Herrera Dominguez MD;  Location: [facility] MAIN OR;  Service: Ophthalmology    IN XCAPSL CTRC RMVL INSJ IO LENS PROSTH W/O ECP Left 10/1/2018    Procedure: EXTRACTION EXTRACAPSULAR CATARACT PHACO INTRAOCULAR LENS (IOL);   Surgeon: Herrera Dominguez MD;  Location: [facility] MAIN OR;  Service: Ophthalmology     Social History   Social History     Substance and Sexual Activity   Alcohol Use Never     Social History     Substance and Sexual Activity   Drug Use Never     Social History     Tobacco Use   Smoking Status Never   Smokeless Tobacco Never     Family History:   Family History   Problem Relation Age of Onset    Colon cancer Mother     Cancer Father         lung-smoker    Lung cancer Father     Early death Brother     Cancer Brother         "bone cancer"       Meds/Allergies   Current Outpatient Medications   Medication Sig Dispense Refill    apixaban (Eliquis) 5 mg Take 1 tablet (5 mg total) by mouth 2 (two) times a day 180 tablet 1    Ascorbic Acid (VITAMIN C PO) Take by mouth in the morning      calcium-vitamin D (OSCAL 500 + D) 500 mg-200 units per tablet Take 1 tablet by mouth daily      Continuous Blood Gluc Sensor (FreeStyle [place] 14 Day Sensor) MISC CHANGE SENSOR EVERY 14 DAYS 2 each 0    Cyanocobalamin (VITAMIN B-12 CR PO) Take by mouth in the morning      docusate sodium (COLACE) 100 mg capsule Take 1 capsule (100 mg total) by mouth 2 (two) times a day 10 capsule 0    Empagliflozin (Jardiance) 10 MG TABS tablet Take 1 tablet (10 mg total) by mouth every morning 90 tablet 3    folic acid (FOLVITE) 1 mg tablet Take 1 tablet by mouth once daily 90 tablet 0    metFORMIN (GLUCOPHAGE-XR) 500 mg 24 hr tablet 2 tabs bid 360 tablet 2    primidone (MYSOLINE) 50 mg tablet TAKE 2 TABLETS BY MOUTH EVERY 12 HOURS 120 tablet 11    propranolol (INDERAL LA) 120 mg 24 hr capsule Take 1 capsule (120 mg total) by mouth daily After breakfast 30 capsule 11    rosuvastatin (CRESTOR) 10 MG tablet Take 1 tablet by mouth once daily 90 tablet 0    sitaGLIPtin (Januvia) 100 mg tablet Take 1 tablet (100 mg total) by mouth daily 90 tablet 3    polyethylene glycol (MIRALAX) 17 g packet Take 17 g by mouth daily as needed (constipation) for up to 14 days 14 each 0    senna (SENOKOT) 8.6 mg Take 2 tablets (17.2 mg total) by mouth daily at bedtime 60 tablet 0    tamsulosin (FLOMAX) 0.4 mg Take 0.4 mg by mouth 2 (two) times a day (Patient not taking: Reported on 9/25/2023)       No current facility-administered medications for this visit. No Known Allergies    Objective   Vitals: Blood pressure 140/90, pulse 69, height 5' 3" (1.6 m), weight 99.8 kg (220 lb), SpO2 98 %. Physical Exam  Constitutional:       General: He is not in acute distress. Appearance: He is well-developed. He is not diaphoretic. HENT:      Head: Normocephalic and atraumatic. Eyes:      Conjunctiva/sclera: Conjunctivae normal.      Pupils: Pupils are equal, round, and reactive to light. Cardiovascular:      Rate and Rhythm: Normal rate and regular rhythm. Heart sounds: Normal heart sounds. No murmur heard. Pulmonary:      Effort: Pulmonary effort is normal. No respiratory distress. Breath sounds: Normal breath sounds. No wheezing. Abdominal:      General: There is no distension. Palpations: Abdomen is soft. Tenderness: There is no abdominal tenderness.  There is no guarding. Musculoskeletal:      Cervical back: Normal range of motion and neck supple. Skin:     General: Skin is warm and dry. Findings: No erythema or rash. Neurological:      Mental Status: He is alert and oriented to person, place, and time. Psychiatric:         Behavior: Behavior normal.         Thought Content: Thought content normal.         The history was obtained from the review of the chart, patient and family.     Lab Results:   Lab Results   Component Value Date/Time    Hemoglobin A1C 9.0 (H) 10/26/2023 09:47 AM    Hemoglobin A1C 8.1 (H) 07/11/2023 09:23 AM    Hemoglobin A1C 7.7 (H) 03/16/2023 09:40 AM    WBC 6.87 10/26/2023 09:47 AM    WBC 5.88 07/11/2023 09:23 AM    WBC 6.18 03/16/2023 09:40 AM    Hemoglobin 10.6 (L) 10/26/2023 09:47 AM    Hemoglobin 10.8 (L) 07/11/2023 09:23 AM    Hemoglobin 10.5 (L) 03/16/2023 09:40 AM    Hematocrit 33.9 (L) 10/26/2023 09:47 AM    Hematocrit 33.7 (L) 07/11/2023 09:23 AM    Hematocrit 33.8 (L) 03/16/2023 09:40 AM    MCV 89 10/26/2023 09:47 AM    MCV 89 07/11/2023 09:23 AM    MCV 89 03/16/2023 09:40 AM    Platelets 271 19/77/7041 09:47 AM    Platelets 483 27/52/6993 09:23 AM    Platelets 259 20/63/2514 09:40 AM    BUN 18 10/26/2023 09:47 AM    BUN 20 07/11/2023 09:23 AM    BUN 17 03/16/2023 09:40 AM    Potassium 4.9 10/26/2023 09:47 AM    Potassium 4.4 07/11/2023 09:23 AM    Potassium 4.6 03/16/2023 09:40 AM    Chloride 97 10/26/2023 09:47 AM    Chloride 99 07/11/2023 09:23 AM    Chloride 101 03/16/2023 09:40 AM    CO2 32 10/26/2023 09:47 AM    CO2 31 07/11/2023 09:23 AM    CO2 30 03/16/2023 09:40 AM    Creatinine 0.89 10/26/2023 09:47 AM    Creatinine 0.94 07/11/2023 09:23 AM    Creatinine 0.90 03/16/2023 09:40 AM    AST 10 (L) 10/26/2023 09:47 AM    AST 11 (L) 07/11/2023 09:23 AM    AST 9 (L) 03/16/2023 09:40 AM    ALT 13 10/26/2023 09:47 AM    ALT 13 07/11/2023 09:23 AM    ALT 12 03/16/2023 09:40 AM    Total Protein 6.2 (L) 10/26/2023 09:47 AM Total Protein 6.4 07/11/2023 09:23 AM    Total Protein 6.0 (L) 03/16/2023 09:40 AM    Albumin 3.6 10/26/2023 09:47 AM    Albumin 3.8 07/11/2023 09:23 AM    Albumin 3.7 03/16/2023 09:40 AM    HDL, Direct 33 (L) 07/11/2023 09:23 AM    HDL, Direct 35 (L) 03/16/2023 09:40 AM    Triglycerides 287 (H) 07/11/2023 09:23 AM    Triglycerides 224 (H) 03/16/2023 09:40 AM         Imaging Studies: I have personally reviewed pertinent reports. Portions of the record may have been created with voice recognition software. Occasional wrong word or "sound a like" substitutions may have occurred due to the inherent limitations of voice recognition software. Read the chart carefully and recognize, using context, where substitutions have occurred.

## 2023-11-02 DIAGNOSIS — E11.65 TYPE 2 DIABETES MELLITUS WITH HYPERGLYCEMIA, WITHOUT LONG-TERM CURRENT USE OF INSULIN (HCC): ICD-10-CM

## 2023-11-02 RX ORDER — FLASH GLUCOSE SENSOR
KIT MISCELLANEOUS
Qty: 2 EACH | Refills: 0 | Status: SHIPPED | OUTPATIENT
Start: 2023-11-02

## 2023-11-06 DIAGNOSIS — E11.65 TYPE 2 DIABETES MELLITUS WITH HYPERGLYCEMIA, WITHOUT LONG-TERM CURRENT USE OF INSULIN (HCC): ICD-10-CM

## 2023-11-06 RX ORDER — FLASH GLUCOSE SCANNING READER
1 EACH MISCELLANEOUS ONCE
Qty: 1 EACH | Refills: 0 | Status: SHIPPED | OUTPATIENT
Start: 2023-11-06 | End: 2023-11-06 | Stop reason: ALTCHOICE

## 2023-11-06 RX ORDER — FLASH GLUCOSE SCANNING READER
1 EACH MISCELLANEOUS ONCE
COMMUNITY
End: 2023-11-06 | Stop reason: SDUPTHER

## 2023-11-06 RX ORDER — BLOOD-GLUCOSE SENSOR
1 EACH MISCELLANEOUS
Qty: 2 EACH | Refills: 3 | Status: SHIPPED | OUTPATIENT
Start: 2023-11-06 | End: 2023-11-10 | Stop reason: SDUPTHER

## 2023-11-06 RX ORDER — BLOOD-GLUCOSE SENSOR
2 EACH MISCELLANEOUS
COMMUNITY
Start: 2023-11-06 | End: 2023-11-06 | Stop reason: SDUPTHER

## 2023-11-10 ENCOUNTER — TELEPHONE (OUTPATIENT)
Dept: ENDOCRINOLOGY | Facility: CLINIC | Age: 78
End: 2023-11-10

## 2023-11-10 DIAGNOSIS — E11.65 TYPE 2 DIABETES MELLITUS WITH HYPERGLYCEMIA, WITHOUT LONG-TERM CURRENT USE OF INSULIN (HCC): ICD-10-CM

## 2023-11-10 RX ORDER — BLOOD-GLUCOSE SENSOR
1 EACH MISCELLANEOUS
Qty: 2 EACH | Refills: 3 | Status: SHIPPED | OUTPATIENT
Start: 2023-11-10 | End: 2024-11-10

## 2023-11-10 NOTE — TELEPHONE ENCOUNTER
Yolanda called to let us know that the Lebre 3 is not fillable at [facility] and he doesn't have a smart phone. If you could call in the Celena 2 and patches to the [address] [place] [address].  (Same One)

## 2023-11-20 ENCOUNTER — TELEPHONE (OUTPATIENT)
Dept: NEUROLOGY | Facility: CLINIC | Age: 78
End: 2023-11-20

## 2023-11-21 DIAGNOSIS — D64.9 ANEMIA, UNSPECIFIED TYPE: ICD-10-CM

## 2023-11-21 RX ORDER — FOLIC ACID 1 MG/1
TABLET ORAL
Qty: 90 TABLET | Refills: 0 | Status: SHIPPED | OUTPATIENT
Start: 2023-11-21

## 2023-11-25 PROBLEM — Z00.00 MEDICARE ANNUAL WELLNESS VISIT, SUBSEQUENT: Status: RESOLVED | Noted: 2023-09-26 | Resolved: 2023-11-25

## 2023-11-29 ENCOUNTER — OFFICE VISIT (OUTPATIENT)
Dept: HEMATOLOGY ONCOLOGY | Facility: MEDICAL CENTER | Age: 78
End: 2023-11-29
Payer: MEDICARE

## 2023-11-29 VITALS
WEIGHT: 214 LBS | HEIGHT: 63 IN | BODY MASS INDEX: 37.92 KG/M2 | RESPIRATION RATE: 17 BRPM | DIASTOLIC BLOOD PRESSURE: 74 MMHG | SYSTOLIC BLOOD PRESSURE: 130 MMHG | HEART RATE: 82 BPM | OXYGEN SATURATION: 94 % | TEMPERATURE: 98 F

## 2023-11-29 DIAGNOSIS — C61 PROSTATE CANCER (HCC): ICD-10-CM

## 2023-11-29 DIAGNOSIS — D68.51 FACTOR 5 LEIDEN MUTATION, HETEROZYGOUS (HCC): ICD-10-CM

## 2023-11-29 DIAGNOSIS — I26.99 BILATERAL PULMONARY EMBOLISM (HCC): Primary | ICD-10-CM

## 2023-11-29 DIAGNOSIS — D47.2 MONOCLONAL GAMMOPATHY: ICD-10-CM

## 2023-11-29 PROCEDURE — 99214 OFFICE O/P EST MOD 30 MIN: CPT | Performed by: INTERNAL MEDICINE

## 2023-11-29 NOTE — PROGRESS NOTES
Lucrecia Ester  1945  [facility] [place] [address] No. [facility] [id number]    DISCUSSION/SUMMARY:    78-year-old male with multiple medical problems recently found to have bilateral PE. Issues:    Prior bilateral PE, lower extremity DVT. Patient was found to be heterozygous for factor 5 Leiden. Patient has a history of prostate cancer (no evidence of metastatic disease), is obese and not very mobile. The plan is to continue the Eliquis 5 mg twice a day. Patient will monitor for excessive bruising/bleeding. Patient will also monitor for worsening lower extremity edema or any acute respiratory issues. Mr. Dale Zimmer understands that he needs to call the office if he is scheduled for any invasive procedure or surgery as the Eliquis needs to be manipulated. Monoclonal gammopathy. At this time, there is no evidence of a clinically active plasma cell dyscrasia. Immunofixation ordered for before the next office visit. Anemia. Hemoglobin level is about the same as before, runs approximately 10 to 11 g/deciliter. Patient is not symptomatic from a respiratory standpoint. Surveillance continues. Etiology is likely multifactorial.    Prior elevated PSA - prostate cancer. Patient was recently seen/evaluated by Dr. Sada Shields and underwent biopsies. Pathology results demonstrated adenocarcinoma. No evidence of disease progression. Mr. Dale Zimmer continues to do well from a prostate cancer standpoint. The most recent PSA level was low/good; same with the testosterone level. Patient is to return in 6 months with blood work before. Mr. Dale Zimmer knows to call the hematology/oncology office if there are any other questions or concerns. Carefully review your medication list and verify that the list is accurate and up-to-date.  Please call the hematology/oncology office if there are medications missing from the list, medications on the list that you are not currently taking or if there is a dosage or instruction that is different from how you're taking that medication. Patient goals and areas of care:  Monitor hemoglobin level,  follow-up, anticoagulation  Barriers to care: none  Patient is able to self-care  _____________________________________________________________________________________    Chief Complaint   Patient presents with    Follow-up    Factor V Leiden, DVTs, prostate cancer     History of Present Illness:  64-year-old male with a history of diabetes, hyperlipidemia, hypertension, BPH presenting to the emergency room in July 2021 after syncopal episode. CTA/chest from July 18, 2021 demonstrated multiple segmental and subsegmental filling defects consistent with PE. Right lower extremity Doppler demonstrated an acute DVT. Patient was found to be factor 5 Leiden heterozygous. Patient is on Eliquis 5 mg twice a day and returns for follow-up. Patient also with a history of prostate cancer followed by Dr. Demarco Lines    Mr. Rut Macario states feeling okay, about the same as before. No bruising or bleeding issues. No recent lower extremity DVTs or PEs. Lower extremity swelling is about the same as before. Appetite is good, weight is stable. Activities are still limited, patient walks with a cane. No respiratory issues. Patient is on Eligard no recent  issues. Review of Systems   Constitutional:  Positive for activity change and fatigue. HENT: Negative. Eyes: Negative. Respiratory: Negative. Cardiovascular:  Positive for leg swelling. Gastrointestinal: Negative. Endocrine: Negative. Genitourinary: Negative. Musculoskeletal: Negative. Skin: Negative. Allergic/Immunologic: Negative. Neurological: Negative. Hematological: Negative. Psychiatric/Behavioral: Negative. All other systems reviewed and are negative.     Patient Active Problem List   Diagnosis    Type 2 diabetes mellitus with hyperglycemia, without long-term current use of insulin (HCC)    Anemia    Pulmonary artery hypertension (HCC)    Dyslipidemia    Urinary retention    Obesity (BMI 30-39. 9)    Tremor    Hypoxia    Generalized weakness    Protein-calorie malnutrition (HCC)    Bilateral pulmonary embolism (HCC)    Class 1 obesity with serious comorbidity and body mass index (BMI) of 32.0 to 32.9 in adult    Prostate cancer (HCC)    Factor 5 Leiden mutation, heterozygous ([address])    Itching    CKD stage 2 due to type 2 diabetes mellitus     Immunization due    Diabetic retinopathy of left eye associated with type 2 diabetes mellitus ([address])    Stage 3a chronic kidney disease ([address])    Retinopathy     Past Medical History:   Diagnosis Date    Arthritis     BPH associated with nocturia     Diabetes mellitus ([address])     Edema     lower legs    Hearing aid worn     bilateral    Hyperlipidemia     Hypertension     controlled    Tremor of both hands      Past Surgical History:   Procedure Laterality Date    CATARACT EXTRACTION      IL TRURL ELECTROSURG RESCJ PROSTATE BLEED COMPLETE N/A 1/20/2022    Procedure: CYSTOSCOPY, TRANSURETHRAL RESECTION OF PROSTATE (TURP); Surgeon: Estrada Olvera MD;  Location: [facility];  Service: Urology    IL XCAPSL CTRC RMVL INSJ IO LENS PROSTH W/O ECP Right 8/6/2018    Procedure: EXTRACTION EXTRACAPSULAR CATARACT PHACO INTRAOCULAR LENS (IOL); Surgeon: Michell Harris MD;  Location: [facility] MAIN OR;  Service: Ophthalmology    IL XCAPSL CTRC RMVL INSJ IO LENS PROSTH W/O ECP Left 10/1/2018    Procedure: EXTRACTION EXTRACAPSULAR CATARACT PHACO INTRAOCULAR LENS (IOL);   Surgeon: Michell Harris MD;  Location: [facility] MAIN OR;  Service: Ophthalmology     Family History   Problem Relation Age of Onset    Colon cancer Mother     Cancer Father         lung-smoker    Lung cancer Father     Early death Brother     Cancer Brother         "bone cancer"     Social History     Socioeconomic History    Marital status: Single     Spouse name: Not on file    Number of children: Not on file    Years of education: Not on file    Highest education level: Not on file   Occupational History    Not on file   Tobacco Use    Smoking status: Never    Smokeless tobacco: Never   Vaping Use    Vaping Use: Never used   Substance and Sexual Activity    Alcohol use: Never    Drug use: Never    Sexual activity: Never   Other Topics Concern    Not on file   Social History Narrative    Not on file     Social Determinants of Health     Financial Resource Strain: Low Risk  (9/26/2023)    Overall Financial Resource Strain (CARDIA)     Difficulty of Paying Living Expenses: Not hard at all   Food Insecurity: Not on file   Transportation Needs: No Transportation Needs (9/26/2023)    PRAPARE - Transportation     Lack of Transportation (Medical): No     Lack of Transportation (Non-Medical): No   Physical Activity: Not on file   Stress: Not on file   Social Connections: Not on file   Intimate Partner Violence: Not on file   Housing Stability: Not on file       Current Outpatient Medications:     apixaban (Eliquis) 5 mg, Take 1 tablet (5 mg total) by mouth 2 (two) times a day, Disp: 180 tablet, Rfl: 1    Ascorbic Acid (VITAMIN C PO), Take by mouth in the morning, Disp: , Rfl:     calcium-vitamin D (OSCAL 500 + D) 500 mg-200 units per tablet, Take 1 tablet by mouth daily, Disp: , Rfl:     Continuous Blood Gluc  (FreeStyle [place] 2 Drasco) CORA, Use 3 each every 10 days, Disp: , Rfl:     Continuous Blood Gluc Sensor (FreeStyle [place] 14 Day Sensor) MISC, CHANGE SENSOR EVERY 14 DAYS, Disp: 2 each, Rfl: 0    Continuous Blood Gluc Sensor (FreeStyle [place] 2 Sensor) MISC, Inject 3 each under the skin every 10 days, Disp: , Rfl:     Continuous Blood Gluc Sensor (FreeStyle [place] 3 Sensor) MISC, Inject 1 each under the skin every 14 (fourteen) days Pls give new [place] 3 sensor and reader if pt.  Is due for it since his old reader is not working ., Disp: 2 each, Rfl: 3    Cyanocobalamin (VITAMIN B-12 CR PO), Take by mouth in the morning, Disp: , Rfl:     docusate sodium (COLACE) 100 mg capsule, Take 1 capsule (100 mg total) by mouth 2 (two) times a day, Disp: 10 capsule, Rfl: 0    Empagliflozin (Jardiance) 10 MG TABS tablet, Take 1 tablet (10 mg total) by mouth every morning, Disp: 90 tablet, Rfl: 3    folic acid (FOLVITE) 1 mg tablet, Take 1 tablet by mouth once daily, Disp: 90 tablet, Rfl: 0    metFORMIN (GLUCOPHAGE-XR) 500 mg 24 hr tablet, 2 tabs bid, Disp: 360 tablet, Rfl: 2    primidone (MYSOLINE) 50 mg tablet, TAKE 2 TABLETS BY MOUTH EVERY 12 HOURS, Disp: 120 tablet, Rfl: 11    propranolol (INDERAL LA) 120 mg 24 hr capsule, Take 1 capsule (120 mg total) by mouth daily After breakfast, Disp: 30 capsule, Rfl: 11    rosuvastatin (CRESTOR) 10 MG tablet, Take 1 tablet by mouth once daily, Disp: 90 tablet, Rfl: 0    sitaGLIPtin (Januvia) 100 mg tablet, Take 1 tablet (100 mg total) by mouth daily, Disp: 90 tablet, Rfl: 3    Continuous Blood Gluc  (FreeStyle [place] 2 Bliss) CORA, Use 1 each 1 (one) time for 1 dose, Disp: 1 each, Rfl: 0    polyethylene glycol (MIRALAX) 17 g packet, Take 17 g by mouth daily as needed (constipation) for up to 14 days, Disp: 14 each, Rfl: 0    senna (SENOKOT) 8.6 mg, Take 2 tablets (17.2 mg total) by mouth daily at bedtime, Disp: 60 tablet, Rfl: 0    tamsulosin (FLOMAX) 0.4 mg, Take 0.4 mg by mouth 2 (two) times a day (Patient not taking: Reported on 9/25/2023), Disp: , Rfl:     No Known Allergies    Vitals:    11/29/23 0938   BP: 130/74   Pulse: 82   Resp: 17   Temp: 98 °F (36.7 °C)   SpO2: 94%     Physical Exam  Constitutional:       Appearance: He is well-developed. Comments: Obese male, no respiratory distress, no signs of pain   HENT:      Head: Normocephalic and atraumatic.       Right Ear: External ear normal.      Left Ear: External ear normal.   Eyes:      Conjunctiva/sclera: Conjunctivae normal.      Pupils: Pupils are equal, round, and reactive to light. Cardiovascular:      Rate and Rhythm: Normal rate and regular rhythm. Heart sounds: Normal heart sounds. Pulmonary:      Effort: Pulmonary effort is normal.      Breath sounds: Normal breath sounds. Comments: Fair to good air entry bilaterally, scattered bilateral rhonchi  Abdominal:      General: Bowel sounds are normal.      Palpations: Abdomen is soft. Comments: Soft, nontender, obese, cannot palpate liver or spleen, +bowel sounds   Genitourinary:     Comments: Flores catheter in place, urine clear yellow  Musculoskeletal:         General: Normal range of motion. Cervical back: Normal range of motion and neck supple. Skin:     General: Skin is warm. Comments: Slightly pale, warm, moist, no petechiae or ecchymoses   Neurological:      Mental Status: He is alert and oriented to person, place, and time. Deep Tendon Reflexes: Reflexes are normal and symmetric. Psychiatric:         Behavior: Behavior normal.         Thought Content:  Thought content normal.         Judgment: Judgment normal.     Extremities:  1+ bilateral lower extremity edema, chronic venous changes, slight pink/red color to the lower extremities, no signs of cellulitis, pulses are decreased, no cords  Lymphatics:  No adenopathy in the neck, supraclavicular region, axilla bilaterally    Performance Status: 2 - Symptomatic, <50% confined to bed    Labs    10/26/2023 WBC = 6.87 hemoglobin = 10.6 hematocrit = 34 MCV = 89 platelet = 853 neutrophil = 66% testosterone <10 BUN = 18 creatinine = 0.89 calcium = 8.6 LFTs WNL        3/16/2023 WBC = 6.18 hemoglobin = 10.5 hematocrit = 34 MCV = 89 platelet = 742 neutrophil = 63% iron saturation = 35% iron = 103 TIBC = 291 ferritin = 37 BUN = 17 creatinine = 0.90 calcium = 8.6 LFTs WNL  01/10/2022 WBC = 7.5 hemoglobin = 10.9 hematocrit = 35.4 MCV = 88 platelet = 2 9 neutrophil = 61% lymphocytes = 31% monocyte = 7% BUN = 17 creatinine = 1.14  11/19/2021 WBC = 8.03 hemoglobin = 11.1 hematocrit = 37 MCV = 87 platelet = 006 neutrophil = 66% BUN = 23 creatinine = 1.14 calcium = 9.1 LFTs WNL free kappa = 38 free lambda = 109 kappa/lambda ratio = 0.35  10/14/2021 SPEP and immunofixation: IF demonstrated uncertain findings, specimen sent to [facility] for [facility] study  10/14/2021 IgA = 165 IgG = 1000 IgM = 54 vitamin B12 = 660 iron = 55 iron saturation = 17% TIBC = 3 in 31 ferritin = 39  09/27/2021 BUN = 25 creatinine = 1.12 GFR = 63  07/02/2021 PSA = 69    Imaging    07/19/2021 bone scan whole body    1. Small focus of mild activity in a left mid lateral rib is nonspecific but possibly from old trauma. This may be reassessed on follow-up exam.  2.  Otherwise no scintigraphic evidence of osseous metastasis. 07/19/2021 CT scan abdomen pelvis    Marked diffuse bladder wall thickening. Flores catheter present. Minimal fullness of the ureters left greater than right. Prostate appears within normal limits. Stable mild bibasilar atelectasis. Mild bilateral symmetric gynecomastia.    07/18/2021 vascular lower limb venous duplex study    RIGHT LOWER LIMB:  There is evidence of acute occlusive chronic deep vein thrombosis noted in one  of the gastrocnemius veins in the proximal calf. No evidence of superficial thrombophlebitis noted. Doppler evaluation shows a normal response to augmentation maneuvers. Popliteal, posterior tibial and anterior tibial arterial Doppler waveforms are  triphasic/biphasic. LEFT LOWER LIMB:  No evidence of acute or chronic deep vein thrombosis. No evidence of superficial thrombophlebitis noted. Doppler evaluation shows a normal response to augmentation maneuvers.   Popliteal, posterior tibial and anterior tibial arterial Doppler waveforms are  Triphasic/biphasic.    07/18/2021 CTA chest PE study    Multiple segmental and subsegmental filling defects suspicious for pulmonary emboli bilaterally (axial image 119, series 2 and 104, series 601, axial image 113, series 2, and axial image 73-80, series 2 for example). Prominence of the right ventricle suggesting right heart strain. Nodular contour of the liver, may suggest a component of hepatic cirrhosis. Coronary atherosclerosis, and other findings as above. Pathology    Case Report   Surgical Pathology Report                         Case: [id number]                                    Authorizing Provider:  Ramiro Camacho MD         Collected:           01/20/2022 0855               Ordering Location:     [facility] Received:            01/20/2022 [address]                                      Operating Room                                                                Pathologist:           Chandu Leiva MD                                                                 Specimen:    Prostate                                                                                   Final Diagnosis   A. Prostate chips (transurethral resection):     - Extensive prostatic adenocarcinoma, Song score 9 (grade group 5; approximate equal portions [place] pattern 4 and 5). - Tumor involves approximately 85% of submitted tissue. Electronically signed by Chandu Leiva MD on 1/26/2022 at 10:25 AM   Comments: This is an appended report. These results have been appended to a previously preliminary verified report. Preliminary Diagnosis   A. Prostate chips (transurethral resection):     - Extensive high-grade carcinoma, pending additional studies. Preliminary result electronically signed by Chandu Leiva MD on 1/25/2022 at  9:27 AM   Microscopic Description    - Positive NKX3.1; Negative MEENU-3. Controls stain appropriately. - Representative section: A2.

## 2023-11-29 NOTE — TELEPHONE ENCOUNTER
Pts sister called in to r/s pts appt for 12/5 as she has surgery this day. New appt is now 3/20/24 @ 10:30 with Dr. Shonda Zapata.  Added to wait list.

## 2023-12-04 DIAGNOSIS — E11.65 TYPE 2 DIABETES MELLITUS WITH HYPERGLYCEMIA, WITHOUT LONG-TERM CURRENT USE OF INSULIN (HCC): ICD-10-CM

## 2023-12-04 RX ORDER — FLASH GLUCOSE SENSOR
KIT MISCELLANEOUS
Qty: 2 EACH | Refills: 0 | Status: SHIPPED | OUTPATIENT
Start: 2023-12-04

## 2023-12-07 ENCOUNTER — TELEPHONE (OUTPATIENT)
Dept: ENDOCRINOLOGY | Facility: CLINIC | Age: 78
End: 2023-12-07

## 2023-12-12 DIAGNOSIS — E11.65 TYPE 2 DIABETES MELLITUS WITH HYPERGLYCEMIA, WITHOUT LONG-TERM CURRENT USE OF INSULIN (HCC): ICD-10-CM

## 2023-12-12 NOTE — TELEPHONE ENCOUNTER
Pharmacist called again asking that we ask for [place] 2. Patient has the reader. . Do not order the 14 day. Just the [place] 2.

## 2023-12-13 RX ORDER — FLASH GLUCOSE SENSOR
KIT MISCELLANEOUS
Qty: 2 EACH | Refills: 0 | Status: SHIPPED | OUTPATIENT
Start: 2023-12-13

## 2023-12-13 NOTE — TELEPHONE ENCOUNTER
Patients sister called stating the patient needs [place] Itz 2 sensors and not [place] 14 day sensors.

## 2023-12-20 ENCOUNTER — HOSPITAL ENCOUNTER (OUTPATIENT)
Dept: RADIOLOGY | Facility: HOSPITAL | Age: 78
Discharge: HOME | End: 2023-12-20
Attending: SPECIALIST
Payer: MEDICARE

## 2023-12-20 DIAGNOSIS — M81.0 OSTEOPOROSIS, UNSPECIFIED OSTEOPOROSIS TYPE, UNSPECIFIED PATHOLOGICAL FRACTURE PRESENCE: ICD-10-CM

## 2023-12-20 PROCEDURE — 77080 DXA BONE DENSITY AXIAL: CPT

## 2023-12-29 ENCOUNTER — TELEPHONE (OUTPATIENT)
Dept: HEMATOLOGY ONCOLOGY | Facility: CLINIC | Age: 78
End: 2023-12-29

## 2023-12-29 NOTE — TELEPHONE ENCOUNTER
Patient Call    Who are you speaking with? sister     If it is not the patient, are they listed on an active communication consent form? Yes   What is the reason for this call? Need to schedule prolea shot   Does this require a call back? Yes   If a call back is required, please list best call back number [phone]    If a call back is required, advise that a message will be forwarded to their care team and someone will return their call as soon as possible.   Did you relay this information to the patient? Yes

## 2024-01-07 DIAGNOSIS — E78.5 DYSLIPIDEMIA: ICD-10-CM

## 2024-01-08 RX ORDER — ROSUVASTATIN CALCIUM 10 MG/1
10 TABLET, COATED ORAL DAILY
Qty: 90 TABLET | Refills: 0 | Status: SHIPPED | OUTPATIENT
Start: 2024-01-08

## 2024-01-23 ENCOUNTER — TELEPHONE (OUTPATIENT)
Dept: HEMATOLOGY ONCOLOGY | Facility: CLINIC | Age: 79
End: 2024-01-23

## 2024-01-23 NOTE — TELEPHONE ENCOUNTER
Patient Call    Who are you speaking with? sister     If it is not the patient, are they listed on an active communication consent form? Yes   What is the reason for this call? Prolase shot for osteoporosis, does it need to be done in [place] or [place]?   Does this require a call back? Yes   If a call back is required, please list best call back number [phone]    If a call back is required, advise that a message will be forwarded to their care team and someone will return their call as soon as possible.   Did you relay this information to the patient? Yes

## 2024-01-23 NOTE — TELEPHONE ENCOUNTER
Sister Shandra is wondering if insurance covers prolia injections in [place]  Will send to [facility] team to determine

## 2024-01-26 ENCOUNTER — OFFICE VISIT (OUTPATIENT)
Dept: HEMATOLOGY ONCOLOGY | Facility: MEDICAL CENTER | Age: 79
End: 2024-01-26
Payer: MEDICARE

## 2024-01-26 VITALS
BODY MASS INDEX: 38.09 KG/M2 | HEIGHT: 63 IN | OXYGEN SATURATION: 92 % | TEMPERATURE: 97.7 F | HEART RATE: 80 BPM | RESPIRATION RATE: 18 BRPM | DIASTOLIC BLOOD PRESSURE: 68 MMHG | SYSTOLIC BLOOD PRESSURE: 138 MMHG | WEIGHT: 215 LBS

## 2024-01-26 DIAGNOSIS — I26.99 BILATERAL PULMONARY EMBOLISM (HCC): ICD-10-CM

## 2024-01-26 DIAGNOSIS — D68.51 FACTOR 5 LEIDEN MUTATION, HETEROZYGOUS (HCC): Primary | ICD-10-CM

## 2024-01-26 PROCEDURE — 99214 OFFICE O/P EST MOD 30 MIN: CPT | Performed by: INTERNAL MEDICINE

## 2024-01-26 NOTE — PROGRESS NOTES
Parviz Posey  1945  [facility]  [address] [place] [id number]    DISCUSSION/SUMMARY:    78-year-old male with multiple medical problems recently found to have bilateral PE.  Issues:    Prior bilateral PE, lower extremity DVT.  Patient was found to be heterozygous for factor 5 Leiden.  Patient has a history of prostate cancer (no evidence of metastatic disease), is obese and not very mobile.  The plan is to continue the Eliquis 5 mg twice a day.  Patient will monitor for excessive bruising/bleeding.  Patient will also monitor for worsening lower extremity edema or any acute respiratory issues.    Mr. Posey understands that he needs to call the office if he is scheduled for any invasive procedure or surgery as the Eliquis needs to be manipulated.    Monoclonal gammopathy.  At this time, there is no evidence of a clinically active plasma cell dyscrasia.  Surveillance continues.    Anemia.  Hemoglobin level is about the same as before, runs approximately 10 to 11 g/deciliter.  Patient is not symptomatic from a respiratory standpoint.  Surveillance continues.  Etiology is likely multifactorial.    Prior elevated PSA - prostate cancer.  Patient was recently seen/evaluated by Dr. Parra and underwent biopsies.  Pathology results demonstrated adenocarcinoma.  No evidence of disease progression.  Mr. Posey continues to do well from a prostate cancer standpoint.  The most recent PSA level was low/good; same with the testosterone level.  Patient will follow-up with  as directed.    Osteopenia.  Recent DEXA scan results are listed below.  Prolia is used to increase bone mass in men with osteoporosis with high risk for fracture - this patient.  Unfortunately Mr. Posey has not seen a dentist in 3 or 4 years.  Teeth are in poor condition.  Patient understands that he first needs to get clearance from dental before  beginning the denosumab.    Recommended dose is 60 mg as a single subcutaneous injection every 6 months.    Patient was given a 6-month follow-up but this may change depending upon the above.  Mr. Posey knows to call the hematology/oncology office if there are any other questions or concerns.    Carefully review your medication list and verify that the list is accurate and up-to-date. Please call the hematology/oncology office if there are medications missing from the list, medications on the list that you are not currently taking or if there is a dosage or instruction that is different from how you're taking that medication.    Patient goals and areas of care:  Monitor hemoglobin level,  follow-up, anticoagulation  Barriers to care: none  Patient is able to self-care  _____________________________________________________________________________________    Chief Complaint   Patient presents with    Follow-up    Thrombophilia, prostate cancer     History of Present Illness:  78-year-old male with a history of diabetes, hyperlipidemia, hypertension, BPH presenting to the emergency room in July 2021 after syncopal episode.  CTA/chest from July 18, 2021 demonstrated multiple segmental and subsegmental filling defects consistent with PE.  Right lower extremity Doppler demonstrated an acute DVT.  Patient was found to be factor 5 Leiden heterozygous.  Patient is on Eliquis 5 mg twice a day and returns for follow-up.  Patient also with a history of prostate cancer followed by Dr. Parra.    Mr. Posey states feeling okay, about the same as before.  No problems with the Eliquis, no bruising or bleeding.  Patient did fall recently, just slipped with some bruising on the right upper extremity.  This has resolved.  No other problems with the Eliquis.    Patient also with a history of prostate cancer.  No recent  problems or hematuria.  Recent DEXA scan demonstrated osteopenia.    Appetite is okay, patient is  trying to lose weight.  Activities are limited, patient walks with a cane.  No respiratory problems.  No pain control issues.    Review of Systems   Constitutional:  Positive for activity change and fatigue.   HENT: Negative.     Eyes: Negative.    Respiratory: Negative.     Cardiovascular:  Positive for leg swelling.   Gastrointestinal: Negative.    Endocrine: Negative.    Genitourinary: Negative.    Musculoskeletal: Negative.    Skin: Negative.    Allergic/Immunologic: Negative.    Neurological: Negative.    Hematological: Negative.    Psychiatric/Behavioral: Negative.     All other systems reviewed and are negative.    Patient Active Problem List   Diagnosis    Type 2 diabetes mellitus with hyperglycemia, without long-term current use of insulin (HCC)    Anemia    Pulmonary artery hypertension (HCC)    Dyslipidemia    Urinary retention    Obesity (BMI 30-39.9)    Tremor    Hypoxia    Generalized weakness    Protein-calorie malnutrition (HCC)    Bilateral pulmonary embolism (HCC)    Class 1 obesity with serious comorbidity and body mass index (BMI) of 32.0 to 32.9 in adult    Prostate cancer (HCC)    Factor 5 Leiden mutation, heterozygous (HCC)    Itching    CKD stage 2 due to type 2 diabetes mellitus     Immunization due    Diabetic retinopathy of left eye associated with type 2 diabetes mellitus (HCC)    Stage 3a chronic kidney disease (HCC)    Retinopathy     Past Medical History:   Diagnosis Date    Arthritis     BPH associated with nocturia     Diabetes mellitus (HCC)     Edema     lower legs    Hearing aid worn     bilateral    Hyperlipidemia     Hypertension     controlled    Tremor of both hands      Past Surgical History:   Procedure Laterality Date    CATARACT EXTRACTION      OR TRURL ELECTROSURG RESCJ PROSTATE BLEED COMPLETE N/A 1/20/2022    Procedure: CYSTOSCOPY, TRANSURETHRAL RESECTION OF PROSTATE (TURP);  Surgeon: Flakito Parra MD;  Location: [facility];  Service: Urology    OR XCAPSL CTRC RMVL  "INSJ IO LENS PROSTH W/O ECP Right 8/6/2018    Procedure: EXTRACTION EXTRACAPSULAR CATARACT PHACO INTRAOCULAR LENS (IOL);  Surgeon: Riccardo Saavedra MD;  Location: [facility] MAIN OR;  Service: Ophthalmology    PA XCAPSL CTRC RMVL INSJ IO LENS PROSTH W/O ECP Left 10/1/2018    Procedure: EXTRACTION EXTRACAPSULAR CATARACT PHACO INTRAOCULAR LENS (IOL);  Surgeon: Riccardo Saavedra MD;  Location: [facility] MAIN OR;  Service: Ophthalmology     Family History   Problem Relation Age of Onset    Colon cancer Mother     Cancer Father         lung-smoker    Lung cancer Father     Early death Brother     Cancer Brother         \"bone cancer\"     Social History     Socioeconomic History    Marital status: Single     Spouse name: Not on file    Number of children: Not on file    Years of education: Not on file    Highest education level: Not on file   Occupational History    Not on file   Tobacco Use    Smoking status: Never    Smokeless tobacco: Never   Vaping Use    Vaping status: Never Used   Substance and Sexual Activity    Alcohol use: Never    Drug use: Never    Sexual activity: Never   Other Topics Concern    Not on file   Social History Narrative    Not on file     Social Determinants of Health     Financial Resource Strain: Low Risk  (9/26/2023)    Overall Financial Resource Strain (CARDIA)     Difficulty of Paying Living Expenses: Not hard at all   Food Insecurity: Not on file   Transportation Needs: No Transportation Needs (9/26/2023)    PRAPARE - Transportation     Lack of Transportation (Medical): No     Lack of Transportation (Non-Medical): No   Physical Activity: Not on file   Stress: Not on file   Social Connections: Not on file   Intimate Partner Violence: Not on file   Housing Stability: Not on file       Current Outpatient Medications:     apixaban (Eliquis) 5 mg, Take 1 tablet (5 mg total) by mouth 2 (two) times a day, Disp: 180 tablet, Rfl: 1    Ascorbic Acid (VITAMIN C PO), Take by mouth in the morning, Disp: , Rfl:     " calcium-vitamin D (OSCAL 500 + D) 500 mg-200 units per tablet, Take 1 tablet by mouth daily, Disp: , Rfl:     Continuous Blood Gluc  (FreeStyle Nilton 2 Prescott) CORA, Use 3 each every 10 days, Disp: , Rfl:     Continuous Blood Gluc Sensor (FreeStyle Nilton 14 Day Sensor) MISC, Change sensor every 14 days, Disp: 2 each, Rfl: 0    Continuous Blood Gluc Sensor (FreeStyle Nilton 2 Sensor) MISC, Inject 3 each under the skin every 10 days, Disp: 2 each, Rfl: 3    Continuous Blood Gluc Sensor (FreeStyle Nilton 3 Sensor) MISC, Inject 1 each under the skin every 14 (fourteen) days Pls give new Nilton 3 sensor and reader if pt. Is due for it since his old reader is not working ., Disp: 2 each, Rfl: 3    Cyanocobalamin (VITAMIN B-12 CR PO), Take by mouth in the morning, Disp: , Rfl:     docusate sodium (COLACE) 100 mg capsule, Take 1 capsule (100 mg total) by mouth 2 (two) times a day, Disp: 10 capsule, Rfl: 0    Empagliflozin (Jardiance) 10 MG TABS tablet, Take 1 tablet (10 mg total) by mouth every morning, Disp: 90 tablet, Rfl: 3    folic acid (FOLVITE) 1 mg tablet, Take 1 tablet by mouth once daily, Disp: 90 tablet, Rfl: 0    metFORMIN (GLUCOPHAGE-XR) 500 mg 24 hr tablet, 2 tabs bid, Disp: 360 tablet, Rfl: 2    primidone (MYSOLINE) 50 mg tablet, TAKE 2 TABLETS BY MOUTH EVERY 12 HOURS, Disp: 120 tablet, Rfl: 11    propranolol (INDERAL LA) 120 mg 24 hr capsule, Take 1 capsule (120 mg total) by mouth daily After breakfast, Disp: 30 capsule, Rfl: 11    rosuvastatin (CRESTOR) 10 MG tablet, Take 1 tablet by mouth once daily, Disp: 90 tablet, Rfl: 0    sitaGLIPtin (Januvia) 100 mg tablet, Take 1 tablet (100 mg total) by mouth daily, Disp: 90 tablet, Rfl: 3    Continuous Blood Gluc  (FreeStyle Nilton 2 Prescott) CORA, Use 1 each 1 (one) time for 1 dose, Disp: 1 each, Rfl: 0    polyethylene glycol (MIRALAX) 17 g packet, Take 17 g by mouth daily as needed (constipation) for up to 14 days, Disp: 14 each, Rfl: 0    senna  (SENOKOT) 8.6 mg, Take 2 tablets (17.2 mg total) by mouth daily at bedtime, Disp: 60 tablet, Rfl: 0    tamsulosin (FLOMAX) 0.4 mg, Take 0.4 mg by mouth 2 (two) times a day (Patient not taking: Reported on 9/25/2023), Disp: , Rfl:     No Known Allergies    Vitals:    01/26/24 1118   BP: 138/68   Pulse: 80   Resp: 18   Temp: 97.7 °F (36.5 °C)   SpO2: 92%     Physical Exam  Constitutional:       Appearance: He is well-developed.      Comments: Obese male, no respiratory distress, no signs of pain   HENT:      Head: Normocephalic and atraumatic.      Right Ear: External ear normal.      Left Ear: External ear normal.   Eyes:      Conjunctiva/sclera: Conjunctivae normal.      Pupils: Pupils are equal, round, and reactive to light.   Cardiovascular:      Rate and Rhythm: Normal rate and regular rhythm.      Heart sounds: Normal heart sounds.   Pulmonary:      Effort: Pulmonary effort is normal.      Breath sounds: Normal breath sounds.      Comments: Fair to good air entry bilaterally, scattered bilateral rhonchi  Abdominal:      General: Bowel sounds are normal.      Palpations: Abdomen is soft.      Comments: Soft, nontender, obese, cannot palpate liver or spleen, +bowel sounds   Genitourinary:     Comments: Flores catheter in place, urine clear yellow  Musculoskeletal:         General: Normal range of motion.      Cervical back: Normal range of motion and neck supple.   Skin:     General: Skin is warm.      Comments: Slightly pale, warm, moist, no petechiae or ecchymoses   Neurological:      Mental Status: He is alert and oriented to person, place, and time.      Deep Tendon Reflexes: Reflexes are normal and symmetric.   Psychiatric:         Behavior: Behavior normal.         Thought Content: Thought content normal.         Judgment: Judgment normal.     Extremities:  1+ bilateral lower extremity edema, chronic venous changes, slight pink/red color to the lower extremities, no signs of cellulitis, pulses are decreased,  no cords  Lymphatics:  No adenopathy in the neck, supraclavicular region, axilla bilaterally    Performance Status: 2 - Symptomatic, <50% confined to bed    Labs    10/26/2023 WBC = 6.87 hemoglobin = 11 hematocrit = 34 platelet = 180 neutrophil = 66% BUN = 18 creatinine = 0.89 alkaline phosphatase = 57 calcium = 8.6 LFTs WNL total protein = 6.2 testosterone <10 PSA = 0.06    10/26/2023 WBC = 6.87 hemoglobin = 10.6 hematocrit = 34 MCV = 89 platelet = 180 neutrophil = 66% testosterone <10 BUN = 18 creatinine = 0.89 calcium = 8.6 LFTs WNL  3/16/2023 WBC = 6.18 hemoglobin = 10.5 hematocrit = 34 MCV = 89 platelet = 187 neutrophil = 63% iron saturation = 35% iron = 103 TIBC = 291 ferritin = 37 BUN = 17 creatinine = 0.90 calcium = 8.6 LFTs WNL  01/10/2022 WBC = 7.5 hemoglobin = 10.9 hematocrit = 35.4 MCV = 88 platelet = 2 9 neutrophil = 61% lymphocytes = 31% monocyte = 7% BUN = 17 creatinine = 1.14    Imaging    12/20/2023 bone density spine hip and pelvis.  Impression stated osteopenia.    07/19/2021 bone scan whole body    1.  Small focus of mild activity in a left mid lateral rib is nonspecific but possibly from old trauma.  This may be reassessed on follow-up exam.  2.  Otherwise no scintigraphic evidence of osseous metastasis.    07/19/2021 CT scan abdomen pelvis    Marked diffuse bladder wall thickening.  Flores catheter present.  Minimal fullness of the ureters left greater than right.  Prostate appears within normal limits.  Stable mild bibasilar atelectasis.  Mild bilateral symmetric gynecomastia.    07/18/2021 vascular lower limb venous duplex study    RIGHT LOWER LIMB:  There is evidence of acute occlusive chronic deep vein thrombosis noted in one  of the gastrocnemius veins in the proximal calf.  No evidence of superficial thrombophlebitis noted.  Doppler evaluation shows a normal response to augmentation maneuvers.  Popliteal, posterior tibial and anterior tibial arterial Doppler waveforms  are  triphasic/biphasic.     LEFT LOWER LIMB:  No evidence of acute or chronic deep vein thrombosis.  No evidence of superficial thrombophlebitis noted.  Doppler evaluation shows a normal response to augmentation maneuvers.  Popliteal, posterior tibial and anterior tibial arterial Doppler waveforms are  Triphasic/biphasic.    07/18/2021 CTA chest PE study    Multiple segmental and subsegmental filling defects suspicious for pulmonary emboli bilaterally (axial image 119, series 2 and 104, series 601, axial image 113, series 2, and axial image 73-80, series 2 for example).  Prominence of the right ventricle suggesting right heart strain.   Nodular contour of the liver, may suggest a component of hepatic cirrhosis.  Coronary atherosclerosis, and other findings as above.    Pathology    Case Report   Surgical Pathology Report                         Case: [id number]                                    Authorizing Provider:  Flakito Parra MD         Collected:           01/20/2022 0855               Ordering Location:     [facility] Received:            01/20/2022 0808                                      Operating Room                                                                Pathologist:           Nikko Azar MD                                                                 Specimen:    Prostate                                                                                   Final Diagnosis   A. Prostate chips (transurethral resection):     - Extensive prostatic adenocarcinoma, Gas[place] score 9 (grade group 5; approximate equal portions Song pattern 4 and 5).     - Tumor involves approximately 85% of submitted tissue.   Electronically signed by Nikko Azar MD on 1/26/2022 at 10:25 AM   Comments:   This is an appended report. These results have been appended to a previously preliminary verified report.      Preliminary Diagnosis   A. Prostate chips (transurethral resection):     - Extensive  high-grade carcinoma, pending additional studies.   Preliminary result electronically signed by Nikko Azar MD on 1/25/2022 at  9:27 AM   Microscopic Description    - Positive NKX3.1; Negative MEENU-3. Controls stain appropriately.  - Representative section: A2.

## 2024-01-29 ENCOUNTER — TELEPHONE (OUTPATIENT)
Dept: HEMATOLOGY ONCOLOGY | Facility: CLINIC | Age: 79
End: 2024-01-29

## 2024-01-29 NOTE — TELEPHONE ENCOUNTER
Left VM for patient's sister following up from his appt with Dr. Summers to see if patient was able to get an appointment scheduled with his dentist. Left direct teams number for call back

## 2024-02-05 ENCOUNTER — OFFICE VISIT (OUTPATIENT)
Dept: ENDOCRINOLOGY | Facility: CLINIC | Age: 79
End: 2024-02-05
Payer: MEDICARE

## 2024-02-05 VITALS
WEIGHT: 214 LBS | SYSTOLIC BLOOD PRESSURE: 124 MMHG | HEART RATE: 83 BPM | OXYGEN SATURATION: 92 % | DIASTOLIC BLOOD PRESSURE: 72 MMHG | BODY MASS INDEX: 37.92 KG/M2 | HEIGHT: 63 IN

## 2024-02-05 DIAGNOSIS — E78.5 DYSLIPIDEMIA: ICD-10-CM

## 2024-02-05 DIAGNOSIS — E11.65 TYPE 2 DIABETES MELLITUS WITH HYPERGLYCEMIA, WITHOUT LONG-TERM CURRENT USE OF INSULIN (HCC): Primary | ICD-10-CM

## 2024-02-05 DIAGNOSIS — N18.31 STAGE 3A CHRONIC KIDNEY DISEASE (HCC): ICD-10-CM

## 2024-02-05 DIAGNOSIS — E66.01 CLASS 2 SEVERE OBESITY WITH SERIOUS COMORBIDITY AND BODY MASS INDEX (BMI) OF 38.0 TO 38.9 IN ADULT, UNSPECIFIED OBESITY TYPE: ICD-10-CM

## 2024-02-05 DIAGNOSIS — H35.00 RETINOPATHY: ICD-10-CM

## 2024-02-05 DIAGNOSIS — N18.2 CKD STAGE 2 DUE TO TYPE 2 DIABETES MELLITUS: ICD-10-CM

## 2024-02-05 DIAGNOSIS — E11.22 CKD STAGE 2 DUE TO TYPE 2 DIABETES MELLITUS: ICD-10-CM

## 2024-02-05 DIAGNOSIS — I27.21 PULMONARY ARTERY HYPERTENSION (HCC): ICD-10-CM

## 2024-02-05 PROBLEM — E66.812 CLASS 2 SEVERE OBESITY WITH SERIOUS COMORBIDITY AND BODY MASS INDEX (BMI) OF 38.0 TO 38.9 IN ADULT, UNSPECIFIED OBESITY TYPE (HCC): Status: ACTIVE | Noted: 2024-02-05

## 2024-02-05 PROCEDURE — 95251 CONT GLUC MNTR ANALYSIS I&R: CPT | Performed by: NURSE PRACTITIONER

## 2024-02-05 PROCEDURE — 99214 OFFICE O/P EST MOD 30 MIN: CPT | Performed by: NURSE PRACTITIONER

## 2024-02-05 NOTE — ASSESSMENT & PLAN NOTE
Lab Results   Component Value Date    HGBA1C 9.0 (H) 10/26/2023     Recommend improved glycemic control  On Jardiance.

## 2024-02-05 NOTE — PROGRESS NOTES
Established Patient Progress Note    Chief Complaint:  Diabetes follow up visit    Impression & Plan:    Problem List Items Addressed This Visit          Endocrine    Type 2 diabetes mellitus with hyperglycemia, without long-term current use of insulin (HCC) - Primary       Lab Results   Component Value Date    HGBA1C 9.0 (H) 10/26/2023     HGA1C above goal, indicates poor control. Due for repeat lab work including Fructosamine level. Has GMI approximately 8.5%. Discussed increase in Jardiance dose following lab work. Would recommend dietary modifications, offered referral to diabetes educator, declined. Would like to delay insulin at this time.     BGL Reviewed: Continue CGM use    Treatment regimen: Continue for now.     Discussed risks/complications associated with uncontrolled diabetes including organ involvement, heart attack, stroke, death.    Advised lifestyle modifications including attention to diet including the amount and types of carbohydrates consumed and regular activity.     Call for blood sugars less than 70 mg/dl or patterns over 250 mg/dl.     Recommendation for medical identification either bracelet, necklace.      Routine follow up for diabetic eye and foot exams.     Ordered blood work to complete prior to next visit.    Send glucose logs/CGM download in 1-2 weeks for review    Follow up in 3 months.            Relevant Orders    Hemoglobin A1C    Fructosamine    Comprehensive metabolic panel    Albumin / creatinine urine ratio    CKD stage 2 due to type 2 diabetes mellitus        Lab Results   Component Value Date    HGBA1C 9.0 (H) 10/26/2023     Recommend improved glycemic control  On Jardiance.             Cardiovascular and Mediastinum    Pulmonary artery hypertension (HCC)       Genitourinary    Stage 3a chronic kidney disease (HCC)       Other    Dyslipidemia     Continues on rosuvastatin. LDL at goal.          Retinopathy     Follows regularly for diabetic eye exams.          Class 2  severe obesity with serious comorbidity and body mass index (BMI) of 38.0 to 38.9 in adult, unspecified obesity type      Discussed lifestyle modifications.   Discussed referral for diabetes education, declined.             History of Present Illness:   Parviz Posey is a 78 y.o. male with a history of type 2 diabetes without long term use of insulin. Reports complications of  CKD, severe non-proliferative retinopathy with macular edema. Other significant medical history includes prostate cancer, hypertension and hyperlipidemia. UTD with diabetic eye exam.     CGM Interpretation  Parviz Posey   Device used Freestyle aleena for Personal Use  Indication: Type of Diabetes: Type 2 Diabetes  More than 72 hours of data was reviewed. Report to be scanned to chart.   Date Range: January 23, 2024- February 5, 2024  Analysis of data:   Average Glucose: 217 mg/dl  Coefficient of Variation: 25.0 %  GMI: 8.5%  Time in Target Range: 33%  Time Above Range: 67%  Time Below Range: 0%   Interpretation of data:   Most pronounced hyperglycemia post breakfast.      Current regimen:  Jardiance 10 mg daily  Januvia 100 mg daily  Metformin 1000 mg twice daily    No ACE/ARB  Has hyperlipidemia: Taking rosuvastatin, tolerating    Thyroid disorders: Denies  Pancreatitis: denies     Patient Active Problem List   Diagnosis    Type 2 diabetes mellitus with hyperglycemia, without long-term current use of insulin (HCC)    Anemia    Pulmonary artery hypertension (HCC)    Dyslipidemia    Urinary retention    Obesity (BMI 30-39.9)    Tremor    Hypoxia    Generalized weakness    Protein-calorie malnutrition (HCC)    Bilateral pulmonary embolism (HCC)    Class 1 obesity with serious comorbidity and body mass index (BMI) of 32.0 to 32.9 in adult    Prostate cancer (HCC)    Factor 5 Leiden mutation, heterozygous (HCC)    Itching    CKD stage 2 due to type 2 diabetes mellitus     Immunization due    Diabetic retinopathy of left eye associated  "with type 2 diabetes mellitus (HCC)    Stage 3a chronic kidney disease (HCC)    Retinopathy    Class 2 severe obesity with serious comorbidity and body mass index (BMI) of 38.0 to 38.9 in adult, unspecified obesity type       Past Medical History:   Diagnosis Date    Arthritis     BPH associated with nocturia     Diabetes mellitus (HCC)     Edema     lower legs    Hearing aid worn     bilateral    Hyperlipidemia     Hypertension     controlled    Tremor of both hands       Past Surgical History:   Procedure Laterality Date    CATARACT EXTRACTION      FL TRURL ELECTROSURG RESCJ PROSTATE BLEED COMPLETE N/A 1/20/2022    Procedure: CYSTOSCOPY, TRANSURETHRAL RESECTION OF PROSTATE (TURP);  Surgeon: Flakito Parra MD;  Location: [facility] MAIN OR;  Service: Urology    FL XCAPSL CTRC RMVL INSJ IO LENS PROSTH W/O ECP Right 8/6/2018    Procedure: EXTRACTION EXTRACAPSULAR CATARACT PHACO INTRAOCULAR LENS (IOL);  Surgeon: Riccardo Saavedra MD;  Location: [facility] MAIN OR;  Service: Ophthalmology    FL XCAPSL CTRC RMVL INSJ IO LENS PROSTH W/O ECP Left 10/1/2018    Procedure: EXTRACTION EXTRACAPSULAR CATARACT PHACO INTRAOCULAR LENS (IOL);  Surgeon: Riccardo Saavedra MD;  Location: [facility] MAIN OR;  Service: Ophthalmology      Family History   Problem Relation Age of Onset    Colon cancer Mother     Cancer Father         lung-smoker    Lung cancer Father     Early death Brother     Cancer Brother         \"bone cancer\"     Social History     Tobacco Use    Smoking status: Never    Smokeless tobacco: Never   Substance Use Topics    Alcohol use: Never     No Known Allergies      Current Outpatient Medications:     apixaban (Eliquis) 5 mg, Take 1 tablet (5 mg total) by mouth 2 (two) times a day, Disp: 180 tablet, Rfl: 1    Ascorbic Acid (VITAMIN C PO), Take by mouth in the morning, Disp: , Rfl:     calcium-vitamin D (OSCAL 500 + D) 500 mg-200 units per tablet, Take 1 tablet by mouth daily, Disp: , Rfl:     Continuous Blood Gluc  (FreeStyle " [place] 2 [place]) CORA, Use 3 each every 10 days, Disp: , Rfl:     Continuous Blood Gluc  (FreeStyle [place] 2 [place]) CORA, Use 1 each 1 (one) time for 1 dose, Disp: 1 each, Rfl: 0    Continuous Blood Gluc Sensor (FreeStyle [place] 14 Day Sensor) MISC, Change sensor every 14 days, Disp: 2 each, Rfl: 0    Continuous Blood Gluc Sensor (FreeStyle [place] 2 Sensor) MISC, Inject 3 each under the skin every 10 days, Disp: 2 each, Rfl: 3    Continuous Blood Gluc Sensor (FreeStyle [place] 3 Sensor) MISC, Inject 1 each under the skin every 14 (fourteen) days Pls give new [place] 3 sensor and reader if pt. Is due for it since his old reader is not working ., Disp: 2 each, Rfl: 3    Cyanocobalamin (VITAMIN B-12 CR PO), Take by mouth in the morning, Disp: , Rfl:     docusate sodium (COLACE) 100 mg capsule, Take 1 capsule (100 mg total) by mouth 2 (two) times a day, Disp: 10 capsule, Rfl: 0    Empagliflozin (Jardiance) 10 MG TABS tablet, Take 1 tablet (10 mg total) by mouth every morning, Disp: 90 tablet, Rfl: 3    folic acid (FOLVITE) 1 mg tablet, Take 1 tablet by mouth once daily, Disp: 90 tablet, Rfl: 0    metFORMIN (GLUCOPHAGE-XR) 500 mg 24 hr tablet, 2 tabs bid, Disp: 360 tablet, Rfl: 2    primidone (MYSOLINE) 50 mg tablet, TAKE 2 TABLETS BY MOUTH EVERY 12 HOURS, Disp: 120 tablet, Rfl: 11    propranolol (INDERAL LA) 120 mg 24 hr capsule, Take 1 capsule (120 mg total) by mouth daily After breakfast, Disp: 30 capsule, Rfl: 11    rosuvastatin (CRESTOR) 10 MG tablet, Take 1 tablet by mouth once daily, Disp: 90 tablet, Rfl: 0    senna (SENOKOT) 8.6 mg, Take 2 tablets (17.2 mg total) by mouth daily at bedtime, Disp: 60 tablet, Rfl: 0    sitaGLIPtin (Januvia) 100 mg tablet, Take 1 tablet (100 mg total) by mouth daily, Disp: 90 tablet, Rfl: 3    polyethylene glycol (MIRALAX) 17 g packet, Take 17 g by mouth daily as needed (constipation) for up to 14 days, Disp: 14 each, Rfl: 0    tamsulosin (FLOMAX) 0.4 mg, Take 0.4 mg by mouth 2  "(two) times a day (Patient not taking: Reported on 9/25/2023), Disp: , Rfl:     Review of Systems  Constitutional: Negative for activity change, appetite change, fatigue and unexpected weight change.   HENT: Negative for ear pain, sore throat, trouble swallowing and voice change.    Eyes: Negative for visual disturbance.   Respiratory: Negative for cough and shortness of breath.    Cardiovascular: Negative for chest pain and palpitations.   Gastrointestinal: Negative for abdominal distention, abdominal pain, constipation, diarrhea and vomiting.   Endocrine: Negative for cold intolerance, heat intolerance, polydipsia and polyuria.   Genitourinary: Negative for dysuria and hematuria. Denies genital yeast or skin infection.   Musculoskeletal: Negative for arthralgias and back pain.   Skin: Negative for color change and rash.   Neurological: Negative for weakness or tremors.   All other systems reviewed and are negative.      Physical Exam:  Body mass index is 37.91 kg/m².  /72 (BP Location: Left arm, Patient Position: Sitting, Cuff Size: Large)   Pulse 83   Ht 5' 3\" (1.6 m)   Wt 97.1 kg (214 lb)   SpO2 92%   BMI 37.91 kg/m²    Wt Readings from Last 3 Encounters:   02/05/24 97.1 kg (214 lb)   01/26/24 97.5 kg (215 lb)   11/29/23 97.1 kg (214 lb)       Physical Exam   Constitutional: He is oriented to person, place, and time. He appears well-developed and well-nourished. No distress.   HENT:   Head: Normocephalic and atraumatic.   Neck: Normal range of motion.   Pulmonary/Chest: Effort normal.   Musculoskeletal: Normal range of motion.   Neurological: He is alert and oriented to person, place, and time.   Skin: He is not diaphoretic.   Walks with a SPC.   Psychiatric: He has a normal mood and affect. His behavior is normal.    Labs:   Lab Results   Component Value Date    HGBA1C 9.0 (H) 10/26/2023    HGBA1C 8.1 (H) 07/11/2023    HGBA1C 7.7 (H) 03/16/2023     Lab Results   Component Value Date    CREATININE " 0.89 10/26/2023    CREATININE 0.94 07/11/2023    CREATININE 0.90 03/16/2023    BUN 18 10/26/2023    K 4.9 10/26/2023    CL 97 10/26/2023    CO2 32 10/26/2023     eGFR   Date Value Ref Range Status   10/26/2023 81 ml/min/1.73sq m Final     Lab Results   Component Value Date    HDL 33 (L) 07/11/2023    TRIG 287 (H) 07/11/2023     Lab Results   Component Value Date    ALT 13 10/26/2023    AST 10 (L) 10/26/2023    ALKPHOS 57 10/26/2023     Lab Results   Component Value Date    VQU4MHIQIMDA 3.260 07/11/2023    NRE5PQUNCDTA 3.240 03/16/2023    TXO5FVZUGGVC 2.495 06/29/2022     Lab Results   Component Value Date    FREET4 0.83 06/29/2022       Discussed with the patient and all questioned fully answered. He will call me if any problems arise.    Follow-up appointment in 3 months.     Counseled patient on diagnostic results, prognosis, risk and benefit of treatment options, instruction for management, importance of treatment compliance, Risk  factor reduction and impressions    BLAZE Patterson

## 2024-02-05 NOTE — ASSESSMENT & PLAN NOTE
Lab Results   Component Value Date    HGBA1C 9.0 (H) 10/26/2023     HGA1C above goal, indicates poor control. Due for repeat lab work including Fructosamine level. Has GMI approximately 8.5%. Discussed increase in Jardiance dose following lab work. Would recommend dietary modifications, offered referral to diabetes educator, declined. Would like to delay insulin at this time.     BGL Reviewed: Continue CGM use    Treatment regimen: Continue for now.     Discussed risks/complications associated with uncontrolled diabetes including organ involvement, heart attack, stroke, death.    Advised lifestyle modifications including attention to diet including the amount and types of carbohydrates consumed and regular activity.     Call for blood sugars less than 70 mg/dl or patterns over 250 mg/dl.     Recommendation for medical identification either bracelet, necklace.      Routine follow up for diabetic eye and foot exams.     Ordered blood work to complete prior to next visit.    Send glucose logs/CGM download in 1-2 weeks for review    Follow up in 3 months.

## 2024-02-07 ENCOUNTER — TELEPHONE (OUTPATIENT)
Dept: HEMATOLOGY ONCOLOGY | Facility: MEDICAL CENTER | Age: 79
End: 2024-02-07

## 2024-02-07 NOTE — TELEPHONE ENCOUNTER
----- Message from Zia Summers MD sent at 1/26/2024 11:52 AM EST -----  See the follow-up note from January 26, 2024.  Patient can be treated with Prolia but Mr. Posey has not been seen by a dentist in a number of years, teeth are in poor condition.  Patient/sister understand that dental clearance is needed first before patient can start the Prolia.  Thanks    Spoke with sister Shandra Devi will make appt and call me with detaiLS    Biphosphonate clearance faxed to [facility] at [phone]  Patient has appt 3/4/2024

## 2024-02-18 DIAGNOSIS — D64.9 ANEMIA, UNSPECIFIED TYPE: ICD-10-CM

## 2024-02-18 RX ORDER — FOLIC ACID 1 MG/1
TABLET ORAL
Qty: 90 TABLET | Refills: 1 | Status: SHIPPED | OUTPATIENT
Start: 2024-02-18

## 2024-02-20 ENCOUNTER — OFFICE VISIT (OUTPATIENT)
Dept: CARDIOLOGY CLINIC | Facility: CLINIC | Age: 79
End: 2024-02-20
Payer: MEDICARE

## 2024-02-20 VITALS
DIASTOLIC BLOOD PRESSURE: 52 MMHG | BODY MASS INDEX: 38.27 KG/M2 | OXYGEN SATURATION: 97 % | WEIGHT: 216 LBS | SYSTOLIC BLOOD PRESSURE: 115 MMHG | HEART RATE: 78 BPM | HEIGHT: 63 IN

## 2024-02-20 DIAGNOSIS — N18.30 CKD STAGE 3 DUE TO TYPE 2 DIABETES MELLITUS (HCC): ICD-10-CM

## 2024-02-20 DIAGNOSIS — E11.9 TYPE 2 DIABETES MELLITUS WITHOUT COMPLICATION, WITHOUT LONG-TERM CURRENT USE OF INSULIN (HCC): ICD-10-CM

## 2024-02-20 DIAGNOSIS — I27.21 PULMONARY ARTERY HYPERTENSION (HCC): ICD-10-CM

## 2024-02-20 DIAGNOSIS — E66.9 OBESITY (BMI 30-39.9): ICD-10-CM

## 2024-02-20 DIAGNOSIS — E11.22 CKD STAGE 3 DUE TO TYPE 2 DIABETES MELLITUS (HCC): ICD-10-CM

## 2024-02-20 DIAGNOSIS — R01.1 CARDIAC MURMUR: ICD-10-CM

## 2024-02-20 DIAGNOSIS — R00.0 TACHYCARDIA: ICD-10-CM

## 2024-02-20 DIAGNOSIS — E78.5 DYSLIPIDEMIA: ICD-10-CM

## 2024-02-20 PROCEDURE — 99214 OFFICE O/P EST MOD 30 MIN: CPT | Performed by: INTERNAL MEDICINE

## 2024-02-20 PROCEDURE — 93000 ELECTROCARDIOGRAM COMPLETE: CPT | Performed by: INTERNAL MEDICINE

## 2024-02-20 NOTE — PROGRESS NOTES
Progress Note - Cardiology Office  [facility]    Parviz Posey 78 y.o. male MRN: [id number]  : 1945  Encounter: [id number]      Assessment:     1. Tachycardia    2. Cardiac murmur    3. Dyslipidemia    4. Obesity (BMI 30-39.9)    5. Pulmonary artery hypertension (HCC)    6. CKD stage 3 due to type 2 diabetes mellitus (HCC)    7. Type 2 diabetes mellitus without complication, without long-term current use of insulin (HCC)        Discussion Summary and Plan:  1. Syncope. No more episodes of syncope.  His syncope was due to his severe pulmonary hypertension and pulmonary embolism at that time.  Repeat echo Doppler in 2022 shows patient's EF is now 60% and there is no evidence of any elevated pulmonary artery pressure.  His PA pressures have improved he is feeling well.  No further episodes of syncope    2. Bilateral pulmonary embolism.  Patient had history of bilateral pulmonary embolism with RV strain.  At that time PA pressure was high.  He also had acute DVT at the time.  He will follow-up with hematology and they will do the blood test to see if we need long-term antithrombotic therapy.  He was found to have.  He needs factor B Leiden deficiency for now he will continue Eliquis.  Hematology thinks that he will need blood thinner for long time. Repeat echo shows that his PA pressures have normalized.  He is followed by hematology oncology.  Was recommended to continue Eliquis no issues.  So he is    3.   History of DVT as above     4.  Cardiac murmur.  Patient is diagnosed to have cardiac murmur workup shows his mild aortic stenosis.  He had a low normal LV systolic function by echo and by nuclear EF was noted to be lower most likely due to his tachycardia and PVC at that time.  Repeat echo Doppler shows in 2022 that his valve is still mildly stenosed with out any significant flow restriction will continue to monitor.  No change in quality of the heart murmur.      5.  Dyslipidemia.  Continue statin cholesterol profile reviewed triglyceride elevated due to probably elevated HbA1c otherwise cholesterol is improved.     6. Diabetes mellitus.    Blood sugar has been up and down management as per medical team.  HbA1c 9.1 he is aware and working on it.     7. Elevated PSA with possible prostate cancer with possible urinary obstruction status post chronic Flores catheter management as per Urology.  Now scheduled to have prostate surgery.  There is no cardiac contraindication for the procedure.  Alexa with patient.     8.   Tachycardia.  It has improved.  Current heart rate 76  bpm.    9. Obesity with BMI around 33 with gait dysfunction.  He is trying to watch his diet    10. Abnormal stress test.  Patient has fixed defect due to body attenuation artifact no change in symptoms we will continue to monitor.      Will follow-up in 6 months.      Continue other Rx as before.  All issues discussed with patient and patient's sister.    Please call [phone] if any questions.  Counseling :  A description of the counseling.  Goals and Barriers.  Patient's ability to self care: Yes  Medication side effect reviewed with patient in detail and all their questions answered to their satisfaction.    HPI :     Parviz Posey is a 78 y.o. year old male who came for follow up. Patient  Was admitted to [facility] with recurrent syncope and was found to have pulmonary embolism and elevated pulmonary artery pressure.  He has medical history significant for hypertension, hyperlipidemia, CKD stage 3, diabetes mellitus and mildly abnormal stress test.  Later on he developed retention of his urine and has Flores catheter placed.  He has cardiac workup in the form of echo and stress test and was found to have mild aortic stenosis, moderate pulmonary hypertension, and by echo EF was normal by stress test he has decreased EF he was noted to have inferior wall fixed defect no ischemia and  he has frequent PACs and PVC at that time which may be showing his low ejection fraction.  He feels great now he walks with the help of cane he has no more episodes of syncope dizziness lightheadedness and he is not requiring any oxygen therapy.  He is still using Flores catheter he may need to follow-up with urology for that.  His EKG reviewed he had a bifascicular block with heart rate 92 beats per minute and Q-wave in inferior lead noted.  He never  he lives with his family he has good support system.  His blood test from August 13, 2020 reviewed.    2/6/2023.    Lab reviewed.  Patient came for follow-up with his sister.  He is doing well history of pulmonary embolism with elevated PA pressure, hypertension, hyperlipidemia, CKD stage III, tachycardia, RBBB, abnormal stress test with fixed inferior wall defect but no ischemia who came for follow-up.  Today EKG was heart rate 70 bpm his heart rate have significantly improved.  He is feeling better he has no new complaints.  He is compliant with his medications.  And his med list was reviewed.  Currently he is on propanolol 120 mg daily.  He has blood test done in September 2022 which has been acceptable.  No nausea no vomiting no other issues.  His EKG shows bifascicular block with heart rate 70 bpm.  He has a repeat echo Doppler done in August 2022 which shows his heart function is acceptable and his PA pressures have significantly improved mild aortic stenosis noted, his sugar is still little up-and-down.  He is still on Eliquis he is scheduled to see hematologist and will have the blood test.    8/24/2023.    Above reviewed.  Patient came for follow-up with his sister.  He is doing well.  History of pulmonary embolism with elevated PA P, hypertension, dyslipidemia, heart murmur, CKD stage III, RBBB who came for follow-up.  His nuclear stress test shows no ischemia but fixed defect.  EKG today shows heart rate 70 bpm.  His med list reviewed with him.  He  has no new symptoms.  He is pretty compliant with his medications.  His blood test done on July 2023 shows patient electrolytes stable.  Triglycerides are elevated he is diabetic.  He follows with hematology and he was told he may be on his blood thinners all his life.  He is doing well from cardiac point of view.  His echo Doppler in August 2022 shows his EF is 60%, he has mild aortic stenosis and his pulmonary hypertension have significantly improved.    2/20/2024.    Above reviewed.  Patient came for follow-up with sister.  He is doing well from cardiac point of view.  Denies any new complaints.  Blood sugar has been elevated HbA1c has steadily gone up to 9.  He is working on that and is well aware.  He has medical history significant for syncope due to pulmonary embolism, elevated PA pressure which has now improved to baseline, hypertension, dyslipidemia heart murmur CKD stage III and right bundle branch block.  Today EKG shows sinus tach with heart rate 78 bpm.  His blood test reviewed.  He is on statins and Eliquis from cardiac point of view no issues with blood thinners.  Last blood test shows hemoglobin 10.6 cholesterol profile shows triglycerides slightly elevated otherwise acceptable.  That could be related to underlying elevated HbA1c.  His previous echo which was repeated in August 2022 shows he is mild aortic stenosis and PA pressures have improved.      Review of Systems   Constitutional:  Negative for activity change, chills, diaphoresis, fever and unexpected weight change.   HENT:  Negative for congestion.    Eyes:  Negative for discharge and redness.   Respiratory:  Negative for cough, chest tightness, shortness of breath and wheezing.    Cardiovascular: Negative.  Negative for chest pain, palpitations and leg swelling.   Gastrointestinal:  Negative for abdominal pain, diarrhea and nausea.   Endocrine: Negative.    Genitourinary:  Negative for decreased urine volume and urgency.   Musculoskeletal:   "Positive for arthralgias and gait problem. Negative for back pain.   Skin:  Negative for rash and wound.   Allergic/Immunologic: Negative.    Neurological:  Negative for dizziness, seizures, syncope, weakness, light-headedness and headaches.   Hematological: Negative.    Psychiatric/Behavioral:  Negative for agitation and confusion. The patient is nervous/anxious.        Historical Information   Past Medical History:   Diagnosis Date   • Arthritis    • BPH associated with nocturia    • Diabetes mellitus (HCC)    • Edema     lower legs   • Hearing aid worn     bilateral   • Hyperlipidemia    • Hypertension     controlled   • Tremor of both hands      Past Surgical History:   Procedure Laterality Date   • CATARACT EXTRACTION     • MT TRURL ELECTROSURG RESCJ PROSTATE BLEED COMPLETE N/A 1/20/2022    Procedure: CYSTOSCOPY, TRANSURETHRAL RESECTION OF PROSTATE (TURP);  Surgeon: Flakito Parra MD;  Location: [facility] MAIN OR;  Service: Urology   • MT XCAPSL CTRC RMVL INSJ IO LENS PROSTH W/O ECP Right 8/6/2018    Procedure: EXTRACTION EXTRACAPSULAR CATARACT PHACO INTRAOCULAR LENS (IOL);  Surgeon: Riccardo Saavedra MD;  Location: [facility] MAIN OR;  Service: Ophthalmology   • MT XCAPSL CTRC RMVL INSJ IO LENS PROSTH W/O ECP Left 10/1/2018    Procedure: EXTRACTION EXTRACAPSULAR CATARACT PHACO INTRAOCULAR LENS (IOL);  Surgeon: Riccardo Saavedra MD;  Location: [facility] MAIN OR;  Service: Ophthalmology     Social History     Substance and Sexual Activity   Alcohol Use Never     Social History     Substance and Sexual Activity   Drug Use Never     Social History     Tobacco Use   Smoking Status Never   Smokeless Tobacco Never     Family History:   Family History   Problem Relation Age of Onset   • Colon cancer Mother    • Cancer Father         lung-smoker   • Lung cancer Father    • Early death Brother    • Cancer Brother         \"bone cancer\"       Meds/Allergies     No Known Allergies    Current Outpatient Medications:   •  apixaban (Eliquis) 5 " mg, Take 1 tablet (5 mg total) by mouth 2 (two) times a day, Disp: 180 tablet, Rfl: 1  •  Ascorbic Acid (VITAMIN C PO), Take by mouth in the morning, Disp: , Rfl:   •  calcium-vitamin D (OSCAL 500 + D) 500 mg-200 units per tablet, Take 1 tablet by mouth daily, Disp: , Rfl:   •  Continuous Blood Gluc  (FreeStyle [place] 2 [place]) CORA, Use 3 each every 10 days, Disp: , Rfl:   •  Continuous Blood Gluc  (FreeStyle [place] 2 [place]) CORA, Use 1 each 1 (one) time for 1 dose, Disp: 1 each, Rfl: 0  •  Continuous Blood Gluc Sensor (FreeStyle [place] 14 Day Sensor) MISC, Change sensor every 14 days, Disp: 2 each, Rfl: 0  •  Continuous Blood Gluc Sensor (FreeStyle [place] 2 Sensor) MISC, Inject 3 each under the skin every 10 days, Disp: 2 each, Rfl: 3  •  Continuous Blood Gluc Sensor (FreeStyle Nilton 3 Sensor) MISC, Inject 1 each under the skin every 14 (fourteen) days Pls give new Nilton 3 sensor and reader if pt. Is due for it since his old reader is not working ., Disp: 2 each, Rfl: 3  •  Cyanocobalamin (VITAMIN B-12 CR PO), Take by mouth in the morning, Disp: , Rfl:   •  docusate sodium (COLACE) 100 mg capsule, Take 1 capsule (100 mg total) by mouth 2 (two) times a day, Disp: 10 capsule, Rfl: 0  •  Empagliflozin (Jardiance) 10 MG TABS tablet, Take 1 tablet (10 mg total) by mouth every morning, Disp: 90 tablet, Rfl: 3  •  folic acid (FOLVITE) 1 mg tablet, Take 1 tablet by mouth once daily, Disp: 90 tablet, Rfl: 1  •  metFORMIN (GLUCOPHAGE-XR) 500 mg 24 hr tablet, 2 tabs bid, Disp: 360 tablet, Rfl: 2  •  polyethylene glycol (MIRALAX) 17 g packet, Take 17 g by mouth daily as needed (constipation) for up to 14 days, Disp: 14 each, Rfl: 0  •  primidone (MYSOLINE) 50 mg tablet, TAKE 2 TABLETS BY MOUTH EVERY 12 HOURS, Disp: 120 tablet, Rfl: 11  •  propranolol (INDERAL LA) 120 mg 24 hr capsule, Take 1 capsule (120 mg total) by mouth daily After breakfast, Disp: 30 capsule, Rfl: 11  •  rosuvastatin (CRESTOR) 10 MG tablet,  "Take 1 tablet by mouth once daily, Disp: 90 tablet, Rfl: 0  •  sitaGLIPtin (Januvia) 100 mg tablet, Take 1 tablet (100 mg total) by mouth daily, Disp: 90 tablet, Rfl: 3  •  senna (SENOKOT) 8.6 mg, Take 2 tablets (17.2 mg total) by mouth daily at bedtime (Patient not taking: Reported on 2/20/2024), Disp: 60 tablet, Rfl: 0  •  tamsulosin (FLOMAX) 0.4 mg, Take 0.4 mg by mouth 2 (two) times a day (Patient not taking: Reported on 9/25/2023), Disp: , Rfl:     Vitals: Blood pressure 115/52, pulse 78, height 5' 3\" (1.6 m), weight 98 kg (216 lb), SpO2 97%.  ?  Body mass index is 38.26 kg/m².  Wt Readings from Last 3 Encounters:   02/20/24 98 kg (216 lb)   02/05/24 97.1 kg (214 lb)   01/26/24 97.5 kg (215 lb)     Vitals:    02/20/24 1102   Weight: 98 kg (216 lb)     BP Readings from Last 3 Encounters:   02/20/24 115/52   02/05/24 124/72   01/26/24 138/68       Physical Exam:  Physical Exam  Constitutional:       General: He is not in acute distress.     Appearance: He is well-developed. He is not diaphoretic.   Neck:      Thyroid: No thyromegaly.      Vascular: No JVD.      Trachea: No tracheal deviation.   Cardiovascular:      Rate and Rhythm: Normal rate and regular rhythm.      Heart sounds: S1 normal and S2 normal. Heart sounds not distant. Murmur heard.      Systolic (ejection) murmur is present with a grade of 2/6.      No friction rub. No gallop. No S3 or S4 sounds.   Pulmonary:      Effort: Pulmonary effort is normal. No respiratory distress.      Breath sounds: Normal breath sounds. No wheezing or rales.   Chest:      Chest wall: No tenderness.   Abdominal:      General: Bowel sounds are normal. There is no distension.      Palpations: Abdomen is soft.      Tenderness: There is no abdominal tenderness.   Musculoskeletal:         General: No deformity.      Cervical back: Neck supple.   Skin:     General: Skin is warm and dry.      Coloration: Skin is not pale.      Findings: No rash.   Neurological:      Mental " Status: He is alert and oriented to person, place, and time.   Psychiatric:         Behavior: Behavior normal.         Judgment: Judgment normal.           Diagnostic Studies Review Cardio:      Nuclear stress test.  Nuclear stress test was abnormal with fixed inferior apical defect which is worse in the rest images and is most likely due to body attenuation artifact EF was around 40%.  But no significant ischemia was noted.      Echo Doppler.  Echo Doppler shows EF 55 % mild LVH, mild aortic stenosis, mild MR, mild TR and PA pressure was 64 mmHg.    Repeat echo Doppler done in August 2022 shows patient has EF 60%, mild aortic stenosis with mean gradient 11 mmHg, aortic root is mildly dilated..  PA pressure was not measured but no indirect evidence of right-sided elevated pressures.      EKG:  Twelve lead EKG 09/20/2021 shows sinus rhythm heart rate 92 beats per minute bifascicular block Q-wave in inferior lead noted cannot rule out old inferior wall infarct.    Twelve lead EKG done on 01/17/2022 shows sinus rhythm with frequent PACs heart rate 86 beats per minute bifascicular block noted.    Twelve lead EKG done 07/13/2022 shows sinus rhythm with frequent PACs heart rate 77 beats per minute.    Twelve-lead EKG 2/6/2023 shows normal sinus rhythm with bifascicular block heart rate 70 bpm no change from previously    Twelve-lead EKG done on 8/24/2020 shows normal sinus some bifascicular block with RBBB left risk block heart rate 70 bpm not much change from previous EKG    Twelve-lead EKG done on 2/28/2024 shows normal sinus some heart rate 78 bpm incomplete RBBB.  Not much change from previous EKG left axis deviation noted.        Cardiac testing:   Results for orders placed during the hospital encounter of 07/17/21    Echo complete with contrast if indicated    Narrative  [address] [id number] [phone]    Transthoracic Echocardiogram  2D, M-mode, Doppler, and  Color Doppler    Study date:  2021    Patient: NAIDA SANABRIA  MR number: [id number]  Account number: [id number]  : 1945  Age: 75 years  Gender: Male  Status: Inpatient  Location: Bedside  Height: 69 in  Weight: 226.6 lb  BP: 122/ 74 mmHg    Indications: Pulmonary Embolism    Diagnoses: I74.9 - Embolism and thrombosis of unspecified artery    Sonographer:  Alley Dobson RDCS  Referring Physician:  Alley Worley PA-C  Group:  [facility]  Interpreting Physician:  Brad Walters DO    SUMMARY    LEFT VENTRICLE:  Systolic function was normal by visual assessment. Ejection fraction was estimated in the range of 55 % to 60 %.  There were no regional wall motion abnormalities.  There was mild concentric hypertrophy.  Doppler parameters were consistent with abnormal left ventricular relaxation (grade 1 diastolic dysfunction).    MITRAL VALVE:  There was mild regurgitation.    AORTIC VALVE:  The valve was trileaflet. Leaflets exhibited normal thickness, mild calcification, and moderately reduced cuspal separation.  There was mild stenosis.  There was no regurgitation.  Valve mean gradient was 11 mmHg.  Aortic valve area was 1.7 cmï¾² by the continuity equation.    TRICUSPID VALVE:  There was mild regurgitation.  Pulmonary artery systolic pressure was moderately increased.    HISTORY: PRIOR HISTORY: Diabetes Mellitus; Chronic Kidney Disease; Anemia; Dyslipidemia; Sepsis; Non MI Troponin Elevation; Hypertension    PROCEDURE: The procedure was performed at the bedside. This was a routine study. The transthoracic approach was used. The study included complete 2D imaging, M-mode, complete spectral Doppler, and color Doppler. The heart rate was 79 bpm,  at the start of the study. Intravenous contrast ( 0.4ml/min Definity in NSS) was administered. Intravenous contrast was administered to opacify the left ventricle. Echocardiographic views were limited due to poor acoustic  window  availability, decreased penetration, and lung interference. This was a technically difficult study.    LEFT VENTRICLE: Size was normal. Systolic function was normal by visual assessment. Ejection fraction was estimated in the range of 55 % to 60 %. There were no regional wall motion abnormalities. There was mild concentric hypertrophy.  DOPPLER: Doppler parameters were consistent with abnormal left ventricular relaxation (grade 1 diastolic dysfunction).    RIGHT VENTRICLE: The size was normal. Systolic function was normal. DOPPLER: Systolic pressure was within the normal range.    LEFT ATRIUM: Size was normal. No thrombus was identified.    RIGHT ATRIUM: Size was normal.    MITRAL VALVE: Valve structure was normal. There was normal leaflet separation. No echocardiographic evidence for prolapse. DOPPLER: The transmitral velocity was within the normal range. There was no evidence for stenosis. There was mild  regurgitation.    AORTIC VALVE: The valve was trileaflet. Leaflets exhibited normal thickness, mild calcification, and moderately reduced cuspal separation. DOPPLER: Transaortic velocity was increased due to valvular stenosis. There was mild stenosis. There  was no regurgitation.    TRICUSPID VALVE: The valve structure was normal. There was normal leaflet separation. DOPPLER: The transtricuspid velocity was within the normal range. There was mild regurgitation. Pulmonary artery systolic pressure was moderately  increased. Estimated peak PA pressure was 64 mmHg.    PULMONIC VALVE: Leaflets exhibited normal thickness, no calcification, and normal cuspal separation. DOPPLER: The transpulmonic velocity was within the normal range. There was no regurgitation.    PERICARDIUM: There was no thickening. There was no pericardial effusion.    AORTA: The root exhibited normal size.    PULMONARY ARTERY: The size was normal. The morphology appeared normal.    MEASUREMENT TABLES    DOPPLER MEASUREMENTS  Aortic valve    (Reference normals)  Peak gradient   21 mmHg   (--)  Mean gradient   11 mmHg   (--)  Valve area, cont   1.7 cmï¾²   (--)    SYSTEM MEASUREMENT TABLES    2D  EF (Teich): 54.78 %  %FS: 28.23 %  Ao Diam: 3.71 cm  Ao asc: 4.23 cm  EDV(Teich): 87.43 ml  ESV(Teich): 39.54 ml  IVSd: 1.19 cm  LA Area: 13.05 cm2  LA Diam: 2.75 cm  LVEDV MOD A4C: 78.34 ml  LVEF MOD A4C: 80.3 %  LVESV MOD A4C: 15.43 ml  LVIDd: 4.39 cm  LVIDs: 3.15 cm  LVLd A4C: 7.01 cm  LVLs A4C: 5.23 cm  LVOT Diam: 1.97 cm  LVPWd: 1.23 cm  RA Area: 13.4 cm2  RVIDd: 3.82 cm  SV (Teich): 47.89 ml  SV MOD A4C: 62.91 ml    CW  AV Env.Ti: 298.13 ms  AV VTI: 45.96 cm  AV Vmax: 2.23 m/s  AV Vmean: 1.54 m/s  AV maxP.85 mmHg  AV meanPG: 10.84 mmHg  TR Vmax: 3.77 m/s  TR maxP.93 mmHg    MM  TAPSE: 2.2 cm    PW  MV E/A Ratio: 0.85  E' Sept: 0.06 m/s  E/E' Sept: 12.19  LVOT Env.Ti: 365.37 ms  LVOT VTI: 25.4 cm  LVOT Vmax: 1.2 m/s  LVOT Vmean: 0.7 m/s  LVOT maxP.76 mmHg  LVOT meanP.51 mmHg  MV A Melvin: 0.93 m/s  MV Dec Ada: 3.07 m/s2  MV DecT: 255.81 ms  MV E Melvin: 0.79 m/s  MV PHT: 74.19 ms  MVA By PHT: 2.97 cm2    [employer] Accredited Echocardiography Laboratory    Prepared and electronically signed by    Brad Walters DO  Signed 2021 10:21:25        Results for orders placed during the hospital encounter of 21    NM Myocardial Perfusion Spect (Pharmacological Induced Stress and/or Rest)    Narrative  [address] [id number] [phone]    Rest/Stress Gated SPECT Myocardial Perfusion Imaging After Regadenoson    Patient: NAIDA SANABRIA  MR number: [id number]  Account number: [id number]  : 1945  Age: 75 years  Gender: Male  Status: Inpatient  Location: Stress lab  Height: 69 in  Weight: 227 lb  BP: 133/ 68 mmHg    Allergies: NO KNOWN ALLERGIES    Diagnosis: R06.02 - Shortness of breath, R74.8 - Abnormal levels of other serum enzymes    Primary Physician:   Rashid Singh DO  RN:  GABY Veliz  Referring Physician:  BLAZE Stewart  Group:  [facility]  Report Prepared By::  GABY Veliz  Interpreting Physician:  Mary Lou Keller MD    INDICATIONS: Evaluation for coronary artery disease.    HISTORY: The patient is a 75 year old  male. Chest pain status: no chest pain. Other symptoms: dyspnea. Coronary artery disease risk factors: dyslipidemia, hypertension, and diabetes mellitus. Cardiovascular history: peripheral  vascular occlusive disease. Medications:an anticoagulant, a lipid lowering agent and diabetic medications.    PHYSICAL EXAM: Baseline physical exam screening: no wheezes audible.    REST ECG: Normal sinus rhythm. The ECG showed right bundle branch block.    PROCEDURE: The study was performed in the the Stress lab. A regadenoson infusion pharmacologic stress test was performed. Gated SPECT myocardial perfusion imaging was performed after stress and at rest. Systolic blood pressure was 133  mmHg, at the start of the study. Diastolic blood pressure was 68 mmHg, at the start of the study. The heart rate was 83 bpm, at the start of the study. IV double checked.  Regadenoson protocol:  Time HR bpm SBP mmHg DBP mmHg Symptoms Rhythm/conduct  Baseline 09:36 83 133 68 none NSR, RBBB  Immediate 09:42 100 96 54 none same as above  1 min 09:43 99 106 59 none same as above  2 min 09:44 95 118 60 none same as above  3 min 09:45 98 118 56 none --  4 min 09:46 100 118 60 none same as above  No medications or fluids given.    STRESS SUMMARY: Duration of pharmacologic stress was 3 min. Maximal heart rate during stress was 105 bpm. The heart rate response to stress was normal. There was normal resting blood pressure with an appropriate response to stress. The  rate-pressure product for the peak heart rate and blood pressure was [id number]. There was no chest pain during stress. The stress test was terminated due to protocol completion. On 2l/min of O2  by nasal cannula  Pre oxygen saturation: 94 %. Peak oxygen saturation: 94 %. The stress ECG was negative for ischemia and normal. There were no stress arrhythmias or conduction abnormalities.Arrhythmia during stress: isolated premature ventricular beats.    ISOTOPE ADMINISTRATION:  Resting isotope administration Stress isotope administration  Agent Tetrofosmin Tetrofosmin  Dose 11 mCi 33 mCi  Date 07/21/2021 07/21/2021    The radiopharmaceutical was injected at the peak effect of pharmacologic stress.    MYOCARDIAL PERFUSION IMAGING:  The image quality was fair. Rotating projection images reveal mild diaphragmatic attenuation and mild patient motion. Left ventricular size was normal. The TID ratio was 1.25. The right ventricle was dilated.    PERFUSION DEFECTS:  -  There was a moderate-sized, mildly severe, fixed myocardial perfusion defect of the apical apical and inferior wall.    GATED SPECT:  The calculated left ventricular ejection fraction was 40 %. Left ventricular ejection fraction was mildly decreased by visual estimate. There was no diagnostic evidence for left ventricular regional abnormality.    SUMMARY:  -  Stress results: There was no chest pain during stress.  -  ECG conclusions: The stress ECG was negative for ischemia and normal.  -  Perfusion imaging: There was a moderate-sized, mildly severe, fixed myocardial perfusion defect of the apical apical and inferior wall.  -  Gated SPECT: The calculated left ventricular ejection fraction was 40 %. Left ventricular ejection fraction was mildly decreased by visual estimate. There was no diagnostic evidence for left ventricular regional abnormality.  -  Impressions and recommendations: Abnormal study after pharmacologic vasodilation. There is a fixed inferior apical defect which is worse in rest images than stress images most likely secondary to body attenuation artifact. Cannot rule  out old nontransmural myocardial infarction. EF calculated around 40%  "appears to be mildly decreased. No significant ischemia is noted    IMPRESSIONS: Abnormal study after pharmacologic vasodilation. There is a fixed inferior apical defect which is worse in rest images than stress images most likely secondary to body attenuation artifact. Cannot rule out old nontransmural  myocardial infarction. EF calculated around 40% appears to be mildly decreased. No significant ischemia is noted Left ventricular systolic function was reduced, without distinct regional wall motion abnormalities.    Prepared and signed by    Mary Lou Keller MD  Signed 07/21/2021 16:58:46          Lab Review   Lab Results   Component Value Date    WBC 6.87 10/26/2023    HGB 10.6 (L) 10/26/2023    HCT 33.9 (L) 10/26/2023    MCV 89 10/26/2023    RDW 13.2 10/26/2023     10/26/2023     BMP:  Lab Results   Component Value Date    SODIUM 137 10/26/2023    K 4.9 10/26/2023    CL 97 10/26/2023    CO2 32 10/26/2023    BUN 18 10/26/2023    CREATININE 0.89 10/26/2023    GLUC 250 (H) 11/19/2021    GLUF 229 (H) 10/26/2023    CALCIUM 8.6 10/26/2023    CORRECTEDCA 9.2 06/29/2022    EGFR 81 10/26/2023     LFT:  Lab Results   Component Value Date    AST 10 (L) 10/26/2023    ALT 13 10/26/2023    ALKPHOS 57 10/26/2023    TP 6.2 (L) 10/26/2023    ALB 3.6 10/26/2023      No components found for: \"TSH3\"  Lab Results   Component Value Date    DUL0OPJHHSRE 3.260 07/11/2023     Lab Results   Component Value Date    HGBA1C 9.0 (H) 10/26/2023     Lipid Profile:     Lab Results   Component Value Date    TROPONINI 1.12 (H) 07/18/2021     Lab Results   Component Value Date    NTBNP 2,051 (H) 07/18/2021            Dr. Mary Lou Keller MD [facility]      \"This note has been constructed using a voice recognition system.Therefore there may be syntax, spelling, and/or grammatical errors. Please call if you have any questions. \"  "

## 2024-02-23 DIAGNOSIS — G25.0 TREMOR, ESSENTIAL: ICD-10-CM

## 2024-02-23 RX ORDER — PRIMIDONE 50 MG/1
TABLET ORAL
Qty: 120 TABLET | Refills: 0 | Status: SHIPPED | OUTPATIENT
Start: 2024-02-23

## 2024-03-06 ENCOUNTER — TELEPHONE (OUTPATIENT)
Dept: NEUROLOGY | Facility: CLINIC | Age: 79
End: 2024-03-06

## 2024-03-18 DIAGNOSIS — E11.65 TYPE 2 DIABETES MELLITUS WITH HYPERGLYCEMIA, WITHOUT LONG-TERM CURRENT USE OF INSULIN (HCC): ICD-10-CM

## 2024-03-19 ENCOUNTER — RA CDI HCC (OUTPATIENT)
Dept: OTHER | Facility: HOSPITAL | Age: 79
End: 2024-03-19

## 2024-03-19 ENCOUNTER — TELEPHONE (OUTPATIENT)
Dept: NEUROLOGY | Facility: CLINIC | Age: 79
End: 2024-03-19

## 2024-03-19 RX ORDER — METFORMIN HYDROCHLORIDE 500 MG/1
TABLET, EXTENDED RELEASE ORAL
Qty: 360 TABLET | Refills: 0 | Status: SHIPPED | OUTPATIENT
Start: 2024-03-19

## 2024-03-19 NOTE — PROGRESS NOTES
HCC coding opportunities          Chart Reviewed number of suggestions sent to Provider: 1   E11.65    Patients Insurance     Medicare Insurance: Medicare

## 2024-03-20 ENCOUNTER — OFFICE VISIT (OUTPATIENT)
Dept: NEUROLOGY | Facility: CLINIC | Age: 79
End: 2024-03-20
Payer: MEDICARE

## 2024-03-20 VITALS
TEMPERATURE: 96.8 F | WEIGHT: 217 LBS | BODY MASS INDEX: 38.45 KG/M2 | OXYGEN SATURATION: 90 % | HEART RATE: 75 BPM | DIASTOLIC BLOOD PRESSURE: 78 MMHG | SYSTOLIC BLOOD PRESSURE: 132 MMHG | HEIGHT: 63 IN

## 2024-03-20 DIAGNOSIS — F41.9 ANXIETY DISORDER: ICD-10-CM

## 2024-03-20 DIAGNOSIS — G25.0 TREMOR, ESSENTIAL: Primary | ICD-10-CM

## 2024-03-20 PROCEDURE — 99214 OFFICE O/P EST MOD 30 MIN: CPT | Performed by: PSYCHIATRY & NEUROLOGY

## 2024-03-20 NOTE — PROGRESS NOTES
Return NeuroOutpatient Note        Parviz Posey  [id number]  78 y.o.  1945       Tremor, essential        History obtained from:  Patient and sister     HPI/Subjective:    Parviz Posey right handed is an 79 yo M with PMH of tremros presents as f/u.  Per my previous history, he started noticing hand tremors 5 years ago. He was seen by Dr. Al and was placed on primidone but since pandemic never went back for refill so he hadn't been taking it for 1-2 years. He was restarted but says he's not sure whether medications are helping. He had expressed he wanted to be tapered off of them.   When he concentrates, his tremors are worse. If spontaneous, he does better.   Holding utensils, screw  can be difficult. His hand writing is poor.    There is no resting tremor.  He's also on propranolol 120mg daily.   His tremor is worse on left.         His limiting factor are his knees but he's not surgical candidate.       Past Medical History:   Diagnosis Date   • Arthritis    • BPH associated with nocturia    • Diabetes mellitus (HCC)    • Edema     lower legs   • Hearing aid worn     bilateral   • Hyperlipidemia    • Hypertension     controlled   • Tremor of both hands      Social History     Socioeconomic History   • Marital status: Single     Spouse name: Not on file   • Number of children: Not on file   • Years of education: Not on file   • Highest education level: Not on file   Occupational History   • Not on file   Tobacco Use   • Smoking status: Never   • Smokeless tobacco: Never   Vaping Use   • Vaping status: Never Used   Substance and Sexual Activity   • Alcohol use: Never   • Drug use: Never   • Sexual activity: Never   Other Topics Concern   • Not on file   Social History Narrative   • Not on file     Social Determinants of Health     Financial Resource Strain: Low Risk  (9/26/2023)    Overall Financial Resource Strain (CARDIA)    • Difficulty of Paying Living Expenses: Not hard at all  "  Food Insecurity: Not on file   Transportation Needs: No Transportation Needs (9/26/2023)    PRAPARE - Transportation    • Lack of Transportation (Medical): No    • Lack of Transportation (Non-Medical): No   Physical Activity: Not on file   Stress: Not on file   Social Connections: Not on file   Intimate Partner Violence: Not on file   Housing Stability: Not on file     Family History   Problem Relation Age of Onset   • Colon cancer Mother    • Cancer Father         lung-smoker   • Lung cancer Father    • Early death Brother    • Cancer Brother         \"bone cancer\"     No Known Allergies  Current Outpatient Medications on File Prior to Visit   Medication Sig Dispense Refill   • apixaban (Eliquis) 5 mg Take 1 tablet (5 mg total) by mouth 2 (two) times a day 180 tablet 1   • Ascorbic Acid (VITAMIN C PO) Take by mouth in the morning     • calcium-vitamin D (OSCAL 500 + D) 500 mg-200 units per tablet Take 1 tablet by mouth daily     • Continuous Blood Gluc  (FreeStyle [place]ilton 2 [place]) CORA Use 3 each every 10 days     • Continuous Blood Gluc  (FreeStyle [place] 2 [place]) CORA Use 1 each 1 (one) time for 1 dose 1 each 0   • Continuous Blood Gluc Sensor (FreeStyle [place] 14 Day Sensor) MISC Change sensor every 14 days 2 each 0   • Continuous Blood Gluc Sensor (FreeStyle [place] 2 Sensor) MISC Inject 3 each under the skin every 10 days 2 each 3   • Continuous Blood Gluc Sensor (FreeStyle [place] 3 Sensor) MISC Inject 1 each under the skin every 14 (fourteen) days Pls give new Nilton 3 sensor and reader if pt. Is due for it since his old reader is not working . 2 each 3   • Cyanocobalamin (VITAMIN B-12 CR PO) Take by mouth in the morning     • docusate sodium (COLACE) 100 mg capsule Take 1 capsule (100 mg total) by mouth 2 (two) times a day 10 capsule 0   • Empagliflozin (Jardiance) 10 MG TABS tablet Take 1 tablet (10 mg total) by mouth every morning 90 tablet 3   • folic acid (FOLVITE) 1 mg tablet Take 1 tablet by " "mouth once daily 90 tablet 1   • metFORMIN (GLUCOPHAGE-XR) 500 mg 24 hr tablet Take 2 tablets by mouth twice daily 360 tablet 0   • polyethylene glycol (MIRALAX) 17 g packet Take 17 g by mouth daily as needed (constipation) for up to 14 days 14 each 0   • primidone (MYSOLINE) 50 mg tablet TAKE 2 TABLETS BY MOUTH EVERY 12 HOURS 120 tablet 0   • propranolol (INDERAL LA) 120 mg 24 hr capsule Take 1 capsule (120 mg total) by mouth daily After breakfast 30 capsule 11   • rosuvastatin (CRESTOR) 10 MG tablet Take 1 tablet by mouth once daily 90 tablet 0   • sitaGLIPtin (Januvia) 100 mg tablet Take 1 tablet (100 mg total) by mouth daily 90 tablet 3   • senna (SENOKOT) 8.6 mg Take 2 tablets (17.2 mg total) by mouth daily at bedtime (Patient not taking: Reported on 2/20/2024) 60 tablet 0   • tamsulosin (FLOMAX) 0.4 mg Take 0.4 mg by mouth 2 (two) times a day (Patient not taking: Reported on 9/25/2023)     • [DISCONTINUED] metoprolol succinate (TOPROL-XL) 50 mg 24 hr tablet Take 1 tablet by mouth once daily 90 tablet 3     No current facility-administered medications on file prior to visit.         Review of Systems   Refer to positive review of systems in HPI.   Review of Systems    Constitutional- No fever  Eyes- No visual change  ENT- Hearing normal  CV- No chest pain  Resp- No Shortness of breath  GI- No diarrhea  - Bladder normal  MS- No Arthritis   Skin- No rash  Psych- No depression  Endo- No DM  Heme- No nodes    Vitals:    03/20/24 1029   BP: 132/78   BP Location: Right arm   Patient Position: Sitting   Cuff Size: Standard   Pulse: 75   Temp: (!) 96.8 °F (36 °C)   TempSrc: Tympanic   SpO2: 90%   Weight: 98.4 kg (217 lb)   Height: 5' 3\" (1.6 m)       PHYSICAL EXAM:  Appearance: No Acute Distress  Ophthalmoscopic: Disc Flat, Normal fundus  Mental status:  Orientation: Awake, Alert, and Orientedx3  Memory: Registation 3/3 Recall 3/3  Attention: normal  Knowledge: good  Language: No aphasia  Speech: No " dysarthria  Cranial Nerves:  2 No Visual Defect on Confrontation, Pupils round, equal, reactive to light  3,4,6 Extraocular Movements Intact, no nystagmus  5 Facial Sensation Intact  7 No facial asymmetry  8 Intact hearing  9,10 Palate symmetric, normal gag  11 Good shoulder shrug  12 Tongue Midline  Gait: Stable, uses cane   Coordination: moderate ET in LUE, subtle in RUE. No ataxia with finger to nose testing, and heel to shin  Sensory: Intact, Symmetric to pinprick, light touch, vibration, and joint position  Muscle Tone: Normal              Muscle exam:  Arm Right Left Leg Right Left   Deltoid 5/5 5/5 Iliopsoas 5/5 5/5   Biceps 5/5 5/5 Quads 5/5 5/5   Triceps 5/5 5/5 Hamstrings 5/5 5/5   Wrist Extension 5/5 5/5 Ankle Dorsi Flexion 5/5 5/5   Wrist Flexion 5/5 5/5 Ankle Plantar Flexion 5/5 5/5   Interossei 5/5 5/5 Ankle Eversion 5/5 5/5   APB 5/5 5/5 Ankle Inversion 5/5 5/5       Reflexes   RJ BJ TJ KJ AJ Plantars Kevin's   Right 2+ 2+ 2+ 2+  Downgoing Not present   Left 2+ 2+ 2+ 2+  Downgoing Not present     Personal review of  Labs:                Diagnoses and all orders for this visit:      1. Tremor, essential        2. Anxiety disorder              Discussed that propranolol can help with his anxiety as well, so should stay on propranolol 120mg daily.   Will taper primidone off as he doesn't think it's making any difference.   He's not very active. He easily gets fatigued.             Total time of encounter:  30 min  More than 50% of the time was used in counseling and/or coordination of care  Extent of counseling and/or coordination of care        Deborah Olivas MD  [facility]  [address] [place] [id number] [phone]

## 2024-03-23 DIAGNOSIS — I26.99 BILATERAL PULMONARY EMBOLISM (HCC): ICD-10-CM

## 2024-03-23 DIAGNOSIS — D68.51 FACTOR 5 LEIDEN MUTATION, HETEROZYGOUS (HCC): ICD-10-CM

## 2024-03-23 DIAGNOSIS — G25.0 TREMOR, ESSENTIAL: ICD-10-CM

## 2024-03-24 PROBLEM — E66.9 CLASS 1 OBESITY WITH SERIOUS COMORBIDITY AND BODY MASS INDEX (BMI) OF 32.0 TO 32.9 IN ADULT: Status: RESOLVED | Noted: 2021-10-16 | Resolved: 2024-03-24

## 2024-03-24 PROBLEM — E66.811 CLASS 1 OBESITY WITH SERIOUS COMORBIDITY AND BODY MASS INDEX (BMI) OF 32.0 TO 32.9 IN ADULT: Status: RESOLVED | Noted: 2021-10-16 | Resolved: 2024-03-24

## 2024-03-25 RX ORDER — APIXABAN 5 MG/1
5 TABLET, FILM COATED ORAL 2 TIMES DAILY
Qty: 180 TABLET | Refills: 1 | Status: SHIPPED | OUTPATIENT
Start: 2024-03-25

## 2024-03-25 RX ORDER — PRIMIDONE 50 MG/1
TABLET ORAL
Qty: 120 TABLET | Refills: 0 | Status: SHIPPED | OUTPATIENT
Start: 2024-03-25

## 2024-03-25 NOTE — TELEPHONE ENCOUNTER
Refill must be reviewed and completed by the office or provider. The refill is unable to be approved by the medication management team.    Last cbc 10/26/23 HGB and HCT abnormal

## 2024-03-26 ENCOUNTER — OFFICE VISIT (OUTPATIENT)
Dept: FAMILY MEDICINE CLINIC | Facility: CLINIC | Age: 79
End: 2024-03-26
Payer: MEDICARE

## 2024-03-26 VITALS
DIASTOLIC BLOOD PRESSURE: 60 MMHG | SYSTOLIC BLOOD PRESSURE: 108 MMHG | BODY MASS INDEX: 37.73 KG/M2 | RESPIRATION RATE: 18 BRPM | HEART RATE: 80 BPM | WEIGHT: 213 LBS | TEMPERATURE: 97.2 F

## 2024-03-26 DIAGNOSIS — E66.9 OBESITY (BMI 30-39.9): ICD-10-CM

## 2024-03-26 DIAGNOSIS — D68.51 FACTOR 5 LEIDEN MUTATION, HETEROZYGOUS (HCC): ICD-10-CM

## 2024-03-26 DIAGNOSIS — L84 CALLUS OF FOOT: ICD-10-CM

## 2024-03-26 DIAGNOSIS — I26.99 BILATERAL PULMONARY EMBOLISM (HCC): Primary | ICD-10-CM

## 2024-03-26 DIAGNOSIS — E11.22 CKD STAGE 2 DUE TO TYPE 2 DIABETES MELLITUS  (HCC): ICD-10-CM

## 2024-03-26 DIAGNOSIS — C61 PROSTATE CANCER (HCC): ICD-10-CM

## 2024-03-26 DIAGNOSIS — N18.2 CKD STAGE 2 DUE TO TYPE 2 DIABETES MELLITUS  (HCC): ICD-10-CM

## 2024-03-26 PROCEDURE — 99214 OFFICE O/P EST MOD 30 MIN: CPT | Performed by: FAMILY MEDICINE

## 2024-03-26 PROCEDURE — G2211 COMPLEX E/M VISIT ADD ON: HCPCS | Performed by: FAMILY MEDICINE

## 2024-03-26 NOTE — PROGRESS NOTES
Assessment/Plan:    No problem-specific Assessment & Plan notes found for this encounter.    B/l PE and FVL  On noac  Bleeding and fall risk aware    Foot callus, earl right heel  Advised foot protection and care  Suggest podiatry eval    DM2 not controlled  Last A1c 9.0 in October 2023  F/u endocrinology    Prostate ca hx, f/u urology     Diagnoses and all orders for this visit:    Bilateral pulmonary embolism (HCC)    Factor 5 Leiden mutation, heterozygous (HCC)    Obesity (BMI 30-39.9)    Callus of foot  -     Ambulatory referral to Podiatry; Future    Prostate cancer (HCC)    CKD stage 2 due to type 2 diabetes mellitus         Return in about 6 months (around 9/26/2024).    Subjective:      Patient ID: Parviz Posey is a 78 y.o. male.    Chief Complaint   Patient presents with    Follow-up    Hypertension     Sas/cma       HPI  Using cane  No bleeding  Has CGM  No low readings  Watches diet    The following portions of the patient's history were reviewed and updated as appropriate: allergies, current medications, past family history, past medical history, past social history, past surgical history and problem list.    Review of Systems   Constitutional:  Negative for chills and fever.   Musculoskeletal:  Positive for gait problem.         Current Outpatient Medications   Medication Sig Dispense Refill    apixaban (Eliquis) 5 mg Take 1 tablet by mouth twice daily 180 tablet 1    Ascorbic Acid (VITAMIN C PO) Take by mouth in the morning      calcium-vitamin D (OSCAL 500 + D) 500 mg-200 units per tablet Take 1 tablet by mouth daily      Continuous Blood Gluc  (FreeStyle [place] 2 [place]) CORA Use 3 each every 10 days      Continuous Blood Gluc Sensor (FreeStyle [place] 14 Day Sensor) MISC Change sensor every 14 days 2 each 0    Continuous Blood Gluc Sensor (FreeStyle [place] 2 Sensor) MISC Inject 3 each under the skin every 10 days 2 each 3    Continuous Blood Gluc Sensor (FreeStyle N[place] 3 Sensor) MISC  Inject 1 each under the skin every 14 (fourteen) days Pls give new Nilton 3 sensor and reader if pt. Is due for it since his old reader is not working . 2 each 3    Cyanocobalamin (VITAMIN B-12 CR PO) Take by mouth in the morning      Empagliflozin (Jardiance) 10 MG TABS tablet Take 1 tablet (10 mg total) by mouth every morning 90 tablet 3    folic acid (FOLVITE) 1 mg tablet Take 1 tablet by mouth once daily 90 tablet 1    metFORMIN (GLUCOPHAGE-XR) 500 mg 24 hr tablet Take 2 tablets by mouth twice daily 360 tablet 0    polyethylene glycol (MIRALAX) 17 g packet Take 17 g by mouth daily as needed (constipation) for up to 14 days 14 each 0    primidone (MYSOLINE) 50 mg tablet TAKE 2 TABLETS BY MOUTH EVERY 12 HOURS 120 tablet 0    propranolol (INDERAL LA) 120 mg 24 hr capsule Take 1 capsule (120 mg total) by mouth daily After breakfast 30 capsule 11    rosuvastatin (CRESTOR) 10 MG tablet Take 1 tablet by mouth once daily 90 tablet 0    senna (SENOKOT) 8.6 mg Take 2 tablets (17.2 mg total) by mouth daily at bedtime 60 tablet 0    sitaGLIPtin (Januvia) 100 mg tablet Take 1 tablet (100 mg total) by mouth daily 90 tablet 3    Continuous Blood Gluc  (FreeStyle [place] 2 [place]) CORA Use 1 each 1 (one) time for 1 dose 1 each 0    tamsulosin (FLOMAX) 0.4 mg Take 0.4 mg by mouth 2 (two) times a day (Patient not taking: Reported on 3/26/2024)       No current facility-administered medications for this visit.       Objective:    /60   Pulse 80   Temp (!) 97.2 °F (36.2 °C)   Resp 18   Wt 96.6 kg (213 lb)   BMI 37.73 kg/m²        Physical Exam  Constitutional:       General: He is not in acute distress.     Appearance: He is not ill-appearing.   HENT:      Right Ear: Tympanic membrane normal.      Left Ear: Tympanic membrane normal.   Eyes:      General: No scleral icterus.  Cardiovascular:      Rate and Rhythm: Regular rhythm.      Pulses: no weak pulses.           Dorsalis pedis pulses are 2+ on the right side and  2+ on the left side.   Pulmonary:      Breath sounds: No wheezing or rales.   Musculoskeletal:      Right lower leg: No edema.      Left lower leg: No edema.   Feet:      Right foot:      Skin integrity: Callus present.      Left foot:      Skin integrity: Callus present.   Skin:     General: Skin is dry.      Findings: No erythema or rash.      Comments: Cracked dry feet with callus   Neurological:      Motor: Weakness present.      Gait: Gait abnormal.   Psychiatric:         Mood and Affect: Mood normal.       Diabetic Foot Exam    Patient's shoes and socks removed.    Right Foot/Ankle   Right Foot Inspection  Skin Exam: callus and callus. No abnormal color.     Sensory   Monofilament testing: intact    Vascular  The right DP pulse is 2+.     Left Foot/Ankle  Left Foot Inspection  Skin Exam: callus. Normal color.     Sensory   Monofilament testing: intact    Vascular  The left DP pulse is 2+.     Assign Risk Category  No deformity present  No loss of protective sensation  No weak pulses  Risk: 0           Rashid Singh DO

## 2024-03-27 ENCOUNTER — APPOINTMENT (OUTPATIENT)
Dept: LAB | Facility: HOSPITAL | Age: 79
End: 2024-03-27
Payer: MEDICARE

## 2024-03-27 DIAGNOSIS — E78.5 DYSLIPIDEMIA: ICD-10-CM

## 2024-03-27 DIAGNOSIS — N18.31 STAGE 3A CHRONIC KIDNEY DISEASE (HCC): ICD-10-CM

## 2024-03-27 DIAGNOSIS — E11.65 TYPE 2 DIABETES MELLITUS WITH HYPERGLYCEMIA, WITHOUT LONG-TERM CURRENT USE OF INSULIN (HCC): ICD-10-CM

## 2024-03-27 DIAGNOSIS — D68.51 FACTOR 5 LEIDEN MUTATION, HETEROZYGOUS (HCC): ICD-10-CM

## 2024-03-27 DIAGNOSIS — D47.2 MONOCLONAL GAMMOPATHY: ICD-10-CM

## 2024-03-27 DIAGNOSIS — C61 PROSTATE CANCER (HCC): ICD-10-CM

## 2024-03-27 LAB
ALBUMIN SERPL BCP-MCNC: 4.1 G/DL (ref 3.5–5)
ALP SERPL-CCNC: 68 U/L (ref 34–104)
ALT SERPL W P-5'-P-CCNC: 14 U/L (ref 7–52)
ANION GAP SERPL CALCULATED.3IONS-SCNC: 6 MMOL/L (ref 4–13)
AST SERPL W P-5'-P-CCNC: 10 U/L (ref 13–39)
BASOPHILS # BLD AUTO: 0.02 THOUSANDS/ÂΜL (ref 0–0.1)
BASOPHILS NFR BLD AUTO: 0 % (ref 0–1)
BILIRUB SERPL-MCNC: 0.24 MG/DL (ref 0.2–1)
BUN SERPL-MCNC: 25 MG/DL (ref 5–25)
CALCIUM SERPL-MCNC: 9.2 MG/DL (ref 8.4–10.2)
CHLORIDE SERPL-SCNC: 99 MMOL/L (ref 96–108)
CO2 SERPL-SCNC: 33 MMOL/L (ref 21–32)
CREAT SERPL-MCNC: 0.92 MG/DL (ref 0.6–1.3)
CREAT UR-MCNC: 36.7 MG/DL
EOSINOPHIL # BLD AUTO: 0.05 THOUSAND/ÂΜL (ref 0–0.61)
EOSINOPHIL NFR BLD AUTO: 1 % (ref 0–6)
ERYTHROCYTE [DISTWIDTH] IN BLOOD BY AUTOMATED COUNT: 13.9 % (ref 11.6–15.1)
EST. AVERAGE GLUCOSE BLD GHB EST-MCNC: 214 MG/DL
GFR SERPL CREATININE-BSD FRML MDRD: 78 ML/MIN/1.73SQ M
GLUCOSE P FAST SERPL-MCNC: 184 MG/DL (ref 65–99)
HBA1C MFR BLD: 9.1 %
HCT VFR BLD AUTO: 36.5 % (ref 36.5–49.3)
HGB BLD-MCNC: 11.5 G/DL (ref 12–17)
IGA SERPL-MCNC: 140 MG/DL (ref 66–433)
IGG SERPL-MCNC: 715 MG/DL (ref 635–1741)
IGM SERPL-MCNC: 39 MG/DL (ref 45–281)
IMM GRANULOCYTES # BLD AUTO: 0.02 THOUSAND/UL (ref 0–0.2)
IMM GRANULOCYTES NFR BLD AUTO: 0 % (ref 0–2)
LYMPHOCYTES # BLD AUTO: 1.86 THOUSANDS/ÂΜL (ref 0.6–4.47)
LYMPHOCYTES NFR BLD AUTO: 28 % (ref 14–44)
MCH RBC QN AUTO: 28.2 PG (ref 26.8–34.3)
MCHC RBC AUTO-ENTMCNC: 31.5 G/DL (ref 31.4–37.4)
MCV RBC AUTO: 90 FL (ref 82–98)
MICROALBUMIN UR-MCNC: 16.2 MG/L
MICROALBUMIN/CREAT 24H UR: 44 MG/G CREATININE (ref 0–30)
MONOCYTES # BLD AUTO: 0.47 THOUSAND/ÂΜL (ref 0.17–1.22)
MONOCYTES NFR BLD AUTO: 7 % (ref 4–12)
NEUTROPHILS # BLD AUTO: 4.34 THOUSANDS/ÂΜL (ref 1.85–7.62)
NEUTS SEG NFR BLD AUTO: 64 % (ref 43–75)
NRBC BLD AUTO-RTO: 0 /100 WBCS
PLATELET # BLD AUTO: 184 THOUSANDS/UL (ref 149–390)
PMV BLD AUTO: 10.6 FL (ref 8.9–12.7)
POTASSIUM SERPL-SCNC: 4.6 MMOL/L (ref 3.5–5.3)
PROT SERPL-MCNC: 6.7 G/DL (ref 6.4–8.4)
PSA SERPL-MCNC: 0.1 NG/ML (ref 0–4)
RBC # BLD AUTO: 4.08 MILLION/UL (ref 3.88–5.62)
SODIUM SERPL-SCNC: 138 MMOL/L (ref 135–147)
TESTOST SERPL-MSCNC: <10 NG/DL
WBC # BLD AUTO: 6.76 THOUSAND/UL (ref 4.31–10.16)

## 2024-03-27 PROCEDURE — 80053 COMPREHEN METABOLIC PANEL: CPT

## 2024-03-27 PROCEDURE — 36415 COLL VENOUS BLD VENIPUNCTURE: CPT

## 2024-03-27 PROCEDURE — 84153 ASSAY OF PSA TOTAL: CPT

## 2024-03-27 PROCEDURE — 84165 PROTEIN E-PHORESIS SERUM: CPT

## 2024-03-27 PROCEDURE — 86334 IMMUNOFIX E-PHORESIS SERUM: CPT

## 2024-03-27 PROCEDURE — 83521 IG LIGHT CHAINS FREE EACH: CPT

## 2024-03-27 PROCEDURE — 83036 HEMOGLOBIN GLYCOSYLATED A1C: CPT

## 2024-03-27 PROCEDURE — 82985 ASSAY OF GLYCATED PROTEIN: CPT

## 2024-03-27 PROCEDURE — 82784 ASSAY IGA/IGD/IGG/IGM EACH: CPT

## 2024-03-27 PROCEDURE — 84403 ASSAY OF TOTAL TESTOSTERONE: CPT

## 2024-03-27 PROCEDURE — 85025 COMPLETE CBC W/AUTO DIFF WBC: CPT

## 2024-03-28 LAB
ALBUMIN SERPL ELPH-MCNC: 3.64 G/DL (ref 3.2–5.1)
ALBUMIN SERPL ELPH-MCNC: 59.6 % (ref 48–70)
ALPHA1 GLOB SERPL ELPH-MCNC: 0.24 G/DL (ref 0.15–0.47)
ALPHA1 GLOB SERPL ELPH-MCNC: 4 % (ref 1.8–7)
ALPHA2 GLOB SERPL ELPH-MCNC: 0.89 G/DL (ref 0.42–1.04)
ALPHA2 GLOB SERPL ELPH-MCNC: 14.6 % (ref 5.9–14.9)
BETA GLOB ABNORMAL SERPL ELPH-MCNC: 0.37 G/DL (ref 0.31–0.57)
BETA1 GLOB SERPL ELPH-MCNC: 6.1 % (ref 4.7–7.7)
BETA2 GLOB SERPL ELPH-MCNC: 5.6 % (ref 3.1–7.9)
BETA2+GAMMA GLOB SERPL ELPH-MCNC: 0.34 G/DL (ref 0.2–0.58)
FRUCTOSAMINE SERPL-SCNC: 280 UMOL/L (ref 0–285)
GAMMA GLOB ABNORMAL SERPL ELPH-MCNC: 0.62 G/DL (ref 0.4–1.66)
GAMMA GLOB SERPL ELPH-MCNC: 10.1 % (ref 6.9–22.3)
IGG/ALB SER: 1.48 {RATIO} (ref 1.1–1.8)
INTERPRETATION UR IFE-IMP: NORMAL
KAPPA LC FREE SER-MCNC: 31.6 MG/L (ref 3.3–19.4)
KAPPA LC FREE/LAMBDA FREE SER: 0.31 {RATIO} (ref 0.26–1.65)
LAMBDA LC FREE SERPL-MCNC: 102.5 MG/L (ref 5.7–26.3)
PROT PATTERN SERPL ELPH-IMP: ABNORMAL
PROT SERPL-MCNC: 6.1 G/DL (ref 6.4–8.2)

## 2024-03-28 PROCEDURE — 84165 PROTEIN E-PHORESIS SERUM: CPT | Performed by: PATHOLOGY

## 2024-03-28 PROCEDURE — 86334 IMMUNOFIX E-PHORESIS SERUM: CPT | Performed by: PATHOLOGY

## 2024-04-04 DIAGNOSIS — E78.5 DYSLIPIDEMIA: ICD-10-CM

## 2024-04-04 RX ORDER — ROSUVASTATIN CALCIUM 10 MG/1
10 TABLET, COATED ORAL DAILY
Qty: 90 TABLET | Refills: 0 | Status: SHIPPED | OUTPATIENT
Start: 2024-04-04

## 2024-05-02 ENCOUNTER — TELEPHONE (OUTPATIENT)
Dept: ENDOCRINOLOGY | Facility: CLINIC | Age: 79
End: 2024-05-02

## 2024-05-02 DIAGNOSIS — E11.65 TYPE 2 DIABETES MELLITUS WITH HYPERGLYCEMIA, WITHOUT LONG-TERM CURRENT USE OF INSULIN (HCC): ICD-10-CM

## 2024-05-02 NOTE — TELEPHONE ENCOUNTER
Reason for call:   [x] Refill   [] Prior Auth  [] Other:     Office:   [] PCP/Provider -   [x] Specialty/Provider - endo    Medication: Continuous Blood Gluc Sensor (FreeStyle [place] 2 Sensor) MISC     Dose/Frequency: 3 each, Subcutaneous, Every 10 days     Quantity: 2    Pharmacy: [facility] [address] [place], [place] - [address]     Does the patient have enough for 3 days?   [x] Yes   [] No - Send as HP to POD

## 2024-05-06 NOTE — TELEPHONE ENCOUNTER
Sister, Shandra called regarding script.  I told her it was refilled and sent to [facility].  She is stating the pharmacy is trying to call the office that the script is wrong.  I asked what was wrong with the script and she could not tell me.  I treid to warm transfer her to the Endo dept and while giving the info to them, she hung up

## 2024-05-06 NOTE — TELEPHONE ENCOUNTER
This is not a duplicate.  Sister, Shandra called regarding script . Per [facility] pharmacy the directions need to say     Qty of 2  Sig 1 sensor every 14 days.     The patient is currently out, will forward this as a high priority

## 2024-05-07 NOTE — TELEPHONE ENCOUNTER
Mari Juares calling they need new script for the Nilton please clarify/correct the directions and resend.

## 2024-05-08 ENCOUNTER — OFFICE VISIT (OUTPATIENT)
Dept: ENDOCRINOLOGY | Facility: CLINIC | Age: 79
End: 2024-05-08
Payer: MEDICARE

## 2024-05-08 ENCOUNTER — TELEPHONE (OUTPATIENT)
Dept: ENDOCRINOLOGY | Facility: CLINIC | Age: 79
End: 2024-05-08

## 2024-05-08 VITALS — BODY MASS INDEX: 38.44 KG/M2 | WEIGHT: 217 LBS

## 2024-05-08 DIAGNOSIS — E11.65 TYPE 2 DIABETES MELLITUS WITH HYPERGLYCEMIA, WITHOUT LONG-TERM CURRENT USE OF INSULIN (HCC): ICD-10-CM

## 2024-05-08 PROCEDURE — 99214 OFFICE O/P EST MOD 30 MIN: CPT | Performed by: NURSE PRACTITIONER

## 2024-05-08 NOTE — TELEPHONE ENCOUNTER
Pharmacy called to have clarification on script instructions for patients freestyle 2 aleena sensors. Instructions state to inject 3 each under the skin every 14 days. Pharmacist states marcelle it is normally inject once under the skin every 14 days. Please review and send new script if necessary

## 2024-05-08 NOTE — ASSESSMENT & PLAN NOTE
Lab Results   Component Value Date    HGBA1C 9.1 (H) 03/27/2024     HGA1C above goal. GMI 8.4% Recommend increased Jardiance to 25 mg daily and follow closely. Reviewed contacting our office for symptoms of UTI, yeast, or genital skin infection, dizziness, low blood pressure, or concern for dehydration.    BGL Reviewed:Continues on CGM     Treatment regimen:  Jardiance 25 mg daily  Januvia 100 mg daily  Metformin 1000 mg twice daily    Discussed risks/complications associated with uncontrolled diabetes including organ involvement, heart attack, stroke, death.    Advised lifestyle modifications including attention to diet including the amount and types of carbohydrates consumed and regular activity.     Call for blood sugars less than 70 mg/dl or patterns over 250 mg/dL.     Recommendation for medical identification either bracelet, necklace.    Routine follow up for diabetic eye and foot exams.     Ordered blood work to complete prior to next visit.    Send glucose logs/CGM download in 1-2 weeks for review    Follow up in 2 months.

## 2024-05-08 NOTE — PATIENT INSTRUCTIONS
Empagliflozin (By mouth)   Empagliflozin ([id number]-ib-ryx-FLOE-zin)  Treats type 2 diabetes. Used to lower risk of cardiovascular death in patients with type 2 diabetes and heart or blood vessel problems. Also used to lower the risk of cardiovascular death and hospitalization in patients with heart failure.   Brand Name(s): Jardiance   There may be other brand names for this medicine.  When This Medicine Should Not Be Used:   This medicine is not right for everyone. Do not use it if you had an allergic reaction to empagliflozin, or if you have severe kidney disease (including patients receiving dialysis).  How to Use This Medicine:   Tablet  Take your medicine as directed. Your dose may need to be changed several times to find what works best for you. This medicine is usually taken in the morning.  This medicine should come with a Medication Guide. Ask your pharmacist for a copy if you do not have one.  Missed dose: Take a dose as soon as you remember. If it is almost time for your next dose, wait until then and take a regular dose. Do not take extra medicine to make up for a missed dose.  Store the medicine in a closed container at room temperature, away from heat, moisture, and direct light.  Drugs and Foods to Avoid:   Ask your doctor or pharmacist before using any other medicine, including over-the-counter medicines, vitamins, and herbal products.  Some medicines can affect how empagliflozin works. Tell your doctor if you are using any of the following:  Lithium  Blood pressure medicine  Diuretic (water pill)  Insulin or another diabetes medicine  NSAID pain medicine (including aspirin, diclofenac, ibuprofen, naproxen)  Warnings While Using This Medicine:   Tell your doctor if you are pregnant. Do not use this medicine during the second and third part of your pregnancy, unless your doctor tells you to.  Do not breastfeed during treatment with this medicine.  Tell your doctor if you have kidney disease, liver disease,  congestive heart failure, or a history of pancreas problems or genital yeast or urinary tract infections. Tell your doctor if you are on a low-salt diet, if you drink alcohol, or if you are having a surgery or other procedures.  This medicine may cause the following problems:  Low blood pressure  Ketoacidosis (high ketones and acid in the blood), which can be life-threatening  Kidney problems  Increased risk of genital yeast or urinary tract infections (including pyelonephritis, urosepsis)  Low blood sugar, when used with other diabetes medicine  Domo's gangrene (skin or tissue damage of the area between the anus and genitals)  This medicine may cause some patients to become dehydrated. Drink plenty of water every day, during exercise, or in hot weather.  Tell any doctor or dentist who treats you that you are using this medicine. You may need to stop using this medicine several days before you have surgery or medical tests.  Tell any doctor or dentist who treats you that you are using this medicine. This medicine may affect certain medical test results.  Your doctor will do lab tests at regular visits to check on the effects of this medicine. Keep all appointments.  Keep all medicine out of the reach of children. Never share your medicine with anyone.  Possible Side Effects While Using This Medicine:   Call your doctor right away if you notice any of these side effects:  Allergic reaction: Itching or hives, swelling in your face or hands, swelling or tingling in your mouth or throat, chest tightness, trouble breathing  Change in how much or how often you urinate, bloody or cloudy urine, painful or difficult urination, lower back or side pain  Fast or trouble breathing, tiredness, stomach pain, nausea, vomiting  Increased hunger, confusion, shaking, trembling, sweating  Lightheadedness, dizziness, fainting  Pain, tenderness, redness, or swelling of the area between the anus and genitals, fever, unusual tiredness  or weakness  If you notice these less serious side effects, talk with your doctor:   Redness, itching, pain, or swelling of the penis, bad-smelling discharge from the penis  White or yellow vaginal discharge, vaginal itching or odor  If you notice other side effects that you think are caused by this medicine, tell your doctor.   Call your doctor for medical advice about side effects. You may report side effects to FDA at 7-096-GOO-6823  © Copyright [employer]ative 2023 Information is for End User's use only and may not be sold, redistributed or otherwise used for commercial purposes.  The above information is an  only. It is not intended as medical advice for individual conditions or treatments. Talk to your doctor, nurse or pharmacist before following any medical regimen to see if it is safe and effective for you.

## 2024-05-09 ENCOUNTER — TELEPHONE (OUTPATIENT)
Age: 79
End: 2024-05-09

## 2024-05-09 DIAGNOSIS — E11.65 TYPE 2 DIABETES MELLITUS WITH HYPERGLYCEMIA, WITHOUT LONG-TERM CURRENT USE OF INSULIN (HCC): ICD-10-CM

## 2024-05-09 NOTE — TELEPHONE ENCOUNTER
Pharmacy was calling to confirm that patient is to inject 1 sensor under the skin every 14 days. I verified this with them. No further questions at this time.

## 2024-05-24 ENCOUNTER — TELEPHONE (OUTPATIENT)
Dept: HEMATOLOGY ONCOLOGY | Facility: MEDICAL CENTER | Age: 79
End: 2024-05-24

## 2024-05-24 NOTE — TELEPHONE ENCOUNTER
Spoke w/ pt's sister. RS pt's 7/26 appt to 8/2 at 11:20 am due to a change in the provider's schedule.

## 2024-06-13 DIAGNOSIS — E11.65 TYPE 2 DIABETES MELLITUS WITH HYPERGLYCEMIA, WITHOUT LONG-TERM CURRENT USE OF INSULIN (HCC): ICD-10-CM

## 2024-06-13 RX ORDER — METFORMIN HYDROCHLORIDE 500 MG/1
TABLET, EXTENDED RELEASE ORAL
Qty: 360 TABLET | Refills: 1 | Status: SHIPPED | OUTPATIENT
Start: 2024-06-13

## 2024-06-28 DIAGNOSIS — E78.5 DYSLIPIDEMIA: ICD-10-CM

## 2024-06-28 RX ORDER — ROSUVASTATIN CALCIUM 10 MG/1
10 TABLET, COATED ORAL DAILY
Qty: 90 TABLET | Refills: 1 | Status: SHIPPED | OUTPATIENT
Start: 2024-06-28

## 2024-07-02 ENCOUNTER — APPOINTMENT (OUTPATIENT)
Dept: LAB | Facility: HOSPITAL | Age: 79
End: 2024-07-02
Payer: MEDICARE

## 2024-07-02 DIAGNOSIS — E11.65 TYPE 2 DIABETES MELLITUS WITH HYPERGLYCEMIA, WITHOUT LONG-TERM CURRENT USE OF INSULIN (HCC): ICD-10-CM

## 2024-07-02 DIAGNOSIS — C61 PROSTATE CANCER (HCC): ICD-10-CM

## 2024-07-02 LAB
ALBUMIN SERPL BCG-MCNC: 3.8 G/DL (ref 3.5–5)
ALP SERPL-CCNC: 47 U/L (ref 34–104)
ALT SERPL W P-5'-P-CCNC: 13 U/L (ref 7–52)
ANION GAP SERPL CALCULATED.3IONS-SCNC: 15 MMOL/L (ref 4–13)
AST SERPL W P-5'-P-CCNC: 11 U/L (ref 13–39)
BILIRUB SERPL-MCNC: 0.29 MG/DL (ref 0.2–1)
BUN SERPL-MCNC: 18 MG/DL (ref 5–25)
CALCIUM SERPL-MCNC: 9.1 MG/DL (ref 8.4–10.2)
CHLORIDE SERPL-SCNC: 96 MMOL/L (ref 96–108)
CO2 SERPL-SCNC: 32 MMOL/L (ref 21–32)
CREAT SERPL-MCNC: 0.96 MG/DL (ref 0.6–1.3)
EST. AVERAGE GLUCOSE BLD GHB EST-MCNC: 206 MG/DL
GFR SERPL CREATININE-BSD FRML MDRD: 74 ML/MIN/1.73SQ M
GLUCOSE P FAST SERPL-MCNC: 188 MG/DL (ref 65–99)
HBA1C MFR BLD: 8.8 %
POTASSIUM SERPL-SCNC: 4.6 MMOL/L (ref 3.5–5.3)
PROT SERPL-MCNC: 6 G/DL (ref 6.4–8.4)
PSA SERPL-MCNC: 0.12 NG/ML (ref 0–4)
SODIUM SERPL-SCNC: 143 MMOL/L (ref 135–147)

## 2024-07-02 PROCEDURE — 83036 HEMOGLOBIN GLYCOSYLATED A1C: CPT

## 2024-07-02 PROCEDURE — 36415 COLL VENOUS BLD VENIPUNCTURE: CPT

## 2024-07-02 PROCEDURE — 80053 COMPREHEN METABOLIC PANEL: CPT

## 2024-07-02 PROCEDURE — 84153 ASSAY OF PSA TOTAL: CPT

## 2024-07-05 ENCOUNTER — TELEPHONE (OUTPATIENT)
Dept: ENDOCRINOLOGY | Facility: CLINIC | Age: 79
End: 2024-07-05

## 2024-07-08 ENCOUNTER — OFFICE VISIT (OUTPATIENT)
Dept: ENDOCRINOLOGY | Facility: CLINIC | Age: 79
End: 2024-07-08
Payer: MEDICARE

## 2024-07-08 ENCOUNTER — TELEPHONE (OUTPATIENT)
Age: 79
End: 2024-07-08

## 2024-07-08 VITALS
OXYGEN SATURATION: 92 % | DIASTOLIC BLOOD PRESSURE: 68 MMHG | HEART RATE: 79 BPM | HEIGHT: 63 IN | WEIGHT: 216 LBS | BODY MASS INDEX: 38.27 KG/M2 | SYSTOLIC BLOOD PRESSURE: 112 MMHG

## 2024-07-08 DIAGNOSIS — G25.0 TREMOR, ESSENTIAL: ICD-10-CM

## 2024-07-08 DIAGNOSIS — E66.9 OBESITY (BMI 30-39.9): ICD-10-CM

## 2024-07-08 DIAGNOSIS — E11.65 TYPE 2 DIABETES MELLITUS WITH HYPERGLYCEMIA, WITHOUT LONG-TERM CURRENT USE OF INSULIN (HCC): ICD-10-CM

## 2024-07-08 DIAGNOSIS — E11.319 DIABETIC RETINOPATHY OF LEFT EYE ASSOCIATED WITH TYPE 2 DIABETES MELLITUS, MACULAR EDEMA PRESENCE UNSPECIFIED, UNSPECIFIED RETINOPATHY SEVERITY (HCC): Primary | ICD-10-CM

## 2024-07-08 DIAGNOSIS — E78.5 DYSLIPIDEMIA: ICD-10-CM

## 2024-07-08 PROCEDURE — 99214 OFFICE O/P EST MOD 30 MIN: CPT | Performed by: NURSE PRACTITIONER

## 2024-07-08 RX ORDER — PRIMIDONE 50 MG/1
50 TABLET ORAL EVERY 12 HOURS
Qty: 180 TABLET | Refills: 0 | Status: SHIPPED | OUTPATIENT
Start: 2024-07-08

## 2024-07-08 NOTE — PROGRESS NOTES
Established Patient Progress Note    Chief Complaint:  Diabetes follow up visit    Impression & Plan:    Problem List Items Addressed This Visit          Endocrine    Type 2 diabetes mellitus with hyperglycemia, without long-term current use of insulin (HCC)       Lab Results   Component Value Date    HGBA1C 8.8 (H) 07/02/2024     HGA1C above goal-- GMI 7.9%. continue to work on lifestyle modifications.     BGL Reviewed: Continue CGM    Treatment regimen: Continue current regimen.     Discussed risks/complications associated with uncontrolled diabetes including organ involvement, heart attack, stroke, death.    Advised lifestyle modifications including attention to diet including the amount and types of carbohydrates consumed and regular activity.     Call for blood sugars less than 70 mg/dl or patterns over 250 mg/dl.     Recommendation for medical identification either bracelet, necklace.    Routine follow up for diabetic eye and foot exams.     Ordered blood work to complete prior to next visit.    Follow up in 3 months.            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               Other    Dyslipidemia     Continues on statin therapy.            Relevant Orders    Lipid panel    Obesity (BMI 30-39.9)     Dietary modifications.   Exercise limited due to fear falling.             History of Present Illness:   Parviz Dolanpincott is a 78 y.o. male with a history of type 2 diabetes without long term use of insulin. Reports complications of  CKD, severe non-proliferative retinopathy with macular edema. UTD with diabetic eye exam. UTD with diabetic foot exams March 2024.  Other significant medical history includes prostate cancer, hypertension and hyperlipidemia.      CGM Interpretation  Parviz ROSA  Runscope   Device used Freestyle aleena for Personal Use  Indication: Type of Diabetes: Type 2 Diabetes  More than 72 hours of data was reviewed. Report to be scanned to chart.   Date Range: June 25-July 8, 2024  Analysis of data:   Average Glucose: 192 mg/dl  Coefficient of Variation: 21.5%  GMI 7.9%  Time in Target Range: 45%  Time Above Range: 55%  Time Below Range: 0%   Interpretation of data:   Post-prandial hyperglycemia.      Current regimen:  Jardiance 25 mg daily  Januvia 100 mg daily  Metformin 1000 mg twice daily     No ACE/ARB  Has hyperlipidemia: Taking rosuvastatin, tolerating              Thyroid disorders: Denies history  Pancreatitis Denies history       Patient Active Problem List   Diagnosis    Type 2 diabetes mellitus with hyperglycemia, without long-term current use of insulin (HCC)    Anemia    Pulmonary artery hypertension (HCC)    Dyslipidemia    Urinary retention    Obesity (BMI 30-39.9)    Tremor    Hypoxia    Generalized weakness    Protein-calorie malnutrition (HCC)    Bilateral pulmonary embolism (HCC)    Prostate cancer (HCC)    Factor 5 Leiden mutation, heterozygous (HCC)    Itching    CKD stage 2 due to type 2 diabetes mellitus  (HCC)    Immunization due    Diabetic retinopathy of left eye associated with type 2 diabetes mellitus (HCC)    CKD (chronic kidney disease) stage 2, GFR 60-89 ml/min    Retinopathy    Class 2 severe obesity with serious comorbidity and body mass index (BMI) of 38.0 to 38.9 in adult, unspecified obesity type (HCC)    Callus of foot      Past Medical History:   Diagnosis Date    Arthritis     BPH associated with nocturia     Diabetes mellitus (HCC)     Edema     lower legs    Hearing aid worn     bilateral    Hyperlipidemia     Hypertension     controlled    Tremor of both hands       Past Surgical History:   Procedure Laterality Date    CATARACT EXTRACTION      NC TRURL ELECTROSURG RESCJ PROSTATE BLEED COMPLETE N/A 1/20/2022    Procedure: CYSTOSCOPY,  TRANSURETHRAL RESECTION OF PROSTATE (TURP);  Surgeon: Flakito Parra MD;  Location: [facility] MAIN OR;  Service: Urology    LA XCAPSL CTRC RMVL INSJ IO LENS PROSTH W/O ECP Right 8/6/2018    Procedure: EXTRACTION EXTRACAPSULAR CATARACT PHACO INTRAOCULAR LENS (IOL);  Surgeon: Riccardo Saavedra MD;  Location: [facility] MAIN OR;  Service: Ophthalmology    LA XCAPSL CTRC RMVL INSJ IO LENS PROSTH W/O ECP Left 10/1/2018    Procedure: EXTRACTION EXTRACAPSULAR CATARACT PHACO INTRAOCULAR LENS (IOL);  Surgeon: Riccardo Saavedra MD;  Location: [facility] MAIN OR;  Service: Ophthalmology      Family History   Problem Relation Age of Onset    Colon cancer Mother     Cancer Father         lung-smoker    Lung cancer Father     Early death Brother     Mastocytosis Brother     Cancer Brother     Stomach cancer Brother     Hyperlipidemia Sister     Hypertension Sister     Diabetes type II Sister     No Known Problems Sister     No Known Problems Sister     No Known Problems Sister     No Known Problems Sister      Social History     Tobacco Use    Smoking status: Never    Smokeless tobacco: Never   Substance Use Topics    Alcohol use: Never     No Known Allergies      Current Outpatient Medications:     apixaban (Eliquis) 5 mg, Take 1 tablet by mouth twice daily, Disp: 180 tablet, Rfl: 1    Ascorbic Acid (VITAMIN C PO), Take by mouth in the morning, Disp: , Rfl:     calcium-vitamin D (OSCAL 500 + D) 500 mg-200 units per tablet, Take 1 tablet by mouth daily, Disp: , Rfl:     Cyanocobalamin (VITAMIN B-12 CR PO), Take by mouth in the morning, Disp: , Rfl:     Empagliflozin (Jardiance) 25 MG TABS, Take 1 tablet (25 mg total) by mouth every morning, Disp: 90 tablet, Rfl: 2    folic acid (FOLVITE) 1 mg tablet, Take 1 tablet by mouth once daily, Disp: 90 tablet, Rfl: 1    metFORMIN (GLUCOPHAGE-XR) 500 mg 24 hr tablet, Take 2 tablets by mouth twice daily, Disp: 360 tablet, Rfl: 1    primidone (MYSOLINE) 50 mg tablet, TAKE 2 TABLETS BY MOUTH EVERY 12 HOURS  (Patient taking differently: Take 50 mg by mouth Patient is taking 1 pill every 12 hours), Disp: 120 tablet, Rfl: 0    propranolol (INDERAL LA) 120 mg 24 hr capsule, Take 1 capsule (120 mg total) by mouth daily After breakfast, Disp: 30 capsule, Rfl: 11    rosuvastatin (CRESTOR) 10 MG tablet, Take 1 tablet by mouth once daily, Disp: 90 tablet, Rfl: 1    sitaGLIPtin (Januvia) 100 mg tablet, Take 1 tablet (100 mg total) by mouth daily, Disp: 90 tablet, Rfl: 3    Continuous Blood Gluc  (FreeStyle [place] 2 [place]) CORA, Use 1 each 1 (one) time for 1 dose, Disp: 1 each, Rfl: 0    Continuous Glucose Sensor (FreeStyle [place] 2 Sensor) MISC, Inject 1 each under the skin every 14 (fourteen) days, Disp: 6 each, Rfl: 3    polyethylene glycol (MIRALAX) 17 g packet, Take 17 g by mouth daily as needed (constipation) for up to 14 days, Disp: 14 each, Rfl: 0    senna (SENOKOT) 8.6 mg, Take 2 tablets (17.2 mg total) by mouth daily at bedtime, Disp: 60 tablet, Rfl: 0    tamsulosin (FLOMAX) 0.4 mg, Take 0.4 mg by mouth 2 (two) times a day (Patient not taking: Reported on 3/26/2024), Disp: , Rfl:     Review of Systems  Constitutional: Negative for activity change, appetite change, fatigue and unexpected weight change.   HENT: Negative for ear pain, sore throat, trouble swallowing and voice change.    Eyes: At baseline.    Respiratory: Negative for cough and shortness of breath.    Cardiovascular: Negative for chest pain and palpitations.   Gastrointestinal: Negative for abdominal distention, abdominal pain, constipation, diarrhea and vomiting.   Endocrine: Negative for cold intolerance, heat intolerance, polydipsia and polyuria.   Genitourinary: Negative for dysuria and hematuria. Denies genital yeast or skin infections.   Musculoskeletal: Negative for arthralgias and back pain.   Skin: Negative for color change and rash.   Neurological: Negative for weakness.   All other systems reviewed and are negative.    Physical Exam:  Body  "mass index is 38.26 kg/m².  /68   Pulse 79   Ht 5' 3\" (1.6 m)   Wt 98 kg (216 lb)   SpO2 92%   BMI 38.26 kg/m²    Wt Readings from Last 3 Encounters:   07/08/24 98 kg (216 lb)   05/08/24 98.4 kg (217 lb)   03/26/24 96.6 kg (213 lb)       Physical Exam   Constitutional: He is oriented to person, place, and time. He appears well-developed and well-nourished. No distress.   HENT:   Head: Normocephalic and atraumatic.   Neck: Normal range of motion.   Pulmonary/Chest: Effort normal.   Musculoskeletal: Normal range of motion.   Neurological: He is alert and oriented to person, place, and time.   Skin: He is not diaphoretic.   Psychiatric: He has a normal mood and affect. His behavior is normal.       Labs:   Lab Results   Component Value Date    HGBA1C 8.8 (H) 07/02/2024    HGBA1C 9.1 (H) 03/27/2024    HGBA1C 9.0 (H) 10/26/2023     Lab Results   Component Value Date    CREATININE 0.96 07/02/2024    CREATININE 0.92 03/27/2024    CREATININE 0.89 10/26/2023    BUN 18 07/02/2024    K 4.6 07/02/2024    CL 96 07/02/2024    CO2 32 07/02/2024     eGFR   Date Value Ref Range Status   07/02/2024 74 ml/min/1.73sq m Final     Lab Results   Component Value Date    HDL 33 (L) 07/11/2023    TRIG 287 (H) 07/11/2023     Lab Results   Component Value Date    ALT 13 07/02/2024    AST 11 (L) 07/02/2024    ALKPHOS 47 07/02/2024     Lab Results   Component Value Date    AKS9JCYBYQYA 3.260 07/11/2023    QBL3TXWDABBA 3.240 03/16/2023    LJG2XPWGHBXI 2.495 06/29/2022     Lab Results   Component Value Date    FREET4 0.83 06/29/2022       Discussed with the patient and all questioned fully answered. He will call me if any problems arise.    Follow-up appointment in 3 months.     Counseled patient on diagnostic results, prognosis, risk and benefit of treatment options, instruction for management, importance of treatment compliance, Risk  factor reduction and impressions    BLAZE Patterson    "

## 2024-07-08 NOTE — ASSESSMENT & PLAN NOTE
Lab Results   Component Value Date    HGBA1C 8.8 (H) 07/02/2024     HGA1C above goal-- GMI 7.9%. continue to work on lifestyle modifications.     BGL Reviewed: Continue CGM    Treatment regimen: Continue current regimen.     Discussed risks/complications associated with uncontrolled diabetes including organ involvement, heart attack, stroke, death.    Advised lifestyle modifications including attention to diet including the amount and types of carbohydrates consumed and regular activity.     Call for blood sugars less than 70 mg/dl or patterns over 250 mg/dl.     Recommendation for medical identification either bracelet, necklace.    Routine follow up for diabetic eye and foot exams.     Ordered blood work to complete prior to next visit.    Follow up in 3 months.

## 2024-07-08 NOTE — TELEPHONE ENCOUNTER
Shandra Shaffer sister    Neurology -     Patient needs a new script for Primidone 50 mg,.patient only takes 2 times a day every 12 hrs  Patient stopped taking 4 tablets at his last office visit on 03/20/24    Please send new script to:  [facility] [address] [place] [address] - [address]  [address] [facility] [address]  Phone: [phone]  Fax: [phone]     CB: [phone] Shandra estrada

## 2024-08-10 DIAGNOSIS — D64.9 ANEMIA, UNSPECIFIED TYPE: ICD-10-CM

## 2024-08-10 RX ORDER — FOLIC ACID 1 MG/1
TABLET ORAL
Qty: 90 TABLET | Refills: 1 | Status: SHIPPED | OUTPATIENT
Start: 2024-08-10

## 2024-08-12 ENCOUNTER — TELEPHONE (OUTPATIENT)
Dept: HEMATOLOGY ONCOLOGY | Facility: MEDICAL CENTER | Age: 79
End: 2024-08-12

## 2024-08-12 NOTE — TELEPHONE ENCOUNTER
Pt is scheduled with oral surgeon for extraction 7/27. They are faxing a clearance. Gave hopeline fax

## 2024-08-13 DIAGNOSIS — G25.0 TREMOR, ESSENTIAL: ICD-10-CM

## 2024-08-13 RX ORDER — PROPRANOLOL HYDROCHLORIDE 120 MG/1
CAPSULE, EXTENDED RELEASE ORAL
Qty: 30 CAPSULE | Refills: 5 | Status: SHIPPED | OUTPATIENT
Start: 2024-08-13

## 2024-08-19 ENCOUNTER — TELEPHONE (OUTPATIENT)
Age: 79
End: 2024-08-19

## 2024-08-19 NOTE — TELEPHONE ENCOUNTER
Dulce calling from [facility] to have Dr. Summers's office complete clearance document.  Patient is on Eliquis and they need to know when he should stop taking medication.  Patient has surgery scheduled for 8/27/24.  I sent document to [facility].  Please complete and fax to [phone] attn Dulce

## 2024-08-22 ENCOUNTER — TELEPHONE (OUTPATIENT)
Dept: FAMILY MEDICINE CLINIC | Facility: CLINIC | Age: 79
End: 2024-08-22

## 2024-08-22 ENCOUNTER — TELEPHONE (OUTPATIENT)
Age: 79
End: 2024-08-22

## 2024-08-22 NOTE — TELEPHONE ENCOUNTER
Pt's sister, Shandra, called.  She was quite upset about pt not being able to get in for pre-op clearance appt for his dental procedure on Tuesday 8/27/24.  Explained to her that we were short providers this week and that there were no openings available either tomorrow or Monday.  Checked again for her during this call, but still no openings.  Made her aware that there were several appts available next week - only one on Tuesday, but several each day after that.  Asked her if it would be possible to see if his surgery date could be changed so we could get him scheduled for pre-op next week.  Shandra will call [facility] and then call back to let us know.

## 2024-08-22 NOTE — TELEPHONE ENCOUNTER
Please return [facility] at [phone] opt 2  Attempted warm transfer to clerical and clinical without success.

## 2024-08-22 NOTE — TELEPHONE ENCOUNTER
[facility] calling to confirm Dr. Summers received medical clearance form to be completed.  Patient is scheduled for surgery 8/27/24. Notes indicate fax was received and [facility] 8/19/24.    Document is in Dr. Summerss folder.  Please call Anni [phone] to confirm form was completed.

## 2024-08-22 NOTE — TELEPHONE ENCOUNTER
Call transferred from [facility] oral surgeons office is on the phone about clearance for the patient. They stated patient scheduled and already paid for his surgery that he is having next Tuesday 8/27/24. He is having total and complete extractions and full upper and lower dentures put in and being put under local anesthesia.   The hematology office already told patient he can hold his Eliquis 5mg but will need a medical clearance pre-op exam in order to be cleared to have the procedure done.   I informed the representative that we do not currently have any appointments to be able to accommodate the patient. We have 0 openings tomorrow and we do not have any 30 minute slots to put patient in on Monday. She got irritated and stated that this is not fair to the patient because he already paid for his surgery. I told the rep I would put her of a brief hold and talk with a provider regarding the issue to see if he could be seen Monday in a 15 minute slot. She stated the patient would need to have his clearance tomorrow and then the call disconnected.  The Access center did not give me their call back number so I am unable to call them back.  Luisa Naqvi, CMA

## 2024-08-23 ENCOUNTER — DOCUMENTATION (OUTPATIENT)
Dept: HEMATOLOGY ONCOLOGY | Facility: CLINIC | Age: 79
End: 2024-08-23

## 2024-08-23 NOTE — TELEPHONE ENCOUNTER
Form was faxed yesterday and Wednesday with hematology clearance by Sneha FATIMA.  Will re-fax and call to follow-up with office.

## 2024-08-23 NOTE — PROGRESS NOTES
Received Paperwork   What type of form Other (Enter type in comments)   Scanned blank form into patient's Epic chart Yes   Method received form  [employer]   Provider responsible for form Dr. Summers   Informed patient our office turn around time for completing patient forms is 5-7 business days. NA     Comments Dental Clearance

## 2024-08-26 ENCOUNTER — TELEPHONE (OUTPATIENT)
Dept: HEMATOLOGY ONCOLOGY | Facility: MEDICAL CENTER | Age: 79
End: 2024-08-26

## 2024-08-26 NOTE — TELEPHONE ENCOUNTER
Forms were sent last week and reconfirmed today that they were received.  Hematology clearance granted.

## 2024-08-29 NOTE — TELEPHONE ENCOUNTER
It looks like the patient was seen and cleared by [facility] on 8/23/24. SONIA.     Keely Powell LPN

## 2024-09-09 ENCOUNTER — TELEPHONE (OUTPATIENT)
Dept: NEUROLOGY | Facility: CLINIC | Age: 79
End: 2024-09-09

## 2024-09-10 DIAGNOSIS — I26.99 BILATERAL PULMONARY EMBOLISM (HCC): ICD-10-CM

## 2024-09-10 DIAGNOSIS — D68.51 FACTOR 5 LEIDEN MUTATION, HETEROZYGOUS (HCC): ICD-10-CM

## 2024-09-10 RX ORDER — APIXABAN 5 MG/1
5 TABLET, FILM COATED ORAL 2 TIMES DAILY
Qty: 180 TABLET | Refills: 0 | Status: SHIPPED | OUTPATIENT
Start: 2024-09-10

## 2024-09-17 DIAGNOSIS — G25.0 TREMOR, ESSENTIAL: ICD-10-CM

## 2024-09-17 RX ORDER — PROPRANOLOL HYDROCHLORIDE 120 MG/1
CAPSULE, EXTENDED RELEASE ORAL
Qty: 90 CAPSULE | Refills: 0 | Status: SHIPPED | OUTPATIENT
Start: 2024-09-17

## 2024-09-17 NOTE — TELEPHONE ENCOUNTER
Reason for call: pt sister requesting 90 day supplies   [x] Refill   [] Prior Auth  [] Other:     Office:   [x] PCP/Provider -   [] Specialty/Provider -     Medication: propranolol (INDERAL LA) 120 mg 24 hr capsule     Dose/Frequency:  TAKE 1 CAPSULE BY MOUTH ONCE DAILY AFTER BREAKFAST     Quantity: 90 Capsule    Pharmacy: [id number] - [place], [place] - [address]      Does the patient have enough for 3 days?   [] Yes   [x] No - Send as HP to POD

## 2024-09-23 ENCOUNTER — OFFICE VISIT (OUTPATIENT)
Dept: NEUROLOGY | Facility: CLINIC | Age: 79
End: 2024-09-23
Payer: MEDICARE

## 2024-09-23 VITALS
WEIGHT: 212 LBS | HEART RATE: 59 BPM | DIASTOLIC BLOOD PRESSURE: 78 MMHG | OXYGEN SATURATION: 93 % | HEIGHT: 63 IN | BODY MASS INDEX: 37.56 KG/M2 | SYSTOLIC BLOOD PRESSURE: 126 MMHG | TEMPERATURE: 96.1 F

## 2024-09-23 DIAGNOSIS — G25.0 TREMOR, ESSENTIAL: Primary | ICD-10-CM

## 2024-09-23 PROCEDURE — 99214 OFFICE O/P EST MOD 30 MIN: CPT | Performed by: PSYCHIATRY & NEUROLOGY

## 2024-09-23 NOTE — PROGRESS NOTES
Return NeuroOutpatient Note        Parviz Posey  [id number]  78 y.o.  1945       Anxiety disorder  and Tremor, essential         History obtained from:  Patient and sister     HPI/Subjective:    Parviz Posey is an 77 yo M with PMH of ET, anxiety disorder presents as f/u. Per my previous history, he started noticing hand tremors 5 years ago. He was seen by Dr. Al and was placed on primidone but since pandemic never went back for refill so he hadn't been taking it for 1-2 years. He was restarted but says he's not sure whether medications are helping. At last visit, he had stated that primidone is not working for him so it was tapered and placed back on propranolol.  Today, he's not sure which ones or whether he's taking both. They will need to check at home.  Today he says, his tremors are not bad. His left hand shakes a little but it's not significant.   He's good with using utensils. Chopping can be difficult.   He has think about using screw .     When he concentrates, his tremors are worse. If spontaneous, he does better.   There is no resting tremor.  His tremor is worse on left.   He's had ineligible hand writing for many years.         His limiting factor are his knees but he's not surgical candidate.       Past Medical History:   Diagnosis Date    Arthritis     BPH associated with nocturia     Diabetes mellitus (HCC)     Edema     lower legs    Hearing aid worn     bilateral    Hyperlipidemia     Hypertension     controlled    Tremor of both hands      Social History     Socioeconomic History    Marital status: Single     Spouse name: Not on file    Number of children: 0    Years of education: Not on file    Highest education level: Not on file   Occupational History    Not on file   Tobacco Use    Smoking status: Never    Smokeless tobacco: Never   Vaping Use    Vaping status: Never Used   Substance and Sexual Activity    Alcohol use: Never    Drug use: Never    Sexual activity:  Never   Other Topics Concern    Not on file   Social History Narrative    Not on file     Social Determinants of Health     Financial Resource Strain: Low Risk  (9/26/2023)    Overall Financial Resource Strain (CARDIA)     Difficulty of Paying Living Expenses: Not hard at all   Food Insecurity: Not on file   Transportation Needs: No Transportation Needs (9/26/2023)    PRAPARE - Transportation     Lack of Transportation (Medical): No     Lack of Transportation (Non-Medical): No   Physical Activity: Not on file   Stress: Not on file   Social Connections: Not on file   Intimate Partner Violence: Not on file   Housing Stability: Not on file     Family History   Problem Relation Age of Onset    Colon cancer Mother     Cancer Father         lung-smoker    Lung cancer Father     Early death Brother     Mastocytosis Brother     Cancer Brother     Stomach cancer Brother     Hyperlipidemia Sister     Hypertension Sister     Diabetes type II Sister     No Known Problems Sister     No Known Problems Sister     No Known Problems Sister     No Known Problems Sister      No Known Allergies  Current Outpatient Medications on File Prior to Visit   Medication Sig Dispense Refill    Ascorbic Acid (VITAMIN C PO) Take by mouth in the morning      calcium-vitamin D (OSCAL 500 + D) 500 mg-200 units per tablet Take 1 tablet by mouth daily      Continuous Blood Gluc  (FreeStyle [place] 2 [place]) CORA Use 1 each 1 (one) time for 1 dose 1 each 0    Continuous Glucose Sensor (FreeStyle Nilton 2 Sensor) MISC Inject 1 each under the skin every 14 (fourteen) days 6 each 3    Cyanocobalamin (VITAMIN B-12 CR PO) Take by mouth in the morning      Eliquis 5 MG Take 1 tablet by mouth twice daily 180 tablet 0    Empagliflozin (Jardiance) 25 MG TABS Take 1 tablet (25 mg total) by mouth every morning 90 tablet 2    folic acid (FOLVITE) 1 mg tablet Take 1 tablet by mouth once daily 90 tablet 1    metFORMIN (GLUCOPHAGE-XR) 500 mg 24 hr tablet Take 2  "tablets by mouth twice daily 360 tablet 1    primidone (MYSOLINE) 50 mg tablet Take 1 tablet (50 mg total) by mouth every 12 (twelve) hours 180 tablet 0    propranolol (INDERAL LA) 120 mg 24 hr capsule TAKE 1 CAPSULE BY MOUTH ONCE DAILY AFTER BREAKFASTStrength: 120 mg 90 capsule 0    rosuvastatin (CRESTOR) 10 MG tablet Take 1 tablet by mouth once daily 90 tablet 1    senna (SENOKOT) 8.6 mg Take 2 tablets (17.2 mg total) by mouth daily at bedtime 60 tablet 0    sitaGLIPtin (Januvia) 100 mg tablet Take 1 tablet (100 mg total) by mouth daily 90 tablet 3    polyethylene glycol (MIRALAX) 17 g packet Take 17 g by mouth daily as needed (constipation) for up to 14 days (Patient not taking: Reported on 9/23/2024) 14 each 0    tamsulosin (FLOMAX) 0.4 mg Take 0.4 mg by mouth 2 (two) times a day (Patient not taking: Reported on 3/26/2024)      [DISCONTINUED] metoprolol succinate (TOPROL-XL) 50 mg 24 hr tablet Take 1 tablet by mouth once daily 90 tablet 3     No current facility-administered medications on file prior to visit.         Review of Systems   Refer to positive review of systems in HPI.   Review of Systems    Constitutional- No fever  Eyes- No visual change  ENT- Hearing normal  CV- No chest pain  Resp- No Shortness of breath  GI- No diarrhea  - Bladder normal  MS- No Arthritis   Skin- No rash  Psych- No depression  Endo- No DM  Heme- No nodes    Vitals:    09/23/24 1032   BP: 126/78   BP Location: Right arm   Patient Position: Sitting   Cuff Size: Standard   Pulse: 59   Temp: (!) 96.1 °F (35.6 °C)   TempSrc: Tympanic   SpO2: 93%   Weight: 96.2 kg (212 lb)   Height: 5' 3\" (1.6 m)       PHYSICAL EXAM:  Appearance: No Acute Distress  Ophthalmoscopic: Disc Flat, Normal fundus  Mental status:  Orientation: Awake, Alert, and Orientedx3  Memory: Registation 3/3 Recall 3/3  Attention: normal  Knowledge: good  Language: No aphasia  Speech: No dysarthria  Cranial Nerves:  2 No Visual Defect on Confrontation, Pupils round, " equal, reactive to light  3,4,6 Extraocular Movements Intact, no nystagmus  5 Facial Sensation Intact  7 No facial asymmetry  8 Intact hearing  9,10 Palate symmetric, normal gag  11 Good shoulder shrug  12 Tongue Midline  Gait: Stable, uses cane, stooped posture for 1-2 yrs.   Coordination: ET in LUE, mild in RUE.   Sensory: Intact, Symmetric to pinprick, light touch, vibration, and joint position  Muscle Tone: Normal              Muscle exam:  Arm Right Left Leg Right Left   Deltoid 5/5 5/5 Iliopsoas 5/5 5/5   Biceps 5/5 5/5 Quads 5/5 5/5   Triceps 5/5 5/5 Hamstrings 5/5 5/5   Wrist Extension 5/5 5/5 Ankle Dorsi Flexion 5/5 5/5   Wrist Flexion 5/5 5/5 Ankle Plantar Flexion 5/5 5/5   Interossei 5/5 5/5 Ankle Eversion 5/5 5/5   APB 5/5 5/5 Ankle Inversion 5/5 5/5       Reflexes   RJ BJ TJ KJ AJ Plantars Kevin's   Right 2+ 2+ 2+ 2+ 2+ Downgoing Not present   Left 2+ 2+ 2+ 2+ 2+ Downgoing Not present     Personal review of  Labs:                    Diagnoses and all orders for this visit:        1. Tremor, essential            Patient has remained stable.   He's able to function and do his ADLs just fine.  Family will check at home and confirm with us whether he's taking propranolol and primidone or only propranolol.   We won't make any changes but we need to know.   He doesn't have any PD features at this time.                 Total time of encounter:  30 min  More than 50% of the time was used in counseling and/or coordination of care  Extent of counseling and/or coordination of care        Heyocasta Olivas MD  [facility]  [address] [place] [id number] [phone]

## 2024-10-06 DIAGNOSIS — G25.0 TREMOR, ESSENTIAL: ICD-10-CM

## 2024-10-07 RX ORDER — PRIMIDONE 50 MG/1
50 TABLET ORAL EVERY 12 HOURS
Qty: 180 TABLET | Refills: 0 | Status: SHIPPED | OUTPATIENT
Start: 2024-10-07

## 2024-10-15 ENCOUNTER — APPOINTMENT (OUTPATIENT)
Dept: LAB | Facility: CLINIC | Age: 79
End: 2024-10-15
Payer: MEDICARE

## 2024-10-15 DIAGNOSIS — E78.5 DYSLIPIDEMIA: ICD-10-CM

## 2024-10-15 DIAGNOSIS — E11.65 TYPE 2 DIABETES MELLITUS WITH HYPERGLYCEMIA, WITHOUT LONG-TERM CURRENT USE OF INSULIN (HCC): ICD-10-CM

## 2024-10-15 LAB
ALBUMIN SERPL BCG-MCNC: 4.3 G/DL (ref 3.5–5)
ALP SERPL-CCNC: 60 U/L (ref 34–104)
ALT SERPL W P-5'-P-CCNC: 18 U/L (ref 7–52)
ANION GAP SERPL CALCULATED.3IONS-SCNC: 10 MMOL/L (ref 4–13)
AST SERPL W P-5'-P-CCNC: 15 U/L (ref 13–39)
BILIRUB SERPL-MCNC: 0.46 MG/DL (ref 0.2–1)
BUN SERPL-MCNC: 18 MG/DL (ref 5–25)
CALCIUM SERPL-MCNC: 9.5 MG/DL (ref 8.4–10.2)
CHLORIDE SERPL-SCNC: 95 MMOL/L (ref 96–108)
CHOLEST SERPL-MCNC: 158 MG/DL
CO2 SERPL-SCNC: 33 MMOL/L (ref 21–32)
CREAT SERPL-MCNC: 1 MG/DL (ref 0.6–1.3)
EST. AVERAGE GLUCOSE BLD GHB EST-MCNC: 203 MG/DL
GFR SERPL CREATININE-BSD FRML MDRD: 70 ML/MIN/1.73SQ M
GLUCOSE P FAST SERPL-MCNC: 238 MG/DL (ref 65–99)
HBA1C MFR BLD: 8.7 %
HDLC SERPL-MCNC: 42 MG/DL
LDLC SERPL CALC-MCNC: 53 MG/DL (ref 0–100)
NONHDLC SERPL-MCNC: 116 MG/DL
POTASSIUM SERPL-SCNC: 6.1 MMOL/L (ref 3.5–5.3)
PROT SERPL-MCNC: 7.2 G/DL (ref 6.4–8.4)
SODIUM SERPL-SCNC: 138 MMOL/L (ref 135–147)
TRIGL SERPL-MCNC: 313 MG/DL

## 2024-10-15 PROCEDURE — 83036 HEMOGLOBIN GLYCOSYLATED A1C: CPT

## 2024-10-15 PROCEDURE — 82985 ASSAY OF GLYCATED PROTEIN: CPT

## 2024-10-15 PROCEDURE — 80053 COMPREHEN METABOLIC PANEL: CPT

## 2024-10-15 PROCEDURE — 36415 COLL VENOUS BLD VENIPUNCTURE: CPT

## 2024-10-15 PROCEDURE — 80061 LIPID PANEL: CPT

## 2024-10-16 ENCOUNTER — TELEPHONE (OUTPATIENT)
Age: 79
End: 2024-10-16

## 2024-10-16 ENCOUNTER — APPOINTMENT (OUTPATIENT)
Dept: LAB | Facility: CLINIC | Age: 79
End: 2024-10-16
Payer: MEDICARE

## 2024-10-16 DIAGNOSIS — E87.5 HYPERKALEMIA: Primary | ICD-10-CM

## 2024-10-16 LAB
CREAT UR-MCNC: 32.4 MG/DL
MICROALBUMIN UR-MCNC: 10.4 MG/L
MICROALBUMIN/CREAT 24H UR: 32 MG/G CREATININE (ref 0–30)

## 2024-10-16 PROCEDURE — 82043 UR ALBUMIN QUANTITATIVE: CPT

## 2024-10-16 PROCEDURE — 82570 ASSAY OF URINE CREATININE: CPT

## 2024-10-16 NOTE — TELEPHONE ENCOUNTER
Please advise  Sister Shandra called. Was told to call PCP regarding elevated K+ by Endocrinology  , reading is 6.1. Please see labs in chart. Shandra mentioned he had his Prolia shot last week by Oncology , wasn't sure if that had any effect on it.

## 2024-10-16 NOTE — TELEPHONE ENCOUNTER
Please call her to ask that he go to the lab tomorrow to have it retested in case it's a lab error and that he should try to be well hydrated before the test so they don't have an issue finding the vein.

## 2024-10-17 ENCOUNTER — APPOINTMENT (OUTPATIENT)
Dept: LAB | Facility: CLINIC | Age: 79
End: 2024-10-17
Payer: MEDICARE

## 2024-10-17 ENCOUNTER — OFFICE VISIT (OUTPATIENT)
Dept: ENDOCRINOLOGY | Facility: CLINIC | Age: 79
End: 2024-10-17
Payer: MEDICARE

## 2024-10-17 VITALS
OXYGEN SATURATION: 98 % | WEIGHT: 212 LBS | HEART RATE: 75 BPM | SYSTOLIC BLOOD PRESSURE: 118 MMHG | DIASTOLIC BLOOD PRESSURE: 70 MMHG | BODY MASS INDEX: 37.56 KG/M2 | HEIGHT: 63 IN

## 2024-10-17 DIAGNOSIS — E78.5 DYSLIPIDEMIA: ICD-10-CM

## 2024-10-17 DIAGNOSIS — E87.5 HYPERKALEMIA: ICD-10-CM

## 2024-10-17 DIAGNOSIS — E11.65 TYPE 2 DIABETES MELLITUS WITH HYPERGLYCEMIA, WITHOUT LONG-TERM CURRENT USE OF INSULIN (HCC): Primary | ICD-10-CM

## 2024-10-17 LAB
ANION GAP SERPL CALCULATED.3IONS-SCNC: 8 MMOL/L (ref 4–13)
BUN SERPL-MCNC: 22 MG/DL (ref 5–25)
CALCIUM SERPL-MCNC: 8.9 MG/DL (ref 8.4–10.2)
CHLORIDE SERPL-SCNC: 97 MMOL/L (ref 96–108)
CO2 SERPL-SCNC: 32 MMOL/L (ref 21–32)
CREAT SERPL-MCNC: 1.07 MG/DL (ref 0.6–1.3)
FRUCTOSAMINE SERPL-SCNC: 319 UMOL/L (ref 0–285)
GFR SERPL CREATININE-BSD FRML MDRD: 65 ML/MIN/1.73SQ M
GLUCOSE SERPL-MCNC: 190 MG/DL (ref 65–140)
POTASSIUM SERPL-SCNC: 5.4 MMOL/L (ref 3.5–5.3)
SODIUM SERPL-SCNC: 137 MMOL/L (ref 135–147)

## 2024-10-17 PROCEDURE — 80048 BASIC METABOLIC PNL TOTAL CA: CPT

## 2024-10-17 PROCEDURE — 36415 COLL VENOUS BLD VENIPUNCTURE: CPT

## 2024-10-17 PROCEDURE — 99204 OFFICE O/P NEW MOD 45 MIN: CPT | Performed by: NURSE PRACTITIONER

## 2024-10-17 PROCEDURE — 99214 OFFICE O/P EST MOD 30 MIN: CPT | Performed by: NURSE PRACTITIONER

## 2024-10-17 NOTE — PROGRESS NOTES
Ambulatory Visit  Name: Parviz Posey      : 1945      MRN: [id number]  Encounter Provider: BLAZE Patterson  Encounter Date: 10/17/2024   Encounter department: [facility] [place]    Assessment & Plan  Type 2 diabetes mellitus with hyperglycemia, without long-term current use of insulin ([facility])    Lab Results   Component Value Date    HGBA1C 8.7 (H) 10/15/2024     HGA1C above goal indicating poor glycemic control. Recommend continue current regimen for now and bring in CGM for download, was not available at today's appointment.     Discussed risks/complications associated with uncontrolled diabetes including organ involvement, heart attack, stroke, death.    Advised lifestyle modifications including attention to diet including the amount and types of carbohydrates consumed and regular activity.     Call for blood sugars less than 70 mg/dl or patterns over 250 mg/dl.     Discussed symptoms and treatment of hypoglycemia.  Reviewed risks associated with hypoglycemia. Always carry rapid acting carbohydrates and a glucometer (a way to check your blood sugar).    Recommendation for medical identification either bracelet, necklace.     Routine follow up for diabetic eye and foot exams.     Ordered blood work to complete prior to next visit.    Send glucose logs/CGM download in 1-2 weeks for review    Follow up in 3 months.     Orders:    Comprehensive metabolic panel; Future    Hemoglobin A1C; Future    Fructosamine; Future    CBC and differential; Future    Dyslipidemia  Continues on statin           History of Present Illness     Parviz Posey is a 78 y.o. male with a history of type 2 diabetes without long term use of insulin. Reports complications of  CKD, severe non-proliferative retinopathy with macular edema. UTD diabetic eye and foot exams.     Denies any significant changes with health, hospitalizations, or illnesses. Denies requiring medical attention  or third party assistance for severe hypo/hyperglycemia. Denies changes in thirst, frequency of urination, vision, or sensation in feet.     CGM Interpretation: no download available for interpretation.     Current regimen:  Jardiance 25 mg daily  Januvia 100 mg daily  Metformin 1000 mg twice daily     No ACE/ARB  Continues on statin.              Thyroid disorders: Denies history  Pancreatitis Denies history    Prolia through Oncology.        History obtained from : patient    Review of Systems  See HPI.   All other systems reviewed and are negative.      Medical History Reviewed by provider this encounter:       Current Outpatient Medications on File Prior to Visit   Medication Sig Dispense Refill    Ascorbic Acid (VITAMIN C PO) Take by mouth in the morning      calcium-vitamin D (OSCAL 500 + D) 500 mg-200 units per tablet Take 1 tablet by mouth daily      Continuous Glucose Sensor (FreeStyle [place] 2 Sensor) MISC Inject 1 each under the skin every 14 (fourteen) days 6 each 3    Cyanocobalamin (VITAMIN B-12 CR PO) Take by mouth in the morning      denosumab (PROLIA) 60 mg/mL Inject 60 mg under the skin once Last dose was 10/11/2024 next dose will be 04/13/2025 at dr. Pemberton's office      Eliquis 5 MG Take 1 tablet by mouth twice daily 180 tablet 0    Empagliflozin (Jardiance) 25 MG TABS Take 1 tablet (25 mg total) by mouth every morning 90 tablet 2    folic acid (FOLVITE) 1 mg tablet Take 1 tablet by mouth once daily 90 tablet 1    metFORMIN (GLUCOPHAGE-XR) 500 mg 24 hr tablet Take 2 tablets by mouth twice daily 360 tablet 1    primidone (MYSOLINE) 50 mg tablet TAKE 1 TABLET BY MOUTH EVERY 12 HOURS 180 tablet 0    propranolol (INDERAL LA) 120 mg 24 hr capsule TAKE 1 CAPSULE BY MOUTH ONCE DAILY AFTER BREAKFASTStrength: 120 mg 90 capsule 0    rosuvastatin (CRESTOR) 10 MG tablet Take 1 tablet by mouth once daily 90 tablet 1    sitaGLIPtin (Januvia) 100 mg tablet Take 1 tablet (100 mg total) by mouth daily 90 tablet 3  "   tamsulosin (FLOMAX) 0.4 mg Take 0.4 mg by mouth 2 (two) times a day      Continuous Blood Gluc  (FreeStyle [place] 2 [place]) CORA Use 1 each 1 (one) time for 1 dose 1 each 0    polyethylene glycol (MIRALAX) 17 g packet Take 17 g by mouth daily as needed (constipation) for up to 14 days (Patient not taking: Reported on 9/23/2024) 14 each 0    senna (SENOKOT) 8.6 mg Take 2 tablets (17.2 mg total) by mouth daily at bedtime 60 tablet 0    [DISCONTINUED] metoprolol succinate (TOPROL-XL) 50 mg 24 hr tablet Take 1 tablet by mouth once daily 90 tablet 3     No current facility-administered medications on file prior to visit.          Objective     /70 (BP Location: Left arm, Patient Position: Sitting, Cuff Size: Large)   Pulse 75   Ht 5' 3\" (1.6 m)   Wt 96.2 kg (212 lb)   SpO2 98%   BMI 37.55 kg/m²       Physical Exam   Constitutional: He is oriented to person, place, and time. He appears well-developed and well-nourished. No distress.   HENT:   Head: Normocephalic and atraumatic.   Neck: Normal range of motion.   Pulmonary/Chest: Effort normal.   Musculoskeletal: Normal range of motion.   Neurological: He is alert and oriented to person, place, and time.   Skin: He is not diaphoretic.   Psychiatric: He has a normal mood and affect. His behavior is normal.       Labs    Component      Latest Ref Rng 10/15/2024 10/16/2024 10/17/2024   Sodium      135 - 147 mmol/L 138   137    Potassium      3.5 - 5.3 mmol/L 6.1 (H)   5.4 (H)    Chloride      96 - 108 mmol/L 95 (L)   97    Carbon Dioxide      21 - 32 mmol/L 33 (H)   32    ANION GAP      4 - 13 mmol/L 10   8    BUN      5 - 25 mg/dL 18   22    Creatinine      0.60 - 1.30 mg/dL 1.00   1.07    GLUCOSE, FASTING      65 - 99 mg/dL 238 (H)      Calcium      8.4 - 10.2 mg/dL 9.5   8.9    AST      13 - 39 U/L 15      ALT      7 - 52 U/L 18      ALK PHOS      34 - 104 U/L 60      Total Protein      6.4 - 8.4 g/dL 7.2      Albumin      3.5 - 5.0 g/dL 4.3      Total " Bilirubin      0.20 - 1.00 mg/dL 0.46      GFR, Calculated      ml/min/1.73sq m 70   65    GLUCOSE      65 - 140 mg/dL   190 (H)    Cholesterol      See Comment mg/dL 158      Triglycerides      See Comment mg/dL 313 (H)      HDL      >=40 mg/dL 42      LDL Calculated      0 - 100 mg/dL 53      Non-HDL Cholesterol      mg/dl 116      EXT Creatinine Urine      Reference range not established. mg/dL  32.4     Albumin,U,Random      <20.0 mg/L  10.4     Albumin Creat Ratio      0 - 30 mg/g creatinine  32 (H)     Hemoglobin A1C      Normal 4.0-5.6%; PreDiabetic 5.7-6.4%; Diabetic >=6.5%; Glycemic control for adults with diabetes <7.0% % 8.7 (H)      eAG, EST AVG Glucose      mg/dl 203      FRUCTOSAMINE      0 - 285 umol/L 319 (H)         Administrative Statements

## 2024-10-17 NOTE — ASSESSMENT & PLAN NOTE
Lab Results   Component Value Date    HGBA1C 8.7 (H) 10/15/2024     HGA1C above goal indicating poor glycemic control. Recommend continue current regimen for now and bring in CGM for download, was not available at today's appointment.     Discussed risks/complications associated with uncontrolled diabetes including organ involvement, heart attack, stroke, death.    Advised lifestyle modifications including attention to diet including the amount and types of carbohydrates consumed and regular activity.     Call for blood sugars less than 70 mg/dl or patterns over 250 mg/dl.     Discussed symptoms and treatment of hypoglycemia.  Reviewed risks associated with hypoglycemia. Always carry rapid acting carbohydrates and a glucometer (a way to check your blood sugar).    Recommendation for medical identification either bracelet, necklace.     Routine follow up for diabetic eye and foot exams.     Ordered blood work to complete prior to next visit.    Send glucose logs/CGM download in 1-2 weeks for review    Follow up in 3 months.     Orders:    Comprehensive metabolic panel; Future    Hemoglobin A1C; Future    Fructosamine; Future    CBC and differential; Future

## 2024-11-17 DIAGNOSIS — E11.65 TYPE 2 DIABETES MELLITUS WITH HYPERGLYCEMIA, WITHOUT LONG-TERM CURRENT USE OF INSULIN (HCC): ICD-10-CM

## 2024-11-18 RX ORDER — SITAGLIPTIN 100 MG/1
100 TABLET, FILM COATED ORAL DAILY
Qty: 30 TABLET | Refills: 5 | Status: SHIPPED | OUTPATIENT
Start: 2024-11-18

## 2024-12-03 DIAGNOSIS — E11.65 TYPE 2 DIABETES MELLITUS WITH HYPERGLYCEMIA, WITHOUT LONG-TERM CURRENT USE OF INSULIN (HCC): ICD-10-CM

## 2024-12-04 RX ORDER — METFORMIN HYDROCHLORIDE 500 MG/1
1000 TABLET, EXTENDED RELEASE ORAL 2 TIMES DAILY
Qty: 360 TABLET | Refills: 1 | Status: SHIPPED | OUTPATIENT
Start: 2024-12-04

## 2024-12-06 DIAGNOSIS — I26.99 BILATERAL PULMONARY EMBOLISM (HCC): ICD-10-CM

## 2024-12-06 DIAGNOSIS — D68.51 FACTOR 5 LEIDEN MUTATION, HETEROZYGOUS (HCC): ICD-10-CM

## 2024-12-06 RX ORDER — APIXABAN 5 MG/1
5 TABLET, FILM COATED ORAL 2 TIMES DAILY
Qty: 180 TABLET | Refills: 0 | Status: SHIPPED | OUTPATIENT
Start: 2024-12-06

## 2024-12-09 DIAGNOSIS — E11.65 TYPE 2 DIABETES MELLITUS WITH HYPERGLYCEMIA, WITHOUT LONG-TERM CURRENT USE OF INSULIN (HCC): Primary | ICD-10-CM

## 2024-12-10 DIAGNOSIS — G25.0 TREMOR, ESSENTIAL: ICD-10-CM

## 2024-12-10 RX ORDER — PROPRANOLOL HYDROCHLORIDE 120 MG/1
120 CAPSULE, EXTENDED RELEASE ORAL DAILY
Qty: 90 CAPSULE | Refills: 1 | Status: SHIPPED | OUTPATIENT
Start: 2024-12-10

## 2024-12-13 ENCOUNTER — TRANSCRIBE ORDERS (OUTPATIENT)
Dept: LAB | Facility: CLINIC | Age: 79
End: 2024-12-13

## 2024-12-13 ENCOUNTER — APPOINTMENT (OUTPATIENT)
Dept: LAB | Facility: CLINIC | Age: 79
End: 2024-12-13
Payer: MEDICARE

## 2024-12-13 DIAGNOSIS — C61 PROSTATE CANCER (HCC): Primary | ICD-10-CM

## 2024-12-13 LAB — PSA SERPL-MCNC: 0.21 NG/ML (ref 0–4)

## 2024-12-13 PROCEDURE — G0103 PSA SCREENING: HCPCS

## 2024-12-13 PROCEDURE — 36415 COLL VENOUS BLD VENIPUNCTURE: CPT

## 2024-12-21 DIAGNOSIS — E78.5 DYSLIPIDEMIA: ICD-10-CM

## 2024-12-23 RX ORDER — ROSUVASTATIN CALCIUM 10 MG/1
10 TABLET, COATED ORAL DAILY
Qty: 90 TABLET | Refills: 1 | Status: SHIPPED | OUTPATIENT
Start: 2024-12-23

## 2025-01-01 DIAGNOSIS — G25.0 TREMOR, ESSENTIAL: ICD-10-CM

## 2025-01-02 RX ORDER — PRIMIDONE 50 MG/1
50 TABLET ORAL 2 TIMES DAILY
Qty: 180 TABLET | Refills: 0 | Status: SHIPPED | OUTPATIENT
Start: 2025-01-02

## 2025-01-29 DIAGNOSIS — D64.9 ANEMIA, UNSPECIFIED TYPE: ICD-10-CM

## 2025-01-29 RX ORDER — FOLIC ACID 1 MG/1
1000 TABLET ORAL DAILY
Qty: 90 TABLET | Refills: 0 | Status: SHIPPED | OUTPATIENT
Start: 2025-01-29

## 2025-02-10 DIAGNOSIS — E11.65 TYPE 2 DIABETES MELLITUS WITH HYPERGLYCEMIA, WITHOUT LONG-TERM CURRENT USE OF INSULIN (HCC): ICD-10-CM

## 2025-02-10 NOTE — TELEPHONE ENCOUNTER
Patient needs a refill for the following medication:      Empagliflozin (Jardiance) 25 MG TABS     Pharmacy: [place], [place]

## 2025-03-04 DIAGNOSIS — I26.99 BILATERAL PULMONARY EMBOLISM (HCC): ICD-10-CM

## 2025-03-04 DIAGNOSIS — D68.51 FACTOR 5 LEIDEN MUTATION, HETEROZYGOUS (HCC): ICD-10-CM

## 2025-03-06 RX ORDER — APIXABAN 5 MG/1
5 TABLET, FILM COATED ORAL 2 TIMES DAILY
Qty: 180 TABLET | Refills: 0 | Status: SHIPPED | OUTPATIENT
Start: 2025-03-06

## 2025-03-11 ENCOUNTER — APPOINTMENT (OUTPATIENT)
Dept: LAB | Facility: CLINIC | Age: 80
End: 2025-03-11
Payer: MEDICARE

## 2025-03-11 ENCOUNTER — TRANSCRIBE ORDERS (OUTPATIENT)
Dept: LAB | Facility: CLINIC | Age: 80
End: 2025-03-11

## 2025-03-11 DIAGNOSIS — C61 PROSTATE CANCER (HCC): Primary | ICD-10-CM

## 2025-03-11 PROCEDURE — 84153 ASSAY OF PSA TOTAL: CPT

## 2025-03-11 PROCEDURE — 36415 COLL VENOUS BLD VENIPUNCTURE: CPT

## 2025-03-11 PROCEDURE — G0103 PSA SCREENING: HCPCS

## 2025-03-12 ENCOUNTER — RA CDI HCC (OUTPATIENT)
Dept: OTHER | Facility: HOSPITAL | Age: 80
End: 2025-03-12

## 2025-03-12 LAB — PSA SERPL-MCNC: 0.26 NG/ML (ref 0–4)

## 2025-03-15 PROBLEM — E66.812 CLASS 2 SEVERE OBESITY WITH SERIOUS COMORBIDITY AND BODY MASS INDEX (BMI) OF 38.0 TO 38.9 IN ADULT, UNSPECIFIED OBESITY TYPE (HCC): Status: RESOLVED | Noted: 2024-02-05 | Resolved: 2025-03-15

## 2025-03-15 PROBLEM — E66.01 CLASS 2 SEVERE OBESITY WITH SERIOUS COMORBIDITY AND BODY MASS INDEX (BMI) OF 38.0 TO 38.9 IN ADULT, UNSPECIFIED OBESITY TYPE (HCC): Status: RESOLVED | Noted: 2024-02-05 | Resolved: 2025-03-15

## 2025-03-19 ENCOUNTER — OFFICE VISIT (OUTPATIENT)
Dept: FAMILY MEDICINE CLINIC | Facility: CLINIC | Age: 80
End: 2025-03-19
Payer: MEDICARE

## 2025-03-19 VITALS
HEIGHT: 63 IN | SYSTOLIC BLOOD PRESSURE: 116 MMHG | HEART RATE: 80 BPM | TEMPERATURE: 97.3 F | WEIGHT: 222 LBS | RESPIRATION RATE: 18 BRPM | DIASTOLIC BLOOD PRESSURE: 60 MMHG | BODY MASS INDEX: 39.34 KG/M2

## 2025-03-19 DIAGNOSIS — I26.99 BILATERAL PULMONARY EMBOLISM (HCC): ICD-10-CM

## 2025-03-19 DIAGNOSIS — N18.2 CKD STAGE 2 DUE TO TYPE 2 DIABETES MELLITUS  (HCC): ICD-10-CM

## 2025-03-19 DIAGNOSIS — R53.1 GENERALIZED WEAKNESS: ICD-10-CM

## 2025-03-19 DIAGNOSIS — E11.22 CKD STAGE 2 DUE TO TYPE 2 DIABETES MELLITUS  (HCC): ICD-10-CM

## 2025-03-19 DIAGNOSIS — I27.21 PULMONARY ARTERY HYPERTENSION (HCC): ICD-10-CM

## 2025-03-19 DIAGNOSIS — C61 PROSTATE CANCER (HCC): ICD-10-CM

## 2025-03-19 DIAGNOSIS — Z00.00 MEDICARE ANNUAL WELLNESS VISIT, SUBSEQUENT: Primary | ICD-10-CM

## 2025-03-19 DIAGNOSIS — E66.9 OBESITY (BMI 30-39.9): ICD-10-CM

## 2025-03-19 PROBLEM — N18.30 CKD STAGE 3 DUE TO TYPE 2 DIABETES MELLITUS (HCC): Status: ACTIVE | Noted: 2025-03-19

## 2025-03-19 PROBLEM — N18.30 CKD STAGE 3 DUE TO TYPE 2 DIABETES MELLITUS (HCC): Status: RESOLVED | Noted: 2025-03-19 | Resolved: 2025-03-19

## 2025-03-19 PROCEDURE — G2211 COMPLEX E/M VISIT ADD ON: HCPCS | Performed by: FAMILY MEDICINE

## 2025-03-19 PROCEDURE — G0439 PPPS, SUBSEQ VISIT: HCPCS | Performed by: FAMILY MEDICINE

## 2025-03-19 PROCEDURE — 99214 OFFICE O/P EST MOD 30 MIN: CPT | Performed by: FAMILY MEDICINE

## 2025-03-19 NOTE — PROGRESS NOTES
Name: Parviz Posey      : 1945      MRN: [id number]  Encounter Provider: Rashid Singh DO  Encounter Date: 3/19/2025   Encounter department: [facility]  :  Assessment & Plan  Pulmonary artery hypertension (HCC)  stable       CKD stage 2 due to type 2 diabetes mellitus  (HCC)  Stay hydrated, stable  DM2 A1c goal advised  Lab Results   Component Value Date    HGBA1C 8.7 (H) 10/15/2024          Obesity (BMI 30-39.9)  Suggest exercise as able for wt loss but is a fall risk       Bilateral pulmonary embolism (HCC)  Long term AC, continue eliquis       Prostate cancer (HCC)  Hx, stable       Generalized weakness  Fall cautions especially with AC  Leg strengthening suggested for stabiliy Medicare annual wellness visit, subsequent  updated          History of Present Illness   HPI  Sees eye Dr. Salvador  On eliquis long term, fall risks aware  No blood in stool or urine  10# gain    Review of Systems   Constitutional:  Negative for chills and fever.   Gastrointestinal:  Negative for blood in stool.   Genitourinary:  Negative for hematuria.     Family History   Problem Relation Age of Onset   • Colon cancer Mother    • Cancer Father         lung-smoker   • Lung cancer Father    • Early death Brother    • Mastocytosis Brother    • Cancer Brother    • Stomach cancer Brother    • Hyperlipidemia Sister    • Hypertension Sister    • Diabetes type II Sister    • No Known Problems Sister    • No Known Problems Sister    • No Known Problems Sister    • No Known Problems Sister       Social History     Tobacco Use   • Smoking status: Never     Passive exposure: Past   • Smokeless tobacco: Never   Vaping Use   • Vaping status: Never Used   Substance Use Topics   • Alcohol use: Never   • Drug use: Never      Current Outpatient Medications   Medication Instructions   • Ascorbic Acid (VITAMIN C PO) Daily   • calcium-vitamin D (OSCAL 500 + D) 500 mg-200 units per tablet 1 tablet, Daily   • Continuous  "Blood Gluc  (FreeStyle [place] 2 [place]) CORA 1 each, Does not apply, Once   • Continuous Glucose Sensor (FreeStyle [place] 2 Sensor) MISC 1 each, Subcutaneous, Every 14 days   • Cyanocobalamin (VITAMIN B-12 CR PO) Daily   • denosumab (PROLIA) 60 mg, Once   • Eliquis 5 mg, Oral, 2 times daily   • Empagliflozin (JARDIANCE) 25 mg, Oral, Every morning   • folic acid (FOLVITE) 1,000 mcg, Oral, Daily   • Januvia 100 mg, Oral, Daily   • metFORMIN (GLUCOPHAGE-XR) 1,000 mg, Oral, 2 times daily   • primidone (MYSOLINE) 50 mg, Oral, 2 times daily   • propranolol (INDERAL LA) 120 mg, Oral, Daily   • rosuvastatin (CRESTOR) 10 mg, Oral, Daily   • tamsulosin (FLOMAX) 0.4 mg, 2 times daily     >>>>>>>>  Return in about 6 months (around 9/19/2025) for Recheck.  >>>>>>>>    [POCT]  No results found for this or any previous visit (from the past 24 hours).    Visit Vitals  /60   Pulse 80   Temp (!) 97.3 °F (36.3 °C)   Resp 18   Ht 5' 3\" (1.6 m)   Wt 101 kg (222 lb)   BMI 39.33 kg/m²   Smoking Status Never   BSA 2.02 m²        Objective   /60   Pulse 80   Temp (!) 97.3 °F (36.3 °C)   Resp 18   Ht 5' 3\" (1.6 m)   Wt 101 kg (222 lb)   BMI 39.33 kg/m²      Physical Exam  Vitals and nursing note reviewed.   Constitutional:       General: He is not in acute distress.     Appearance: He is well-developed. He is obese. He is not ill-appearing.   HENT:      Head: Normocephalic.      Right Ear: Tympanic membrane and external ear normal.      Left Ear: Tympanic membrane and external ear normal.      Nose: No congestion.   Eyes:      General: No scleral icterus.     Conjunctiva/sclera: Conjunctivae normal.   Cardiovascular:      Rate and Rhythm: Normal rate and regular rhythm.      Pulses: no weak pulses.           Dorsalis pedis pulses are 2+ on the right side and 2+ on the left side.   Pulmonary:      Effort: Pulmonary effort is normal. No respiratory distress.      Breath sounds: No wheezing.   Abdominal:      General: " There is no distension.      Palpations: Abdomen is soft.      Tenderness: There is no abdominal tenderness.   Musculoskeletal:         General: No deformity.      Cervical back: Neck supple. No rigidity.      Right lower leg: Edema present.      Left lower leg: Edema present.   Skin:     General: Skin is warm and dry.      Coloration: Skin is not jaundiced or pale.      Findings: No erythema.   Neurological:      Mental Status: He is alert.      Motor: Weakness present.      Gait: Gait abnormal.   Psychiatric:         Mood and Affect: Mood normal.         Behavior: Behavior normal.         Thought Content: Thought content normal.     Diabetic Foot Exam    Patient's shoes and socks removed.    Right Foot/Ankle   Right Foot Inspection  Skin Exam: skin intact. No abnormal color.     Toe Exam:  no right toe deformity    Sensory   Monofilament testing: intact    Vascular  The right DP pulse is 2+.     Left Foot/Ankle  Left Foot Inspection  Skin Exam: skin intact. Normal color.     Toe Exam: No left toe deformity.     Sensory   Monofilament testing: intact    Vascular  The left DP pulse is 2+.     Assign Risk Category  No deformity present  No loss of protective sensation  No weak pulses  Risk: 0

## 2025-03-19 NOTE — ASSESSMENT & PLAN NOTE
Stay hydrated, stable  DM2 A1c goal advised  Lab Results   Component Value Date    HGBA1C 8.7 (H) 10/15/2024

## 2025-03-20 NOTE — PROGRESS NOTES
Parviz is here for his Subsequent Wellness visit. Last Medicare Wellness visit information reviewed, patient interviewed and updates made to the record today.      Health Risk Assessment:   Patient rates overall health as good. Patient feels that their physical health rating is same. Patient is satisfied with their life. Eyesight was rated as same. Hearing was rated as same. Patient feels that their emotional and mental health rating is same. Patients states they are never, rarely angry. Patient states they are sometimes unusually tired/fatigued. Pain experienced in the last 7 days has been none. Patient states that he has experienced no weight loss or gain in last 6 months.     Fall Risk Screening:   In the past year, patient has experienced: no history of falling in past year      Home Safety:  Patient does not have trouble with stairs inside or outside of their home. Patient has working smoke alarms and has working carbon monoxide detector. Home safety hazards include: none.     Nutrition:   Current diet is Regular.     Medications:   Patient is currently taking over-the-counter supplements. OTC medications include: see medication list. Patient is not able to manage medications.     Activities of Daily Living (ADLs)/Instrumental Activities of Daily Living (IADLs):   Walk and transfer into and out of bed and chair?: Yes  Dress and groom yourself?: Yes    Bathe or shower yourself?: Yes    Feed yourself? Yes  Do your laundry/housekeeping?: Yes  Manage your money, pay your bills and track your expenses?: Yes  Make your own meals?: Yes    Do your own shopping?: No    Previous Hospitalizations:   Any hospitalizations or ED visits within the last 12 months?: No      Advance Care Planning:   Living will: No    Durable POA for healthcare: Yes    Advanced directive: Yes    End of Life Decisions reviewed with patient: No      Cognitive Screening:   Provider or family/friend/caregiver concerned regarding cognition?:  No    PREVENTIVE SCREENINGS      Cardiovascular Screening:    General: Screening Current      Diabetes Screening:     General: Screening Not Indicated and History Diabetes      Colorectal Cancer Screening:     General: Screening Current      Prostate Cancer Screening:    General: History Prostate Cancer and Screening Not Indicated      Osteoporosis Screening:    General: Screening Not Indicated      Abdominal Aortic Aneurysm (AAA) Screening:    Risk factors include: tobacco use        General: Screening Not Indicated      Lung Cancer Screening:     General: Screening Not Indicated      Hepatitis C Screening:    General: Patient Declines and Screening Not Indicated    Hep C Screening Accepted: No     Screening, Brief Intervention, and Referral to Treatment (SBIRT)     Screening  Typical number of drinks in a day: 0  Typical number of drinks in a week: 0  Interpretation: Low risk drinking behavior.    Single Item Drug Screening:  How often have you used an illegal drug (including marijuana) or a prescription medication for non-medical reasons in the past year? never    Single Item Drug Screen Score: 0  Interpretation: Negative screen for possible drug use disorder    Other Counseling Topics:   Car/seat belt/driving safety and regular weightbearing exercise.

## 2025-03-24 ENCOUNTER — OFFICE VISIT (OUTPATIENT)
Age: 80
End: 2025-03-24
Payer: MEDICARE

## 2025-03-24 VITALS
WEIGHT: 221 LBS | BODY MASS INDEX: 39.16 KG/M2 | HEART RATE: 74 BPM | HEIGHT: 63 IN | SYSTOLIC BLOOD PRESSURE: 132 MMHG | OXYGEN SATURATION: 94 % | DIASTOLIC BLOOD PRESSURE: 80 MMHG

## 2025-03-24 DIAGNOSIS — G25.0 TREMOR, ESSENTIAL: ICD-10-CM

## 2025-03-24 PROCEDURE — 99214 OFFICE O/P EST MOD 30 MIN: CPT | Performed by: PSYCHIATRY & NEUROLOGY

## 2025-03-24 RX ORDER — PRIMIDONE 50 MG/1
TABLET ORAL
Qty: 270 TABLET | Refills: 3 | Status: SHIPPED | OUTPATIENT
Start: 2025-03-24

## 2025-03-24 NOTE — PROGRESS NOTES
Return NeuroOutpatient Note        Parviz Posey  [id number]  79 y.o.  1945       Tremor, essential        History obtained from:  Patient and sister     HPI/Subjective:    Parviz Posey is an 80 yo M with PMH of ET presents as f/u. Per my previous history, he started noticing hand tremors 5 years ago. He was seen by Dr. Al and was placed on primidone but since pandemic never went back for refill so he hadn't been taking it for 1-2 years.   He's currently taking primidone 50mg bid. He's doing well except for when he says he thinks about tremor and then it exaggerates.   During encounter today, he has no tremor.        When he concentrates, his tremors are worse. If spontaneous, he does better.   There is no resting tremor.  His tremor is worse on left.   He's had ineligible hand writing for many years.         His limiting factor are his knees but he's not surgical candidate.      Patient is one of 14 siblings. He's the oldest.       Past Medical History:   Diagnosis Date   • Arthritis    • BPH associated with nocturia    • Diabetes mellitus (HCC)    • Edema     lower legs   • Hearing aid worn     bilateral   • Hyperlipidemia    • Hypertension     controlled   • Tremor of both hands      Social History     Socioeconomic History   • Marital status: Single     Spouse name: Not on file   • Number of children: 0   • Years of education: Not on file   • Highest education level: Not on file   Occupational History   • Not on file   Tobacco Use   • Smoking status: Never     Passive exposure: Past   • Smokeless tobacco: Never   Vaping Use   • Vaping status: Never Used   Substance and Sexual Activity   • Alcohol use: Never   • Drug use: Never   • Sexual activity: Never   Other Topics Concern   • Not on file   Social History Narrative   • Not on file     Social Drivers of Health     Financial Resource Strain: Low Risk  (9/26/2023)    Overall Financial Resource Strain (CARDIA)    • Difficulty of Paying  Living Expenses: Not hard at all   Food Insecurity: No Food Insecurity (3/19/2025)    Hunger Vital Sign    • Worried About Running Out of Food in the Last Year: Never true    • Ran Out of Food in the Last Year: Never true   Transportation Needs: No Transportation Needs (3/19/2025)    PRAPARE - Transportation    • Lack of Transportation (Medical): No    • Lack of Transportation (Non-Medical): No   Physical Activity: Not on file   Stress: Not on file   Social Connections: Not on file   Intimate Partner Violence: Not on file   Housing Stability: Low Risk  (3/19/2025)    Housing Stability Vital Sign    • Unable to Pay for Housing in the Last Year: No    • Number of Times Moved in the Last Year: 0    • Homeless in the Last Year: No     Family History   Problem Relation Age of Onset   • Colon cancer Mother    • Cancer Father         lung-smoker   • Lung cancer Father    • Early death Brother    • Mastocytosis Brother    • Cancer Brother    • Stomach cancer Brother    • Hyperlipidemia Sister    • Hypertension Sister    • Diabetes type II Sister    • No Known Problems Sister    • No Known Problems Sister    • No Known Problems Sister    • No Known Problems Sister      No Known Allergies  Current Outpatient Medications on File Prior to Visit   Medication Sig Dispense Refill   • Ascorbic Acid (VITAMIN C PO) Take by mouth in the morning     • calcium-vitamin D (OSCAL 500 + D) 500 mg-200 units per tablet Take 1 tablet by mouth daily     • Continuous Blood Gluc  (FreeStyle [place] 2 [place]) CORA Use 1 each 1 (one) time for 1 dose 1 each 0   • Continuous Glucose Sensor (FreeStyle [place] 2 Sensor) MISC Inject 1 each under the skin every 14 (fourteen) days 6 each 3   • Cyanocobalamin (VITAMIN B-12 CR PO) Take by mouth in the morning     • denosumab (PROLIA) 60 mg/mL Inject 60 mg under the skin once Last dose was 10/11/2024 next dose will be 04/13/2025 at dr. Pemberton's office     • Eliquis 5 MG Take 1 tablet by mouth twice daily  "180 tablet 0   • Empagliflozin (Jardiance) 25 MG TABS Take 1 tablet (25 mg total) by mouth every morning 90 tablet 1   • folic acid (FOLVITE) 1 mg tablet Take 1 tablet by mouth once daily 90 tablet 0   • metFORMIN (GLUCOPHAGE-XR) 500 mg 24 hr tablet Take 2 tablets by mouth twice daily 360 tablet 1   • propranolol (INDERAL LA) 120 mg 24 hr capsule Take 1 capsule by mouth once daily 90 capsule 1   • rosuvastatin (CRESTOR) 10 MG tablet Take 1 tablet by mouth once daily 90 tablet 1   • sitaGLIPtin (Januvia) 100 mg tablet Take 1 tablet by mouth once daily 30 tablet 5   • [DISCONTINUED] primidone (MYSOLINE) 50 mg tablet TAKE 1 TABLET BY MOUTH EVERY 12 HOURS 180 tablet 0   • tamsulosin (FLOMAX) 0.4 mg Take 0.4 mg by mouth 2 (two) times a day (Patient not taking: Reported on 3/19/2025)     • [DISCONTINUED] metoprolol succinate (TOPROL-XL) 50 mg 24 hr tablet Take 1 tablet by mouth once daily 90 tablet 3     No current facility-administered medications on file prior to visit.         Review of Systems   Refer to positive review of systems in HPI.   Review of Systems    Constitutional- No fever  Eyes- No visual change  ENT- Hearing normal  CV- No chest pain  Resp- No Shortness of breath  GI- No diarrhea  - Bladder normal  MS- No Arthritis   Skin- No rash  Psych- No depression  Endo- No DM  Heme- No nodes    Vitals:    03/24/25 1456   BP: 132/80   BP Location: Left arm   Patient Position: Sitting   Cuff Size: Standard   Pulse: 74   SpO2: 94%   Weight: 100 kg (221 lb)   Height: 5' 3\" (1.6 m)       PHYSICAL EXAM:  Appearance: No Acute Distress  Ophthalmoscopic: Disc Flat, Normal fundus  Mental status:  Orientation: Awake, Alert, and Orientedx3  Memory: Registation 3/3 Recall 3/3  Attention: normal  Knowledge: good  Language: No aphasia  Speech: No dysarthria  Cranial Nerves:  2 No Visual Defect on Confrontation, Pupils round, equal, reactive to light  3,4,6 Extraocular Movements Intact, no nystagmus  5 Facial Sensation " Intact  7 No facial asymmetry  8 Intact hearing  9,10 Palate symmetric, normal gag  11 Good shoulder shrug  12 Tongue Midline  Gait: Stable, uses cane.   Coordination: No ataxia with finger to nose testing, and heel to shin  Sensory: Intact, Symmetric to pinprick, light touch, vibration, and joint position  Muscle Tone: Normal              Muscle exam:  Arm Right Left Leg Right Left   Deltoid 5/5 5/5 Iliopsoas 5/5 5/5   Biceps 5/5 5/5 Quads 5/5 5/5   Triceps 5/5 5/5 Hamstrings 5/5 5/5   Wrist Extension 5/5 5/5 Ankle Dorsi Flexion 5/5 5/5   Wrist Flexion 5/5 5/5 Ankle Plantar Flexion 5/5 5/5   Interossei 5/5 5/5 Ankle Eversion 5/5 5/5   APB 5/5 5/5 Ankle Inversion 5/5 5/5       Reflexes   RJ BJ TJ KJ AJ Plantars Kevin's   Right 2+ 2+ 2+ 2+  Downgoing Not present   Left 2+ 2+ 2+ 2+  Downgoing Not present     Personal review of  Labs:                    Diagnoses and all orders for this visit:          Assessment & Plan  Tremor, essential    Orders:  •  primidone (MYSOLINE) 50 mg tablet; Take 2 tabs in morning and 1 tab at bedtime.    Asked him to take primidone 2 tabs in morning and 1 tab at night. Will modify further if needed.                   Total time of encounter:  30 min  More than 50% of the time was used in counseling and/or coordination of care  Extent of counseling and/or coordination of care        Deborah Olivas MD  [facility]  [address] [place] [id number] [phone]

## 2025-03-25 DIAGNOSIS — E11.65 TYPE 2 DIABETES MELLITUS WITH HYPERGLYCEMIA, WITHOUT LONG-TERM CURRENT USE OF INSULIN (HCC): ICD-10-CM

## 2025-03-26 ENCOUNTER — TELEPHONE (OUTPATIENT)
Dept: ENDOCRINOLOGY | Facility: CLINIC | Age: 80
End: 2025-03-26

## 2025-03-26 DIAGNOSIS — E11.65 TYPE 2 DIABETES MELLITUS WITH HYPERGLYCEMIA, WITHOUT LONG-TERM CURRENT USE OF INSULIN (HCC): ICD-10-CM

## 2025-03-26 NOTE — TELEPHONE ENCOUNTER
Patient called requesting refill for     Continuous Glucose Sensor (FreeStyle [place] 2 Sensor)   . Patient made aware medication was refilled on 3.25.2025 for 6 with 0 refills to [id number] pharmacy. Patient instructed to contact the pharmacy to obtain refills of medication. Patient verbalized understanding.

## 2025-03-31 ENCOUNTER — APPOINTMENT (OUTPATIENT)
Dept: LAB | Facility: CLINIC | Age: 80
End: 2025-03-31
Payer: MEDICARE

## 2025-03-31 DIAGNOSIS — E11.65 TYPE 2 DIABETES MELLITUS WITH HYPERGLYCEMIA, WITHOUT LONG-TERM CURRENT USE OF INSULIN (HCC): ICD-10-CM

## 2025-03-31 LAB
ALBUMIN SERPL BCG-MCNC: 4.1 G/DL (ref 3.5–5)
ALP SERPL-CCNC: 57 U/L (ref 34–104)
ALT SERPL W P-5'-P-CCNC: 13 U/L (ref 7–52)
ANION GAP SERPL CALCULATED.3IONS-SCNC: 7 MMOL/L (ref 4–13)
AST SERPL W P-5'-P-CCNC: 13 U/L (ref 13–39)
BASOPHILS # BLD AUTO: 0.03 THOUSANDS/ÂΜL (ref 0–0.1)
BASOPHILS NFR BLD AUTO: 1 % (ref 0–1)
BILIRUB SERPL-MCNC: 0.33 MG/DL (ref 0.2–1)
BUN SERPL-MCNC: 23 MG/DL (ref 5–25)
CALCIUM SERPL-MCNC: 9.3 MG/DL (ref 8.4–10.2)
CHLORIDE SERPL-SCNC: 99 MMOL/L (ref 96–108)
CO2 SERPL-SCNC: 32 MMOL/L (ref 21–32)
CREAT SERPL-MCNC: 1.08 MG/DL (ref 0.6–1.3)
EOSINOPHIL # BLD AUTO: 0.05 THOUSAND/ÂΜL (ref 0–0.61)
EOSINOPHIL NFR BLD AUTO: 1 % (ref 0–6)
ERYTHROCYTE [DISTWIDTH] IN BLOOD BY AUTOMATED COUNT: 14 % (ref 11.6–15.1)
EST. AVERAGE GLUCOSE BLD GHB EST-MCNC: 260 MG/DL
GFR SERPL CREATININE-BSD FRML MDRD: 64 ML/MIN/1.73SQ M
GLUCOSE SERPL-MCNC: 247 MG/DL (ref 65–140)
HBA1C MFR BLD: 10.7 %
HCT VFR BLD AUTO: 36 % (ref 36.5–49.3)
HGB BLD-MCNC: 11.4 G/DL (ref 12–17)
IMM GRANULOCYTES # BLD AUTO: 0.03 THOUSAND/UL (ref 0–0.2)
IMM GRANULOCYTES NFR BLD AUTO: 1 % (ref 0–2)
LYMPHOCYTES # BLD AUTO: 2.13 THOUSANDS/ÂΜL (ref 0.6–4.47)
LYMPHOCYTES NFR BLD AUTO: 33 % (ref 14–44)
MCH RBC QN AUTO: 28 PG (ref 26.8–34.3)
MCHC RBC AUTO-ENTMCNC: 31.7 G/DL (ref 31.4–37.4)
MCV RBC AUTO: 89 FL (ref 82–98)
MONOCYTES # BLD AUTO: 0.45 THOUSAND/ÂΜL (ref 0.17–1.22)
MONOCYTES NFR BLD AUTO: 7 % (ref 4–12)
NEUTROPHILS # BLD AUTO: 3.85 THOUSANDS/ÂΜL (ref 1.85–7.62)
NEUTS SEG NFR BLD AUTO: 57 % (ref 43–75)
NRBC BLD AUTO-RTO: 0 /100 WBCS
PLATELET # BLD AUTO: 174 THOUSANDS/UL (ref 149–390)
PMV BLD AUTO: 11.9 FL (ref 8.9–12.7)
POTASSIUM SERPL-SCNC: 5.7 MMOL/L (ref 3.5–5.3)
PROT SERPL-MCNC: 6.4 G/DL (ref 6.4–8.4)
RBC # BLD AUTO: 4.07 MILLION/UL (ref 3.88–5.62)
SODIUM SERPL-SCNC: 138 MMOL/L (ref 135–147)
WBC # BLD AUTO: 6.54 THOUSAND/UL (ref 4.31–10.16)

## 2025-03-31 PROCEDURE — 82985 ASSAY OF GLYCATED PROTEIN: CPT

## 2025-03-31 PROCEDURE — 85025 COMPLETE CBC W/AUTO DIFF WBC: CPT

## 2025-03-31 PROCEDURE — 80053 COMPREHEN METABOLIC PANEL: CPT

## 2025-03-31 PROCEDURE — 83036 HEMOGLOBIN GLYCOSYLATED A1C: CPT

## 2025-03-31 PROCEDURE — 36415 COLL VENOUS BLD VENIPUNCTURE: CPT

## 2025-04-01 ENCOUNTER — RESULTS FOLLOW-UP (OUTPATIENT)
Dept: ENDOCRINOLOGY | Facility: CLINIC | Age: 80
End: 2025-04-01

## 2025-04-01 DIAGNOSIS — E87.5 SERUM POTASSIUM ELEVATED: Primary | ICD-10-CM

## 2025-04-02 ENCOUNTER — TELEPHONE (OUTPATIENT)
Age: 80
End: 2025-04-02

## 2025-04-02 ENCOUNTER — APPOINTMENT (OUTPATIENT)
Dept: LAB | Facility: CLINIC | Age: 80
End: 2025-04-02
Payer: MEDICARE

## 2025-04-02 DIAGNOSIS — E87.5 SERUM POTASSIUM ELEVATED: ICD-10-CM

## 2025-04-02 LAB
FRUCTOSAMINE SERPL-SCNC: 344 UMOL/L (ref 0–285)
POTASSIUM SERPL-SCNC: 5.1 MMOL/L (ref 3.5–5.3)

## 2025-04-02 PROCEDURE — 36415 COLL VENOUS BLD VENIPUNCTURE: CPT

## 2025-04-02 PROCEDURE — 84132 ASSAY OF SERUM POTASSIUM: CPT

## 2025-04-02 NOTE — TELEPHONE ENCOUNTER
Patient's sister called asking if patient has to fast for potassium lab draw. Made aware, no he does not need to fast. Sister verbalized understanding

## 2025-04-03 ENCOUNTER — RESULTS FOLLOW-UP (OUTPATIENT)
Dept: ENDOCRINOLOGY | Facility: CLINIC | Age: 80
End: 2025-04-03

## 2025-04-18 PROBLEM — Z00.00 MEDICARE ANNUAL WELLNESS VISIT, SUBSEQUENT: Status: RESOLVED | Noted: 2025-03-19 | Resolved: 2025-04-18

## 2025-04-24 ENCOUNTER — DOCUMENTATION (OUTPATIENT)
Dept: ADMINISTRATIVE | Facility: OTHER | Age: 80
End: 2025-04-24

## 2025-04-24 NOTE — PROGRESS NOTES
04/24/25 2:32 PM    Annual Wellness Visit outreach is not required, an AWV was completed at the PCP office.    Thank you.  Savana Bermeo MA  [facility]

## 2025-04-26 DIAGNOSIS — D64.9 ANEMIA, UNSPECIFIED TYPE: ICD-10-CM

## 2025-04-28 RX ORDER — FOLIC ACID 1 MG/1
1000 TABLET ORAL DAILY
Qty: 90 TABLET | Refills: 1 | Status: SHIPPED | OUTPATIENT
Start: 2025-04-28

## 2025-05-02 DIAGNOSIS — E11.65 TYPE 2 DIABETES MELLITUS WITH HYPERGLYCEMIA, WITHOUT LONG-TERM CURRENT USE OF INSULIN (HCC): ICD-10-CM

## 2025-05-02 RX ORDER — SITAGLIPTIN 100 MG/1
100 TABLET, FILM COATED ORAL DAILY
Qty: 30 TABLET | Refills: 5 | Status: SHIPPED | OUTPATIENT
Start: 2025-05-02

## 2025-05-05 ENCOUNTER — TRANSCRIBE ORDERS (OUTPATIENT)
Dept: LAB | Facility: CLINIC | Age: 80
End: 2025-05-05

## 2025-05-05 ENCOUNTER — APPOINTMENT (OUTPATIENT)
Dept: LAB | Facility: CLINIC | Age: 80
End: 2025-05-05
Payer: MEDICARE

## 2025-05-05 DIAGNOSIS — C61 PROSTATE CANCER (HCC): ICD-10-CM

## 2025-05-05 DIAGNOSIS — C61 PROSTATE CANCER (HCC): Primary | ICD-10-CM

## 2025-05-05 DIAGNOSIS — E11.65 TYPE 2 DIABETES MELLITUS WITH HYPERGLYCEMIA, WITHOUT LONG-TERM CURRENT USE OF INSULIN (HCC): ICD-10-CM

## 2025-05-05 LAB — PSA SERPL-MCNC: 0.03 NG/ML (ref 0–4)

## 2025-05-05 PROCEDURE — 36415 COLL VENOUS BLD VENIPUNCTURE: CPT

## 2025-05-05 PROCEDURE — G0103 PSA SCREENING: HCPCS

## 2025-05-05 PROCEDURE — 84153 ASSAY OF PSA TOTAL: CPT

## 2025-05-21 ENCOUNTER — DOCUMENTATION (OUTPATIENT)
Dept: ADMINISTRATIVE | Facility: OTHER | Age: 80
End: 2025-05-21

## 2025-05-21 NOTE — PROGRESS NOTES
05/21/25 2:17 PM     DM A1c outreach is not required, patient has an upcoming appointment with the Endocrinologist office.    Thank you.  Yeni Morrell MA  [facility]

## 2025-05-31 DIAGNOSIS — E11.65 TYPE 2 DIABETES MELLITUS WITH HYPERGLYCEMIA, WITHOUT LONG-TERM CURRENT USE OF INSULIN (HCC): ICD-10-CM

## 2025-06-01 DIAGNOSIS — G25.0 TREMOR, ESSENTIAL: ICD-10-CM

## 2025-06-02 RX ORDER — PROPRANOLOL HYDROCHLORIDE 120 MG/1
120 CAPSULE, EXTENDED RELEASE ORAL DAILY
Qty: 90 CAPSULE | Refills: 1 | Status: SHIPPED | OUTPATIENT
Start: 2025-06-02

## 2025-06-02 RX ORDER — METFORMIN HYDROCHLORIDE 500 MG/1
1000 TABLET, EXTENDED RELEASE ORAL 2 TIMES DAILY
Qty: 360 TABLET | Refills: 1 | Status: SHIPPED | OUTPATIENT
Start: 2025-06-02

## 2025-06-11 DIAGNOSIS — E78.5 DYSLIPIDEMIA: ICD-10-CM

## 2025-06-11 RX ORDER — ROSUVASTATIN CALCIUM 10 MG/1
10 TABLET, COATED ORAL DAILY
Qty: 90 TABLET | Refills: 0 | Status: SHIPPED | OUTPATIENT
Start: 2025-06-11

## 2025-06-13 ENCOUNTER — TELEPHONE (OUTPATIENT)
Dept: FAMILY MEDICINE CLINIC | Facility: CLINIC | Age: 80
End: 2025-06-13

## 2025-06-15 DIAGNOSIS — E11.65 TYPE 2 DIABETES MELLITUS WITH HYPERGLYCEMIA, WITHOUT LONG-TERM CURRENT USE OF INSULIN (HCC): ICD-10-CM

## 2025-06-18 ENCOUNTER — APPOINTMENT (OUTPATIENT)
Dept: LAB | Facility: CLINIC | Age: 80
End: 2025-06-18
Attending: FAMILY MEDICINE
Payer: MEDICARE

## 2025-06-18 LAB
CREAT UR-MCNC: 35.3 MG/DL
MICROALBUMIN UR-MCNC: 45.6 MG/L
MICROALBUMIN/CREAT 24H UR: 129 MG/G CREATININE (ref 0–30)

## 2025-06-18 PROCEDURE — 82570 ASSAY OF URINE CREATININE: CPT

## 2025-06-18 PROCEDURE — 82043 UR ALBUMIN QUANTITATIVE: CPT

## 2025-06-19 ENCOUNTER — RESULTS FOLLOW-UP (OUTPATIENT)
Dept: FAMILY MEDICINE CLINIC | Facility: CLINIC | Age: 80
End: 2025-06-19

## 2025-06-27 ENCOUNTER — OFFICE VISIT (OUTPATIENT)
Dept: CARDIOLOGY CLINIC | Facility: CLINIC | Age: 80
End: 2025-06-27
Payer: MEDICARE

## 2025-06-27 VITALS
OXYGEN SATURATION: 91 % | HEIGHT: 63 IN | DIASTOLIC BLOOD PRESSURE: 58 MMHG | SYSTOLIC BLOOD PRESSURE: 110 MMHG | HEART RATE: 71 BPM | BODY MASS INDEX: 37.92 KG/M2 | WEIGHT: 214 LBS

## 2025-06-27 DIAGNOSIS — I27.21 PULMONARY ARTERY HYPERTENSION (HCC): ICD-10-CM

## 2025-06-27 DIAGNOSIS — R06.09 DOE (DYSPNEA ON EXERTION): ICD-10-CM

## 2025-06-27 DIAGNOSIS — E66.9 OBESITY (BMI 30-39.9): ICD-10-CM

## 2025-06-27 DIAGNOSIS — R00.0 TACHYCARDIA: ICD-10-CM

## 2025-06-27 DIAGNOSIS — E11.9 TYPE 2 DIABETES MELLITUS WITHOUT COMPLICATION, WITHOUT LONG-TERM CURRENT USE OF INSULIN (HCC): ICD-10-CM

## 2025-06-27 DIAGNOSIS — E78.5 DYSLIPIDEMIA: ICD-10-CM

## 2025-06-27 DIAGNOSIS — R01.1 CARDIAC MURMUR: ICD-10-CM

## 2025-06-27 DIAGNOSIS — E11.22 CKD STAGE 3 DUE TO TYPE 2 DIABETES MELLITUS (HCC): ICD-10-CM

## 2025-06-27 DIAGNOSIS — N18.30 CKD STAGE 3 DUE TO TYPE 2 DIABETES MELLITUS (HCC): ICD-10-CM

## 2025-06-27 PROCEDURE — 93000 ELECTROCARDIOGRAM COMPLETE: CPT | Performed by: INTERNAL MEDICINE

## 2025-06-27 PROCEDURE — 99214 OFFICE O/P EST MOD 30 MIN: CPT | Performed by: INTERNAL MEDICINE

## 2025-06-27 NOTE — PROGRESS NOTES
Progress Note - Cardiology Office  [facility]    Parviz Posey 79 y.o. male MRN: [id number]  : 1945  Encounter: [id number]      Assessment:     1. VILLA (dyspnea on exertion)    2. Tachycardia    3. Cardiac murmur    4. Dyslipidemia    5. Obesity (BMI 30-39.9)    6. Pulmonary artery hypertension (HCC)    7. CKD stage 3 due to type 2 diabetes mellitus (HCC)    8. Type 2 diabetes mellitus without complication, without long-term current use of insulin (HCC)        Discussion Summary and Plan:  1. Syncope. No more episodes of syncope.  His syncope was due to his severe pulmonary hypertension and pulmonary embolism at that time.  Repeat echo Doppler in 2022 shows patient's EF is now 60% and there is no evidence of any elevated pulmonary artery pressure.  His pulmonary artery pressures have improved.  No more syncope.    2. Bilateral pulmonary embolism.  Patient had history of bilateral pulmonary embolism with RV strain.  At that time PA pressure was high.  He also had acute DVT at the time.  He will follow-up with hematology and they will do the blood test to see if we need long-term antithrombotic therapy.  He was found to have.  He needs factor B Leiden deficiency for now he will continue Eliquis.  Hematology thinks that he will need blood thinner for long time. Repeat echo shows that his PA pressures have normalized.  He is followed by hematology oncology.      3.   History of DVT as above     4.  Cardiac murmur.  Patient is diagnosed to have cardiac murmur workup shows his mild aortic stenosis.  He had a low normal LV systolic function by echo and by nuclear EF was noted to be lower most likely due to his tachycardia and PVC at that time.  Repeat echo Doppler shows in 2022 that his valve is still mildly stenosed with out any significant flow restriction will continue to monitor.  No change in quality of heart murmur.     5.  Dyslipidemia.  Continue statin  cholesterol profile reviewed triglyceride elevated due to probably elevated HbA1c otherwise cholesterol is improved.  Triglycerides are elevated     6. Diabetes mellitus.   A1c 10.7 which need to be better controlled.  Discussed with patient he follow-up with endocrinology.  Diabetic application can happen if blood sugar remains uncontrolled including heart attack and stroke risk.     7. Elevated PSA with possible prostate cancer with possible urinary obstruction status post chronic Flores catheter management as per Urology.  Now scheduled to have prostate surgery.  There is no cardiac contraindication for the procedure.  Discussed with them again.     8.   Tachycardia.  It has improved.  Current heart rate 71  bpm.    9. Obesity with BMI around 37.9.  Encouraged him to lose weight.    10. Abnormal stress test.  Patient has fixed defect due to body attenuation artifact no change in symptoms we will continue to monitor.      Will follow-up in 6 months.  Patient has no cardiovascular symptoms but his sugar need to be better controlled.  They follow with endocrinology encouraged him to call them as he may need additional medication or change in diet.  They understood very well    Please call [phone] if any questions.  Counseling :  A description of the counseling.  Goals and Barriers.  Patient's ability to self care: Yes  Medication side effect reviewed with patient in detail and all their questions answered to their satisfaction.    HPI :     Parviz Posey is a 79 y.o. year old male who came for follow up. Patient  Was admitted to [facility] with recurrent syncope and was found to have pulmonary embolism and elevated pulmonary artery pressure.  He has medical history significant for hypertension, hyperlipidemia, CKD stage 3, diabetes mellitus and mildly abnormal stress test.  Later on he developed retention of his urine and has Flores catheter placed.  He has cardiac workup in the form of echo  and stress test and was found to have mild aortic stenosis, moderate pulmonary hypertension, and by echo EF was normal by stress test he has decreased EF he was noted to have inferior wall fixed defect no ischemia and he has frequent PACs and PVC at that time which may be showing his low ejection fraction.  He feels great now he walks with the help of cane he has no more episodes of syncope dizziness lightheadedness and he is not requiring any oxygen therapy.  He is still using Flores catheter he may need to follow-up with urology for that.  His EKG reviewed he had a bifascicular block with heart rate 92 beats per minute and Q-wave in inferior lead noted.  He never  he lives with his family he has good support system.  His blood test from August 13, 2020 reviewed.    2/6/2023.    Lab reviewed.  Patient came for follow-up with his sister.  He is doing well history of pulmonary embolism with elevated PA pressure, hypertension, hyperlipidemia, CKD stage III, tachycardia, RBBB, abnormal stress test with fixed inferior wall defect but no ischemia who came for follow-up.  Today EKG was heart rate 70 bpm his heart rate have significantly improved.  He is feeling better he has no new complaints.  He is compliant with his medications.  And his med list was reviewed.  Currently he is on propanolol 120 mg daily.  He has blood test done in September 2022 which has been acceptable.  No nausea no vomiting no other issues.  His EKG shows bifascicular block with heart rate 70 bpm.  He has a repeat echo Doppler done in August 2022 which shows his heart function is acceptable and his PA pressures have significantly improved mild aortic stenosis noted, his sugar is still little up-and-down.  He is still on Eliquis he is scheduled to see hematologist and will have the blood test.    8/24/2023.    Above reviewed.  Patient came for follow-up with his sister.  He is doing well.  History of pulmonary embolism with elevated PA P,  hypertension, dyslipidemia, heart murmur, CKD stage III, RBBB who came for follow-up.  His nuclear stress test shows no ischemia but fixed defect.  EKG today shows heart rate 70 bpm.  His med list reviewed with him.  He has no new symptoms.  He is pretty compliant with his medications.  His blood test done on July 2023 shows patient electrolytes stable.  Triglycerides are elevated he is diabetic.  He follows with hematology and he was told he may be on his blood thinners all his life.  He is doing well from cardiac point of view.  His echo Doppler in August 2022 shows his EF is 60%, he has mild aortic stenosis and his pulmonary hypertension have significantly improved.    2/20/2024.    Above reviewed.  Patient came for follow-up with sister.  He is doing well from cardiac point of view.  Denies any new complaints.  Blood sugar has been elevated HbA1c has steadily gone up to 9.  He is working on that and is well aware.  He has medical history significant for syncope due to pulmonary embolism, elevated PA pressure which has now improved to baseline, hypertension, dyslipidemia heart murmur CKD stage III and right bundle branch block.  Today EKG shows sinus tach with heart rate 78 bpm.  His blood test reviewed.  He is on statins and Eliquis from cardiac point of view no issues with blood thinners.  Last blood test shows hemoglobin 10.6 cholesterol profile shows triglycerides slightly elevated otherwise acceptable.  That could be related to underlying elevated HbA1c.  His previous echo which was repeated in August 2022 shows he is mild aortic stenosis and PA pressures have improved.    6/27/2025.    Above reviewed.  Patient came for follow-up.  Patient has medical history significant for syncope due to pulmonary embolism, history of elevated PA pressure which has improved, dyslipidemia, CKD stage III, right bundle branch block, hypertensive heart disease who came for follow-up.  Patient blood sugar is not very  well-controlled HbA1c 10.7.  His EKG shows sinus rhythm with a heart rate 71 bpm.  They follow with endocrinologist.  Compliant with his medication he takes Eliquis 5 twice a day, Jardiance, propranolol and Crestor.  His triglycerides are elevated consistent with his lack of proper blood sugar control he is not as active as he used to be.  But he has no chest pain no shortness of breath he does have a gait problem and arthralgia.      Review of Systems   Constitutional:  Negative for activity change, chills, diaphoresis, fever and unexpected weight change.   HENT:  Negative for congestion.    Eyes:  Negative for discharge and redness.   Respiratory:  Negative for cough, chest tightness, shortness of breath and wheezing.    Cardiovascular: Negative.  Negative for chest pain, palpitations and leg swelling.   Gastrointestinal:  Negative for abdominal pain, diarrhea and nausea.   Endocrine: Negative.    Genitourinary:  Negative for decreased urine volume and urgency.   Musculoskeletal:  Positive for arthralgias and gait problem. Negative for back pain.   Skin:  Negative for rash and wound.   Allergic/Immunologic: Negative.    Neurological:  Negative for dizziness, seizures, syncope, weakness, light-headedness and headaches.   Hematological: Negative.    Psychiatric/Behavioral:  Negative for agitation and confusion. The patient is not nervous/anxious.        Historical Information   Past Medical History:   Diagnosis Date   • Arthritis    • BPH associated with nocturia    • Diabetes mellitus (HCC)    • Edema     lower legs   • Hearing aid worn     bilateral   • Hyperlipidemia    • Hypertension     controlled   • Tremor of both hands      Past Surgical History:   Procedure Laterality Date   • CATARACT EXTRACTION     • MOUTH SURGERY  09/2024   • MS TRURL ELECTROSURG RESCJ PROSTATE BLEED COMPLETE N/A 01/20/2022    Procedure: CYSTOSCOPY, TRANSURETHRAL RESECTION OF PROSTATE (TURP);  Surgeon: Flakito Parra MD;  Location: [facility]  MAIN OR;  Service: Urology   • FL XCAPSL CTRC RMVL INSJ IO LENS PROSTH W/O ECP Right 08/06/2018    Procedure: EXTRACTION EXTRACAPSULAR CATARACT PHACO INTRAOCULAR LENS (IOL);  Surgeon: Riccardo Saavedra MD;  Location: [facility] MAIN OR;  Service: Ophthalmology   • FL XCAPSL CTRC RMVL INSJ IO LENS PROSTH W/O ECP Left 10/01/2018    Procedure: EXTRACTION EXTRACAPSULAR CATARACT PHACO INTRAOCULAR LENS (IOL);  Surgeon: Riccardo Saavedra MD;  Location: [facility] MAIN OR;  Service: Ophthalmology     Social History     Substance and Sexual Activity   Alcohol Use Never     Social History     Substance and Sexual Activity   Drug Use Never     Social History     Tobacco Use   Smoking Status Never   • Passive exposure: Past   Smokeless Tobacco Never     Family History:   Family History   Problem Relation Name Age of Onset   • Colon cancer Mother     • Cancer Father          lung-smoker   • Lung cancer Father     • Early death Brother sherri    • Mastocytosis Brother sherri    • Cancer Brother Prosper    • Stomach cancer Brother Prosper    • Hyperlipidemia Sister Jackelin    • Hypertension Sister Jackelin    • Diabetes type II Sister Jackelin    • No Known Problems Sister     • No Known Problems Sister     • No Known Problems Sister     • No Known Problems Sister         Meds/Allergies     No Known Allergies    Current Outpatient Medications:   •  Ascorbic Acid (VITAMIN C PO), Take by mouth in the morning, Disp: , Rfl:   •  calcium-vitamin D (OSCAL 500 + D) 500 mg-200 units per tablet, Take 1 tablet by mouth in the morning., Disp: , Rfl:   •  Continuous Glucose Sensor (FreeStyle [place] 2 Sensor) MISC, USE 1 ONCE UNDER THE SKIN EVERY 14 DAYS, Disp: 6 each, Rfl: 1  •  Cyanocobalamin (VITAMIN B-12 CR PO), Take by mouth in the morning, Disp: , Rfl:   •  Darolutamide (NUBEQA PO), Take by mouth, Disp: , Rfl:   •  denosumab (PROLIA) 60 mg/mL, Inject 60 mg under the skin once Last dose was 10/11/2024 next dose will be 04/13/2025 at dr. Pemberton's office, Disp: , Rfl:  "  •  Eliquis 5 MG, Take 1 tablet by mouth twice daily, Disp: 180 tablet, Rfl: 0  •  Empagliflozin (Jardiance) 25 MG TABS, Take 1 tablet (25 mg total) by mouth every morning, Disp: 90 tablet, Rfl: 1  •  folic acid (FOLVITE) 1 mg tablet, Take 1 tablet by mouth once daily, Disp: 90 tablet, Rfl: 1  •  metFORMIN (GLUCOPHAGE-XR) 500 mg 24 hr tablet, Take 2 tablets by mouth twice daily, Disp: 360 tablet, Rfl: 1  •  primidone (MYSOLINE) 50 mg tablet, Take 2 tabs in morning and 1 tab at bedtime., Disp: 270 tablet, Rfl: 3  •  propranolol (INDERAL LA) 120 mg 24 hr capsule, Take 1 capsule by mouth once daily, Disp: 90 capsule, Rfl: 1  •  rosuvastatin (CRESTOR) 10 MG tablet, Take 1 tablet by mouth once daily, Disp: 90 tablet, Rfl: 0  •  sitaGLIPtin (Januvia) 100 mg tablet, Take 1 tablet by mouth once daily, Disp: 30 tablet, Rfl: 5  •  Continuous Blood Gluc  (FreeStyle [place]ilton 2 [place]) CORA, Use 1 each 1 (one) time for 1 dose, Disp: 1 each, Rfl: 0  •  tamsulosin (FLOMAX) 0.4 mg, Take 0.4 mg by mouth 2 (two) times a day (Patient not taking: Reported on 3/19/2025), Disp: , Rfl:     Vitals: Blood pressure 110/58, pulse 71, height 5' 3\" (1.6 m), weight 97.1 kg (214 lb), SpO2 91%.    Body mass index is 37.91 kg/m².  Wt Readings from Last 3 Encounters:   06/27/25 97.1 kg (214 lb)   03/24/25 100 kg (221 lb)   03/19/25 101 kg (222 lb)     Vitals:    06/27/25 1119   Weight: 97.1 kg (214 lb)     BP Readings from Last 3 Encounters:   06/27/25 110/58   03/24/25 132/80   03/19/25 116/60       Physical Exam:  Physical Exam  Constitutional:       General: He is not in acute distress.     Appearance: He is well-developed. He is not diaphoretic.   Neck:      Thyroid: No thyromegaly.      Vascular: No JVD.      Trachea: No tracheal deviation.     Cardiovascular:      Rate and Rhythm: Normal rate and regular rhythm.      Heart sounds: S1 normal and S2 normal. Heart sounds not distant. Murmur heard.      Systolic (ejection) murmur is present " with a grade of 2/6.      No friction rub. No gallop. No S3 or S4 sounds.   Pulmonary:      Effort: Pulmonary effort is normal. No respiratory distress.      Breath sounds: Normal breath sounds. No wheezing or rales.   Chest:      Chest wall: No tenderness.   Abdominal:      General: Bowel sounds are normal. There is no distension.      Palpations: Abdomen is soft.      Tenderness: There is no abdominal tenderness.     Musculoskeletal:         General: No deformity.      Cervical back: Neck supple.     Skin:     General: Skin is warm and dry.      Coloration: Skin is not pale.      Findings: No rash.     Neurological:      Mental Status: He is alert and oriented to person, place, and time.     Psychiatric:         Behavior: Behavior normal.         Judgment: Judgment normal.           Diagnostic Studies Review Cardio:      Nuclear stress test.  Nuclear stress test was abnormal with fixed inferior apical defect which is worse in the rest images and is most likely due to body attenuation artifact EF was around 40%.  But no significant ischemia was noted.      Echo Doppler.  Echo Doppler shows EF 55 % mild LVH, mild aortic stenosis, mild MR, mild TR and PA pressure was 64 mmHg.    Repeat echo Doppler done in August 2022 shows patient has EF 60%, mild aortic stenosis with mean gradient 11 mmHg, aortic root is mildly dilated..  PA pressure was not measured but no indirect evidence of right-sided elevated pressures.      EKG:  Twelve lead EKG 09/20/2021 shows sinus rhythm heart rate 92 beats per minute bifascicular block Q-wave in inferior lead noted cannot rule out old inferior wall infarct.    Twelve lead EKG done on 01/17/2022 shows sinus rhythm with frequent PACs heart rate 86 beats per minute bifascicular block noted.    Twelve lead EKG done 07/13/2022 shows sinus rhythm with frequent PACs heart rate 77 beats per minute.    Twelve-lead EKG 2/6/2023 shows normal sinus rhythm with bifascicular block heart rate 70 bpm  no change from previously    Twelve-lead EKG done on 2020 shows normal sinus some bifascicular block with RBBB left risk block heart rate 70 bpm not much change from previous EKG    Twelve-lead EKG done on 2024 shows normal sinus some heart rate 78 bpm incomplete RBBB.  Not much change from previous EKG left axis deviation noted.    Twelve-lead EKG 2025 sinus rhythm right bundle branch block heart rate 71 bpm no change from previous EKG.        Cardiac testing:   Results for orders placed during the hospital encounter of 21    Echo complete with contrast if indicated    Narrative  [address] [address]  [phone]    Transthoracic Echocardiogram  2D, M-mode, Doppler, and Color Doppler    Study date:  2021    Patient: NAIDA SANABRIA  MR number: [id number]  Account number: [id number]  : 1945  Age: 75 years  Gender: Male  Status: Inpatient  Location: Bedside  Height: 69 in  Weight: 226.6 lb  BP: 122/ 74 mmHg    Indications: Pulmonary Embolism    Diagnoses: I74.9 - Embolism and thrombosis of unspecified artery    Sonographer:  Alley Dobson RDCS  Referring Physician:  Alley Worley PA-C  Group:  [facility]  Interpreting Physician:  Brad Walters DO    SUMMARY    LEFT VENTRICLE:  Systolic function was normal by visual assessment. Ejection fraction was estimated in the range of 55 % to 60 %.  There were no regional wall motion abnormalities.  There was mild concentric hypertrophy.  Doppler parameters were consistent with abnormal left ventricular relaxation (grade 1 diastolic dysfunction).    MITRAL VALVE:  There was mild regurgitation.    AORTIC VALVE:  The valve was trileaflet. Leaflets exhibited normal thickness, mild calcification, and moderately reduced cuspal separation.  There was mild stenosis.  There was no regurgitation.  Valve mean gradient was 11 mmHg.  Aortic valve area was 1.7 cmï¾² by  the continuity equation.    TRICUSPID VALVE:  There was mild regurgitation.  Pulmonary artery systolic pressure was moderately increased.    HISTORY: PRIOR HISTORY: Diabetes Mellitus; Chronic Kidney Disease; Anemia; Dyslipidemia; Sepsis; Non MI Troponin Elevation; Hypertension    PROCEDURE: The procedure was performed at the bedside. This was a routine study. The transthoracic approach was used. The study included complete 2D imaging, M-mode, complete spectral Doppler, and color Doppler. The heart rate was 79 bpm,  at the start of the study. Intravenous contrast ( 0.4ml/min Definity in NSS) was administered. Intravenous contrast was administered to opacify the left ventricle. Echocardiographic views were limited due to poor acoustic window  availability, decreased penetration, and lung interference. This was a technically difficult study.    LEFT VENTRICLE: Size was normal. Systolic function was normal by visual assessment. Ejection fraction was estimated in the range of 55 % to 60 %. There were no regional wall motion abnormalities. There was mild concentric hypertrophy.  DOPPLER: Doppler parameters were consistent with abnormal left ventricular relaxation (grade 1 diastolic dysfunction).    RIGHT VENTRICLE: The size was normal. Systolic function was normal. DOPPLER: Systolic pressure was within the normal range.    LEFT ATRIUM: Size was normal. No thrombus was identified.    RIGHT ATRIUM: Size was normal.    MITRAL VALVE: Valve structure was normal. There was normal leaflet separation. No echocardiographic evidence for prolapse. DOPPLER: The transmitral velocity was within the normal range. There was no evidence for stenosis. There was mild  regurgitation.    AORTIC VALVE: The valve was trileaflet. Leaflets exhibited normal thickness, mild calcification, and moderately reduced cuspal separation. DOPPLER: Transaortic velocity was increased due to valvular stenosis. There was mild stenosis. There  was no  regurgitation.    TRICUSPID VALVE: The valve structure was normal. There was normal leaflet separation. DOPPLER: The transtricuspid velocity was within the normal range. There was mild regurgitation. Pulmonary artery systolic pressure was moderately  increased. Estimated peak PA pressure was 64 mmHg.    PULMONIC VALVE: Leaflets exhibited normal thickness, no calcification, and normal cuspal separation. DOPPLER: The transpulmonic velocity was within the normal range. There was no regurgitation.    PERICARDIUM: There was no thickening. There was no pericardial effusion.    AORTA: The root exhibited normal size.    PULMONARY ARTERY: The size was normal. The morphology appeared normal.    MEASUREMENT TABLES    DOPPLER MEASUREMENTS  Aortic valve   (Reference normals)  Peak gradient   21 mmHg   (--)  Mean gradient   11 mmHg   (--)  Valve area, cont   1.7 cmï¾²   (--)    SYSTEM MEASUREMENT TABLES    2D  EF (Teich): 54.78 %  %FS: 28.23 %  Ao Diam: 3.71 cm  Ao asc: 4.23 cm  EDV(Teich): 87.43 ml  ESV(Teich): 39.54 ml  IVSd: 1.19 cm  LA Area: 13.05 cm2  LA Diam: 2.75 cm  LVEDV MOD A4C: 78.34 ml  LVEF MOD A4C: 80.3 %  LVESV MOD A4C: 15.43 ml  LVIDd: 4.39 cm  LVIDs: 3.15 cm  LVLd A4C: 7.01 cm  LVLs A4C: 5.23 cm  LVOT Diam: 1.97 cm  LVPWd: 1.23 cm  RA Area: 13.4 cm2  RVIDd: 3.82 cm  SV (Teich): 47.89 ml  SV MOD A4C: 62.91 ml    CW  AV Env.Ti: 298.13 ms  AV VTI: 45.96 cm  AV Vmax: 2.23 m/s  AV Vmean: 1.54 m/s  AV maxP.85 mmHg  AV meanPG: 10.84 mmHg  TR Vmax: 3.77 m/s  TR maxP.93 mmHg    MM  TAPSE: 2.2 cm    PW  MV E/A Ratio: 0.85  E' Sept: 0.06 m/s  E/E' Sept: 12.19  LVOT Env.Ti: 365.37 ms  LVOT VTI: 25.4 cm  LVOT Vmax: 1.2 m/s  LVOT Vmean: 0.7 m/s  LVOT maxP.76 mmHg  LVOT meanP.51 mmHg  MV A Melvin: 0.93 m/s  MV Dec Green: 3.07 m/s2  MV DecT: 255.81 ms  MV E Melvin: 0.79 m/s  MV PHT: 74.19 ms  MVA By PHT: 2.97 cm2    [facility]    Prepared and electronically signed  by    Brad Walters DO  Signed 2021 10:21:25        Results for orders placed during the hospital encounter of 21    NM Myocardial Perfusion Spect (Pharmacological Induced Stress and/or Rest)    Narrative  [address] [address]  [phone]    Rest/Stress Gated SPECT Myocardial Perfusion Imaging After Regadenoson    Patient: NAIDA SANABRIA  MR number: [id number]  Account number: [id number]  : 1945  Age: 75 years  Gender: Male  Status: Inpatient  Location: Stress lab  Height: 69 in  Weight: 227 lb  BP: 133/ 68 mmHg    Allergies: NO KNOWN ALLERGIES    Diagnosis: R06.02 - Shortness of breath, R74.8 - Abnormal levels of other serum enzymes    Primary Physician:  Rashid Singh DO  RN:  GABY Veliz  Referring Physician:  BLAZE Stewart  Group:  [facility]  Report Prepared By::  GABY Veliz  Interpreting Physician:  Mary Lou Keller MD    INDICATIONS: Evaluation for coronary artery disease.    HISTORY: The patient is a 75 year old  male. Chest pain status: no chest pain. Other symptoms: dyspnea. Coronary artery disease risk factors: dyslipidemia, hypertension, and diabetes mellitus. Cardiovascular history: peripheral  vascular occlusive disease. Medications:an anticoagulant, a lipid lowering agent and diabetic medications.    PHYSICAL EXAM: Baseline physical exam screening: no wheezes audible.    REST ECG: Normal sinus rhythm. The ECG showed right bundle branch block.    PROCEDURE: The study was performed in the the Stress lab. A regadenoson infusion pharmacologic stress test was performed. Gated SPECT myocardial perfusion imaging was performed after stress and at rest. Systolic blood pressure was 133  mmHg, at the start of the study. Diastolic blood pressure was 68 mmHg, at the start of the study. The heart rate was 83 bpm, at the start of the study. IV double checked.  Regadenoson protocol:  Time HR bpm SBP  mmHg DBP mmHg Symptoms Rhythm/conduct  Baseline 09:36 83 133 68 none NSR, RBBB  Immediate 09:42 100 96 54 none same as above  1 min 09:43 99 106 59 none same as above  2 min 09:44 95 118 60 none same as above  3 min 09:45 98 118 56 none --  4 min 09:46 100 118 60 none same as above  No medications or fluids given.    STRESS SUMMARY: Duration of pharmacologic stress was 3 min. Maximal heart rate during stress was 105 bpm. The heart rate response to stress was normal. There was normal resting blood pressure with an appropriate response to stress. The  rate-pressure product for the peak heart rate and blood pressure was 15340. There was no chest pain during stress. The stress test was terminated due to protocol completion. On 2l/min of O2 by nasal cannula  Pre oxygen saturation: 94 %. Peak oxygen saturation: 94 %. The stress ECG was negative for ischemia and normal. There were no stress arrhythmias or conduction abnormalities.Arrhythmia during stress: isolated premature ventricular beats.    ISOTOPE ADMINISTRATION:  Resting isotope administration Stress isotope administration  Agent Tetrofosmin Tetrofosmin  Dose 11 mCi 33 mCi  Date 07/21/2021 07/21/2021    The radiopharmaceutical was injected at the peak effect of pharmacologic stress.    MYOCARDIAL PERFUSION IMAGING:  The image quality was fair. Rotating projection images reveal mild diaphragmatic attenuation and mild patient motion. Left ventricular size was normal. The TID ratio was 1.25. The right ventricle was dilated.    PERFUSION DEFECTS:  -  There was a moderate-sized, mildly severe, fixed myocardial perfusion defect of the apical apical and inferior wall.    GATED SPECT:  The calculated left ventricular ejection fraction was 40 %. Left ventricular ejection fraction was mildly decreased by visual estimate. There was no diagnostic evidence for left ventricular regional abnormality.    SUMMARY:  -  Stress results: There was no chest pain during stress.  -  ECG  conclusions: The stress ECG was negative for ischemia and normal.  -  Perfusion imaging: There was a moderate-sized, mildly severe, fixed myocardial perfusion defect of the apical apical and inferior wall.  -  Gated SPECT: The calculated left ventricular ejection fraction was 40 %. Left ventricular ejection fraction was mildly decreased by visual estimate. There was no diagnostic evidence for left ventricular regional abnormality.  -  Impressions and recommendations: Abnormal study after pharmacologic vasodilation. There is a fixed inferior apical defect which is worse in rest images than stress images most likely secondary to body attenuation artifact. Cannot rule  out old nontransmural myocardial infarction. EF calculated around 40% appears to be mildly decreased. No significant ischemia is noted    IMPRESSIONS: Abnormal study after pharmacologic vasodilation. There is a fixed inferior apical defect which is worse in rest images than stress images most likely secondary to body attenuation artifact. Cannot rule out old nontransmural  myocardial infarction. EF calculated around 40% appears to be mildly decreased. No significant ischemia is noted Left ventricular systolic function was reduced, without distinct regional wall motion abnormalities.    Prepared and signed by    Mary Lou Keller MD  Signed 07/21/2021 16:58:46          Lab Review   Lab Results   Component Value Date    WBC 6.54 03/31/2025    HGB 11.4 (L) 03/31/2025    HCT 36.0 (L) 03/31/2025    MCV 89 03/31/2025    RDW 14.0 03/31/2025     03/31/2025     BMP:  Lab Results   Component Value Date    SODIUM 138 03/31/2025    K 5.1 04/02/2025    CL 99 03/31/2025    CO2 32 03/31/2025    BUN 23 03/31/2025    CREATININE 1.08 03/31/2025    GLUC 247 (H) 03/31/2025    GLUF 238 (H) 10/15/2024    CALCIUM 9.3 03/31/2025    CORRECTEDCA 9.2 06/29/2022    EGFR 64 03/31/2025     LFT:  Lab Results   Component Value Date    AST 13 03/31/2025    ALT 13 03/31/2025     "ALKPHOS 57 03/31/2025    TP 6.4 03/31/2025    ALB 4.1 03/31/2025      No components found for: \"TSH3\"  Lab Results   Component Value Date    YVR4WUNFRZZH 3.260 07/11/2023     Lab Results   Component Value Date    HGBA1C 10.7 (H) 03/31/2025     Lipid Profile:     Lab Results   Component Value Date    TROPONINI 1.12 (H) 07/18/2021     Lab Results   Component Value Date    NTBNP 2,051 (H) 07/18/2021            Dr. Mary Lou Keller MD [facility]      \"This note has been constructed using a voice recognition system.Therefore there may be syntax, spelling, and/or grammatical errors. Please call if you have any questions. \"  "

## 2025-07-28 DIAGNOSIS — I26.99 BILATERAL PULMONARY EMBOLISM (HCC): ICD-10-CM

## 2025-07-28 DIAGNOSIS — D68.51 FACTOR 5 LEIDEN MUTATION, HETEROZYGOUS (HCC): ICD-10-CM

## 2025-07-28 DIAGNOSIS — E11.65 TYPE 2 DIABETES MELLITUS WITH HYPERGLYCEMIA, WITHOUT LONG-TERM CURRENT USE OF INSULIN (HCC): ICD-10-CM

## 2025-07-29 RX ORDER — EMPAGLIFLOZIN 25 MG/1
25 TABLET, FILM COATED ORAL EVERY MORNING
Qty: 90 TABLET | Refills: 1 | Status: SHIPPED | OUTPATIENT
Start: 2025-07-29

## 2025-07-29 RX ORDER — APIXABAN 5 MG/1
5 TABLET, FILM COATED ORAL 2 TIMES DAILY
Qty: 180 TABLET | Refills: 1 | Status: SHIPPED | OUTPATIENT
Start: 2025-07-29

## (undated) DEVICE — SPONGE STICK WITH PVP-I: Brand: KENDALL

## (undated) DEVICE — GROUNDING PAD UNIVERSAL SLW

## (undated) DEVICE — STANDARD SURGICAL GOWN, L: Brand: CONVERTORS

## (undated) DEVICE — BAG URINE DRAINAGE 4000ML CONTINUOUS IRR

## (undated) DEVICE — THE MONARCH® "D" CARTRIDGE IS A SINGLE-USE POLYPROPYLENE CARTRIDGE FOR POSTERIOR CHAMBER IOL DELIVERY: Brand: MONARCH® III

## (undated) DEVICE — CLEARCUT® SLIT KNIFE INTREPID MICRO-COAXIAL SYSTEM 2.4 SB: Brand: CLEARCUT®; INTREPID

## (undated) DEVICE — INTREPID® TRANSFORMER IA HP: Brand: INTREPID®

## (undated) DEVICE — EYE PACK CUSTOM -FINNEGAN

## (undated) DEVICE — 0.9MM MICROSMOOTH NULTRA INFUSION SLEEVE KIT: Brand: INFINIT, MICROSMOOTH, ALCON

## (undated) DEVICE — MICROSURGICAL INSTRUMENT IRR. CYSTITOME 25GA STRAIGHT-REVERSE CUTTING: Brand: ALCON

## (undated) DEVICE — ACTIVE FMS W/ INTREPID* ULTRA SLEEVES, 0.9MM 45° ABS* INTREPID* BALANCED TIP: Brand: ALCON

## (undated) DEVICE — B-H IRRIGATING CAN 19GA FLAT ANGLED 8MM: Brand: OPHTHALMIC CANNULA

## (undated) DEVICE — GLOVE SRG BIOGEL 8

## (undated) DEVICE — CYSTOSCOPY PACK: Brand: CONVERTORS

## (undated) DEVICE — GLOVE SRG BIOGEL 7.5

## (undated) DEVICE — Device

## (undated) DEVICE — RESECTOSCOPE LOOE ELECTRODE 27040F/6

## (undated) DEVICE — CYSTO TUBING TUR Y IRRIGATION

## (undated) DEVICE — Device: Brand: MALYUGIN RING SYSTEM 7.0MM

## (undated) DEVICE — EVACUATOR BLADDER ELLIK DISP STRL

## (undated) DEVICE — POUCH UR CATCHER STERILE

## (undated) DEVICE — AIR INJECT CANNULA 27GA: Brand: OPHTHALMIC CANNULA

## (undated) DEVICE — LUBRICANT SURGILUBE TUBE 4 OZ  FLIP TOP